# Patient Record
Sex: FEMALE | Race: WHITE | NOT HISPANIC OR LATINO | Employment: OTHER | URBAN - METROPOLITAN AREA
[De-identification: names, ages, dates, MRNs, and addresses within clinical notes are randomized per-mention and may not be internally consistent; named-entity substitution may affect disease eponyms.]

---

## 2017-01-17 ENCOUNTER — MYC MEDICAL ADVICE (OUTPATIENT)
Dept: FAMILY MEDICINE | Facility: CLINIC | Age: 67
End: 2017-01-17

## 2017-01-17 NOTE — Clinical Note
[facility]  [address] [place] [id number] [phone]      January 25, 2017      Shelia Sanchez  [facility] [address]              Dear Shelia,    In order to ensure we are providing the best quality care, we have reviewed your chart and see that you are due for:    1 FIT (stool card)  2 Mammogram- (scheduling line [phone])    Please call the clinic at your earliest convenience to schedule an appointment. Please complete the FIT card and send in. If you need another card, please schedule a lab only appt to pick one up.    Thank you for trusting us with your health care.    Sincerely,    Dr. Tahmina Hernández/wilbur

## 2017-01-17 NOTE — TELEPHONE ENCOUNTER
Panel Management Review          Composite cancer screening  Chart review shows that this patient is due/due soon for the following Mammogram and Fecal Colorectal (FIT)  Summary:    Patient is due/failing the following:   FIT and MAMMOGRAM    Action needed:   Patient needs to schedule mammogram  Complete FIT and send in OR schedule lab only appt to  another card if she needs it    Type of outreach:    Sent Zova message.    Questions for provider review:    None                                                                                                                                      Fauzia Jasmine MA       Chart routed to Care Team .

## 2017-03-24 ENCOUNTER — OFFICE VISIT (OUTPATIENT)
Dept: PALLIATIVE MEDICINE | Facility: CLINIC | Age: 67
End: 2017-03-24
Payer: COMMERCIAL

## 2017-03-24 VITALS
SYSTOLIC BLOOD PRESSURE: 143 MMHG | DIASTOLIC BLOOD PRESSURE: 82 MMHG | BODY MASS INDEX: 41.82 KG/M2 | HEART RATE: 88 BPM | WEIGHT: 267 LBS

## 2017-03-24 DIAGNOSIS — M54.2 CERVICAL PAIN: ICD-10-CM

## 2017-03-24 DIAGNOSIS — M47.812 CERVICAL FACET JOINT SYNDROME: ICD-10-CM

## 2017-03-24 DIAGNOSIS — M47.812 CERVICAL SPONDYLOSIS WITHOUT MYELOPATHY: ICD-10-CM

## 2017-03-24 DIAGNOSIS — M19.011 ARTHROSIS OF RIGHT ACROMIOCLAVICULAR JOINT: ICD-10-CM

## 2017-03-24 DIAGNOSIS — G44.86 CERVICOGENIC HEADACHE: ICD-10-CM

## 2017-03-24 DIAGNOSIS — M79.18 MYOFASCIAL PAIN: Primary | ICD-10-CM

## 2017-03-24 PROCEDURE — 99215 OFFICE O/P EST HI 40 MIN: CPT | Performed by: ANESTHESIOLOGY

## 2017-03-24 RX ORDER — GABAPENTIN 100 MG/1
100 CAPSULE ORAL 3 TIMES DAILY
Qty: 90 CAPSULE | Refills: 1 | Status: SHIPPED | OUTPATIENT
Start: 2017-03-24 | End: 2017-05-25

## 2017-03-24 ASSESSMENT — PAIN SCALES - GENERAL: PAINLEVEL: EXTREME PAIN (8)

## 2017-03-24 NOTE — NURSING NOTE
"Chief Complaint   Patient presents with     Pain     Neck pain        Initial /82  Pulse 88  Wt 121.1 kg (267 lb)  BMI 41.82 kg/m2 Estimated body mass index is 41.82 kg/(m^2) as calculated from the following:    Height as of 11/16/16: 1.702 m (5' 7\").    Weight as of this encounter: 121.1 kg (267 lb).  Medication Reconciliation: complete     Monse Villanueva MA    "

## 2017-03-24 NOTE — MR AVS SNAPSHOT
After Visit Summary   3/24/2017    Shelia Sanchez    MRN: [id number]           Patient Information     Date Of Birth          1950        Visit Information        Provider Department      3/24/2017 3:00 PM Rome Fowler MD [facility]        Today's Diagnoses     Myofascial pain    -  1    Cervical facet joint syndrome          Care Instructions    Assessment:  1.  Chronic neck pain  2.  Cervical spondylosis (arithritis)  3.  Cervical facet joint arthropathy  4.  Cervicogenic headache  5.  Myofascial pain  6.  Chronic right shoulder pain  7.  Right acromioclavicular joint arthropathy      Plan:  Diagnosis reviewed, treatment option addressed, and risk/benefits discussed.  Self-care instructions given.  I am recommending a multidisciplinary treatment plan to help this patient better manage her pain.      1. Physical Therapy: deferred at this time - continue home exercise  2. Clinical Health Psychologist to address issues of relaxation, behavioral change, coping style, and other factors important to improvement: deferred at this time - coping well  3. Diagnostic Studies: reviewed MRI with patient  4. Medication Management:   1. Continue Advil prn  2. trial of Gabapentin 100 mg at bedtime, with gradual titration to three times per day  5. Further procedures recommended:   1. Cervical medial branch block bilaterally at C3, C4, C5, and C6  2. Consider right acromioclavicular joint injection  6. Acupuncture: deferred  7. Urine toxicology screen today: deferred today - not requesting pain medications   8. Recommendations/follow-up for PCP:  Continue current treatment  9. Release of information: n/a  10. Follow up: for procedure and in 6-8 weeks      ----------------------------------------------------------------  Nurse Triage line:  [phone]   Call this number with any questions or concerns. You may leave a detailed message anytime. Calls are typically returned Monday  through Friday between 8 AM and 4:30 PM. We usually get back to you within 2 business days depending on the issue/request.       Medication refills:    For non-narcotic medications, call your pharmacy directly to request a refill. The pharmacy will contact the [facility] for authorization. Please allow 3-4 days for these refills to be processed.     For narcotic refills, call the nurse triage line or send a Vistaart message. Please contact us 7-10 days before your refill is due. The message MUST include the name of the specific medication(s) requested and how you would like to receive the prescription(s). The options are as follows:    Pain Clinic staff can mail the prescription to your pharmacy. Please tell us the name of the pharmacy.    You may pick the prescription up at the Pain Clinic (tell us the location) or during a clinic visit with your pain provider    Pain Clinic staff can deliver the prescription to the [facility] pharmacy in the clinic building. Please tell us the location.      Scheduling number: [phone].  Call this number to schedule or change appointments.    We believe regular attendance is key to your success in our program.    Any time you are unable to keep your appointment we ask that you call us at least 24 hours in advance to let us know. This will allow us to offer the appointment time to another patient.             Follow-ups after your visit        Additional Services     PAIN INJECTION EVAL/TREAT/FOLLOW UP                 Who to contact     If you have questions or need follow up information about today's clinic visit or your schedule please contact [facility] directly at [phone].  Normal or non-critical lab and imaging results will be communicated to you by MyChart, letter or phone within 4 business days after the clinic has received the results. If you do not hear from us within 7 days, please contact the clinic through MyChart or phone. If you have a  critical or abnormal lab result, we will notify you by phone as soon as possible.  Submit refill requests through QR Wild or call your pharmacy and they will forward the refill request to us. Please allow 3 business days for your refill to be completed.          Additional Information About Your Visit        Joobilihart Information     QR Wild gives you secure access to your electronic health record. If you see a primary care provider, you can also send messages to your care team and make appointments. If you have questions, please call your primary care clinic.  If you do not have a primary care provider, please call [phone] and they will assist you.        Care EveryWhere ID     This is your Care EveryWhere ID. This could be used by other organizations to access your [place] medical records  [id number]        Your Vitals Were     Pulse BMI (Body Mass Index)                88 41.82 kg/m2           Blood Pressure from Last 3 Encounters:   03/24/17 143/82   11/16/16 128/76   07/11/16 134/70    Weight from Last 3 Encounters:   03/24/17 121.1 kg (267 lb)   11/16/16 116.5 kg (256 lb 12.8 oz)   06/03/16 109.8 kg (242 lb)              We Performed the Following     PAIN INJECTION EVAL/TREAT/FOLLOW UP          Today's Medication Changes          These changes are accurate as of: 3/24/17  4:08 PM.  If you have any questions, ask your nurse or doctor.               Start taking these medicines.        Dose/Directions    gabapentin 100 MG capsule   Commonly known as:  NEURONTIN   Used for:  Myofascial pain        Dose:  100 mg   Take 1 capsule (100 mg) by mouth 3 times daily Take one at bedtime for 3 days, then one twice a day for 3 days, then one 3 times per day   Quantity:  90 capsule   Refills:  1            Where to get your medicines      These medications were sent to [facility] [address] [facility] [address] [facility] [address]  [address], [facility] [address]      Phone:  [phone]     gabapentin 100 MG capsule                Primary Care Provider Office Phone # Fax #    Tahmina Hernández [phone]       [address] [address]        Thank you!     Thank you for choosing [facility]  for your care. Our goal is always to provide you with excellent care. Hearing back from our patients is one way we can continue to improve our services. Please take a few minutes to complete the written survey that you may receive in the mail after your visit with us. Thank you!             Your Updated Medication List - Protect others around you: Learn how to safely use, store and throw away your medicines at www.disposemymeds.org.          This list is accurate as of: 3/24/17  4:08 PM.  Always use your most recent med list.                   Brand Name Dispense Instructions for use    amLODIPine 5 MG tablet    NORVASC    90 tablet    Take 1 tablet (5 mg) by mouth daily       aspirin 81 MG tablet      Take 81 mg by mouth daily       gabapentin 100 MG capsule    NEURONTIN    90 capsule    Take 1 capsule (100 mg) by mouth 3 times daily Take one at bedtime for 3 days, then one twice a day for 3 days, then one 3 times per day       hydrochlorothiazide 25 MG tablet    HYDRODIURIL    90 tablet    Take 1 tablet (25 mg) by mouth daily       NEXIUM PO          SLEEP-AID MAXIMUM STRENGTH 50 MG Caps   Generic drug:  DiphenhydrAMINE HCl (Sleep)      Take 1 capsule by mouth At Bedtime       venlafaxine 37.5 MG 24 hr capsule    EFFEXOR-XR    90 capsule    Take 1 capsule (37.5 mg) by mouth daily

## 2017-03-24 NOTE — PATIENT INSTRUCTIONS
Assessment:  1.  Chronic neck pain  2.  Cervical spondylosis (arithritis)  3.  Cervical facet joint arthropathy  4.  Cervicogenic headache  5.  Myofascial pain  6.  Chronic right shoulder pain  7.  Right acromioclavicular joint arthropathy      Plan:  Diagnosis reviewed, treatment option addressed, and risk/benefits discussed.  Self-care instructions given.  I am recommending a multidisciplinary treatment plan to help this patient better manage her pain.      1. Physical Therapy: deferred at this time - continue home exercise  2. Clinical Health Psychologist to address issues of relaxation, behavioral change, coping style, and other factors important to improvement: deferred at this time - coping well  3. Diagnostic Studies: reviewed MRI with patient  4. Medication Management:   1. Continue Advil prn  2. trial of Gabapentin 100 mg at bedtime, with gradual titration to three times per day  5. Further procedures recommended:   1. Cervical medial branch block bilaterally at C3, C4, C5, and C6  2. Consider right acromioclavicular joint injection  6. Acupuncture: deferred  7. Urine toxicology screen today: deferred today - not requesting pain medications   8. Recommendations/follow-up for PCP:  Continue current treatment  9. Release of information: n/a  10. Follow up: for procedure and in 6-8 weeks      ----------------------------------------------------------------  Nurse Triage line:  [phone]   Call this number with any questions or concerns. You may leave a detailed message anytime. Calls are typically returned Monday through Friday between 8 AM and 4:30 PM. We usually get back to you within 2 business days depending on the issue/request.       Medication refills:    For non-narcotic medications, call your pharmacy directly to request a refill. The pharmacy will contact the [facility] for authorization. Please allow 3-4 days for these refills to be processed.     For narcotic refills, call the nurse  triage line or send a HipLogiq message. Please contact us 7-10 days before your refill is due. The message MUST include the name of the specific medication(s) requested and how you would like to receive the prescription(s). The options are as follows:    Pain Clinic staff can mail the prescription to your pharmacy. Please tell us the name of the pharmacy.    You may pick the prescription up at the Pain Clinic (tell us the location) or during a clinic visit with your pain provider    Pain Clinic staff can deliver the prescription to the [facility] pharmacy in the clinic building. Please tell us the location.      Scheduling number: [phone].  Call this number to schedule or change appointments.    We believe regular attendance is key to your success in our program.    Any time you are unable to keep your appointment we ask that you call us at least 24 hours in advance to let us know. This will allow us to offer the appointment time to another patient.

## 2017-03-24 NOTE — PROGRESS NOTES
"  [facility] Consultation    Date of visit: 3/24/2017    Reason for consultation:    Shelia Sanchez is a 66 year old female who is seen today for transfer of care from Dr Lincoln Fernando for management of chronic pain.      Primary Care Provider is Tahmina Hernández.  Pain medications are being prescribed by:  None at this time.    Please see the [facility] health questionnaire which the patient completed and reviewed with me in detail.    Chief Complaint:    Chief Complaint   Patient presents with     Pain     Neck pain      She was last seen by Dr Fernando on 6/3/16 and had right shoulder joint performed on 6/6/16 and left hip bursa injection performed on 7/11/16.    The treatment plan on 6/3/16 included:  Plan:  Diagnosis reviewed, treatment option addressed, and risk/benefits discussed. Self-care instructions given. I am recommending a multidisciplinary treatment plan to help this patient better manage her pain.      1. Physical Therapy: Indicated, to improve posture for head and neck and learn better exercise \"do's and don'ts\" and also to address IT band syndrome and correct stretching. Progress to an IHEP  2. Clinical Health Psychologist to address issues of relaxation, behavioral change, coping style, and other factors important to improvement. Deferred, pt coping well.   3. Diagnostic Studies: Consider CMBB for neck pain if not enough response after shoulder GH joint injection  4. Medication Management: Pt wishes to avoid medication changes. Effexor dose escalation discussed as an option for a med she already takes/tolerates  5. Further procedures recommended: Fluoro guided GT bursa steroid injection due to obesity and inconsistent results from blind approaches  6. Acupuncture: consider  7. Opiate contract and urine toxicology screen today: Narcotics not indicated, and not requested   8. Recommendations/follow-up for PCP:  Defer pain complaints to our clinic for " consistent management and guidance   9. Follow up: 2-3 Months     See Patient Instructions for further details of our treatment plan- which were reviewed by myself with the patient.      Patient Instructions   There are many things that we could do for the various pains:     1. Right shoulder joint steroid injection using an Ultrasound machine to get into the joint space (used in combination with the stretches and posture changes you can learn about). We will schedule you for 3:15pm this coming Monday- they will call today or Monday to formally schedule it.   2. Physical therapy to learn how to change your posture; especially the neck and upper body/shoulders. Also can work on the left hip and other issues as they come up. Call the number below to schedule PT.  3. The left hip injection can be done in about 1 month from now (1 month after the shoulder steroid injection) and we will use X-ray guidance to get it right into the best location.   4. There are many other potential treatments for headaches and neck pains if they become more problematic in the future  5. Your Effexor medication could help with pain if needed, at higher doses: upwards of 300mg/day- titration up slowly by ~37.5mg increments every 1-2 months. (similarly slowly down and off). There are still many other medications that we could consider.      Pain history:  Shelia Sanchez is a 66 year old female who first started having problems with pain in the neck, shoulder, and hip a couple of years ago.  She states that her pain started with headaches in the back of her head and that her neck pain and headaches have been getting worse.  She complains of a stiffness in the neck as well.  Her neck is what hurts her the most at this time.  She stopped doing upper extremity exercises at the gym and her shoulder pain has pretty much resolved.    She complains of constant, sharp pain in the neck worse on the left and is associated with headache.  She has  decreased range of motion to the left due to pain as well.  She denies upper extremity pain.  She denies upper extremity numbness or tingling.  She denies weakness, does have occasional unsteadiness on first rising.    Pain rating: intensity ranges from 3/10 to 8/10, and Averages 7/10 on a 0-10 scale.  Aggravating factors include: driving, turning to left, shopping, cleaning, worse on first rising, looking up  Relieving factors include: advil, heat,   Any bowel or bladder incontinence: denies changes    Current treatments include:  Advil 200 mg three tabs prn  Effexor 37.5 mg daily    Previous medication treatments included:  As above    Other treatments have included:  Shelia Sanchez has been seen at a pain clinic in the past.  Dr Fernando  PT: last treatment in 10/16 - not helpful, at times made worse  Chiropractic: denies  Acupuncture: denies  TENs Unit: denies  Injections: right shoulder injection and left hip bursa injection - helped    Past Medical History:  Past Medical History:   Diagnosis Date     Arthritis 1/28/14    Bursitis in left hip     Asteatotic eczema      Gallstone pancreatitis      HTN      Obesity      Past Surgical History:  Past Surgical History:   Procedure Laterality Date     C VAG HYST,RMV TUBE/OVARY  2004     CHOLECYSTECTOMY, LAPOROSCOPIC      Cholecystectomy, Laparoscopic     HC ERCP W STENT PLACEMENT BILE/PANCREATIC DUCT       Medications:  Current Outpatient Prescriptions   Medication Sig Dispense Refill     Esomeprazole Magnesium (NEXIUM PO)        venlafaxine (EFFEXOR-XR) 37.5 MG 24 hr capsule Take 1 capsule (37.5 mg) by mouth daily 90 capsule 3     hydrochlorothiazide (HYDRODIURIL) 25 MG tablet Take 1 tablet (25 mg) by mouth daily 90 tablet 3     amLODIPine (NORVASC) 5 MG tablet Take 1 tablet (5 mg) by mouth daily 90 tablet 3     aspirin 81 MG tablet Take 81 mg by mouth daily       DiphenhydrAMINE HCl, Sleep, (SLEEP-AID MAXIMUM STRENGTH) 50 MG CAPS Take 1 capsule by mouth At  Bedtime       Allergies:   No Known Allergies  Social History:  Home situation: single, lives alone, no children  Occupation/Schooling: retired , B.S.  Tobacco use: never  Alcohol use: occasional  Drug use: never  History of chemical dependency treatment: denies    Family history:  Family History   Problem Relation Age of Onset     Arthritis Mother      Congenital Anomalies Mother      Eye Disorder Mother      Glaucoma Mother      Congenital Anomalies Father      Alzheimer Disease Father      Arthritis Father      HEART DISEASE Maternal Grandmother      HEART DISEASE Maternal Grandfather      HEART DISEASE Paternal Grandfather      Depression Sister      Hypertension Sister      Macular Degeneration Maternal Uncle      DIABETES Father      Hypertension Mother      Hypertension Father      Hypertension No family hx of      Hypertension Paternal Grandmother      CEREBROVASCULAR DISEASE Father      Prostate Cancer Father      Depression Mother      Obesity Father      Obesity Mother      Family history of headaches: n/a    Review of Systems:  Skin: rash  Eyes: negative  Ears/Nose/Throat: vertigo  Respiratory: No shortness of breath, dyspnea on exertion, cough, or hemoptysis  Cardiovascular: lower extremity edema  Gastrointestinal: negative  Genitourinary: incontinence  Musculoskeletal: neck pain and joint pain  Neurologic: headaches  Psychiatric: negative  Hematologic/Lymphatic/Immunologic: easy bruising  Endocrine: negative    Physical Exam:  Vitals:    03/24/17 1500   BP: 143/82   Pulse: 88   Weight: 121.1 kg (267 lb)     Exam:  Constitutional: healthy, alert, active, no distress, cooperative, smiling and over weight  Head: normocephalic. Atraumatic.   Eyes: no redness or jaundice noted   ENT: oropharnx normal.  MMM.  Neck supple.    Cardiovascular: no edema or JVD appreciated, palpable pulses  Respiratory: non-labored, equal expansion, no audible wheezing, speaking in full sentences  Gastrointestinal:  soft, non-tender, normoactive bowel sounds   : deferred  Skin: no suspicious lesions or rashes  Psychiatric: mentation appears normal and affect normal/bright    Musculoskeletal exam:  Gait/Station/Posture: grossly intact and non-antalgic, toe and heel walk intact  Cervical spine: significantly decreased range of motion on lateral rotation left greater than right and with extension.  tender to palpation at the upper to mid cervical articular pillars bilaterally, left greater than right, positive facet loading left greater than right    Thoracic spine:  Non-tender    Lumbar spine: flexes well without pain, exam limited    Myofascial tenderness:  Cervical paraspinal muscles  Straight leg exam: not tested  Nura's maneuver: not tested    Right shoulder with mildly decreased range of motion on internal rotation, tender at the acromioclavicular joint, Apley scratch testing equivocal right, negative left    Neurologic exam:  CN:  Cranial nerves 2-12 are grossly intact  Motor:  5/5 UE and LE strength throughout  Reflexes:  Trace throughout  Other reflexes:  Plantar response downgoing bilaterally, no clonus, Godfrey's equivocal bilaterally  Sensory:  (upper and lower extremities):   Light touch: grossly intact throughout   Vibration: not tested   Allodynia: absent    Dysethesia: absent    Hyperalgesia: absent     Diagnostic tests:  MRI of the cervical spine was completed on 3/2/16 showing:  FINDINGS: The spinal cord appears normal. The paraspinal soft tissues  appear normal. The bone marrow has normal signal intensity.      C2-C3: Mild annular disc bulge. Central canal and neural foramina are  normal.     C3-C4: Degeneration of the disc. Mild annular disc bulge. Mild  degenerative change in the facet joints.     C4-C5: Degeneration of the disc. Mild annular disc bulge with an  associated posterior osteophyte extending greater to the right of  midline. Central canal is normal. There is mild right foraminal  stenosis due  to an uncinate spur.     C5-C6: Degeneration of the disc. Diffuse annular disc bulge with  associated posterior osteophyte. Central canal lower limits of normal.  Neural foramina are adequate.     C6-C7: Degeneration of the disc. Small central disc protrusion.  Central canal and neural foramina are normal.     C7-T1: Normal disc, facet joints, spinal canal and neural foramina.         IMPRESSION:   1. Multilevel degenerative change.  2. No focal right-sided disc herniations are seen.  3. There is mild right C4-C5 foraminal stenosis due to an uncinate  spur.    Other testing (labs, diagnostics):  MR RIGHT SHOULDER WITHOUT CONTRAST 5/21/2016 11:32 AM     HISTORY: Progressive rotator cuff shoulder pain, right. Pain in right  shoulder. Other chronic pain. Impingement syndrome of right shoulder.     TECHNIQUE: Coronal oblique T1, T2, and fat suppressed T2, sagittal  oblique T2, and transverse proton density and T2 weighted images.     FINDINGS:   Osseous acromial outlet: There is a Type I subacromial configuration.  No apparent subacromial spur. There are mild degenerative changes at  the acromioclavicular joint which only minimally indent the  supraspinatus outlet.     Rotator cuff: No supraspinatus, infraspinatus, or subscapularis  tendinosis or tear. No isolated rotator cuff muscular atrophy.     Labral Structures: The glenoid labrum appears within normal limits. No  apparent SLAP lesion. No paralabral cysts.     Biceps Tendon: No long head biceps tendon tear, subluxation, or  tendinosis.     Osseous structures: Glenohumeral joint degenerative changes are noted  with inferomedial humeral head osteophytic spurring. There are also  small foci of chondral fissuring or small articular cartilage defects  along the glenoid fossa and medial articular surface of the humeral  head. Foci of subchondral marrow edema and cyst formation are also  noted in the superior aspect of the humeral head.     Joint space: There is a small  to moderate glenohumeral joint effusion.  Debris is noted within the subscapularis and axillary recesses of the  glenohumeral joint.     Additional findings: No abnormal bursal signal. No deltoid muscle  edema.          IMPRESSION:   1. Glenohumeral joint degenerative arthrosis with humeral head and  glenoid chondromalacia and mild inferomedial humeral head osteophytic  spurring.  2. Glenohumeral joint effusion with debris noted in the subscapularis  and axillary recesses of the glenohumeral joint.  3. No rotator cuff tendinosis or tear.      Screening tools:  DIRE Score for ongoing opioid management is calculated as follows:    Diagnosis = 1    Intractability = 2    Risk: Psych = 3  Chem Hlth = 3  Reliability = 3  Social = 3    Efficacy = 2    Total DIRE Score = 17 (14 or higher predicts good candidate for ongoing opioid management; 13 or lower predicts poor candidate for opioid management)     Assessment:  1.  Chronic neck pain  2.  Cervical spondylosis (arithritis)  3.  Cervical facet joint arthropathy  4.  Cervicogenic headache  5.  Myofascial pain  6.  Chronic right shoulder pain  7.  Right acromioclavicular joint arthropathy    Shelia Sanchez is a 66 year old female who presents with the complaints of worsening chronic neck pain with decreased range of motion due to pain and stiffness.  Her examination today is consistent with cervical facet arthropathy, left greater than right.  she also had right A/C joint tenderness on exam today. Our treatment plan is as outlined below.    Plan:  Diagnosis reviewed, treatment option addressed, and risk/benefits discussed.  Self-care instructions given.  I am recommending a multidisciplinary treatment plan to help this patient better manage her pain.      1. Physical Therapy: deferred at this time - continue home exercise  2. Clinical Health Psychologist to address issues of relaxation, behavioral change, coping style, and other factors important to improvement:  deferred at this time - coping well  3. Diagnostic Studies: reviewed MRI with patient  4. Medication Management:   1. Continue Advil prn  2. trial of Gabapentin 100 mg at bedtime, with gradual titration to three times per day  5. Further procedures recommended:   1. Cervical medial branch block bilaterally at C3, C4, C5, and C6  2. Consider right acromioclavicular joint injection  6. Acupuncture: deferred  7. Urine toxicology screen today: deferred today - not requesting pain medications   8. Recommendations/follow-up for PCP:  Continue current treatment  9. Release of information: n/a  10. Follow up: for procedure and in 6-8 weeks    Total time spent was 60 minutes, and more than 50% of face to face time was spent in counseling and/or coordination of care regarding principles of multidisciplinary care, medication management, and interventional procedures to decrease pain and improve function.

## 2017-03-27 ENCOUNTER — TELEPHONE (OUTPATIENT)
Dept: PALLIATIVE MEDICINE | Facility: CLINIC | Age: 67
End: 2017-03-27

## 2017-03-27 NOTE — TELEPHONE ENCOUNTER
She should take her bp meds.    Called pt and left message relaying the above.    Beatris Cortes RN-BSN  [facility]

## 2017-03-27 NOTE — TELEPHONE ENCOUNTER
Pre-screening questions for Radiology Injections:    Injection to be done at which interventional clinic site? [facility] [place]    Procedure ordered by Dr. Rebecca Vicente    Procedure ordered? Cervical Medial Branch Block    What insurance would patient like us to bill for this procedure? [employer]      [employer]- Any injection DO NOT SCHEDULE and route to Romy Thao.      [employer] insurance - If scheduling an SI joint injection DO NOT SCHEDULE and route to Romy Thao.     [employer]- MBB's must be scheduled at LEAST two weeks apart      [employer] - Any injection besides hip/shoulder/knee joint DO NOT SCHEDULE and route to Romy Thao. She will obtain PA and call pt back to schedule procedure or notify pt of denial.     Is an  needed? No     Patient has a drive home? (mandatory) Yes     Is patient taking any blood thinners (plavix, coumadin, jantoven, warfarin, heparin, pradaxa or dabigatran )? No   (If so, do not schedule, contact RN and/or MD)     Is patient taking any aspirin products? Yes - Pt takes 81mg daily; instructed to hold 6 day(s) prior to procedure.    (If more than 325mg/day do not schedule; Contact RN/MD. For all non-cervical interventional procedures if patient is taking MORE than 325mg/day, limit aspirin to 81-325mg/day x 1 week. No hold required day of procedure.  For CERVICAL procedures, hold all aspirin products for 6 days.)      Does the patient have a bleeding or clotting disorder? No   (If yes, okay to schedule, but contact RN/MD).  **For any patients with platelet count <100, must be forwarded to provider**    Is patient diabetic? NO If YES, have them bring their glucometer.    Does patient have an active infection or treated for one within the past week? No    Is patient currently taking any antibiotics?  No  For patients on chronic, preventative, or prophylactic antibiotics, procedures can be scheduled.   For patients on antibiotics for active or  recent infection:  Jose Mendoza [place], [place]-antibiotic course must have been completed for 4 days  Christy German-antibiotic course must have been completed for 7 days    Is patient currently taking any steroid medications? (i.e. Prednisone, Medrol)  No   For patients on steroid medications:  Jose Mendoza [place], [place]-steroid course must have been completed for 4 days  Christy German-steroid course must have been completed for 7 days    Review with patient:  If you are started on any steroids or antibiotics between now and your appointment, you must contact us because it may affect our ability to perform your procedure Informed    Is patient actively being treated for cancer or immunocompromised, including the spleen having been removed? No  **For Dr. Schulte patients without spleens should have the chart sent to her**  (If YES, do NOT schedule and route to RN)    Are you able to get on and off an exam table with minimal or no assistance? Yes  (If NO, do NOT schedule and route to RN)  Are you able to roll over and lay on your stomach with minimal or no assistance? Yes  (If NO, do NOT schedule and route to RN)         Any allergies to contrast dye, iodine, shellfish, or numbing and steroid medications? No  (If so, inform nursing and note in scheduling comments.)    Allergies: Review of patient's allergies indicates no known allergies.      Any chance of pregnancy? No    Has the patient had a flu shot or any other vaccinations within 7 days before or after the procedure.    No       Does patient have an MRI/CT? March 2016  (SI joint, hip injections, lumbar sympathetic blocks, and stellate ganglion blocks do not require an MRI)    If so, was it done at [place]? Yes      If not, where was it done?      Was the MRI done w/in the last 3 years? Yes     If MRI was not done at [place], [facility] or [facility] do NOT schedule. Route to nursing.  (If pt has disc the injection can be  scheduled but pt has to bring disc to appt. If they show up w/out disc the injection cannot be done)      **Must be scheduled with elapsed time interval of at least 2 weeks and not more than 6 months between the First MBB and the Second MBB**       Medial Branch Block Pre-Procedure Instructions    It is okay to take long acting pain medications (if you are on them) the day of the procedure but try not to take any short acting medications unless absolutely necessary. Informed        Long acting meds would include: Gabapentin (Neurontin), MS Contin, Oxycontin        Short acting meds would include:  Percocet, Oxycodone, Vicodin, Ibuprofen     The day of the procedure, you should try to do things that provoke your pain, since the injection is being done to see if it will relieve your pain . Informed    If your pain level is a 4 out of 10 or less on the day of the procedure, please call [phone] to reschedule.  Informed      Reminders (please tell patient if applicable):        Instructed pt to arrive 30 minutes early for IV start if this is for a cervical procedure, ALL sympathetic (stellate ganglion, hypogastric, or lumbar sympathetic block) and all sedation procedures (RFA, spinal cord stimulation trials). Informed        -IVs are not routinely placed for Garcia and [place] cervical cases       If NPO for sedation, it is okay to take medications with sips of water (except if they are to hold blood thinners). N/A   *DO take blood pressure medication if it is prescribed*      If this is for a cervical MBB aspirin needs to be held for 6 days.  Informed      Do not schedule procedures requiring IV placement in the first appointment after lunch         For patients 85 or older we recommend having an adult stay w/ them for the remainder of the day.              Does the patient have any questions? Amlodipine Besylate- 5 mg once daily. Patient is on a blood pressure medication and she wants to know if that will be okay to  continue on.

## 2017-04-13 ENCOUNTER — RADIANT APPOINTMENT (OUTPATIENT)
Dept: RADIOLOGY | Facility: CLINIC | Age: 67
End: 2017-04-13
Attending: ANESTHESIOLOGY
Payer: COMMERCIAL

## 2017-04-13 ENCOUNTER — RADIOLOGY INJECTION OFFICE VISIT (OUTPATIENT)
Dept: PALLIATIVE MEDICINE | Facility: CLINIC | Age: 67
End: 2017-04-13
Payer: COMMERCIAL

## 2017-04-13 VITALS — DIASTOLIC BLOOD PRESSURE: 70 MMHG | SYSTOLIC BLOOD PRESSURE: 140 MMHG | HEART RATE: 83 BPM

## 2017-04-13 DIAGNOSIS — M47.812 CERVICAL FACET JOINT SYNDROME: Primary | ICD-10-CM

## 2017-04-13 DIAGNOSIS — M47.812 CERVICAL SPONDYLOSIS WITHOUT MYELOPATHY: ICD-10-CM

## 2017-04-13 PROCEDURE — 64492 INJ PARAVERT F JNT C/T 3 LEV: CPT | Mod: LT | Performed by: ANESTHESIOLOGY

## 2017-04-13 PROCEDURE — 64490 INJ PARAVERT F JNT C/T 1 LEV: CPT | Mod: LT | Performed by: ANESTHESIOLOGY

## 2017-04-13 PROCEDURE — 64491 INJ PARAVERT F JNT C/T 2 LEV: CPT | Mod: LT | Performed by: ANESTHESIOLOGY

## 2017-04-13 ASSESSMENT — PAIN SCALES - GENERAL: PAINLEVEL: EXTREME PAIN (8)

## 2017-04-13 NOTE — NURSING NOTE
"Chief Complaint   Patient presents with     Pain       Initial /84  Pulse 88 Estimated body mass index is 41.82 kg/(m^2) as calculated from the following:    Height as of 11/16/16: 1.702 m (5' 7\").    Weight as of 3/24/17: 121.1 kg (267 lb).  Medication Reconciliation: complete       Injection intake:    If this procedure is requiring IV sedation has patient been NPO for 6  Hours? NA    Is patient on coumadin, plavix or other prescribed blood thinner?   No    If patient is on coumadin was it held for 5 days?   NA    If patient is on plavix was it held for 7 days?    NA     Does patient take aspirin?  No    If this is for a cervical procedure and patient is on aspirin has it been held for 6 days?   NA    Any allergies to contrast dye, iodine, steroid and/or numbing medications?  NO    Is patient currently taking antibiotics or have an active infection?  NO    Does patient have a ? Yes       Is patient pregnant or breastfeeding?  NO    Are the vital signs normal?  Yes    Mary Rodriguez CMA ([facility])        "

## 2017-04-13 NOTE — MR AVS SNAPSHOT
After Visit Summary   4/13/2017    Shelia Sanchez    MRN: [id number]           Patient Information     Date Of Birth          1950        Visit Information        Provider Department      4/13/2017 3:15 PM Rome Fowler MD [facility]        Care Instructions    [facility]   Medial Branch Block Discharge Instructions      Your procedure was performed by:  Dr. Rome Vicente      Medications used include:  Lidocaine    Omnipaque      You will need to complete the Pain Scale Log form and return it to us as soon as possible.  Once we have received the form, we will review it and call you to determine the next steps.     The form can be faxed to [phone] or mailed to:   [facility]   [address]   [place] [address]      You may resume your regular medications    You may resume your regular activities    Be cautious with walking as numbness and/or weakness in the lower extremities may occur for up to 6-8 hours due to the effects of the anesthetic.    Avoid driving for 6 hours. The local anesthetic could slow your reflexes.     You may shower, however no swimming or tub baths or hot tubs for 24 hours following your procedure.    Your pain will return after the numbing medications have worn off.  You may use your current pain medications as needed.    You may use anti-inflammatory medications (such as ibuprofen, aleve, or advil) or Tylenol for pain control if necessary.  Some people find it helpful to alternate ibuprofen and Tylenol every 3 hours for a couple of days.    You may use ice packs 10-15 minutes three to four times a day at the injection site for comfort.     Do not use heat to painful areas for 6 to 8 hours. This will give the local anesthetic time to wear off and prevent you from accidentally burning your skin.     If you experience any of the following, call the pain center nursing line during work  hours at [phone] or the after hours provider line at [phone]:  -Fever over 100 degree F  -Swelling, bleeding, redness, drainage, warmth at the injection site  -Progressive weakness or numbness in your arms  -If cervical, call if you have any unusual headache that is not relieved by Tylenol  -Unusual new onset of pain that is not improving          Follow-ups after your visit        Your next 10 appointments already scheduled     May 16, 2017  2:30 PM CDT   Return Visit with Rome Vicente MD   [facility] [place] ([facility] [place])    [address]  [place] [address] [phone]              Who to contact     If you have questions or need follow up information about today's clinic visit or your schedule please contact [facility] [place] directly at [phone].  Normal or non-critical lab and imaging results will be communicated to you by MyChart, letter or phone within 4 business days after the clinic has received the results. If you do not hear from us within 7 days, please contact the clinic through [employer] or phone. If you have a critical or abnormal lab result, we will notify you by phone as soon as possible.  Submit refill requests through nprogress or call your pharmacy and they will forward the refill request to us. Please allow 3 business days for your refill to be completed.          Additional Information About Your Visit        MyChart Information     nprogress gives you secure access to your electronic health record. If you see a primary care provider, you can also send messages to your care team and make appointments. If you have questions, please call your primary care clinic.  If you do not have a primary care provider, please call [phone] and they will assist you.        Care EveryWhere ID     This is your Care EveryWhere ID. This could be used by other organizations to access your [place] medical records  [id number]        Your  Vitals Were     Pulse                   83            Blood Pressure from Last 3 Encounters:   04/13/17 140/70   03/24/17 143/82   11/16/16 128/76    Weight from Last 3 Encounters:   03/24/17 121.1 kg (267 lb)   11/16/16 116.5 kg (256 lb 12.8 oz)   06/03/16 109.8 kg (242 lb)              Today, you had the following     No orders found for display       Primary Care Provider Office Phone # Fax #    Tahmina Hernández [phone]       [facility] [address] [address]        Thank you!     Thank you for choosing [facility]  for your care. Our goal is always to provide you with excellent care. Hearing back from our patients is one way we can continue to improve our services. Please take a few minutes to complete the written survey that you may receive in the mail after your visit with us. Thank you!             Your Updated Medication List - Protect others around you: Learn how to safely use, store and throw away your medicines at www.disposemymeds.org.          This list is accurate as of: 4/13/17  4:30 PM.  Always use your most recent med list.                   Brand Name Dispense Instructions for use    amLODIPine 5 MG tablet    NORVASC    90 tablet    Take 1 tablet (5 mg) by mouth daily       aspirin 81 MG tablet      Take 81 mg by mouth daily       gabapentin 100 MG capsule    NEURONTIN    90 capsule    Take 1 capsule (100 mg) by mouth 3 times daily Take one at bedtime for 3 days, then one twice a day for 3 days, then one 3 times per day       hydrochlorothiazide 25 MG tablet    HYDRODIURIL    90 tablet    Take 1 tablet (25 mg) by mouth daily       NEXIUM PO          SLEEP-AID MAXIMUM STRENGTH 50 MG Caps   Generic drug:  DiphenhydrAMINE HCl (Sleep)      Take 1 capsule by mouth At Bedtime       venlafaxine 37.5 MG 24 hr capsule    EFFEXOR-XR    90 capsule    Take 1 capsule (37.5 mg) by mouth daily

## 2017-04-13 NOTE — NURSING NOTE
24 gauge Peripheral IV inserted into left anticubital - attempts: 1    Briana Candelaria RN, [facility]  Pain Clinic Care Coordinator

## 2017-04-13 NOTE — PATIENT INSTRUCTIONS
[facility]   Medial Branch Block Discharge Instructions      Your procedure was performed by:  Dr. Rome Vicente      Medications used include:  Lidocaine    Omnipaque      You will need to complete the Pain Scale Log form and return it to us as soon as possible.  Once we have received the form, we will review it and call you to determine the next steps.     The form can be faxed to [phone] or mailed to:   [facility]   [address]   [place] [address]      You may resume your regular medications    You may resume your regular activities    Be cautious with walking as numbness and/or weakness in the lower extremities may occur for up to 6-8 hours due to the effects of the anesthetic.    Avoid driving for 6 hours. The local anesthetic could slow your reflexes.     You may shower, however no swimming or tub baths or hot tubs for 24 hours following your procedure.    Your pain will return after the numbing medications have worn off.  You may use your current pain medications as needed.    You may use anti-inflammatory medications (such as ibuprofen, aleve, or advil) or Tylenol for pain control if necessary.  Some people find it helpful to alternate ibuprofen and Tylenol every 3 hours for a couple of days.    You may use ice packs 10-15 minutes three to four times a day at the injection site for comfort.     Do not use heat to painful areas for 6 to 8 hours. This will give the local anesthetic time to wear off and prevent you from accidentally burning your skin.     If you experience any of the following, call the pain center nursing line during work hours at [phone] or the after hours provider line at [phone]:  -Fever over 100 degree F  -Swelling, bleeding, redness, drainage, warmth at the injection site  -Progressive weakness or numbness in your arms  -If cervical, call if you have any unusual headache that is not relieved by Tylenol  -Unusual  new onset of pain that is not improving

## 2017-04-14 NOTE — PROGRESS NOTES
Pre procedure Diagnosis: cervical facet arthropathy, Cervical spondylosis   Post procedure Diagnosis: Same  Procedure performed: diagnostic cervical medial branch block #1 on the left at C3, C4, C5, and C6, fluoroscopically guided, contrast controlled  Anesthesia: none  Complications: none  Operators: Rome Vicente MD    Indications:   Shelia Sanchez is a 66 year old female who presents today for cervical facet procedures.  The patient has a history of chronic left greater than right nek pain that is worse with extension and lateral rotation and associated with decreased range of motion to the left.  Exam shows tenderness along the left greater than right cervical articular pillars and increased pain with extension and lateral rotation left greater than right and they have tried conservative treatment including physical therapy, activity modifications, medications, and prior injections.    Options/alternatives, benefits and risks were discussed with the patient including but not limited to bleeding, infection, tissue trauma, exposure to radiation, reaction to medications, spinal cord injury, weakness, numbness and paralysis.  Questions were answered to her satisfaction and she wishes to proceed. Voluntary informed consent was obtained and signed.     MRI Cervical spine dated 3/2/16 shows;  FINDINGS: The spinal cord appears normal. The paraspinal soft tissues appear normal. The bone marrow has normal signal intensity.      C2-C3: Mild annular disc bulge. Central canal and neural foramina are normal.     C3-C4: Degeneration of the disc. Mild annular disc bulge. Mild  degenerative change in the facet joints.     C4-C5: Degeneration of the disc. Mild annular disc bulge with an  associated posterior osteophyte extending greater to the right of  midline. Central canal is normal. There is mild right foraminal  stenosis due to an uncinate spur.     C5-C6: Degeneration of the disc. Diffuse annular disc bulge  with  associated posterior osteophyte. Central canal lower limits of normal. Neural foramina are adequate.     C6-C7: Degeneration of the disc. Small central disc protrusion.  Central canal and neural foramina are normal.     C7-T1: Normal disc, facet joints, spinal canal and neural foramina.         IMPRESSION:   1. Multilevel degenerative change.  2. No focal right-sided disc herniations are seen.  3. There is mild right C4-C5 foraminal stenosis due to an uncinate spur.    Vitals were reviewed: Yes  /70 (BP Location: Right arm, Patient Position: Chair, Cuff Size: Adult Large)  Pulse 83  Allergies were reviewed:  Yes   Medications were reviewed:  Yes   Pre-procedure pain score: 7/10 with facet loading    Procedure:  After obtaining signed, informed consent, the patient was taken to the procedure room and placed in the prone position on the [facility] frame.  A Pause for the Cause was completed prior to procedure start.  The patient was prepped and draped in the usual sterile fashion.    The areas of interest were identified with fluoroscopy.  The skin and subcutaneous tissue were anesthetized by injecting Lidocaine 1% 1 ml at each injection site utilizing a 27 gauge 1.25 inch needle.  Then,  27 gauge 3.5 inch spinal needles with slightly curved tips were advanced tangential to the medial branch nerves, abutting the articular pillars at C3, C4, C5, and C6 on the left utilizing AP and Lateral fluoroscopic guidance.  Aspiration for blood and CSF was negative at all the levels.    Then, needle positions were verified by injecting Omnipaque 300 at each level which showed good needle placement and adequate spread without intravascular or intrathecal uptake at each level.  Omnipaque used:  1 ml.   Omnipaque wasted:  9 ml.  Then, after repeated negative aspiration, each level was injected with 0.5 ml of 2% Lidocaine and the needles were restyletted and withdrawn.    The injection sites were cleaned and sterile  dressings were applied where indicated.    The patient tolerated the procedure well without complications and was taken to the recovery area for continued observation.  Fluoroscopic images were saved to PACS.    The patient was re-evaluated following the procedure and they noted decreased pain and improved range of motion.    Post-procedure pain:  3/10    The patient was given a pain diary and instructed to document their pain throughout the day.  The patient was asked to return the pain diary the next day in person or by fax at which time it will be reviewed and the decision made whether or not to proceed with Medial Branch Block #2.    Follow-up:  To be determined    Rome Vicente MD  [facility]

## 2017-04-21 ENCOUNTER — TELEPHONE (OUTPATIENT)
Dept: PALLIATIVE MEDICINE | Facility: CLINIC | Age: 67
End: 2017-04-21

## 2017-04-21 NOTE — TELEPHONE ENCOUNTER
Information noted.  Will schedule MBB # 2 once pain diary is received if appropriate.    Rome Vicente MD  [facility]

## 2017-04-21 NOTE — TELEPHONE ENCOUNTER
Patient had a diagnostic cervical medial branch block #1 on the left at C3, C4, C5, and C6, fluoroscopically guided, contrast controlled medial branch block # 1 on 4/13/17.   Called patient for an update. Pt reported the following details:  Worked well, gave good pain relief for a few days.   If needed, remind pt that this procedure is not supposed to give them long term pain relief, only a few hours.     Has patient sent back the post injection form?YES    If this is medial branch block #1 does patient want to schedule medial branch block #2? YES

## 2017-04-25 ENCOUNTER — MYC MEDICAL ADVICE (OUTPATIENT)
Dept: FAMILY MEDICINE | Facility: CLINIC | Age: 67
End: 2017-04-25

## 2017-04-25 NOTE — LETTER
[facility]  [address] [place] [address] [phone]      May 2, 2017      Shelia Sanchez  [facility] [address]              Dear Shelia,    In order to ensure we are providing the best quality care, we have reviewed your chart and see that you are due for:     1 FIT (stool card)   2 Mammogram- (scheduling line [phone])     Please call the clinic at your earliest convenience to schedule an appointment. Please complete the FIT card and send in. If you need another card, please schedule a lab only appt to pick one up.     Thank you for trusting us with your health care.     Sincerely,     Dr. Tahmina Hernández/wilbur

## 2017-04-25 NOTE — TELEPHONE ENCOUNTER
Panel Management Review       Composite cancer screening  Chart review shows that this patient is due/due soon for the following Mammogram and Fecal Colorectal (FIT)  Summary:    Patient is due/failing the following:   FIT and MAMMOGRAM    Action needed:   Patient needs office visit for Mammogram and FIT.    Type of outreach:    Sent Dragonplay message.    Questions for provider review:    None                                                                                                                                      Fauzia Jasmine MA       Chart routed to Care Team .

## 2017-04-26 ENCOUNTER — TELEPHONE (OUTPATIENT)
Dept: PALLIATIVE MEDICINE | Facility: CLINIC | Age: 67
End: 2017-04-26

## 2017-04-26 NOTE — TELEPHONE ENCOUNTER
Left voicemail for patient to schedule CMBB2.    Elizabeth CHAMBERS    [facility]

## 2017-04-26 NOTE — TELEPHONE ENCOUNTER
Forms rcvd.     Briana Candelaria RN, [facility]  Pain Clinic Care Coordinator

## 2017-04-26 NOTE — TELEPHONE ENCOUNTER
Pt had a cervical  medial branch block # 1 on 4/13/17.  The post medial branch block form was received.      According to pain diary pt would like to proceed with medial branch block #2.    Max relief from block is:  71.4%  Insurance:  [facility]   Does pt have adequate relief insurance-wise to proceed to medial branch block #2?  Yes: [facility] covers if >50%     Routed to scheduling coordinators to call pt to schedule cervical  medial branch block #2 with Dr. Rebecca Vicente .    Beatris Cortes, RN-BSN  [facility]

## 2017-05-04 NOTE — TELEPHONE ENCOUNTER
Pre-screening questions for Radiology Injections:    Injection to be done at which interventional clinic site? [facility] [place]    Procedure ordered by Dr. Rebecca Vicente    Procedure ordered? Cervical Medial Branch Block    What insurance would patient like us to bill for this procedure? [employer]      [employer]'s comp- Any injection DO NOT SCHEDULE and route to Romy Thao.      [employer] insurance - If scheduling an SI joint injection DO NOT SCHEDULE and route to Romy Thao.     [employer]- MBB's must be scheduled at LEAST two weeks apart      [employer] - Any injection besides hip/shoulder/knee joint DO NOT SCHEDULE and route to Romy Thao. She will obtain PA and call pt back to schedule procedure or notify pt of denial.     [employer]- PA required for all MBB's    Is an  needed? No     Patient has a drive home? (mandatory) Yes     Is patient taking any blood thinners (plavix, coumadin, jantoven, warfarin, heparin, pradaxa or dabigatran )? No   (If so, do not schedule, contact RN and/or MD)     Is patient taking any aspirin products? Yes - Pt takes 81mg daily; instructed to hold 0 day(s) prior to procedure.    (If more than 325mg/day do not schedule; Contact RN/MD. For all non-cervical interventional procedures if patient is taking MORE than 325mg/day, limit aspirin to 81-325mg/day x 1 week. No hold required day of procedure.  For CERVICAL procedures, hold all aspirin products for 6 days.)      Does the patient have a bleeding or clotting disorder? No   (If yes, okay to schedule, but contact RN/MD).  **For any patients with platelet count <100, must be forwarded to provider**    Is patient diabetic? NO If YES, have them bring their glucometer.    Does patient have an active infection or treated for one within the past week? No    Is patient currently taking any antibiotics?  No  For patients on chronic, preventative, or prophylactic antibiotics, procedures can be scheduled.    For patients on antibiotics for active or recent infection:  Jose Mendoza [place], [place]-antibiotic course must have been completed for 4 days  Christy German-antibiotic course must have been completed for 7 days    Is patient currently taking any steroid medications? (i.e. Prednisone, Medrol)  No   For patients on steroid medications:  Jose Mendoza [place], [place]-steroid course must have been completed for 4 days  Christy German-steroid course must have been completed for 7 days    Review with patient:  If you are started on any steroids or antibiotics between now and your appointment, you must contact us because it may affect our ability to perform your procedure INFORMED    Is patient actively being treated for cancer or immunocompromised, including the spleen having been removed? No  **For Dr. Schulte patients without spleens should have the chart sent to her**  (If YES, do NOT schedule and route to RN)    Are you able to get on and off an exam table with minimal or no assistance? Yes  (If NO, do NOT schedule and route to RN)  Are you able to roll over and lay on your stomach with minimal or no assistance? Yes  (If NO, do NOT schedule and route to RN)         Any allergies to contrast dye, iodine, shellfish, or numbing and steroid medications? No  (If so, inform nursing and note in scheduling comments.)    Allergies: Review of patient's allergies indicates no known allergies.      Any chance of pregnancy? No    Has the patient had a flu shot or any other vaccinations within 7 days before or after the procedure.    No       Does patient have an MRI/CT? YES - MRI   (SI joint, hip injections, lumbar sympathetic blocks, and stellate ganglion blocks do not require an MRI)    If so, was it done at [place]? Yes      If not, where was it done? N/A     Was the MRI done w/in the last 3 years? Yes     If MRI was not done at [place], [facility] or [facility] do NOT schedule. Route to  nursing.  (If pt has disc the injection can be scheduled but pt has to bring disc to appt. If they show up w/out disc the injection cannot be done)      **Must be scheduled with elapsed time interval of at least 2 weeks and not more than 6 months between the First MBB and the Second MBB**       Medial Branch Block Pre-Procedure Instructions    It is okay to take long acting pain medications (if you are on them) the day of the procedure but try not to take any short acting medications unless absolutely necessary. INFORMED        Long acting meds would include: Gabapentin (Neurontin), MS Contin, Oxycontin        Short acting meds would include:  Percocet, Oxycodone, Vicodin, Ibuprofen     The day of the procedure, you should try to do things that provoke your pain, since the injection is being done to see if it will relieve your pain . INFORMED    If your pain level is a 4 out of 10 or less on the day of the procedure, please call [phone] to reschedule.  INFORMED      Reminders (please tell patient if applicable):        Instructed pt to arrive 30 minutes early for IV start if this is for a cervical procedure, ALL sympathetic (stellate ganglion, hypogastric, or lumbar sympathetic block) and all sedation procedures (RFA, spinal cord stimulation trials). INFORMED        -IVs are not routinely placed for Garcia and [place] cervical cases       If NPO for sedation, it is okay to take medications with sips of water (except if they are to hold blood thinners). REVIEWED   *DO take blood pressure medication if it is prescribed*      If this is for a cervical MBB aspirin needs to be held for 6 days.  REVIEWED      Do not schedule procedures requiring IV placement in the first appointment after lunch REVIEWED        For patients 85 or older we recommend having an adult stay w/ them for the remainder of the day. REVIEWED              Does the patient have any questions? NO    Mily Kohler    [facility]  Pain Management

## 2017-05-04 NOTE — TELEPHONE ENCOUNTER
Please call pt again last call was 8 days ago.     Briana Candelaria RN, [facility]  Pain Clinic Care Coordinator

## 2017-05-11 ENCOUNTER — TELEPHONE (OUTPATIENT)
Dept: PALLIATIVE MEDICINE | Facility: CLINIC | Age: 67
End: 2017-05-11

## 2017-05-11 DIAGNOSIS — M79.18 MYOFASCIAL PAIN: ICD-10-CM

## 2017-05-11 RX ORDER — GABAPENTIN 100 MG/1
100 CAPSULE ORAL 3 TIMES DAILY
Qty: 90 CAPSULE | Refills: 1 | Status: CANCELLED | OUTPATIENT
Start: 2017-05-11

## 2017-05-11 NOTE — TELEPHONE ENCOUNTER
Fax received from: [facility]   Drug: gabapentin (NEURONTIN) 100 MG capsule   Qty: 90  Last filled 04/16/17  Last seen: 4/13/17  Next appointment: 5/16/17       Monse Villanueva MA

## 2017-05-16 ENCOUNTER — OFFICE VISIT (OUTPATIENT)
Dept: PALLIATIVE MEDICINE | Facility: CLINIC | Age: 67
End: 2017-05-16
Payer: COMMERCIAL

## 2017-05-16 VITALS — HEART RATE: 89 BPM | DIASTOLIC BLOOD PRESSURE: 84 MMHG | SYSTOLIC BLOOD PRESSURE: 147 MMHG

## 2017-05-16 DIAGNOSIS — M47.812 CERVICAL FACET JOINT SYNDROME: ICD-10-CM

## 2017-05-16 DIAGNOSIS — M54.2 CERVICAL PAIN: Primary | ICD-10-CM

## 2017-05-16 DIAGNOSIS — G44.86 CERVICOGENIC HEADACHE: ICD-10-CM

## 2017-05-16 DIAGNOSIS — R51.9 LEFT-SIDED HEADACHE: ICD-10-CM

## 2017-05-16 PROCEDURE — 99214 OFFICE O/P EST MOD 30 MIN: CPT | Performed by: ANESTHESIOLOGY

## 2017-05-16 ASSESSMENT — PAIN SCALES - GENERAL: PAINLEVEL: SEVERE PAIN (6)

## 2017-05-16 NOTE — MR AVS SNAPSHOT
After Visit Summary   5/16/2017    Shelia Sanchez    MRN: [id number]           Patient Information     Date Of Birth          1950        Visit Information        Provider Department      5/16/2017 2:30 PM Rome Fowler MD [facility]        Care Instructions    Assessment:  1. Chronic neck pain  2. Cervical spondylosis (arithritis)  3. Cervical facet joint arthropathy  4. Cervicogenic headache  5. Myofascial pain  6. Chronic right shoulder pain  7. Right acromioclavicular joint arthropathy      Plan:  1. Physical Therapy:  Deferred - continue home exercises  2. Clinical Health Psychologist to address issues of relaxation, behavioral change, coping style, and other factors important to improvement.  Deferred - coping well  3. Diagnostic Studies:  Not indicated  4. Medication Management:    1. Continue Gabapentin 100 mg TID  2. Continue Advil in safe doses  3. Continue Effexor  5. Further procedures recommended:   1. Diagnostic Medial Branch Block #2 left C3, C4 C5, C6 as scheduled  6. Recommendations to PCP: continue current treatment  7. Follow up: for injection and to be determined after injection (radiofrequency ablation) - otherwise in 8 to 10 weeks          ----------------------------------------------------------------  Nurse Triage line:  [phone]   Call this number with any questions or concerns. You may leave a detailed message anytime. Calls are typically returned Monday through Friday between 8 AM and 4:30 PM. We usually get back to you within 2 business days depending on the issue/request.       Medication refills:    For non-narcotic medications, call your pharmacy directly to request a refill. The pharmacy will contact the [facility] for authorization. Please allow 3-4 days for these refills to be processed.     For narcotic refills, call the nurse triage line or send a [employer] message. Please contact us 7-10 days before your refill is due.  The message MUST include the name of the specific medication(s) requested and how you would like to receive the prescription(s). The options are as follows:    Pain Clinic staff can mail the prescription to your pharmacy. Please tell us the name of the pharmacy.    You may pick the prescription up at the Pain Clinic (tell us the location) or during a clinic visit with your pain provider    Pain Clinic staff can deliver the prescription to the [facility] pharmacy in the clinic building. Please tell us the location.      Scheduling number: [phone].  Call this number to schedule or change appointments.    We believe regular attendance is key to your success in our program.    Any time you are unable to keep your appointment we ask that you call us at least 24 hours in advance to let us know. This will allow us to offer the appointment time to another patient.             Follow-ups after your visit        Your next 10 appointments already scheduled     Jun 02, 2017 10:15 AM CDT   Radiology Injections with Rome Vicente MD   [facility] [place] ([facility])    [address]  [place] [address] [phone]              Who to contact     If you have questions or need follow up information about today's clinic visit or your schedule please contact [facility] directly at [phone].  Normal or non-critical lab and imaging results will be communicated to you by MyChart, letter or phone within 4 business days after the clinic has received the results. If you do not hear from us within 7 days, please contact the clinic through MyChart or phone. If you have a critical or abnormal lab result, we will notify you by phone as soon as possible.  Submit refill requests through [employer] or call your pharmacy and they will forward the refill request to us. Please allow 3 business days for your refill to be completed.          Additional Information About Your Visit         [employer] Information     [employer] gives you secure access to your electronic health record. If you see a primary care provider, you can also send messages to your care team and make appointments. If you have questions, please call your primary care clinic.  If you do not have a primary care provider, please call [phone] and they will assist you.        Care EveryWhere ID     This is your Care EveryWhere ID. This could be used by other organizations to access your [place] medical records  [id number]        Your Vitals Were     Pulse                   89            Blood Pressure from Last 3 Encounters:   05/16/17 147/84   04/13/17 140/70   03/24/17 143/82    Weight from Last 3 Encounters:   03/24/17 121.1 kg (267 lb)   11/16/16 116.5 kg (256 lb 12.8 oz)   06/03/16 109.8 kg (242 lb)              Today, you had the following     No orders found for display       Primary Care Provider Office Phone # Fax #    Tahmina Hernández [phone]       [address] [address]        Thank you!     Thank you for choosing [facility]  for your care. Our goal is always to provide you with excellent care. Hearing back from our patients is one way we can continue to improve our services. Please take a few minutes to complete the written survey that you may receive in the mail after your visit with us. Thank you!             Your Updated Medication List - Protect others around you: Learn how to safely use, store and throw away your medicines at www.disposemymeds.org.          This list is accurate as of: 5/16/17  3:18 PM.  Always use your most recent med list.                   Brand Name Dispense Instructions for use    amLODIPine 5 MG tablet    NORVASC    90 tablet    Take 1 tablet (5 mg) by mouth daily       aspirin 81 MG tablet      Take 81 mg by mouth daily       gabapentin 100 MG capsule    NEURONTIN    90 capsule    Take 1 capsule (100  mg) by mouth 3 times daily Take one at bedtime for 3 days, then one twice a day for 3 days, then one 3 times per day       hydrochlorothiazide 25 MG tablet    HYDRODIURIL    90 tablet    Take 1 tablet (25 mg) by mouth daily       NEXIUM PO          SLEEP-AID MAXIMUM STRENGTH 50 MG Caps   Generic drug:  DiphenhydrAMINE HCl (Sleep)      Take 1 capsule by mouth At Bedtime       venlafaxine 37.5 MG 24 hr capsule    EFFEXOR-XR    90 capsule    Take 1 capsule (37.5 mg) by mouth daily

## 2017-05-16 NOTE — PATIENT INSTRUCTIONS
Assessment:  1. Chronic neck pain  2. Cervical spondylosis (arithritis)  3. Cervical facet joint arthropathy  4. Cervicogenic headache  5. Myofascial pain  6. Chronic right shoulder pain  7. Right acromioclavicular joint arthropathy      Plan:  1. Physical Therapy:  Deferred - continue home exercises  2. Clinical Health Psychologist to address issues of relaxation, behavioral change, coping style, and other factors important to improvement.  Deferred - coping well  3. Diagnostic Studies:  Not indicated  4. Medication Management:    1. Continue Gabapentin 100 mg TID  2. Continue Advil in safe doses  3. Continue Effexor  5. Further procedures recommended:   1. Diagnostic Medial Branch Block #2 left C3, C4 C5, C6 as scheduled  6. Recommendations to PCP: continue current treatment  7. Follow up: for injection and to be determined after injection (radiofrequency ablation) - otherwise in 8 to 10 weeks          ----------------------------------------------------------------  Nurse Triage line:  [phone]   Call this number with any questions or concerns. You may leave a detailed message anytime. Calls are typically returned Monday through Friday between 8 AM and 4:30 PM. We usually get back to you within 2 business days depending on the issue/request.       Medication refills:    For non-narcotic medications, call your pharmacy directly to request a refill. The pharmacy will contact the [facility] for authorization. Please allow 3-4 days for these refills to be processed.     For narcotic refills, call the nurse triage line or send a [employer] message. Please contact us 7-10 days before your refill is due. The message MUST include the name of the specific medication(s) requested and how you would like to receive the prescription(s). The options are as follows:    Pain Clinic staff can mail the prescription to your pharmacy. Please tell us the name of the pharmacy.    You may pick the prescription up at the  Pain Clinic (tell us the location) or during a clinic visit with your pain provider    Pain Clinic staff can deliver the prescription to the [place] pharmacy in the clinic building. Please tell us the location.      Scheduling number: [phone].  Call this number to schedule or change appointments.    We believe regular attendance is key to your success in our program.    Any time you are unable to keep your appointment we ask that you call us at least 24 hours in advance to let us know. This will allow us to offer the appointment time to another patient.

## 2017-05-16 NOTE — PROGRESS NOTES
[facility]    Date of visit: 5/16/2017    Chief complaint:   Chief Complaint   Patient presents with     Pain       Interval history:  Shelia Sanchez was last seen by me on 4/13/17 for a cervical MBB which dropped her pain from a 7/10 to 1/10 and took away her headache for the day and for office visit on 3/24/17.      Recommendations/plan at the last visit included:  Plan:  Diagnosis reviewed, treatment option addressed, and risk/benefits discussed. Self-care instructions given. I am recommending a multidisciplinary treatment plan to help this patient better manage her pain.      1. Physical Therapy: deferred at this time - continue home exercise  2. Clinical Health Psychologist to address issues of relaxation, behavioral change, coping style, and other factors important to improvement: deferred at this time - coping well  3. Diagnostic Studies: reviewed MRI with patient  4. Medication Management:   1. Continue Advil prn  2. trial of Gabapentin 100 mg at bedtime, with gradual titration to three times per day  5. Further procedures recommended:   1. Cervical medial branch block bilaterally at C3, C4, C5, and C6  2. Consider right acromioclavicular joint injection  6. Acupuncture: deferred  7. Urine toxicology screen today: deferred today - not requesting pain medications   8. Recommendations/follow-up for PCP:  Continue current treatment  9. Release of information: n/a  10. Follow up: for procedure and in 6-8 weeks    Since her last visit, Shelia Sanchez reports:  Her pain is back to her previous level.  She has continued neck pain with headaches on the front of her head on the left.  She has not had right sided headache for a long time.  The headaches seem to be worse than the neck pain.  She will typically have headaches on 4 days out of the week and the headaches are usually frontal in nature.  She denies nausea, vomiting, or dizziness with her headaches but will occasionally feel a  little unsteady.  She denies visual changes, sensitivity to light, or an aura.    The neck pain is also more to the left and seems to be worse at the base of the neck associated with stiffness.  Her neck pain is increased with turning her head and with looking up and down.  She denies pain down the arms and denies numbness or tingling in the arms or hands and denies weakness.    She is scheduled for her next MBB on 6/2/17    Pain scores:  Pain intensity on average is 4 on a scale of 0-10. Ranges from 2/10 to 6/10 and is described as aching, sharp, and continuous.    Current pain treatments:   Advil 200 mg three tabs prn  Effexor 37.5 mg daily  Gabapentin 100 mg 2-3 times per day - some mild swelling    Past pain treatments:  advil  effexor    Side Effects: edema    Medications:  Current Outpatient Prescriptions   Medication Sig Dispense Refill     Esomeprazole Magnesium (NEXIUM PO)        gabapentin (NEURONTIN) 100 MG capsule Take 1 capsule (100 mg) by mouth 3 times daily Take one at bedtime for 3 days, then one twice a day for 3 days, then one 3 times per day 90 capsule 1     venlafaxine (EFFEXOR-XR) 37.5 MG 24 hr capsule Take 1 capsule (37.5 mg) by mouth daily 90 capsule 3     hydrochlorothiazide (HYDRODIURIL) 25 MG tablet Take 1 tablet (25 mg) by mouth daily 90 tablet 3     amLODIPine (NORVASC) 5 MG tablet Take 1 tablet (5 mg) by mouth daily 90 tablet 3     aspirin 81 MG tablet Take 81 mg by mouth daily       DiphenhydrAMINE HCl, Sleep, (SLEEP-AID MAXIMUM STRENGTH) 50 MG CAPS Take 1 capsule by mouth At Bedtime         Medical History: any changes in medical history since they were last seen? No    Review of Systems:  The 14 system ROS was reviewed from the intake questionnaire, and is positive for: fatigue, weight gain, headache, itching, edema, high blood pressure, arthritis, neck pain, mood swings  Any bowel or bladder problems: denies changes  Mood: good    Physical Exam:  /84  Pulse 89  Constitutional:  alert and no distress.  Pt is obese  Head: Normocephalic. No masses, lesions, tenderness or abnormalities  ENT: EOMI, mucosal surfaces moist.  Neck with full ROM, posture fair  Cardiovascular: No edema or JVD appreciated.  Respiratory: Good diaphragmatic excursion. No wheezes appreciated.  Speaking in complete sentences without shortness of breath.  No accessory muscle use.   Musculoskeletal: extremities normal- no gross deformities noted, normal muscle tone and able to move about the exam room without difficulty.    Skin: no suspicious lesions or rashes appreciated on exposed areas  Neurologic: Gait normal. Moving all extremities spontaneously, no apparent weakness.    Psychiatric: mentation appears normal, affect full and good eye contact.      Cervical Spine:  Decreased ROM on left lateral rotation and extension, tender lower spinous processes and paraspinal muscles, tender along the cervical articular pillars left greater than right, positive facet loading left     Lumbar Spine:  Moves well, exam limited    Assessment:  1. Chronic neck pain  2. Cervical spondylosis (arithritis)  3. Cervical facet joint arthropathy  4. Cervicogenic headache  5. Myofascial pain  6. Chronic right shoulder pain  7. Right acromioclavicular joint arthropathy    Shelia Sanchez is a 66 year old female who is seen at the pain clinic for chronic neck pain associated with headache.  She got significant relief from her first cervical MBB of her neck pain and her headache. She otherwise has been doing well with current treatments.  Our treatment plan is as outlined below.    Plan:  1. Physical Therapy:  Deferred - continue home exercises  2. Clinical Health Psychologist to address issues of relaxation, behavioral change, coping style, and other factors important to improvement.  Deferred - coping well  3. Diagnostic Studies:  Not indicated  4. Medication Management:    1. Continue Gabapentin 100 mg TID  2. Continue Advil in safe  doses  3. Continue Effexor  5. Further procedures recommended:   1. Diagnostic Medial Branch Block #2 left C3, C4 C5, C6 as scheduled  6. Recommendations to PCP: continue current treatment  7. Follow up: for injection and to be determined after injection (radiofrequency ablation) - otherwise in 8 to 10 weeks    Total time spent was 30 minutes, and more than 50% of face to face time was spent in counseling and/or coordination of care regarding exercise, medication management, and interventional procedures to decrease pain and improve function.

## 2017-05-16 NOTE — NURSING NOTE
"Chief Complaint   Patient presents with     Pain       Initial /84  Pulse 89 Estimated body mass index is 41.82 kg/(m^2) as calculated from the following:    Height as of 11/16/16: 1.702 m (5' 7\").    Weight as of 3/24/17: 121.1 kg (267 lb).  Medication Reconciliation: jean-paul Rodriguez CMA ([facility])      "

## 2017-05-25 DIAGNOSIS — M79.18 MYOFASCIAL PAIN: ICD-10-CM

## 2017-05-25 NOTE — TELEPHONE ENCOUNTER
Rvwd and prepped order for provider- see notes.     Briana Candelaria RN, [facility]  Pain Clinic Care Coordinator

## 2017-05-25 NOTE — TELEPHONE ENCOUNTER
This was never sent to provider.     Will complete in alternate encounter.     Briana Candelaria RN, [facility]  Pain Clinic Care Coordinator

## 2017-05-25 NOTE — TELEPHONE ENCOUNTER
Fax received from: [facility] pharmacy.  Drug: gabapentin (NEURONTIN) 100 MG capsule  Qty: 90  Last filled 04/16/17  Last seen:4/13/17  Next appointment: 6/2/17      Monse Villanueva MA

## 2017-05-26 RX ORDER — GABAPENTIN 100 MG/1
100 CAPSULE ORAL 3 TIMES DAILY
Qty: 90 CAPSULE | Refills: 1 | Status: SHIPPED | OUTPATIENT
Start: 2017-05-26 | End: 2017-07-18

## 2017-05-26 NOTE — TELEPHONE ENCOUNTER
Signed Prescriptions:                        Disp   Refills    gabapentin (NEURONTIN) 100 MG capsule      90 cap*1        Sig: Take 1 capsule (100 mg) by mouth 3 times daily Take           one at bedtime for 3 days, then one twice a day           for 3 days, then one 3 times per day  Authorizing Provider: ROME CAZARES    Reviewed, signed, e-prescribed.    Rome Vicente MD  [facility]

## 2017-06-02 ENCOUNTER — RADIANT APPOINTMENT (OUTPATIENT)
Dept: RADIOLOGY | Facility: CLINIC | Age: 67
End: 2017-06-02
Attending: ANESTHESIOLOGY

## 2017-06-02 ENCOUNTER — RADIOLOGY INJECTION OFFICE VISIT (OUTPATIENT)
Dept: PALLIATIVE MEDICINE | Facility: CLINIC | Age: 67
End: 2017-06-02
Payer: COMMERCIAL

## 2017-06-02 VITALS — HEART RATE: 77 BPM | OXYGEN SATURATION: 100 % | SYSTOLIC BLOOD PRESSURE: 146 MMHG | DIASTOLIC BLOOD PRESSURE: 80 MMHG

## 2017-06-02 DIAGNOSIS — M47.812 CERVICAL FACET JOINT SYNDROME: Primary | ICD-10-CM

## 2017-06-02 DIAGNOSIS — M47.812 CERVICAL SPONDYLOSIS WITHOUT MYELOPATHY: ICD-10-CM

## 2017-06-02 DIAGNOSIS — M47.812 CERVICAL FACET JOINT SYNDROME: ICD-10-CM

## 2017-06-02 PROCEDURE — 64490 INJ PARAVERT F JNT C/T 1 LEV: CPT | Mod: LT | Performed by: ANESTHESIOLOGY

## 2017-06-02 PROCEDURE — 64491 INJ PARAVERT F JNT C/T 2 LEV: CPT | Mod: LT | Performed by: ANESTHESIOLOGY

## 2017-06-02 PROCEDURE — 64492 INJ PARAVERT F JNT C/T 3 LEV: CPT | Mod: LT | Performed by: ANESTHESIOLOGY

## 2017-06-02 ASSESSMENT — PAIN SCALES - GENERAL: PAINLEVEL: EXTREME PAIN (8)

## 2017-06-02 NOTE — PATIENT INSTRUCTIONS
[facility]   Medial Branch Block Discharge Instructions      Your procedure was performed by:  Dr. Rome Vicente     Medications used include:  Lidocaine  Bupivicaine  Omnipaque      You will need to complete the Pain Scale Log form and return it to us as soon as possible.  Once we have received the form, we will review it and call you to determine the next steps.     The form can be faxed to [phone] or mailed to:   [facility]   [address]   [place] [address]      You may resume your regular medications    You may resume your regular activities    Be cautious with walking as numbness and/or weakness in the lower extremities may occur for up to 6-8 hours due to the effects of the anesthetic.    Avoid driving for 6 hours. The local anesthetic could slow your reflexes.     You may shower, however no swimming or tub baths or hot tubs for 24 hours following your procedure.    Your pain will return after the numbing medications have worn off.  You may use your current pain medications as needed.    You may use anti-inflammatory medications (such as ibuprofen, aleve, or advil) or Tylenol for pain control if necessary.  Some people find it helpful to alternate ibuprofen and Tylenol every 3 hours for a couple of days.    You may use ice packs 10-15 minutes three to four times a day at the injection site for comfort.     Do not use heat to painful areas for 6 to 8 hours. This will give the local anesthetic time to wear off and prevent you from accidentally burning your skin.     If you experience any of the following, call the pain center nursing line during work hours at [phone] or the after hours provider line at [phone]:  -Fever over 100 degree F  -Swelling, bleeding, redness, drainage, warmth at the injection site  -Progressive weakness or numbness in your legs or arms  -If lumbar, call if you have a loss of bowel or bladder function  -If  cervical, call if you have any unusual headache that is not relieved by Tylenol  -Unusual new onset of pain that is not improving

## 2017-06-02 NOTE — NURSING NOTE
"Chief Complaint   Patient presents with     Pain       Initial /83  Pulse 83 Estimated body mass index is 41.82 kg/(m^2) as calculated from the following:    Height as of 11/16/16: 1.702 m (5' 7\").    Weight as of 3/24/17: 121.1 kg (267 lb).  Medication Reconciliation: complete     Injection intake:    If this procedure is requiring IV sedation has patient been NPO for 6  Hours? NA    Is patient on coumadin, plavix or other prescribed blood thinner?   No    If patient is on coumadin was it held for 5 days?   NA    If patient is on plavix was it held for 7 days?    NA     Does patient take aspirin?  Yes -   ASA- 81mg    If this is for a cervical procedure and patient is on aspirin has it been held for 6 days?   NA    Any allergies to contrast dye, iodine, steroid and/or numbing medications?  NO    Is patient currently taking antibiotics or have an active infection?  NO    Does patient have a ? Yes       Is patient pregnant or breastfeeding?  NO    Are the vital signs normal?  Yes    Mary Rodriguez CMA ([facility])        "

## 2017-06-02 NOTE — NURSING NOTE
Discharge Information    IV Discontiued Time:  1100 catheter intact    Amount of Fluid Infused:  Saline locked    Discharge Criteria = When patient returns to baseline or as per MD order    Consciousness:  Pt is fully awake    Circulation:  BP +/- 20% of pre-procedure level    Respiration:  Patient is able to breathe deeply    O2 Sat:  Patient is able to maintain O2 Sat >92% on room air    Activity:  Moves 4 extremities on command    Ambulation:  Patient is able to stand and walk or stand and pivot into wheelchair    Dressing:  Clean/dry or No Dressing    Notes:   Discharge instructions and AVS given to patient    Patient meets criteria for discharge?  YES    Admitted to PCU?  No    Responsible adult present to accompany patient home?  Yes    Signature/Title:    alycia pitts RN Care Coordinator  [facility]

## 2017-06-02 NOTE — MR AVS SNAPSHOT
After Visit Summary   6/2/2017    Shelia Sanchez    MRN: [id number]           Patient Information     Date Of Birth          1950        Visit Information        Provider Department      6/2/2017 10:15 AM Rome Fowler MD [facility] [place]        Care Instructions    [facility]   Medial Branch Block Discharge Instructions      Your procedure was performed by:  Dr. Rome Vicente     Medications used include:  Lidocaine  Bupivicaine  Omnipaque      You will need to complete the Pain Scale Log form and return it to us as soon as possible.  Once we have received the form, we will review it and call you to determine the next steps.     The form can be faxed to [phone] or mailed to:   [facility]   [id number] [place] [address] [address]   [place] [address]      You may resume your regular medications    You may resume your regular activities    Be cautious with walking as numbness and/or weakness in the lower extremities may occur for up to 6-8 hours due to the effects of the anesthetic.    Avoid driving for 6 hours. The local anesthetic could slow your reflexes.     You may shower, however no swimming or tub baths or hot tubs for 24 hours following your procedure.    Your pain will return after the numbing medications have worn off.  You may use your current pain medications as needed.    You may use anti-inflammatory medications (such as ibuprofen, aleve, or advil) or Tylenol for pain control if necessary.  Some people find it helpful to alternate ibuprofen and Tylenol every 3 hours for a couple of days.    You may use ice packs 10-15 minutes three to four times a day at the injection site for comfort.     Do not use heat to painful areas for 6 to 8 hours. This will give the local anesthetic time to wear off and prevent you from accidentally burning your skin.     If you experience any of the following, call the pain center nursing line during  work hours at [phone] or the after hours provider line at [phone]:  -Fever over 100 degree F  -Swelling, bleeding, redness, drainage, warmth at the injection site  -Progressive weakness or numbness in your legs or arms  -If lumbar, call if you have a loss of bowel or bladder function  -If cervical, call if you have any unusual headache that is not relieved by Tylenol  -Unusual new onset of pain that is not improving            Follow-ups after your visit        Your next 10 appointments already scheduled     Jul 27, 2017 10:30 AM CDT   Return Visit with Rome Vicente MD   [facility] [place] ([facility] [place])    [address]  [place] [address] [phone]              Who to contact     If you have questions or need follow up information about today's clinic visit or your schedule please contact [facility] directly at [phone].  Normal or non-critical lab and imaging results will be communicated to you by Loved[employer]lahart, letter or phone within 4 business days after the clinic has received the results. If you do not hear from us within 7 days, please contact the clinic through [employer] or phone. If you have a critical or abnormal lab result, we will notify you by phone as soon as possible.  Submit refill requests through [employer] or call your pharmacy and they will forward the refill request to us. Please allow 3 business days for your refill to be completed.          Additional Information About Your Visit        [email or web address] Information     [employer] gives you secure access to your electronic health record. If you see a primary care provider, you can also send messages to your care team and make appointments. If you have questions, please call your primary care clinic.  If you do not have a primary care provider, please call [phone] and they will assist you.        Care EveryWhere ID     This is your Care EveryWhere ID. This could be used by other  organizations to access your [place] medical records  [id number]        Your Vitals Were     Pulse Pulse Oximetry                77 100%           Blood Pressure from Last 3 Encounters:   06/02/17 146/80   05/16/17 147/84   04/13/17 140/70    Weight from Last 3 Encounters:   03/24/17 121.1 kg (267 lb)   11/16/16 116.5 kg (256 lb 12.8 oz)   06/03/16 109.8 kg (242 lb)              Today, you had the following     No orders found for display       Primary Care Provider Office Phone # Fax #    Tahmina Hernández [phone]       [facility] [address] [place] [address]        Thank you!     Thank you for choosing [facility]  for your care. Our goal is always to provide you with excellent care. Hearing back from our patients is one way we can continue to improve our services. Please take a few minutes to complete the written survey that you may receive in the mail after your visit with us. Thank you!             Your Updated Medication List - Protect others around you: Learn how to safely use, store and throw away your medicines at www.disposemymeds.org.          This list is accurate as of: 6/2/17 10:53 AM.  Always use your most recent med list.                   Brand Name Dispense Instructions for use    amLODIPine 5 MG tablet    NORVASC    90 tablet    Take 1 tablet (5 mg) by mouth daily       aspirin 81 MG tablet      Take 81 mg by mouth daily       gabapentin 100 MG capsule    NEURONTIN    90 capsule    Take 1 capsule (100 mg) by mouth 3 times daily Take one at bedtime for 3 days, then one twice a day for 3 days, then one 3 times per day       hydrochlorothiazide 25 MG tablet    HYDRODIURIL    90 tablet    Take 1 tablet (25 mg) by mouth daily       NEXIUM PO          SLEEP-AID MAXIMUM STRENGTH 50 MG Caps   Generic drug:  DiphenhydrAMINE HCl (Sleep)      Take 1 capsule by mouth At Bedtime       venlafaxine 37.5 MG 24 hr capsule    EFFEXOR-XR    90  capsule    Take 1 capsule (37.5 mg) by mouth daily

## 2017-06-02 NOTE — PROGRESS NOTES
Pre procedure Diagnosis: facet arthropathy, Cervical spondylosis   Post procedure Diagnosis: Same  Procedure performed: cervical medial branch block #2 on the left at C3, C4, C5 and C6, fluoroscopically guided, contrast controlled  Anesthesia: none  Complications: none  Operators: Rome Vicente MD    Indications:   Shelia Sanchez is a 66 year old female who is known to me that presents today for her second diagnostic cervical medial branch block.  The patient has a history of chronic left sided neck pain without significant radiation.  Exam shows increased pain with extension and lateral rotation of the cervical spine to the left and they have tried conservative treatment including physical therapy, activity modifications, medications, and interventional procedures.    Options/alternatives, benefits and risks were discussed with the patient including but not limited to bleeding, infection, tissue trauma, exposure to radiation, reaction to medications, spinal cord injury, weakness, numbness and paralysis.  Questions were answered to her satisfaction and she wishes to proceed. Voluntary informed consent was obtained and signed.     Vitals were reviewed: Yes  /80  Pulse 77  SpO2 100%  Allergies were reviewed:  Yes   Medications were reviewed:  Yes   Pre-procedure pain score: 6/10    Procedure:  After obtaining signed, informed consent, the patient was taken to the procedure room and placed in the prone position on the [facility] frame.  A Pause for the Cause was completed prior to procedure start.  The patient was prepped and draped in the usual sterile fashion.    The areas of interest were identified with fluoroscopy.  The skin and subcutaneous tissue were anesthetized by injecting Lidocaine 1% 1 ml at each injection site utilizing a 27 gauge 1.25 inch needle.  Then,  27 gauge 3.5 inch spinal needles with slightly curved tips were advanced tangential to the medial branch nerves, abutting the articular  pillars at C3, C4, C5 and C6 on the left utilizing AP and Lateral fluoroscopic guidance.  Aspiration for blood and CSF was negative at all the levels.    Then, needle positions were verified by injecting Omnipaque 300 at each level which showed good needle placement adequate spread without intravascular or intrathecal uptake.  Omnipaque used:  1 ml.  Omnipaque wasted:  9 ml.  Then, after repeated negative aspiration, each level was injected with 0.5 ml of 2% Lidocaine and the needles were restyletted and withdrawn.    The injection sites were cleaned and sterile dressings were applied where indicated.    The patient tolerated the procedure well without complications and was taken to the recovery area for continued observation.  Fluoroscopic images were saved to PACS.    The patient was re-evaluated following the procedure and they noted decreased pain and improved range of motion.    Post-procedure pain:  3/10 equalling a 50% reduction at 10 minutes post procedure    The patient was given a pain diary and instructed to document their pain throughout the day.  The patient was asked to return the pain diary the next day in person or by fax at which time it will be reviewed and the decision made whether or not to proceed with RFA at these same levels.    Follow-up:  To be determined    Rome Vicente MD  [facility]

## 2017-06-09 ENCOUNTER — TELEPHONE (OUTPATIENT)
Dept: PALLIATIVE MEDICINE | Facility: CLINIC | Age: 67
End: 2017-06-09

## 2017-06-09 NOTE — TELEPHONE ENCOUNTER
Patient had a  cervical medial branch block #2 on the left at C3, C4, C5 and C6, on 6/2/17.   Left message that we were calling for an update about how she was doing after the injection.  LM that if she has any problems or questions to call the nurse line at [phone].

## 2017-06-12 ENCOUNTER — TELEPHONE (OUTPATIENT)
Dept: PALLIATIVE MEDICINE | Facility: CLINIC | Age: 67
End: 2017-06-12

## 2017-06-12 NOTE — TELEPHONE ENCOUNTER
Routed to Romy to check insurance coverage for L-sided cervical radiofrequency ablation.  Then,  27 gauge 3.5 inch spinal needles with slightly curved tips were advanced tangential to the medial branch nerves, abutting the articular pillars at C3, C4, C5 and C6 on the left utilizing AP and Lateral fluoroscopic guidance.  . Dx Facet arthropathy 721.9. For medicare patients use 716.98     Max % of pain relief from blocks:    Cervical  medial branch block #1:  71.4%    Cervical  medial branch block #2:  100%    Insurance:  [facility]    Does pt have adequate relief insurance-wise to proceed to medial branch block #2?  Yes: For Romy to fully determine     Physical therapy:  Last done in?:  2016. How many sessions?:  Several in 2015 & 2016.  Where was it done?  [facility]      The post medial branch block form was received.    Called pt and informed him/her that insurance would be checked and will be called once we get a response.  Done via [employer]    The pain diary was faxed to Romy for insurance review.    Beatris Cortes RN-BSN  [facility]

## 2017-06-13 NOTE — TELEPHONE ENCOUNTER
Pt insured by [facility] - PA not available.    Insurance Company Time Between Work-ups Wait Time Post Work-up Pre-Req's for RFA PA Required?   [facility] Medicare Replacement No Restrictions 1 hour Lidocaine;        2 hours Bupivicaine Dx of arthropathy, spondylosis, or sprain; 6 months failed conservative treatment (medication, injections, etc.) This includes 4 weeks of PT or an unfavorable evaluation; 2 successful workups resulting in >50% pain relief No

## 2017-06-14 NOTE — TELEPHONE ENCOUNTER
Pre-screening Questions for RFA Procedure      Procedure ordered? CRFA    What insurance are we billing for this procedure?  [employer]    Has patient had this injection before? No  Any chance of pregnancy? NO   If YES, do NOT schedule and route to RN pool    Is  Needed?: No  Will patient have a ?  Yes   If pt is given sedation meds, no driving for 24 hours.  Is pt taking a cab or transportation service? NO        If so will need to be accompanied by an adult too (friend/family member) in order for IV sedation to be given.      Per [facility] Policy:  Outpatients are to have responsible adult or family member to accompany them at discharge and drive them home. A service providing medically trained drivers or attendants would be acceptable. Public transportation would not be acceptable unless the patient is accompanied by a responsible adult or family member.    Is patient taking any blood thinners (plavix, coumadin, jantoven, warfarin, heparin, pradaxa or dabigatran )? No   If YES, do NOT schedule, and route to RN pool    Is patient taking any aspirin products? Yes - Pt takes 81mg daily; instructed to hold 6 day(s) prior to procedure.      If more than 325mg/day do NOT schedule; route to RN pool     For CERVICAL procedures, hold all aspirin products for 6 days.     Does the patient have a bleeding or clotting disorder? No     If YES, it it OKAY to schedule AND route to RN nurse pool    **For any patients with platelet count <100, must be forwarded to provider**    Does patient have an active infection or treated for one within the past week? No   If YES, do NOT schedule and route to RN     Is patient currently taking any antibiotics?  No    For patients on chronic, preventative, or prophylactic antibiotics, procedures may be scheduled.     For patients on antibiotics for active or recent infection:    Jose Mendoza [place], [place]-antibiotic course must have been completed for 4 days    Drs.  Maxi-antibiotic course must have been completed for 7 days    Is patient currently taking any steroid medications? (i.e. Prednisone, Medrol)  No     For patients on steroid medications:    Jose Mendoza [place], [place]-steroid course must have been completed for 4 days    Christy German-steroid course must have been completed for 7 days    Reviewed with patient:  If you are started on any steroids or antibiotics between now and your appointment, you must contact us because it may affect our ability to perform your procedure.  Yes    Is patient actively being treated for cancer or immunocompromised, including the spleen having been removed? No    If YES, do NOT schedule and route to RN pool     **For Dr. Schulte patients without spleens should have the chart sent to her**    Any history of complications with sedation medications?  NO   If YES, OK to schedule AND route to RN pool     Any history of sleep apnea?  NO   If YES, OK to schedule AND route to RN pool     Any cardiac history?  NO   If YES, OK to schedule AND route to RN pool     Does patient have an allergy to contrast dye, iodine or shellfish?  No   If YES, OK to schedule. Route to RN pool AND add allergy information to appointment notes    Are you able to get on and off an exam table with minimal or no assistance? Yes   If NO, do NOT schedule and route to RN pool    Are you able to roll over and lay on your stomach with minimal or no assistance? Yes   If NO, do NOT schedule and route to RN pool    Informed patient that s/he needs to be NPO for 6 hours before procedure?  YES   Informed patient that it is OK to take normal medications with sips of water, especially blood pressure medications, before the procedure and must hold blood thinners as instructed.  Yes  Informed patient to arrive 30 minutes before procedure time to have an IV inserted.  reviewed   Do NOT schedule at 0745, 0815 or 1245.  reviewed   All radiofrequency  ablations are in a 90 minute time slot except genicular radiofrequency ablation those are 60 minute time slot.  reviewed     When you schedule an RFA for [place], e-mail David Galvin the date, time, and who they are seeing. Also Cc Breann Yadav, Mariela Espinosa, Erin Dailey, and the provider that they are scheduled with.     In [place] there are only 6 probes for each area (lumbar and cervical) with a 72 hour autoclave. Make sure that there are at least 3 days between LRFA s and 3 days between CRFA s.

## 2017-06-30 ENCOUNTER — OFFICE VISIT (OUTPATIENT)
Dept: OPTOMETRY | Facility: CLINIC | Age: 67
End: 2017-06-30
Payer: COMMERCIAL

## 2017-06-30 DIAGNOSIS — H52.4 ASTIGMATISM OF LEFT EYE WITH PRESBYOPIA: ICD-10-CM

## 2017-06-30 DIAGNOSIS — H25.13 AGE-RELATED NUCLEAR CATARACT OF BOTH EYES: ICD-10-CM

## 2017-06-30 DIAGNOSIS — H52.01 HYPEROPIA OF RIGHT EYE WITH ASTIGMATISM AND PRESBYOPIA: ICD-10-CM

## 2017-06-30 DIAGNOSIS — H52.202 ASTIGMATISM OF LEFT EYE WITH PRESBYOPIA: ICD-10-CM

## 2017-06-30 DIAGNOSIS — Z01.00 EXAMINATION OF EYES AND VISION: Primary | ICD-10-CM

## 2017-06-30 DIAGNOSIS — H52.201 HYPEROPIA OF RIGHT EYE WITH ASTIGMATISM AND PRESBYOPIA: ICD-10-CM

## 2017-06-30 DIAGNOSIS — H52.4 HYPEROPIA OF RIGHT EYE WITH ASTIGMATISM AND PRESBYOPIA: ICD-10-CM

## 2017-06-30 PROCEDURE — 92015 DETERMINE REFRACTIVE STATE: CPT | Performed by: OPTOMETRIST

## 2017-06-30 PROCEDURE — 92014 COMPRE OPH EXAM EST PT 1/>: CPT | Performed by: OPTOMETRIST

## 2017-06-30 ASSESSMENT — REFRACTION_WEARINGRX
OD_ADD: +2.25
OS_AXIS: 020
OD_AXIS: 165
OD_SPHERE: -0.50
OS_CYLINDER: +0.75
OS_ADD: +2.25
OD_CYLINDER: +1.00
OS_SPHERE: -1.00

## 2017-06-30 ASSESSMENT — VISUAL ACUITY
OD_CC: 20/30 -1
OS_SC: 20/60
OS_CC: 20/40
OS_SC: 20/25
CORRECTION_TYPE: GLASSES
OD_SC: 20/30
METHOD: SNELLEN - LINEAR
OS_CC: 20/25
OD_SC: 20/60
OD_CC: 20/25

## 2017-06-30 ASSESSMENT — CONF VISUAL FIELD
OD_NORMAL: 1
OS_NORMAL: 1

## 2017-06-30 ASSESSMENT — SLIT LAMP EXAM - LIDS
COMMENTS: NORMAL
COMMENTS: NORMAL

## 2017-06-30 ASSESSMENT — REFRACTION_MANIFEST
OD_SPHERE: -0.25
OD_AXIS: 165
OD_ADD: +2.25
OS_ADD: +2.25
OS_AXIS: 025
OD_CYLINDER: +1.00
OS_SPHERE: -0.75
OS_CYLINDER: +0.75

## 2017-06-30 ASSESSMENT — EXTERNAL EXAM - RIGHT EYE: OD_EXAM: NORMAL

## 2017-06-30 ASSESSMENT — TONOMETRY
IOP_METHOD: APPLANATION
OD_IOP_MMHG: 14
OS_IOP_MMHG: 15

## 2017-06-30 ASSESSMENT — CUP TO DISC RATIO
OS_RATIO: 0.1
OD_RATIO: 0.15

## 2017-06-30 ASSESSMENT — EXTERNAL EXAM - LEFT EYE: OS_EXAM: NORMAL

## 2017-06-30 NOTE — MR AVS SNAPSHOT
After Visit Summary   6/30/2017    Shelia Sanchez    MRN: [id number]           Patient Information     Date Of Birth          1950        Visit Information        Provider Department      6/30/2017 11:00 AM Carito Ruiz OD [facility]        Today's Diagnoses     Examination of eyes and vision    -  1    Hyperopia of right eye with astigmatism and presbyopia        Astigmatism of left eye with presbyopia        Age-related nuclear cataract of both eyes          Care Instructions        A final glasses prescription was given.  Allow time for adaptation.  The glasses may cause dizziness and affect depth perception for awhile.  Monitor cataracts by having yearly exams.  Wear sunglasses when outside.  Return to clinic 1 year for Comprehensive Vision Exam      Carito Ruiz O.D  [facility] [place]  [address] [place] [address]. [place]  [address] [phone]                  Follow-ups after your visit        Follow-up notes from your care team     Return in about 1 year (around 6/30/2018) for Eye Exam.      Your next 10 appointments already scheduled     Jul 25, 2017  9:45 AM CDT   Radiology Injections with Rome Vicente MD   [facility] ([facility])    [address] [address] [phone]            Jul 27, 2017 10:30 AM CDT   Return Visit with Rome Vicente MD   [facility] [place] ([facility])    [address] [id number] [phone]              Who to contact     If you have questions or need follow up information about today's clinic visit or your schedule please contact [facility] directly at [phone].  Normal or non-critical lab and imaging results will be communicated to you by MyChart, letter or phone within 4 business days after the clinic has received the results. If you do not hear from us within 7 days, please  contact the clinic through [employer] or phone. If you have a critical or abnormal lab result, we will notify you by phone as soon as possible.  Submit refill requests through [employer] or call your pharmacy and they will forward the refill request to us. Please allow 3 business days for your refill to be completed.          Additional Information About Your Visit        [employer] Information     [employer] gives you secure access to your electronic health record. If you see a primary care provider, you can also send messages to your care team and make appointments. If you have questions, please call your primary care clinic.  If you do not have a primary care provider, please call [phone] and they will assist you.        Care EveryWhere ID     This is your Care EveryWhere ID. This could be used by other organizations to access your [facility] [place] medical records  [id number]         Blood Pressure from Last 3 Encounters:   06/02/17 146/80   05/16/17 147/84   04/13/17 140/70    Weight from Last 3 Encounters:   03/24/17 121.1 kg (267 lb)   11/16/16 116.5 kg (256 lb 12.8 oz)   06/03/16 109.8 kg (242 lb)              We Performed the Following     EYE EXAM (SIMPLE-NONBILLABLE)     REFRACTION        Primary Care Provider Office Phone # Fax #    Tahmina Hernández [phone]       [address] [address]        Equal Access to Services     VICKY [facility] : Hadii aad ku hadasho Soomaali, waaxda luqadaha, qaybta kaalmada adeegyada, barry landaverde . So [facility] [phone].    ATENCIÓN: Si habla español, tiene a barajas disposición servicios gratuitos de asistencia lingüística. Llame al [phone].    We comply with applicable federal civil rights laws and [place] laws. We do not discriminate on the basis of race, color, national origin, age, disability sex, sexual orientation or gender identity.            Thank you!     Thank you for choosing  [facility]  for your care. Our goal is always to provide you with excellent care. Hearing back from our patients is one way we can continue to improve our services. Please take a few minutes to complete the written survey that you may receive in the mail after your visit with us. Thank you!             Your Updated Medication List - Protect others around you: Learn how to safely use, store and throw away your medicines at www.disposemymeds.org.          This list is accurate as of: 6/30/17 11:51 AM.  Always use your most recent med list.                   Brand Name Dispense Instructions for use Diagnosis    amLODIPine 5 MG tablet    NORVASC    90 tablet    Take 1 tablet (5 mg) by mouth daily    Essential hypertension with goal blood pressure less than 140/90       aspirin 81 MG tablet      Take 81 mg by mouth daily        gabapentin 100 MG capsule    NEURONTIN    90 capsule    Take 1 capsule (100 mg) by mouth 3 times daily Take one at bedtime for 3 days, then one twice a day for 3 days, then one 3 times per day    Myofascial pain       hydrochlorothiazide 25 MG tablet    HYDRODIURIL    90 tablet    Take 1 tablet (25 mg) by mouth daily    Essential hypertension with goal blood pressure less than 140/90       NEXIUM PO           SLEEP-AID MAXIMUM STRENGTH 50 MG Caps   Generic drug:  DiphenhydrAMINE HCl (Sleep)      Take 1 capsule by mouth At Bedtime        venlafaxine 37.5 MG 24 hr capsule    EFFEXOR-XR    90 capsule    Take 1 capsule (37.5 mg) by mouth daily    Menopausal syndrome (hot flashes)

## 2017-06-30 NOTE — PROGRESS NOTES
Chief Complaint   Patient presents with     COMPREHENSIVE EYE EXAM      Accompanied by self  Last Eye Exam: 2 years ago  Dilated Previously: Yes    What are you currently using to see?  glasses       Distance Vision Acuity: Satisfied with vision    Near Vision Acuity: Satisfied with vision while reading  with glasses    Eye Comfort: good  Do you use eye drops? : No  Occupation or Hobbies: retired    Melissa Sunshine, Optometric Tech          Medical, surgical and family histories reviewed and updated 6/30/2017.       OBJECTIVE: See Ophthalmology exam    ASSESSMENT:    ICD-10-CM    1. Examination of eyes and vision Z01.00 EYE EXAM (SIMPLE-NONBILLABLE)     REFRACTION   2. Hyperopia of right eye with astigmatism and presbyopia H52.01 EYE EXAM (SIMPLE-NONBILLABLE)    H52.201 REFRACTION    H52.4    3. Astigmatism of left eye with presbyopia H52.202 EYE EXAM (SIMPLE-NONBILLABLE)    H52.4 REFRACTION   4. Age-related nuclear cataract of both eyes H25.13 EYE EXAM (SIMPLE-NONBILLABLE)      PLAN:   A final glasses prescription was given.  Allow time for adaptation.  The glasses may cause dizziness and affect depth perception for awhile.  Monitor cataracts by having yearly exams.  Wear sunglasses when outside.  Return to clinic 1 year for Comprehensive Vision Exam      Carito Ruiz O.D  [address] [facility] [place]  [address]    [phone]

## 2017-06-30 NOTE — PATIENT INSTRUCTIONS
A final glasses prescription was given.  Allow time for adaptation.  The glasses may cause dizziness and affect depth perception for awhile.  Monitor cataracts by having yearly exams.  Wear sunglasses when outside.  Return to clinic 1 year for Comprehensive Vision Exam      Carito Ruiz O.D  [address]  [place]  [address]    [phone]

## 2017-07-18 DIAGNOSIS — M79.18 MYOFASCIAL PAIN: ICD-10-CM

## 2017-07-18 RX ORDER — GABAPENTIN 100 MG/1
100 CAPSULE ORAL 3 TIMES DAILY
Qty: 90 CAPSULE | Refills: 1 | Status: SHIPPED | OUTPATIENT
Start: 2017-07-18 | End: 2017-11-14

## 2017-07-18 NOTE — TELEPHONE ENCOUNTER
Fax received from: [facility] pharmacy  Drug: gabapentin (NEURONTIN) 100 MG capsule   Qty: 90  Last filled 06/19/207  Last seen: 6/02/17  Next appointment: 7/25/17       Monse Villanueva MA

## 2017-07-18 NOTE — TELEPHONE ENCOUNTER
Signed Prescriptions:                        Disp   Refills    gabapentin (NEURONTIN) 100 MG capsule      90 cap*1        Sig: Take 1 capsule (100 mg) by mouth 3 times daily  Authorizing Provider: ROME CAZARES    Reviewed, signed, e-prescribed.    Rome Vicente MD  [facility]

## 2017-07-25 ENCOUNTER — RADIOLOGY INJECTION OFFICE VISIT (OUTPATIENT)
Dept: PALLIATIVE MEDICINE | Facility: CLINIC | Age: 67
End: 2017-07-25
Payer: COMMERCIAL

## 2017-07-25 ENCOUNTER — RADIANT APPOINTMENT (OUTPATIENT)
Dept: RADIOLOGY | Facility: CLINIC | Age: 67
End: 2017-07-25
Attending: ANESTHESIOLOGY

## 2017-07-25 VITALS — OXYGEN SATURATION: 99 % | SYSTOLIC BLOOD PRESSURE: 145 MMHG | DIASTOLIC BLOOD PRESSURE: 82 MMHG | HEART RATE: 75 BPM

## 2017-07-25 DIAGNOSIS — M47.812 CERVICAL FACET JOINT SYNDROME: ICD-10-CM

## 2017-07-25 DIAGNOSIS — M47.812 CERVICAL SPONDYLOSIS WITHOUT MYELOPATHY: ICD-10-CM

## 2017-07-25 DIAGNOSIS — M47.812 SPONDYLOSIS OF CERVICAL REGION WITHOUT MYELOPATHY OR RADICULOPATHY: ICD-10-CM

## 2017-07-25 DIAGNOSIS — G89.18 PAIN FOLLOWING SURGERY OR PROCEDURE: Primary | ICD-10-CM

## 2017-07-25 PROCEDURE — 64490 INJ PARAVERT F JNT C/T 1 LEV: CPT | Mod: LT | Performed by: ANESTHESIOLOGY

## 2017-07-25 PROCEDURE — 64491 INJ PARAVERT F JNT C/T 2 LEV: CPT | Mod: LT | Performed by: ANESTHESIOLOGY

## 2017-07-25 PROCEDURE — 64492 INJ PARAVERT F JNT C/T 3 LEV: CPT | Mod: LT | Performed by: ANESTHESIOLOGY

## 2017-07-25 RX ORDER — OXYCODONE AND ACETAMINOPHEN 5; 325 MG/1; MG/1
1-2 TABLET ORAL EVERY 6 HOURS PRN
Qty: 40 TABLET | Refills: 0 | Status: SHIPPED | OUTPATIENT
Start: 2017-07-25 | End: 2018-04-18

## 2017-07-25 ASSESSMENT — PAIN SCALES - GENERAL
PAINLEVEL: MODERATE PAIN (4)
PAINLEVEL: NO PAIN (0)

## 2017-07-25 NOTE — NURSING NOTE
20 gauge Peripheral IV inserted into left anticubital - attempts: 1    BRITTANY QuinteroN, RN  Care Coordinator  [facility]

## 2017-07-25 NOTE — NURSING NOTE
MD Time IN: 10:15 AM   Sedation start time:  10:40 AM:  0.5 MG versed  Second and final dose 11:00 AM:  0.5 MG versed; 25 MCG Fentanyl  MD Time OUT:  11:15 AM    Total medications administered: fentanyl 25 mcg IV; versed 1 mg IV  Intravenous fluids were administered, normal saline 100 cc's.  Sedation Level Achieved:  Minimal sedation

## 2017-07-25 NOTE — MR AVS SNAPSHOT
After Visit Summary   7/25/2017    Shelia Sanchez    MRN: [id number]           Patient Information     Date Of Birth          1950        Visit Information        Provider Department      7/25/2017 9:45 AM Rome Fowler MD [facility]        Today's Diagnoses     Pain following surgery or procedure    -  1      Care Instructions    [facility]   Radiofrequency Ablation (RFA) Discharge Instructions     Anticipate procedural pain for up to 2 weeks.   You will likely receive a prescription for pain medication and you should take this as directed.   It may take 4 to 6 weeks to receive relief from the RFA  If you received sedation before, during or after your procedure, for the next 24 hours you shall NOT:    -Drive    -Operate machinery    -Drink alcohol    -Sign any legal documents   You may resume your normal diet   You may resume your regular medications after the procedure   Be cautious with walking. Numbness and/or weakness in the lower extremities may occur for up to 6-8 hours due to effect of local anesthetic  Avoid strenuous activity for the first 24 hours   You may resume your regular activities after 24 hours   You may shower, however no swimming, tub baths or hot tubs for 24 hours following your procedure   You may use ice packs 10-15 minutes three to four times a day at the injection site for comfort   Do not use heat to painful areas for 6 to 8 hours. This will give the local anesthetic time to wear off and prevent you from accidentally burning your skin.   You may use anti-inflammatory medications (such as Ibuprofen or Aleve or Advil) or Tylenol for pain control if necessary   If you experience any of the following, call the pain center nursing line during work hours at [phone] or call our after hours provider line at at [phone]:   -Fever over 100 degree F    -Swelling, bleeding, redness, drainage, warmth at the injection site     -Progressive weakness or numbness on your legs or arms    -Loss of bowel or bladder function    -Unusual headache that is not relieved by Tylenol    -Unusual new onset of pain that is not improving      Anticipate pain for up to 2 weeks after this procedure.  Pain medication has been prescribed to you for this.  It may take up to 4-6 weeks to receive relief from the radiofrequency ablation .  Follow up with provider in 6 weeks.             Follow-ups after your visit        Your next 10 appointments already scheduled     Jul 27, 2017 10:30 AM CDT   Return Visit with Rome Vicente MD   [facility] [place] ([facility] [place])    [address]  [place] [address] [phone]              Who to contact     If you have questions or need follow up information about today's clinic visit or your schedule please contact [facility] [place] directly at [phone].  Normal or non-critical lab and imaging results will be communicated to you by SpaceFacehart, letter or phone within 4 business days after the clinic has received the results. If you do not hear from us within 7 days, please contact the clinic through SpaceFacehart or phone. If you have a critical or abnormal lab result, we will notify you by phone as soon as possible.  Submit refill requests through Skytree or call your pharmacy and they will forward the refill request to us. Please allow 3 business days for your refill to be completed.          Additional Information About Your Visit        SpaceFacehart Information     Skytree gives you secure access to your electronic health record. If you see a primary care provider, you can also send messages to your care team and make appointments. If you have questions, please call your primary care clinic.  If you do not have a primary care provider, please call [phone] and they will assist you.        Care EveryWhere ID     This is your Care EveryWhere ID. This could be used by  other organizations to access your [facility] medical records  [id number]        Your Vitals Were     Pulse Pulse Oximetry                90 95%           Blood Pressure from Last 3 Encounters:   07/25/17 142/86   06/02/17 146/80   05/16/17 147/84    Weight from Last 3 Encounters:   03/24/17 121.1 kg (267 lb)   11/16/16 116.5 kg (256 lb 12.8 oz)   06/03/16 109.8 kg (242 lb)              Today, you had the following     No orders found for display         Today's Medication Changes          These changes are accurate as of: 7/25/17 11:28 AM.  If you have any questions, ask your nurse or doctor.               Start taking these medicines.        Dose/Directions    oxyCODONE-acetaminophen 5-325 MG per tablet   Commonly known as:  PERCOCET   Used for:  Pain following surgery or procedure   Started by:  Rome Fowler MD        Dose:  1-2 tablet   Take 1-2 tablets by mouth every 6 hours as needed for pain maximum 6 tablet(s) per day   Quantity:  40 tablet   Refills:  0            Where to get your medicines      Some of these will need a paper prescription and others can be bought over the counter.  Ask your nurse if you have questions.     Bring a paper prescription for each of these medications     oxyCODONE-acetaminophen 5-325 MG per tablet                Primary Care Provider Office Phone # Fax #    Tahmina Hernández [phone]       [address] [address]        Equal Access to Services     VICKY ROBERTS AH: Hadii bruce dubono Sozeinab, waaxda luqadaha, qaybta kaalmada jaja, barry alvarenga. So [facility] [phone].    ATENCIÓN: Si habla español, tiene a barajas disposición servicios gratuitos de asistencia lingüística. Llame al [phone].    We comply with applicable federal civil rights laws and [place] laws. We do not discriminate on the basis of race, color, national origin, age, disability sex, sexual  orientation or gender identity.            Thank you!     Thank you for choosing [facility]  for your care. Our goal is always to provide you with excellent care. Hearing back from our patients is one way we can continue to improve our services. Please take a few minutes to complete the written survey that you may receive in the mail after your visit with us. Thank you!             Your Updated Medication List - Protect others around you: Learn how to safely use, store and throw away your medicines at www.disposemymeds.org.          This list is accurate as of: 7/25/17 11:28 AM.  Always use your most recent med list.                   Brand Name Dispense Instructions for use Diagnosis    amLODIPine 5 MG tablet    NORVASC    90 tablet    Take 1 tablet (5 mg) by mouth daily    Essential hypertension with goal blood pressure less than 140/90       aspirin 81 MG tablet      Take 81 mg by mouth daily        gabapentin 100 MG capsule    NEURONTIN    90 capsule    Take 1 capsule (100 mg) by mouth 3 times daily    Myofascial pain       hydrochlorothiazide 25 MG tablet    HYDRODIURIL    90 tablet    Take 1 tablet (25 mg) by mouth daily    Essential hypertension with goal blood pressure less than 140/90       NEXIUM PO           oxyCODONE-acetaminophen 5-325 MG per tablet    PERCOCET    40 tablet    Take 1-2 tablets by mouth every 6 hours as needed for pain maximum 6 tablet(s) per day    Pain following surgery or procedure       SLEEP-AID MAXIMUM STRENGTH 50 MG Caps   Generic drug:  DiphenhydrAMINE HCl (Sleep)      Take 1 capsule by mouth At Bedtime        venlafaxine 37.5 MG 24 hr capsule    EFFEXOR-XR    90 capsule    Take 1 capsule (37.5 mg) by mouth daily    Menopausal syndrome (hot flashes)       ZANTAC PO

## 2017-07-25 NOTE — NURSING NOTE
"Chief Complaint   Patient presents with     Pain       Initial BP (!) 166/91  Pulse 84 Estimated body mass index is 41.82 kg/(m^2) as calculated from the following:    Height as of 11/16/16: 1.702 m (5' 7\").    Weight as of 3/24/17: 121.1 kg (267 lb).  Medication Reconciliation: complete     Injection intake:    If this procedure is requiring IV sedation has patient been NPO for 6  Hours? YES    Is patient on coumadin, plavix or other prescribed blood thinner?   No    If patient is on coumadin was it held for 5 days?   NA    If patient is on plavix was it held for 7 days?    NA     Does patient take aspirin?  Yes -   ASA-81mg    If this is for a cervical procedure and patient is on aspirin has it been held for 6 days?   NA    Any allergies to contrast dye, iodine, steroid and/or numbing medications?  NO    Is patient currently taking antibiotics or have an active infection?  NO    Does patient have a ? Yes       Is patient pregnant or breastfeeding?  NO    Are the vital signs normal?  Yes    Mary Rodriguez CMA (AAMA)        "

## 2017-07-25 NOTE — PROGRESS NOTES
Pre procedure Diagnosis: facet arthropathy, Cervical spondylosis   Post procedure Diagnosis: Same  Procedure performed: cervical medial branch radiofrequency ablation on the left C3, C4, C5, C6, fluoroscopically guided  Anesthesia: Minimal sedation with Versed 1 mg IV and Fentanyl 25 mcg IV  Complications: none  Operators: Rome Vicente MD     Indications:   Shelia Sanchez is a 66 year old female who is known to me that presents today for cervical medial branch radiofrequency ablation as the third part of a three stage procedure.  The patient has a history of chronic left sided neck pain without radiation.  Exam shows increased pain with extension and lateral rotation of the cervical spine and they have tried conservative treatment including physical therapy,medications, and previous interventional procedures. She had two positive diagnostic cervical medial branch blocks on 4/13/17 and 6/2/17 that offered significant reduction in her pain with improved cervical range of motion.     Options/alternatives, benefits and risks were discussed with the patient including but not limited to bleeding, infection, tissue trauma, exposure to radiation, reaction to medications, spinal cord injury, weakness, numbness and paralysis.  Questions were answered to her satisfaction and she wishes to proceed. Voluntary informed consent was obtained and signed.      Vitals were reviewed: Yes  BP (!) 166/91  Pulse 84  Allergies were reviewed:  Yes   Medications were reviewed:  Yes   Pre-procedure pain score: 4/10     Procedure:  After obtaining signed, informed consent, the patient was taken to the procedure room and placed in the prone position on the [facility] frame.  A Pause for the Cause was completed prior to procedure start.  The patient was prepped and draped in the usual sterile fashion.   The areas of interest were identified with fluoroscopy.  The skin and subcutaneous tissues were anesthetized by injecting Lidocaine 1%  1 ml at each injection site utilizing a 30 gauge 1 inch needle.  Then,  20 gauge 100 mm curved tip RF needles with 10 mm active tips were advanced tangential to the medial branch nerves, abutting the articular pillars of C3, C4, C5, and C6 on the left utilizing AP and Lateral fluoroscopy.  Aspiration was negative at all the levels.    Each level was then tested for motor and sensory stimulation, and positioned so that stimulation was negative for stimuli outside the immediate area of the desired lesion.  Sensory stimulation was completed at 50 Hz, with max stimulation up to 0.4V.  Motor stimulation was completed at 2Hz, up to 1.5V and was negative except for multifidus muscle movement at all the levels.       Each level was then injected with 1.5 ml of Bupivicaine 0.5 %, and after allowing the local anesthetic to set up a 90 second, 80 degree Centigrade lesion was generated.  The electrodes were withdrawn slightly and the needles were rotated 180 degrees to widen the lesion area.  The electrodes were replaced and a second 80 degree 90 second lesion was generated.     Needles were then flushed with a combination of Lidocaine 1% and Bupivicaine 0.5% as they were removed and  hemostasis was achieved.     The patient tolerated the procedure well without complications and was taken to the recovery area for continued observation.  Fluoroscopic images were saved to PACS.     Sedation start time:  1040  MD time out:  1115     Post-procedure pain:  0/10    The patient was given a prescription for Percocet 5/325 1-2 tabs Q6H prn post procedure pain, # 40.     Follow-up includes:  - post procedure nurse call in one week  - follow up with PCP and in the pain clinic as scheduled     Rome Vicente MD  [facility]    [facility] Procedure Note        Sedation:      Performed by: Rome Vicente  Authorized by: Rome Vicente    Pre-Procedure Assessment done at 1005.    Expected  Level:  Minimal Sedation    Indication:  Sedation is required to allow for cervical medial branch radiofrequency ablation    Consent obtained from patient after discussing the risks, benefits and alternatives.    PO Intake:  Appropriately NPO for procedure    ASA Class:  Class 3 - SEVERE SYSTEMIC DISEASE, DEFINITE FUNCTIONAL LIMITATIONS.    Mallampati:  Grade 3:  Soft palate visible, posterior pharyngeal wall not visible    Lungs: Lungs Clear with good breath sounds bilaterally.     Heart: Normal heart sounds and rate    History and physical reviewed and no updates needed. I have reviewed the lab findings, diagnostic data, medications, and the plan for sedation. I have determined this patient to be an appropriate candidate for the planned sedation and procedure and have reassessed the patient IMMEDIATELY PRIOR to sedation and procedure.      Sedation Post Procedure Summary:    Prior to the start of the procedure and with procedural staff participation, I verbally confirmed the patient s identity using two indicators, relevant allergies, that the procedure was appropriate and matched the consent or emergent situation, and that the correct equipment/implants were available. Immediately prior to starting the procedure I conducted the Time Out with the procedural staff and re-confirmed the patient s name, procedure, and site/side. (The Joint Commission universal protocol was followed.)  Yes      Sedatives: Fentanyl and Midazolam (Versed)    Vital signs, airway and pulse oximetry were monitored and remained stable throughout the procedure and sedation was maintained until the procedure was complete.  The patient was monitored by staff until sedation discharge criteria were met.    Patient tolerance: Patient tolerated the procedure well with no immediate complications.    Time of sedation in minutes:  35 minutes from beginning to end of physician one to one monitoring.    Rome Vicente MD  [facility]

## 2017-07-25 NOTE — NURSING NOTE
Discharge Information    IV Discontiued Time:  11:45    Amount of Fluid Infused:  100cc    Discharge Criteria = When patient returns to baseline or as per MD order    Consciousness:  Pt is fully awake    Circulation:  BP +/- 20% of pre-procedure level    Respiration:  Patient is able to breathe deeply    O2 Sat:  Patient is able to maintain O2 Sat >92% on room air    Activity:  Moves 4 extremities on command    Ambulation:  Patient is able to stand and walk or stand and pivot into wheelchair    Dressing:  Clean/dry or No Dressing    Notes:   Discharge instructions and AVS given to patient    Patient meets criteria for discharge?  YES    Admitted to PCU?  No    Responsible adult present to accompany patient home?  Yes    Signature/Title:    Moni Williamson RN Care Coordinator  [facility] [place]

## 2017-07-25 NOTE — PATIENT INSTRUCTIONS
[facility]   Radiofrequency Ablation (RFA) Discharge Instructions     Anticipate procedural pain for up to 2 weeks.   You will likely receive a prescription for pain medication and you should take this as directed.   It may take 4 to 6 weeks to receive relief from the RFA  If you received sedation before, during or after your procedure, for the next 24 hours you shall NOT:    -Drive    -Operate machinery    -Drink alcohol    -Sign any legal documents   You may resume your normal diet   You may resume your regular medications after the procedure   Be cautious with walking. Numbness and/or weakness in the lower extremities may occur for up to 6-8 hours due to effect of local anesthetic  Avoid strenuous activity for the first 24 hours   You may resume your regular activities after 24 hours   You may shower, however no swimming, tub baths or hot tubs for 24 hours following your procedure   You may use ice packs 10-15 minutes three to four times a day at the injection site for comfort   Do not use heat to painful areas for 6 to 8 hours. This will give the local anesthetic time to wear off and prevent you from accidentally burning your skin.   You may use anti-inflammatory medications (such as Ibuprofen or Aleve or Advil) or Tylenol for pain control if necessary   If you experience any of the following, call the pain center nursing line during work hours at [phone] or call our after hours provider line at at [phone]:   -Fever over 100 degree F    -Swelling, bleeding, redness, drainage, warmth at the injection site    -Progressive weakness or numbness on your legs or arms    -Loss of bowel or bladder function    -Unusual headache that is not relieved by Tylenol    -Unusual new onset of pain that is not improving      Anticipate pain for up to 2 weeks after this procedure.  Pain medication has been prescribed to you for this.  It may take up to 4-6 weeks to receive relief from the  radiofrequency ablation .  Follow up with provider in 6 weeks.

## 2017-08-02 ENCOUNTER — TELEPHONE (OUTPATIENT)
Dept: RADIOLOGY | Facility: CLINIC | Age: 67
End: 2017-08-02

## 2017-08-02 NOTE — TELEPHONE ENCOUNTER
Patient had a radiofrequency ablation (RFA) on 7/25/17.  Called patient for an update.      Left message that we were calling for an update about how s/he was doing after the procedure and that if s/he has any questions or concerns to call the nurse line at [phone].

## 2017-08-29 ENCOUNTER — MYC MEDICAL ADVICE (OUTPATIENT)
Dept: FAMILY MEDICINE | Facility: CLINIC | Age: 67
End: 2017-08-29

## 2017-08-29 NOTE — LETTER
[facility]  [address] [place] [address] [phone]                                                                                                September 6, 2017    Shelia S Laura  [address] [address]        Dear Ms. Sanchez,    In order to ensure we are providing the best quality care, we have reviewed your chart and see that you are due for:     1 FIT (stool card)   2 Mammogram- (scheduling line [phone])     Please call the clinic at your earliest convenience to schedule an appointment. Please complete the FIT card and send in. If you need another card, please schedule a lab only appt to pick one up.     Thank you for trusting us with your health care.     Sincerely,     Dr. Tahmina Hernández/wilbur

## 2017-08-29 NOTE — TELEPHONE ENCOUNTER
Panel Management Review           Composite cancer screening  Chart review shows that this patient is due/due soon for the following Mammogram and Fecal Colorectal (FIT)  Summary:    Patient is due/failing the following:   FIT and MAMMOGRAM    Action needed:   Patient needs office visit for Mammogram and complete FIT.    Type of outreach:    Sent [employer] message.    Questions for provider review:    None                                                                                                                                      Fauzia Jasmine MA       Chart routed to Care Team .

## 2017-10-17 DIAGNOSIS — I10 ESSENTIAL HYPERTENSION WITH GOAL BLOOD PRESSURE LESS THAN 140/90: ICD-10-CM

## 2017-10-17 DIAGNOSIS — N95.1 MENOPAUSAL SYNDROME (HOT FLASHES): ICD-10-CM

## 2017-10-17 DIAGNOSIS — Z13.6 CARDIOVASCULAR SCREENING; LDL GOAL LESS THAN 130: Primary | ICD-10-CM

## 2017-10-17 NOTE — TELEPHONE ENCOUNTER
venlafaxine (EFFEXOR-XR) 37.5 MG 24 hr capsule   37.5 mg, DAILY 3 ordered  Edit     Summary: Take 1 capsule (37.5 mg) by mouth daily, Disp-90 capsule, R-3, E-Prescribe   Dose, Route, Frequency: 37.5 mg, Oral, DAILY  Start: 11/16/2016  Ord/Sold: 11/16/2016 (O)  Report  Taking:   Long-term:   Pharmacy: [facility] [address] [place] [address] AVE  AT [facility]  Med Dose History       Patient Sig: Take 1 capsule (37.5 mg) by mouth daily       Ordered on: 11/16/2016       Authorized by: PJ [place]       Dispense: 90 capsule          Last Office Visit with [facility] or [facility] prescribing provider: 11-        BP Readings from Last 3 Encounters:   07/25/17 145/82   06/02/17 146/80   05/16/17 147/84     Pulse: (for Fetzima)  Creatinine   Date Value Ref Range Status   11/16/2016 0.65 0.52 - 1.04 mg/dL Final   ]    Last PHQ-9 score on record=   PHQ-9 SCORE 6/6/2016   Total Score 6

## 2017-10-18 NOTE — TELEPHONE ENCOUNTER
Schedule yearly visit and then provide all her refills until the appointment.    Tahmina Hernández MD

## 2017-10-18 NOTE — TELEPHONE ENCOUNTER
Routing refill request to provider for review/approval because:  [facility] is not on [facility] refill protocol for med      Catrachita Amezquita RN  [facility]

## 2017-10-20 RX ORDER — AMLODIPINE BESYLATE 5 MG/1
5 TABLET ORAL DAILY
Qty: 90 TABLET | Refills: 0 | Status: SHIPPED | OUTPATIENT
Start: 2017-10-20 | End: 2018-01-10

## 2017-10-20 RX ORDER — VENLAFAXINE HYDROCHLORIDE 37.5 MG/1
CAPSULE, EXTENDED RELEASE ORAL
Qty: 90 CAPSULE | Refills: 0 | Status: SHIPPED | OUTPATIENT
Start: 2017-10-20 | End: 2018-01-10 | Stop reason: DRUGHIGH

## 2017-10-20 RX ORDER — HYDROCHLOROTHIAZIDE 25 MG/1
25 TABLET ORAL DAILY
Qty: 90 TABLET | Refills: 0 | Status: SHIPPED | OUTPATIENT
Start: 2017-10-20 | End: 2018-01-10

## 2017-10-20 NOTE — TELEPHONE ENCOUNTER
Scheduled patient for 1/10, refills provided & labs ordered per patient request.  Lilliana Reeves RN

## 2017-11-13 DIAGNOSIS — M79.18 MYOFASCIAL PAIN: ICD-10-CM

## 2017-11-13 NOTE — TELEPHONE ENCOUNTER
Fax received from: [facility] pharmacy  Drug: gabapentin (NEURONTIN) 100 MG capsule   Qty: 90  Last filled 10/16/17  Last seen: 7/25/17  Next appointment: WILIAM      Routing to MA pool to facilitate refill.       Elizabeth CHAMBERS    [facility] [place]

## 2017-11-14 RX ORDER — GABAPENTIN 100 MG/1
100 CAPSULE ORAL 3 TIMES DAILY
Qty: 90 CAPSULE | Refills: 1 | Status: SHIPPED | OUTPATIENT
Start: 2017-11-14 | End: 2018-01-04

## 2017-11-14 NOTE — TELEPHONE ENCOUNTER
Signed Prescriptions:                        Disp   Refills    gabapentin (NEURONTIN) 100 MG capsule      90 cap*1        Sig: Take 1 capsule (100 mg) by mouth 3 times daily  Authorizing Provider: ROME CAZARES    Reviewed, signed, e-prescribed.    Rome Vicente MD  [facility]

## 2017-12-16 DIAGNOSIS — I10 ESSENTIAL HYPERTENSION WITH GOAL BLOOD PRESSURE LESS THAN 140/90: ICD-10-CM

## 2017-12-18 NOTE — TELEPHONE ENCOUNTER
Requested Prescriptions   Pending Prescriptions Disp Refills     hydrochlorothiazide (HYDRODIURIL) 25 MG tablet [Pharmacy Med Name: HYDROCHLOROTHIAZIDE 25MG TABLETS]  Last Written Prescription Date:  10/20/2017  Last Fill Quantity: 90,  # refills: 0   Last Office Visit with [facility], [facility] or [facility] prescribing provider:  11/16/2016  Future Office Visit:    Next 5 appointments (look out 90 days)     Jose 10, 2018 10:00 AM CST   PHYSICAL with Tahmina Hernández MD   [facility] ([address] [id number] [phone]                  90 tablet 0     Sig: TAKE 1 TABLET(25 MG) BY MOUTH DAILY    Diuretics (Including Combos) Protocol Failed    12/16/2017  3:58 AM       Failed - Normal serum creatinine on file in past 12 months    Recent Labs   Lab Test  11/16/16   1050   CR  0.65             Failed - Normal serum potassium on file in past 12 months    Recent Labs   Lab Test  11/16/16   1050   POTASSIUM  3.9                   Failed - Normal serum sodium on file in past 12 months    Recent Labs   Lab Test  11/16/16   1050   NA  140             Passed - Blood pressure under 140/90    BP Readings from Last 3 Encounters:   07/25/17 145/82   06/02/17 146/80   05/16/17 147/84                Passed - Recent or future visit with authorizing provider's specialty    Patient had office visit in the last year or has a visit in the next 30 days with authorizing provider.  See chart review.              Passed - Patient is age 18 or older       Passed - No active pregancy on record       Passed - No positive pregnancy test in past 12 months

## 2017-12-22 RX ORDER — HYDROCHLOROTHIAZIDE 25 MG/1
TABLET ORAL
Qty: 90 TABLET | Refills: 0 | Status: SHIPPED | OUTPATIENT
Start: 2017-12-22 | End: 2018-01-10

## 2018-01-04 ENCOUNTER — TELEPHONE (OUTPATIENT)
Dept: FAMILY MEDICINE | Facility: CLINIC | Age: 68
End: 2018-01-04

## 2018-01-04 DIAGNOSIS — M79.18 MYOFASCIAL PAIN: ICD-10-CM

## 2018-01-04 RX ORDER — GABAPENTIN 100 MG/1
100 CAPSULE ORAL 3 TIMES DAILY
Qty: 90 CAPSULE | Refills: 3 | Status: SHIPPED | OUTPATIENT
Start: 2018-01-04 | End: 2018-04-13

## 2018-01-04 NOTE — TELEPHONE ENCOUNTER
Signed Prescriptions:                        Disp   Refills    gabapentin (NEURONTIN) 100 MG capsule      90 cap*3        Sig: Take 1 capsule (100 mg) by mouth 3 times daily  Authorizing Provider: ROME CAZARES    Reviewed, signed, E-prescribed.    Rome Vicente MD  [facility]

## 2018-01-04 NOTE — TELEPHONE ENCOUNTER
Received fax request from [facility] pharmacy requesting refill(s) for gabapentin (NEURONTIN) 100 MG capsule    Last refilled on 12/08/17    Pt last seen on 05/16/17 for office visit. Has had injections since.  Next appt scheduled for : none    Will facilitate refill.

## 2018-01-10 ENCOUNTER — OFFICE VISIT (OUTPATIENT)
Dept: FAMILY MEDICINE | Facility: CLINIC | Age: 68
End: 2018-01-10
Payer: COMMERCIAL

## 2018-01-10 VITALS
OXYGEN SATURATION: 99 % | DIASTOLIC BLOOD PRESSURE: 82 MMHG | WEIGHT: 274.4 LBS | HEART RATE: 85 BPM | HEIGHT: 67 IN | SYSTOLIC BLOOD PRESSURE: 136 MMHG | RESPIRATION RATE: 21 BRPM | BODY MASS INDEX: 43.07 KG/M2 | TEMPERATURE: 97.9 F

## 2018-01-10 DIAGNOSIS — N95.1 MENOPAUSAL SYNDROME (HOT FLASHES): ICD-10-CM

## 2018-01-10 DIAGNOSIS — Z00.01 ENCOUNTER FOR PREVENTATIVE ADULT HEALTH CARE EXAM WITH ABNORMAL FINDINGS: Primary | ICD-10-CM

## 2018-01-10 DIAGNOSIS — Z12.11 COLON CANCER SCREENING: ICD-10-CM

## 2018-01-10 DIAGNOSIS — Z12.31 VISIT FOR SCREENING MAMMOGRAM: ICD-10-CM

## 2018-01-10 DIAGNOSIS — F32.0 MILD MAJOR DEPRESSION (H): ICD-10-CM

## 2018-01-10 DIAGNOSIS — E66.01 MORBID OBESITY (H): ICD-10-CM

## 2018-01-10 DIAGNOSIS — I10 ESSENTIAL HYPERTENSION WITH GOAL BLOOD PRESSURE LESS THAN 140/90: ICD-10-CM

## 2018-01-10 DIAGNOSIS — Z78.0 ASYMPTOMATIC MENOPAUSAL STATE: ICD-10-CM

## 2018-01-10 DIAGNOSIS — Z13.6 CARDIOVASCULAR SCREENING; LDL GOAL LESS THAN 130: ICD-10-CM

## 2018-01-10 DIAGNOSIS — B07.9 VIRAL WARTS, UNSPECIFIED TYPE: ICD-10-CM

## 2018-01-10 PROCEDURE — 80061 LIPID PANEL: CPT | Performed by: FAMILY MEDICINE

## 2018-01-10 PROCEDURE — 36415 COLL VENOUS BLD VENIPUNCTURE: CPT | Performed by: FAMILY MEDICINE

## 2018-01-10 PROCEDURE — 99213 OFFICE O/P EST LOW 20 MIN: CPT | Mod: 25 | Performed by: FAMILY MEDICINE

## 2018-01-10 PROCEDURE — 80053 COMPREHEN METABOLIC PANEL: CPT | Performed by: FAMILY MEDICINE

## 2018-01-10 PROCEDURE — 99397 PER PM REEVAL EST PAT 65+ YR: CPT | Performed by: FAMILY MEDICINE

## 2018-01-10 RX ORDER — VENLAFAXINE HYDROCHLORIDE 37.5 MG/1
CAPSULE, EXTENDED RELEASE ORAL
Qty: 90 CAPSULE | Refills: 3 | Status: CANCELLED | OUTPATIENT
Start: 2018-01-10

## 2018-01-10 RX ORDER — AMLODIPINE BESYLATE 5 MG/1
5 TABLET ORAL DAILY
Qty: 90 TABLET | Refills: 3 | Status: SHIPPED | OUTPATIENT
Start: 2018-01-10 | End: 2018-12-17

## 2018-01-10 RX ORDER — VENLAFAXINE HYDROCHLORIDE 75 MG/1
75 CAPSULE, EXTENDED RELEASE ORAL DAILY
Qty: 90 CAPSULE | Refills: 3 | Status: SHIPPED | OUTPATIENT
Start: 2018-01-10 | End: 2019-01-11

## 2018-01-10 RX ORDER — HYDROCHLOROTHIAZIDE 25 MG/1
25 TABLET ORAL DAILY
Qty: 90 TABLET | Refills: 3 | Status: SHIPPED | OUTPATIENT
Start: 2018-01-10 | End: 2019-01-11

## 2018-01-10 ASSESSMENT — ACTIVITIES OF DAILY LIVING (ADL)
CURRENT_FUNCTION: NO ASSISTANCE NEEDED
I_NEED_ASSISTANCE_FOR_THE_FOLLOWING_DAILY_ACTIVITIES:: NO ASSISTANCE IS NEEDED

## 2018-01-10 ASSESSMENT — PATIENT HEALTH QUESTIONNAIRE - PHQ9: SUM OF ALL RESPONSES TO PHQ QUESTIONS 1-9: 9

## 2018-01-10 ASSESSMENT — PAIN SCALES - GENERAL: PAINLEVEL: NO PAIN (0)

## 2018-01-10 NOTE — MR AVS SNAPSHOT
After Visit Summary   1/10/2018    Shelia Sanchez    MRN: [id number]           Patient Information     Date Of Birth          1950        Visit Information        Provider Department      1/10/2018 10:00 AM Tahmina Hernández MD [facility]        Today's Diagnoses     Encounter for preventative adult health care exam with abnormal findings    -  1    Essential hypertension with goal blood pressure less than 140/90        Menopausal syndrome (hot flashes)        BMI > 40        Mild major depression (H)        Asymptomatic menopausal state        Visit for screening mammogram        Colon cancer screening        CARDIOVASCULAR SCREENING; LDL GOAL LESS THAN 130          Care Instructions    Begin doubling up on Venlafaxine, so this will equal 75 milligrams daily. I will refill this medication with the increased dose.   You can use OTC salicylic acid at night for the warts and then cover the area with a band aid or duct tape. Take this off in the morning and repeat nightly.    Preventive Health Recommendations    Female Ages 65 +    Yearly exam:     See your health care provider every year in order to  o Review health changes.   o Discuss preventive care.    o Review your medicines if your doctor has prescribed any.      You no longer need a yearly Pap test unless you've had an abnormal Pap test in the past 10 years. If you have vaginal symptoms, such as bleeding or discharge, be sure to talk with your provider about a Pap test.      Every 1 to 2 years, have a mammogram.  If you are over 69, talk with your health care provider about whether or not you want to continue having screening mammograms.      Every 10 years, have a colonoscopy. Or, have a yearly FIT test (stool test). These exams will check for colon cancer.       Have a cholesterol test every 5 years, or more often if your doctor advises it.       Have a diabetes test (fasting glucose) every three years. If you are  at risk for diabetes, you should have this test more often.       At age 65, have a bone density scan (DEXA) to check for osteoporosis (brittle bone disease).    Shots:    Get a flu shot each year.    Get a tetanus shot every 10 years.    Talk to your doctor about your pneumonia vaccines. There are now two you should receive - Pneumovax (PPSV 23) and Prevnar (PCV 13).    Talk to your doctor about the shingles vaccine.    Talk to your doctor about the hepatitis B vaccine.    Nutrition:     Eat at least 5 servings of fruits and vegetables each day.      Eat whole-grain bread, whole-wheat pasta and brown rice instead of white grains and rice.      Talk to your provider about Calcium and Vitamin D.     Lifestyle    Exercise at least 150 minutes a week (30 minutes a day, 5 days a week). This will help you control your weight and prevent disease.      Limit alcohol to one drink per day.      No smoking.       Wear sunscreen to prevent skin cancer.       See your dentist twice a year for an exam and cleaning.      See your eye doctor every 1 to 2 years to screen for conditions such as glaucoma, macular degeneration and cataracts.          Follow-ups after your visit        Additional Services     MENTAL HEALTH REFERRAL  - Adult; Outpatient Treatment; Individual/Couples/Family/Group Therapy/Health Psychology; [facility] [place]: [facility] [phone]; We will contact you to schedule the appointment or please call with any questions       All scheduling is subject to the client's specific insurance plan & benefits, provider/location availability, and provider clinical specialities.  Please arrive 15 minutes early for your first appointment and bring your completed paperwork.    Please be aware that coverage of these services is subject to the terms and limitations of your health insurance plan.  Call member services at your health plan with any benefit or coverage questions.                            Future tests that  "were ordered for you today     Open Future Orders        Priority Expected Expires Ordered    *MA Screening Digital Bilateral Routine  1/10/2019 1/10/2018    DX Hip/Pelvis/Spine Routine  1/10/2019 1/10/2018            Who to contact     If you have questions or need follow up information about today's clinic visit or your schedule please contact [facility] [place] directly at [phone].  Normal or non-critical lab and imaging results will be communicated to you by [employer], letter or phone within 4 business days after the clinic has received the results. If you do not hear from us within 7 days, please contact the clinic through [employer] or phone. If you have a critical or abnormal lab result, we will notify you by phone as soon as possible.  Submit refill requests through [employer] or call your pharmacy and they will forward the refill request to us. Please allow 3 business days for your refill to be completed.          Additional Information About Your Visit        [employer] Information     [employer] gives you secure access to your electronic health record. If you see a primary care provider, you can also send messages to your care team and make appointments. If you have questions, please call your primary care clinic.  If you do not have a primary care provider, please call [phone] and they will assist you.        Care EveryWhere ID     This is your Care EveryWhere ID. This could be used by other organizations to access your [facility] medical records  [id number]        Your Vitals Were     Pulse Temperature Respirations Height Pulse Oximetry BMI (Body Mass Index)    85 97.9  F (36.6  C) (Oral) 21 5' 7\" (1.702 m) 99% 42.98 kg/m2       Blood Pressure from Last 3 Encounters:   01/10/18 136/82   07/25/17 145/82   06/02/17 146/80    Weight from Last 3 Encounters:   01/10/18 274 lb 6.4 oz (124.5 kg)   03/24/17 267 lb (121.1 kg)   11/16/16 256 lb 12.8 oz (116.5 kg)              We Performed the Following     " Comprehensive metabolic panel (BMP + Alb, Alk Phos, ALT, AST, Total. Bili, TP)     Lipid panel reflex to direct LDL Fasting     MENTAL HEALTH REFERRAL  - Adult; Outpatient Treatment; Individual/Couples/Family/Group Therapy/Health Psychology; [facility] [place] [facility] [phone]; We will contact you to schedule the appointment or please call with any questions          Today's Medication Changes          These changes are accurate as of: 1/10/18 11:02 AM.  If you have any questions, ask your nurse or doctor.               These medicines have changed or have updated prescriptions.        Dose/Directions    hydrochlorothiazide 25 MG tablet   Commonly known as:  HYDRODIURIL   This may have changed:  See the new instructions.   Used for:  Essential hypertension with goal blood pressure less than 140/90   Changed by:  Tahmina Hernández MD        Dose:  25 mg   Take 1 tablet (25 mg) by mouth daily   Quantity:  90 tablet   Refills:  3       venlafaxine 75 MG 24 hr capsule   Commonly known as:  EFFEXOR-XR   This may have changed:  See the new instructions.   Used for:  Menopausal syndrome (hot flashes)   Changed by:  Tahmina Hernández MD        Dose:  75 mg   Take 1 capsule (75 mg) by mouth daily   Quantity:  90 capsule   Refills:  3            Where to get your medicines      These medications were sent to [facility] [address] [place] [address] AT [facility] [address]  [address] [place] [id number]     Phone:  [phone]     amLODIPine 5 MG tablet    hydrochlorothiazide 25 MG tablet    venlafaxine 75 MG 24 hr capsule                Primary Care Provider Office Phone # Fax #    Tahmina Hernández [phone] [address] [facility] [address]        Equal Access to Services     VICKY ROBERTS AH: Kunal Ellis, svetlana lumamta, qaybta kaalkimberly wilkinson, barry alvarenga. So [facility]  [phone].    ATENCIÓN: Si mara amezcua, tiene a barajas disposición servicios gratuitos de asistencia lingüística. Reji beck [phone].    We comply with applicable federal civil rights laws and [place] laws. We do not discriminate on the basis of race, color, national origin, age, disability, sex, sexual orientation, or gender identity.            Thank you!     Thank you for choosing [facility]  for your care. Our goal is always to provide you with excellent care. Hearing back from our patients is one way we can continue to improve our services. Please take a few minutes to complete the written survey that you may receive in the mail after your visit with us. Thank you!             Your Updated Medication List - Protect others around you: Learn how to safely use, store and throw away your medicines at www.disposemymeds.org.          This list is accurate as of: 1/10/18 11:02 AM.  Always use your most recent med list.                   Brand Name Dispense Instructions for use Diagnosis    amLODIPine 5 MG tablet    NORVASC    90 tablet    Take 1 tablet (5 mg) by mouth daily    Essential hypertension with goal blood pressure less than 140/90       aspirin 81 MG tablet      Take 81 mg by mouth daily        gabapentin 100 MG capsule    NEURONTIN    90 capsule    Take 1 capsule (100 mg) by mouth 3 times daily    Myofascial pain       hydrochlorothiazide 25 MG tablet    HYDRODIURIL    90 tablet    Take 1 tablet (25 mg) by mouth daily    Essential hypertension with goal blood pressure less than 140/90       * NEXIUM PO           * NEXIUM 24HR PO      Take 1 tablet by mouth daily        oxyCODONE-acetaminophen 5-325 MG per tablet    PERCOCET    40 tablet    Take 1-2 tablets by mouth every 6 hours as needed for pain maximum 6 tablet(s) per day    Pain following surgery or procedure       SLEEP-AID MAXIMUM STRENGTH 50 MG Caps   Generic drug:  DiphenhydrAMINE HCl (Sleep)      Take 1 capsule by mouth At  Bedtime        venlafaxine 75 MG 24 hr capsule    EFFEXOR-XR    90 capsule    Take 1 capsule (75 mg) by mouth daily    Menopausal syndrome (hot flashes)       ZANTAC PO           * Notice:  This list has 2 medication(s) that are the same as other medications prescribed for you. Read the directions carefully, and ask your doctor or other care provider to review them with you.

## 2018-01-10 NOTE — PROGRESS NOTES
SUBJECTIVE:   Shelia Sanchez is a 67 year old female who presents for Preventive Visit.    Are you in the first 12 months of your Medicare coverage?  No    Physical   Annual:     Getting at least 3 servings of Calcium per day::  Yes    Bi-annual eye exam::  Yes    Dental care twice a year::  Yes    Sleep apnea or symptoms of sleep apnea::  Daytime drowsiness and Excessive snoring    Frequency of exercise::  None    Taking medications regularly::  Yes    Medication side effects::  Lightheadedness    Additional concerns today::  YES    Ability to successfully perform activities of daily living: no assistance needed  Home Safety:  No safety concerns identified  Hearing Impairment: no hearing concerns      Fall risk:  Fallen 2 or more times in the past year?: No  Any fall with injury in the past year?: Yes  Timed Up and Go Test (>13.5 is fall risk; contact physician) : 12    COGNITIVE SCREEN  1) Repeat 3 items (Banana, Sunrise, Chair)    2) Clock draw: NORMAL  3) 3 item recall: Recalls 3 objects  Results: 3 items recalled: COGNITIVE IMPAIRMENT LESS LIKELY    Mini-CogTM Copyright S Garrett. Licensed by the author for use in [facility]; reprinted with permission ([email or web address]). All rights reserved.        Neck: Tough to deal with this. Affecting her driving because it is difficult to move her neck worse to the R than the L. Pain moved into her R shoulder as well. Had ablation done in May. Gave her more flexibility and less pain. Trying to build up the nerve to do this again. This was done through Dr. Rebecca Vicente.     Gabapentin: Not a fan of this medication. Has dry mouth, used biotene spray and uses tablets during the day. Drinking water has not helped. She has benefits with pain so doesn't want to stop the medication.     Hot flashes: States her symptoms have improved since being on venlafaxine. Has never been on a higher dose.     Warts: Has 6 warts on her R third finger. Has tried to use bandages. No  warts anywhere else.     : Incontinence continues    Mood: Patient has been down lately. Sometimes she will play solitaire and eat for a while day. States this is why she has gained weight. Motivation is very low. No sense of hopelessness. Cousin has cancer, sisters depression is worse, difficulties at home.   No suicidal ideation  Reviewed phq    Exercise: Was told to stop exercising until she figured out what was going on with her neck. Went back to gym on January 1st. Difficult to get motivated to get to the gym. Still cannot do weight lifting.     Eyes: Has cataracts that are growing slowly, surgery in the distant future.  Dentist: Has been going regularly.      Reviewed and updated as needed this visit by clinical staff  Tobacco  Allergies  Meds         Reviewed and updated as needed this visit by Provider        Social History   Substance Use Topics     Smoking status: Never Smoker     Smokeless tobacco: Never Used     Alcohol use 0.0 oz/week      Comment: 4 DRINKS PER WEEK       Alcohol Use 1/10/2018   If you drink alcohol, do you typically have greater than 3 drinks per day OR greater than 7 drinks per week?   No   No flowsheet data found.        Today's PHQ-2 Score:   PHQ-2 ( 1999 [employer]) 1/10/2018   Q1: Little interest or pleasure in doing things 1   Q2: Feeling down, depressed or hopeless 1   PHQ-2 Score 2   Q1: Little interest or pleasure in doing things Several days   Q2: Feeling down, depressed or hopeless Several days   PHQ-2 Score 2       Do you feel safe in your environment - Yes    Do you have a Health Care Directive?: No: Advance care planning was reviewed with patient; patient declined at this time.      Current providers sharing in care for this patient include: Patient Care Team:  Tahmina Hernández MD as PCP - General  Caitlin Reeves RN as   Jessica Hsu, RN as Nurse Coordinator (Neurology)  Marlyn Matta, RN as Nurse Coordinator (Neurology)    The following health  maintenance items are reviewed in Epic and correct as of today:  Health Maintenance   Topic Date Due     FIT Q1 YR  [birth year]20/1960     DEPRESSION ACTION PLAN Q1 YR  08/20/1966     DEXA SCAN SCREENING (SYSTEM ASSIGNED)  08/20/2013     FALL RISK ASSESSMENT  02/12/2017     MAMMO SCREEN Q2 YR (SYSTEM ASSIGNED)  02/12/2017     INFLUENZA VACCINE (SYSTEM ASSIGNED)  09/01/2017     EYE EXAM Q1 YEAR  06/30/2018     BMP Q6 MOS  07/10/2018     PHQ-9 Q6 MONTHS  07/10/2018     ALT Q1 YR  01/10/2019     LIPID MONITORING Q1 YEAR  01/10/2019     TETANUS IMMUNIZATION (SYSTEM ASSIGNED)  09/08/2019     ADVANCE DIRECTIVE PLANNING Q5 YRS  03/06/2020     PNEUMOCOCCAL  Completed     HEPATITIS C SCREENING  Completed     Patient Active Problem List   Diagnosis     Asteatotic eczema     BMI > 40     Cholelithiasis with obstruction     CARDIOVASCULAR SCREENING; LDL GOAL LESS THAN 130     Hypertension goal BP (blood pressure) < 140/90     Advanced directives, counseling/discussion     Right knee pain     Left shoulder pain     Cataracts, both eyes     Urinary incontinence     Atypical nevus     Cervical pain     Left-sided headache     Menopausal syndrome (hot flashes)     Heartburn     Mild major depression (H)     Past Surgical History:   Procedure Laterality Date     C VAG HYST,RMV TUBE/OVARY  2004     CHOLECYSTECTOMY, LAPOROSCOPIC      Cholecystectomy, Laparoscopic     HC ERCP W STENT PLACEMENT BILE/PANCREATIC DUCT         Social History   Substance Use Topics     Smoking status: Never Smoker     Smokeless tobacco: Never Used     Alcohol use 0.0 oz/week      Comment: 4 DRINKS PER WEEK     Family History   Problem Relation Age of Onset     Arthritis Mother      Congenital Anomalies Mother      Eye Disorder Mother      Glaucoma Mother      Hypertension Mother      Depression Mother      Obesity Mother      Congenital Anomalies Father      Alzheimer Disease Father      Arthritis Father      DIABETES Father      Hypertension Father       CEREBROVASCULAR DISEASE Father      Prostate Cancer Father      Obesity Father      HEART DISEASE Maternal Grandmother      HEART DISEASE Maternal Grandfather      Hypertension Paternal Grandmother      HEART DISEASE Paternal Grandfather      Depression Sister      Hypertension Sister      Macular Degeneration Maternal Uncle          Current Outpatient Prescriptions   Medication Sig Dispense Refill     Esomeprazole Magnesium (NEXIUM 24HR PO) Take 1 tablet by mouth daily       hydrochlorothiazide (HYDRODIURIL) 25 MG tablet Take 1 tablet (25 mg) by mouth daily 90 tablet 3     amLODIPine (NORVASC) 5 MG tablet Take 1 tablet (5 mg) by mouth daily 90 tablet 3     venlafaxine (EFFEXOR-XR) 75 MG 24 hr capsule Take 1 capsule (75 mg) by mouth daily 90 capsule 3     gabapentin (NEURONTIN) 100 MG capsule Take 1 capsule (100 mg) by mouth 3 times daily 90 capsule 3     RaNITidine HCl (ZANTAC PO)        Esomeprazole Magnesium (NEXIUM PO)        aspirin 81 MG tablet Take 81 mg by mouth daily       DiphenhydrAMINE HCl, Sleep, (SLEEP-AID MAXIMUM STRENGTH) 50 MG CAPS Take 1 capsule by mouth At Bedtime       oxyCODONE-acetaminophen (PERCOCET) 5-325 MG per tablet Take 1-2 tablets by mouth every 6 hours as needed for pain maximum 6 tablet(s) per day (Patient not taking: Reported on 1/10/2018) 40 tablet 0     No Known Allergies      Mammogram Screening: Patient over age 50, mutual decision to screen reflected in health maintenance.  Review of Systems  Constitutional, HEENT, cardiovascular, pulmonary, GI, , musculoskeletal, neuro, skin, endocrine and psych systems are negative, except as otherwise noted.    This document serves as a record of the services and decisions personally performed and made by Tahmina Hernández MD. It was created on her behalf by Etelvina Garsia, a trained medical scribe. The creation of this document is based the provider's statements to the medical scribe.    January 10, 2018 11:03 AM    OBJECTIVE:   /82  "(BP Location: Right arm, Patient Position: Chair, Cuff Size: Adult Large)  Pulse 85  Temp 97.9  F (36.6  C) (Oral)  Resp 21  Ht 5' 7\" (1.702 m)  Wt 274 lb 6.4 oz (124.5 kg)  SpO2 99%  BMI 42.98 kg/m2 Estimated body mass index is 42.98 kg/(m^2) as calculated from the following:    Height as of this encounter: 5' 7\" (1.702 m).    Weight as of this encounter: 274 lb 6.4 oz (124.5 kg).  Physical Exam  GENERAL: morbid obesity, alert and no distress  EYES: Eyes grossly normal to inspection, PERRL and conjunctivae and sclerae normal  HENT: ear canals and TM's normal, nose and mouth without ulcers or lesions  NECK: no adenopathy, no asymmetry, masses, or scars and thyroid normal to palpation  RESP: lungs clear to auscultation - no rales, rhonchi or wheezes  BREAST: normal without masses, tenderness or nipple discharge and no palpable axillary masses or adenopathy  CV: regular rate and rhythm, normal S1 S2, no S3 or S4, no murmur, click or rub, no peripheral edema and peripheral pulses strong  ABDOMEN: soft, nontender, no hepatosplenomegaly, no masses and bowel sounds normal  MS: no gross musculoskeletal defects noted, no edema  SKIN: R third finger warts  NEURO: Normal strength and tone, mentation intact and speech normal  PSYCH: mentation appears normal, affect normal/bright    PHQ9 score 9    ASSESSMENT / PLAN:   1. Encounter for preventative adult health care exam with abnormal findings      2. Mild major depression (H)  New diagnosis today, although patient has felt for many years that she likely has had low grade depression. Increase Venlafaxine to 75 mg daily.    Follow up with new provider in 4-6 weeks for re-check.  - MENTAL HEALTH REFERRAL  - Adult; Outpatient Treatment; Individual/Couples/Family/Group Therapy/Health Psychology; [facility] [place] [phone]; We will contact you to schedule the appointment or please call with any questions    3. Essential hypertension with goal blood " pressure less than 140/90  Chronic, stable, well controlled, continue current medication, refill done if needed    - hydrochlorothiazide (HYDRODIURIL) 25 MG tablet; Take 1 tablet (25 mg) by mouth daily  Dispense: 90 tablet; Refill: 3  - amLODIPine (NORVASC) 5 MG tablet; Take 1 tablet (5 mg) by mouth daily  Dispense: 90 tablet; Refill: 3  - Comprehensive metabolic panel (BMP + Alb, Alk Phos, ALT, AST, Total. Bili, TP)    4. Menopausal syndrome (hot flashes)  Venlafaxine has been helpful, given mood symptoms will increase dose.   - venlafaxine (EFFEXOR-XR) 75 MG 24 hr capsule; Take 1 capsule (75 mg) by mouth daily  Dispense: 90 capsule; Refill: 3    5. BMI > 40  She is aware of the need for weight loss. We need to improve mental health and motivation. Continue to discuss at future appointments.   - Comprehensive metabolic panel (BMP + Alb, Alk Phos, ALT, AST, Total. Bili, TP)  - JUST IN CASE    6. Asymptomatic menopausal state    - DX Hip/Pelvis/Spine; Future    7. Visit for screening mammogram    - *MA Screening Digital Bilateral; Future    8. Colon cancer screening  Return Fit card.     9. CARDIOVASCULAR SCREENING; LDL GOAL LESS THAN 130    - Lipid panel reflex to direct LDL Fasting    10. Viral warts, unspecified type  OTC treatment discussed.      End of Life Planning:  Patient currently has an advanced directive: No.  I have verified the patient's ablity to prepare an advanced directive/make health care decisions.  Literature was provided to assist patient in preparing an advanced directive.    COUNSELING:  Reviewed preventive health counseling, as reflected in patient instructions       Regular exercise       Healthy diet/nutrition       Vision screening       Hearing screening       Dental care       Osteoporosis Prevention/Bone Health       Colon cancer screening       (Gricel)menopause management       The 10-year ASCVD risk score (Josep DAYNA Jr, et al., 2013) is: 12.5%    Values used to calculate the score:       "Age: 67 years      Sex: Female      Is Non- : No      Diabetic: No      Tobacco smoker: No      Systolic Blood Pressure: 136 mmHg      Is BP treated: Yes      HDL Cholesterol: 62 mg/dL      Total Cholesterol: 208 mg/dL      Estimated body mass index is 42.98 kg/(m^2) as calculated from the following:    Height as of this encounter: 5' 7\" (1.702 m).    Weight as of this encounter: 274 lb 6.4 oz (124.5 kg).  Weight management plan: Discussed healthy diet and exercise guidelines and patient will follow up in 12 months in clinic to re-evaluate.   reports that she has never smoked. She has never used smokeless tobacco.      Appropriate preventive services were discussed with this patient, including applicable screening as appropriate for cardiovascular disease, diabetes, osteopenia/osteoporosis, and glaucoma.  As appropriate for age/gender, discussed screening for colorectal cancer, prostate cancer, breast cancer, and cervical cancer. Checklist reviewing preventive services available has been given to the patient.    Reviewed patients plan of care and provided an AVS. The Basic Care Plan (routine screening as documented in Health Maintenance) for Shelia meets the Care Plan requirement. This Care Plan has been established and reviewed with the Patient.    Counseling Resources:  ATP IV Guidelines  Pooled Cohorts Equation Calculator  Breast Cancer Risk Calculator  FRAX Risk Assessment  ICSI Preventive Guidelines  Dietary Guidelines for Americans, 2010  USDA's MyPlate  ASA Prophylaxis  Lung CA Screening    Tahmina Hernández MD  [facility] for HPI/ROS submitted by the patient on 1/10/2018   PHQ-2 Score: 2    The information in this document, created by the medical scribe for me, accurately reflects the services I personally performed and the decisions made by me. I have reviewed and approved this document for accuracy prior to leaving the patient care area.    "

## 2018-01-10 NOTE — NURSING NOTE
"Chief Complaint   Patient presents with     Physical     not fasting        Initial /82 (BP Location: Right arm, Patient Position: Chair, Cuff Size: Adult Large)  Pulse 85  Temp 97.9  F (36.6  C) (Oral)  Resp 21  Ht 5' 7\" (1.702 m)  Wt 274 lb 6.4 oz (124.5 kg)  SpO2 99%  BMI 42.98 kg/m2 Estimated body mass index is 42.98 kg/(m^2) as calculated from the following:    Height as of this encounter: 5' 7\" (1.702 m).    Weight as of this encounter: 274 lb 6.4 oz (124.5 kg).  Medication Reconciliation: complete   Yakelin sEcobar CMA      "

## 2018-01-10 NOTE — PATIENT INSTRUCTIONS
Begin doubling up on Venlafaxine, so this will equal 75 milligrams daily. I will refill this medication with the increased dose.   You can use OTC salicylic acid at night for the warts and then cover the area with a band aid or duct tape. Take this off in the morning and repeat nightly.    Preventive Health Recommendations    Female Ages 65 +    Yearly exam:     See your health care provider every year in order to  o Review health changes.   o Discuss preventive care.    o Review your medicines if your doctor has prescribed any.      You no longer need a yearly Pap test unless you've had an abnormal Pap test in the past 10 years. If you have vaginal symptoms, such as bleeding or discharge, be sure to talk with your provider about a Pap test.      Every 1 to 2 years, have a mammogram.  If you are over 69, talk with your health care provider about whether or not you want to continue having screening mammograms.      Every 10 years, have a colonoscopy. Or, have a yearly FIT test (stool test). These exams will check for colon cancer.       Have a cholesterol test every 5 years, or more often if your doctor advises it.       Have a diabetes test (fasting glucose) every three years. If you are at risk for diabetes, you should have this test more often.       At age 65, have a bone density scan (DEXA) to check for osteoporosis (brittle bone disease).    Shots:    Get a flu shot each year.    Get a tetanus shot every 10 years.    Talk to your doctor about your pneumonia vaccines. There are now two you should receive - Pneumovax (PPSV 23) and Prevnar (PCV 13).    Talk to your doctor about the shingles vaccine.    Talk to your doctor about the hepatitis B vaccine.    Nutrition:     Eat at least 5 servings of fruits and vegetables each day.      Eat whole-grain bread, whole-wheat pasta and brown rice instead of white grains and rice.      Talk to your provider about Calcium and Vitamin D.     Lifestyle    Exercise at least 150  minutes a week (30 minutes a day, 5 days a week). This will help you control your weight and prevent disease.      Limit alcohol to one drink per day.      No smoking.       Wear sunscreen to prevent skin cancer.       See your dentist twice a year for an exam and cleaning.      See your eye doctor every 1 to 2 years to screen for conditions such as glaucoma, macular degeneration and cataracts.    [facility]   Discharged by : Fauzia ZAMBRANO MA    If you have any questions regarding your visit please contact your care team:     Team Gold                Clinic Hours Telephone Number     Dr. Kailyn Ruvalcaba 7am-7pm  Monday - Thursday   7am-5pm  Fridays  [phone]   (Appointment scheduling available 24/7)     RN Line  [phone] option 2     [facility] 11am-9pm Monday-Friday Saturday-Sunday- 9am-5pm     [place] -   5pm-9pm Monday-Friday Saturday-Sunday- 9am-5pm    [phone] - [place]    [phone] - [place]       For a Price Quote for your services, please call our Consumer Price Line at [phone].     What options do I have for visits at the clinic other than the traditional office visit?     To expand how we care for you, many of our providers are utilizing electronic visits (e-visits) and telephone visits, when medically appropriate, for interactions with their patients rather than a visit in the clinic. We also offer nurse visits for many medical concerns. Just like any other service, we will bill your insurance company for this type of visit based on time spent on the phone with your provider. Not all insurance companies cover these visits. Please check with your medical insurance if this type of visit is covered. You will be responsible for any charges that are not paid by your insurance.   E-visits via [employer]idee: generally incur a $35.00 fee.      Telephone visits:   Time spent on the phone: *charged based on time that is spent on the phone in increments of 10 minutes. Estimated cost:   5-10 mins $30.00   11-20 mins. $59.00   21-30 mins. $85.00     Use [employer] (secure email communication and access to your chart) to send your primary care provider a message or make an appointment. Ask someone on your Team how to sign up for [employer].     As always, Thank you for trusting us with your health care needs!      [facility]:    Scheduling Appointments  [place] [place]  Call: [phone]    [place], [facility]  Call: [phone]    [facility]  Call: [phone]    For Gastroenterology referrals   [facility]   Clinics and Surgery Center, 4th Floor   [address] [place] [address]   Appointments: [phone]    WHERE TO GO FOR CARE?  Clinic    Make an appointment if you:       Are sick (cold, cough, flu, sore throat, earache or in pain).       Have a small injury (sprain, small cut, burn or broken bone).       Need a physical exam, Pap smear, vaccine or prescription refill.       Have questions about your health or medicines.    To reach us:      Call [phone] ([phone]). Open 24 hours every day. (For counseling services, call [phone].)    Log into [employer] at [email or web address]. (Visit [email or web address] to create an account.) Hospital emergency room    An emergency is a serious or life- threatening problem that must be treated right away.    Call [phone] or get to the hospital if you have:      Very bad or sudden:            - Chest pain or pressure         - Bleeding         - Head or belly pain         - Dizziness or trouble seeing, walking or                          Speaking      Problems breathing      Blood in your vomit or you are coughing up blood      A major injury (knocked out, loss of a finger or limb, rape, broken bone  protruding from skin)    A mental health crisis. (Or call the Mental Health Crisis line at 1-353.340.6411 or Suicide Prevention Hotline at 1-261.871.7478.)    Open 24 hours every day. You don't need an appointment.     Urgent care    Visit urgent care for sickness or small injuries when the clinic is closed. You don't need an appointment. To check hours or find an urgent care near you, visit [email or web address]. Online care    Get online care from [facility] for more than 70 common problems, like colds, allergies and infections. Open 24 hours every day at:   [email or web address]   Need help deciding?    For advice about where to be seen, you may call your clinic and ask to speak with a nurse. We're here for you 24 hours every day.         If you are deaf or hard of hearing, please let us know. We provide many free services including sign language interpreters, oral interpreters, TTYs, telephone amplifiers, note takers and written materials.

## 2018-01-11 LAB
ALBUMIN SERPL-MCNC: 3.7 G/DL (ref 3.4–5)
ALP SERPL-CCNC: 89 U/L (ref 40–150)
ALT SERPL W P-5'-P-CCNC: 22 U/L (ref 0–50)
ANION GAP SERPL CALCULATED.3IONS-SCNC: 7 MMOL/L (ref 3–14)
AST SERPL W P-5'-P-CCNC: 19 U/L (ref 0–45)
BILIRUB SERPL-MCNC: 0.3 MG/DL (ref 0.2–1.3)
BUN SERPL-MCNC: 18 MG/DL (ref 7–30)
CALCIUM SERPL-MCNC: 8.7 MG/DL (ref 8.5–10.1)
CHLORIDE SERPL-SCNC: 104 MMOL/L (ref 94–109)
CHOLEST SERPL-MCNC: 217 MG/DL
CO2 SERPL-SCNC: 29 MMOL/L (ref 20–32)
CREAT SERPL-MCNC: 0.64 MG/DL (ref 0.52–1.04)
GFR SERPL CREATININE-BSD FRML MDRD: >90 ML/MIN/1.7M2
GLUCOSE SERPL-MCNC: 79 MG/DL (ref 70–99)
HDLC SERPL-MCNC: 67 MG/DL
LDLC SERPL CALC-MCNC: 126 MG/DL
NONHDLC SERPL-MCNC: 150 MG/DL
POTASSIUM SERPL-SCNC: 3.6 MMOL/L (ref 3.4–5.3)
PROT SERPL-MCNC: 6.5 G/DL (ref 6.8–8.8)
SODIUM SERPL-SCNC: 140 MMOL/L (ref 133–144)
TRIGL SERPL-MCNC: 122 MG/DL

## 2018-01-12 NOTE — PROGRESS NOTES
Jane Bass,       Your lab results are stable for kidney function and blood sugar and potassium.  Your cholesterol is essentially the same as last check.  Your cardiac risk score, see below, does fall in the range that cholesterol lowering medication should be considered.  Let us know if you want to pursue that now, otherwise I recommend discussing this with your new provider.  Tahmina Hernández MD      The below information is a cardiac risk score and helps determine which patients would benefit from cholesterol lowering medication.  We consider statin therapy when the 10 year risk score is 7.5 % or higher.     The 10-year ASCVD risk score (Josep DC Jr, et al., 2013) is: 12.5%    Values used to calculate the score:      Age: 67 years      Sex: Female      Is Non- : No      Diabetic: No      Tobacco smoker: No      Systolic Blood Pressure: 136 mmHg      Is BP treated: Yes      HDL Cholesterol: 67 mg/dL      Total Cholesterol: 217 mg/dL      Tahmina Hernández MD

## 2018-01-16 DIAGNOSIS — N95.1 MENOPAUSAL SYNDROME (HOT FLASHES): ICD-10-CM

## 2018-01-16 NOTE — TELEPHONE ENCOUNTER
"Requested Prescriptions   Pending Prescriptions Disp Refills     venlafaxine (EFFEXOR-XR) 37.5 MG 24 hr capsule [Pharmacy Med Name: VENLAFAXINE ER 37.5MG CAPSULES]  Last Written Prescription Date:  1/10/2018  Last Fill Quantity: 90 tabs,  # refills: 3   Last Office Visit with [facility], P or [facility] prescribing provider:  1/10/2018   Future Office Visit:    Next 5 appointments (look out 90 days)     Feb 12, 2018 11:20 AM GERARD KELLY with Mariana Benitez DO   [facility] ([facility]    [address] [place] [id number] [phone]                    90 capsule 0     Sig: TAKE 1 CAPSULE(37.5 MG) BY MOUTH DAILY    Serotonin-Norepinephrine Reuptake Inhibitors  Failed    1/16/2018  3:59 AM       Failed - PHQ-9 score of less than 5 in past 6 months    The PHQ-9 criteria is meant to fail. It requires a PHQ-9 score review     .    Passed - Blood pressure under 140/90    BP Readings from Last 3 Encounters:   01/10/18 136/82   07/25/17 145/82   06/02/17 146/80                Passed - Recent or future visit with authorizing provider's specialty    Patient had office visit in the last year or has a visit in the next 30 days with authorizing provider.  See \"Patient Info\" tab in inbasket, or \"Choose Columns\" in Meds & Orders section of the refill encounter.              Passed - Patient is age 18 or older       Passed - No active pregnancy on record       Passed - Normal serum creatinine on file in past 12 months    Recent Labs   Lab Test  01/10/18   1108   CR  0.64            Passed - No positive pregnancy test in past 12 months       Passed - Recent (6 mo) or future visit with authorizing provider's specialty    Patient had office visit in the last 6 months or has a visit in the next 30 days with authorizing provider.  See \"Patient Info\" tab in inbasket, or \"Choose Columns\" in Meds & Orders section of the refill encounter.             "

## 2018-01-19 RX ORDER — VENLAFAXINE HYDROCHLORIDE 37.5 MG/1
CAPSULE, EXTENDED RELEASE ORAL
Qty: 90 CAPSULE | Refills: 0 | OUTPATIENT
Start: 2018-01-19

## 2018-01-24 ENCOUNTER — RADIANT APPOINTMENT (OUTPATIENT)
Dept: BONE DENSITY | Facility: CLINIC | Age: 68
End: 2018-01-24
Attending: FAMILY MEDICINE
Payer: COMMERCIAL

## 2018-01-24 ENCOUNTER — RADIANT APPOINTMENT (OUTPATIENT)
Dept: MAMMOGRAPHY | Facility: CLINIC | Age: 68
End: 2018-01-24
Attending: FAMILY MEDICINE
Payer: COMMERCIAL

## 2018-01-24 DIAGNOSIS — Z12.31 VISIT FOR SCREENING MAMMOGRAM: ICD-10-CM

## 2018-01-24 DIAGNOSIS — Z78.0 ASYMPTOMATIC MENOPAUSAL STATE: ICD-10-CM

## 2018-01-24 PROCEDURE — 77080 DXA BONE DENSITY AXIAL: CPT | Performed by: INTERNAL MEDICINE

## 2018-01-24 PROCEDURE — 77067 SCR MAMMO BI INCL CAD: CPT | Mod: TC

## 2018-04-13 DIAGNOSIS — M79.18 MYOFASCIAL PAIN: ICD-10-CM

## 2018-04-13 RX ORDER — GABAPENTIN 100 MG/1
100 CAPSULE ORAL 3 TIMES DAILY
Qty: 90 CAPSULE | Refills: 3 | Status: SHIPPED | OUTPATIENT
Start: 2018-04-13 | End: 2018-07-27

## 2018-04-13 NOTE — TELEPHONE ENCOUNTER
Signed Prescriptions:                        Disp   Refills    gabapentin (NEURONTIN) 100 MG capsule      90 cap*3        Sig: Take 1 capsule (100 mg) by mouth 3 times daily  Authorizing Provider: ROME CAZARES    Reviewed, signed, e-prescribed.    Rome Vicente MD  [facility]

## 2018-04-13 NOTE — TELEPHONE ENCOUNTER
Received fax request from [facility]  pharmacy requesting refill(s) for gabapentin (NEURONTIN) 100 MG capsule    Last refilled on 3/20/18    Pt last seen on 7/25/17  Next appt scheduled for 5/22/18    Will facilitate refill.

## 2018-04-18 ENCOUNTER — OFFICE VISIT (OUTPATIENT)
Dept: FAMILY MEDICINE | Facility: CLINIC | Age: 68
End: 2018-04-18
Payer: COMMERCIAL

## 2018-04-18 VITALS
TEMPERATURE: 98.3 F | HEART RATE: 95 BPM | DIASTOLIC BLOOD PRESSURE: 82 MMHG | WEIGHT: 278 LBS | SYSTOLIC BLOOD PRESSURE: 136 MMHG | HEIGHT: 67 IN | BODY MASS INDEX: 43.63 KG/M2

## 2018-04-18 DIAGNOSIS — F32.0 MILD MAJOR DEPRESSION (H): ICD-10-CM

## 2018-04-18 DIAGNOSIS — M54.2 CERVICALGIA: ICD-10-CM

## 2018-04-18 DIAGNOSIS — N95.1 MENOPAUSAL SYNDROME (HOT FLASHES): ICD-10-CM

## 2018-04-18 DIAGNOSIS — R51.9 LEFT-SIDED HEADACHE: ICD-10-CM

## 2018-04-18 DIAGNOSIS — E66.01 MORBID OBESITY (H): ICD-10-CM

## 2018-04-18 DIAGNOSIS — R06.83 SNORING: ICD-10-CM

## 2018-04-18 DIAGNOSIS — I10 HYPERTENSION GOAL BP (BLOOD PRESSURE) < 140/90: ICD-10-CM

## 2018-04-18 DIAGNOSIS — R42 DIZZINESS: Primary | ICD-10-CM

## 2018-04-18 DIAGNOSIS — G47.00 INSOMNIA, UNSPECIFIED TYPE: ICD-10-CM

## 2018-04-18 PROCEDURE — 99215 OFFICE O/P EST HI 40 MIN: CPT | Performed by: FAMILY MEDICINE

## 2018-04-18 RX ORDER — VENLAFAXINE HYDROCHLORIDE 37.5 MG/1
37.5 CAPSULE, EXTENDED RELEASE ORAL DAILY
Qty: 30 CAPSULE | Refills: 1 | Status: SHIPPED | OUTPATIENT
Start: 2018-04-18 | End: 2018-06-09

## 2018-04-18 NOTE — PROGRESS NOTES
"  SUBJECTIVE:   Shelia Sanchez is a 67 year old female who presents to clinic today for the following health issues:      New Patient/Transfer of Care from Dr. Hernández  Patient would like to talk about dizziness and lightheadedness. She reports this is a new problem. It got really bad in the second week in February. Lightheadedness is constant but it has periods of being worse and better. The dizziness is a feeling of balance issues. She feels like she could fall over to her left at times.   She has similar sx like before, headache with dizziness, neck pain  No chest pain, no palpitations, , no nausea  Dizziness not with moment, MRI of head was neg, no light or sensitivity      She is also struggling with sleep over the last few months. Takes about 1 hour to fall asleep at night and she is frequently awaking at night. She was taking benadryl to help with sleep but it does not help every night. She is now taking Advil PM at night along with regular Advil for her pain control.  She is unable to see Pain Management until 5/22.    Cervical nerve burned off\" last year and was ok but since then her pain came back and thinks her neck pain  January -neck pain came back in January  Gabapentin was prescribed for her pain and she has been on it and thinks that her insomnia is coming from that, she feels like she has     Depression worsening and weight gain, not sleeping well due to pain    She is snoring a lot, insomnia -stop breathing in the middle of the night that she has been told about        Hypertension Follow-up      Outpatient blood pressures are not being checked.    Low Salt Diet: not monitoring salt, Does not usually add salt to foods        Depression Followup    Status since last visit: Worsened due to pain and other concerns    See PHQ-9 for current symptoms.  Other associated symptoms: None    Complicating factors:   Significant life event:  No   Current substance abuse:  None  Anxiety or Panic symptoms:  " No    PHQ-9 6/6/2016 1/10/2018   Total Score 6 9   Q9: Suicide Ideation Not at all Not at all     b  PHQ-9  English  PHQ-9   Any Language  Suicide Assessment Five-step Evaluation and Treatment (SAFE-T)    Problem list and histories reviewed & adjusted, as indicated.  Additional history: as documented    Patient Active Problem List   Diagnosis     Asteatotic eczema     BMI > 40     Cholelithiasis with obstruction     CARDIOVASCULAR SCREENING; LDL GOAL LESS THAN 130     Hypertension goal BP (blood pressure) < 140/90     Advanced directives, counseling/discussion     Right knee pain     Left shoulder pain     Cataracts, both eyes     Urinary incontinence     Atypical nevus     Cervical pain     Left-sided headache     Menopausal syndrome (hot flashes)     Heartburn     Mild major depression (H)     Past Surgical History:   Procedure Laterality Date     C VAG HYST,RMV TUBE/OVARY  2004     CHOLECYSTECTOMY, LAPOROSCOPIC      Cholecystectomy, Laparoscopic     HC ERCP W STENT PLACEMENT BILE/PANCREATIC DUCT         Social History   Substance Use Topics     Smoking status: Never Smoker     Smokeless tobacco: Never Used     Alcohol use 0.0 oz/week      Comment: 4 DRINKS PER WEEK     Family History   Problem Relation Age of Onset     Arthritis Mother      Congenital Anomalies Mother      Eye Disorder Mother      Glaucoma Mother      Hypertension Mother      Depression Mother      Obesity Mother      Congenital Anomalies Father      Alzheimer Disease Father      Arthritis Father      DIABETES Father      Hypertension Father      CEREBROVASCULAR DISEASE Father      Prostate Cancer Father      Obesity Father      HEART DISEASE Maternal Grandmother      HEART DISEASE Maternal Grandfather      Hypertension Paternal Grandmother      HEART DISEASE Paternal Grandfather      Depression Sister      Hypertension Sister      Macular Degeneration Maternal Uncle            Reviewed and updated as needed this visit by clinical staff  Tobacco  " Allergies  Meds  Med Hx  Surg Hx  Fam Hx  Soc Hx      Reviewed and updated as needed this visit by Provider         ROS:  Constitutional, HEENT, cardiovascular, pulmonary, GI, , musculoskeletal, neuro, skin, endocrine and psych systems are negative, except as otherwise noted.    OBJECTIVE:     /82 (BP Location: Right arm, Patient Position: Standing, Cuff Size: Adult Large)  Pulse 95  Temp 98.3  F (36.8  C) (Oral)  Ht 5' 7\" (1.702 m)  Wt 278 lb (126.1 kg)  BMI 43.54 kg/m2  Body mass index is 43.54 kg/(m^2).  GENERAL: healthy, alert and no distress  NECK: no adenopathy, no asymmetry, masses, or scars and thyroid normal to palpation  RESP: lungs clear to auscultation - no rales, rhonchi or wheezes  CV: regular rate and rhythm, normal S1 S2, no S3 or S4, no murmur, click or rub, no peripheral edema and peripheral pulses strong  ABDOMEN: soft, nontender, no hepatosplenomegaly, no masses and bowel sounds normal  MS: no gross musculoskeletal defects noted, no edema    Diagnostic Test Results:  No results found for this or any previous visit (from the past 24 hour(s)).  No results found for this or any previous visit (from the past 24 hour(s)).    ASSESSMENT/PLAN:       ICD-10-CM    1. Dizziness R42 NEUROLOGY ADULT REFERRAL     TSH with free T4 reflex     **Basic metabolic panel FUTURE anytime     **Hemoglobin FUTURE anytime     CANCELED: TSH with free T4 reflex     CANCELED: Basic metabolic panel     CANCELED: Hemoglobin   2. Left-sided headache R51 NEUROLOGY ADULT REFERRAL     TSH with free T4 reflex     **Basic metabolic panel FUTURE anytime     **Hemoglobin FUTURE anytime     CANCELED: TSH with free T4 reflex     CANCELED: Basic metabolic panel     CANCELED: Hemoglobin   3. Cervicalgia M54.2 NEUROLOGY ADULT REFERRAL     TSH with free T4 reflex     **Basic metabolic panel FUTURE anytime     **Hemoglobin FUTURE anytime     CANCELED: TSH with free T4 reflex     CANCELED: Basic metabolic panel     " CANCELED: Hemoglobin   4. Mild major depression (H) F32.0 venlafaxine (EFFEXOR-XR) 37.5 MG 24 hr capsule   5. Hypertension goal BP (blood pressure) < 140/90 I10    6. Insomnia, unspecified type G47.00 SLEEP EVALUATION & MANAGEMENT REFERRAL - [facility]  [phone] (Age 15 and up)   7. BMI > 40 E66.01    8. Snoring R06.83 SLEEP EVALUATION & MANAGEMENT REFERRAL - [facility]  [phone] (Age 15 and up)   9. Menopausal syndrome (hot flashes) N95.1      New patient to this provider with complex medical problems  Reviewed old records, labs and notes with patient in the room  Menopause/depression-increase efexor, recheck in 3 weeks here  Chronic pain-follow up with pain clinic as planned  Check labs(fasting-can come for fasting lab prior to visit)  Chronic headache/neck pain-now dizziness, ? Atypical migraine, check labs -Follow up with neurology as planned  Snoring-advised sleep study  Obesity-advised weight management    Spent  57min greater than 50% of counseling and coordination of care for the conditions documented above.      See us in 3-4 weeks here    See Patient Instructions    Mariana Benitez DO  [facility]

## 2018-04-18 NOTE — PATIENT INSTRUCTIONS
Follow up with pain clinic as planned  Increase effexor as planned  Check labs(fasting-can come for fasting lab prior to visit)  Follow up with neurology as planned  See us in 3-4 weeks here  Mariana Benitez D.O.    [facility]   Discharged by : Yakelin Escobar CMA  Paper scripts provided to patient : no     If you have any questions regarding your visit please contact your care team:     Team Gold                Clinic Hours Telephone Number     Dr. Kailyn Le, NP 7am-7pm  Monday - Thursday   7am-5pm  Fridays  [phone]   (Appointment scheduling available 24/7)     RN Line  [phone] option 2     Urgent [facility] - [place] [facility] - 11am-9pm Monday-Friday Saturday-Sunday- 9am-5pm     [place] -   5pm-9pm Monday-Friday Saturday-Sunday- 9am-5pm    [phone] - [place]    [phone] - [place]       For a Price Quote for your services, please call our Consumer Price Line at [phone].     What options do I have for visits at the clinic other than the traditional office visit?     To expand how we care for you, many of our providers are utilizing electronic visits (e-visits) and telephone visits, when medically appropriate, for interactions with their patients rather than a visit in the clinic. We also offer nurse visits for many medical concerns. Just like any other service, we will bill your insurance company for this type of visit based on time spent on the phone with your provider. Not all insurance companies cover these visits. Please check with your medical insurance if this type of visit is covered. You will be responsible for any charges that are not paid by your insurance.   E-visits via [employer]: generally incur a $35.00 fee.     Telephone visits:   Time spent on the phone: *charged based on time that is spent on the phone in increments of 10 minutes. Estimated  cost:   5-10 mins $30.00   11-20 mins. $59.00   21-30 mins. $85.00     Use [employer] (secure email communication and access to your chart) to send your primary care provider a message or make an appointment. Ask someone on your Team how to sign up for [employer].     As always, Thank you for trusting us with your health care needs!      [facility]:    Scheduling Appointments  [facility]  Call: [phone]    [facility]  Call: [phone]    [facility]  Call: [phone]    For Gastroenterology referrals   [facility]   Clinics and Surgery Center, 4th Floor   [address] [place] [address]   Appointments: [phone]    WHERE TO GO FOR CARE?  Clinic    Make an appointment if you:       Are sick (cold, cough, flu, sore throat, earache or in pain).       Have a small injury (sprain, small cut, burn or broken bone).       Need a physical exam, Pap smear, vaccine or prescription refill.       Have questions about your health or medicines.    To reach us:      Call [phone] ([phone]). Open 24 hours every day. (For counseling services, call [phone].)    Log into [employer] at [email or web address]. (Visit [email or web address] to create an account.) Hospital emergency room    An emergency is a serious or life- threatening problem that must be treated right away.    Call 194 or get to the hospital if you have:      Very bad or sudden:            - Chest pain or pressure         - Bleeding         - Head or belly pain         - Dizziness or trouble seeing, walking or                          Speaking      Problems breathing      Blood in your vomit or you are coughing up blood      A major injury (knocked out, loss of a finger or limb, rape, broken bone protruding from skin)    A mental health crisis. (Or call the Mental Health Crisis line at 1-648.315.6280 or Suicide Prevention Hotline at  4-707-976-4776.)    Open 24 hours every day. You don't need an appointment.     Urgent care    Visit urgent care for sickness or small injuries when the clinic is closed. You don't need an appointment. To check hours or find an urgent care near you, visit [email or web address]. Online care    Get online care from [facility] for more than 70 common problems, like colds, allergies and infections. Open 24 hours every day at:   [email or web address]   Need help deciding?    For advice about where to be seen, you may call your clinic and ask to speak with a nurse. We're here for you 24 hours every day.         If you are deaf or hard of hearing, please let us know. We provide many free services including sign language interpreters, oral interpreters, TTYs, telephone amplifiers, note takers and written materials.

## 2018-04-18 NOTE — MR AVS SNAPSHOT
After Visit Summary   4/18/2018    Shelia Sanchez    MRN: [id number]           Patient Information     Date Of Birth          1950        Visit Information        Provider Department      4/18/2018 1:20 PM Mariana Benitez DO [facility]        Today's Diagnoses     Dizziness    -  1    Left-sided headache        Cervicalgia        Mild major depression (H)        Hypertension goal BP (blood pressure) < 140/90        Insomnia, unspecified type        BMI > 40        Snoring        Menopausal syndrome (hot flashes)          Care Instructions    Follow up with pain clinic as planned  Increase effexor as planned  Check labs(fasting-can come for fasting lab prior to visit)  Follow up with neurology as planned  See us in 3-4 weeks here  Mariana Benitez D.O.    [facility]   Discharged by : Yakelin Escobar CMA  Paper scripts provided to patient : no     If you have any questions regarding your visit please contact your care team:     Team Gold                Clinic Hours Telephone Number     Dr. Kailyn Le, NP 7am-7pm  Monday - Thursday   7am-5pm  Fridays  [phone]   (Appointment scheduling available 24/7)     RN Line  [phone] option 2     Urgent [facility] [place] [facility] - 11am-9pm Monday-Friday Saturday-Sunday- 9am-5pm     [place] -   5pm-9pm Monday-Friday Saturday-Sunday- 9am-5pm    [phone] - [place]    [phone] - [place]       For a Price Quote for your services, please call our Consumer Price Line at [phone].     What options do I have for visits at the clinic other than the traditional office visit?     To expand how we care for you, many of our providers are utilizing electronic visits (e-visits) and telephone visits, when medically appropriate, for interactions with their patients rather than a visit in the  clinic. We also offer nurse visits for many medical concerns. Just like any other service, we will bill your insurance company for this type of visit based on time spent on the phone with your provider. Not all insurance companies cover these visits. Please check with your medical insurance if this type of visit is covered. You will be responsible for any charges that are not paid by your insurance.   E-visits via SurgiQuesthart: generally incur a $35.00 fee.     Telephone visits:   Time spent on the phone: *charged based on time that is spent on the phone in increments of 10 minutes. Estimated cost:   5-10 mins $30.00   11-20 mins. $59.00   21-30 mins. $85.00     Use Imaggat (secure email communication and access to your chart) to send your primary care provider a message or make an appointment. Ask someone on your Team how to sign up for [email or web address].     As always, Thank you for trusting us with your health care needs!      [facility]:    Scheduling Appointments  [facility]  Call: [phone]    [facility]  Call: [phone]    [facility]  Call: [phone]    For Gastroenterology referrals   [facility] G[facility]enterology   Clinics and Surgery Center, 4th Floor   [address] [place] [address]   Appointments: [phone]    WHERE TO GO FOR CARE?  Clinic    Make an appointment if you:       Are sick (cold, cough, flu, sore throat, earache or in pain).       Have a small injury (sprain, small cut, burn or broken bone).       Need a physical exam, Pap smear, vaccine or prescription refill.       Have questions about your health or medicines.    To reach us:      Call [phone] ([phone]). Open 24 hours every day. (For counseling services, call [phone].)    Log into [email or web address] at [email or web address]. (Visit [email or web address] to create an account.) Hospital emergency room    An emergency is a serious or  life- threatening problem that must be treated right away.    Call 911 or get to the hospital if you have:      Very bad or sudden:            - Chest pain or pressure         - Bleeding         - Head or belly pain         - Dizziness or trouble seeing, walking or                          Speaking      Problems breathing      Blood in your vomit or you are coughing up blood      A major injury (knocked out, loss of a finger or limb, rape, broken bone protruding from skin)    A mental health crisis. (Or call the Mental Health Crisis line at 1-312.336.2265 or Suicide Prevention Hotline at 1-258.741.3911.)    Open 24 hours every day. You don't need an appointment.     Urgent care    Visit urgent care for sickness or small injuries when the clinic is closed. You don't need an appointment. To check hours or find an urgent care near you, visit [email or web address]. Online care    Get online care from [facility] for more than 70 common problems, like colds, allergies and infections. Open 24 hours every day at:   [email or web address]   Need help deciding?    For advice about where to be seen, you may call your clinic and ask to speak with a nurse. We're here for you 24 hours every day.         If you are deaf or hard of hearing, please let us know. We provide many free services including sign language interpreters, oral interpreters, TTYs, telephone amplifiers, note takers and written materials.                   Follow-ups after your visit        Additional Services     NEUROLOGY ADULT REFERRAL       Your provider has referred you for the following:   Consult at [facility]: [facility] [phone]   [email or web address]  [facility]: [facility] [phone]   [email or web address]    Please be aware that coverage of these services is subject to the terms and limitations of your health insurance plan.  Call member services at  your health plan with any benefit or coverage questions.      Please bring the following with you to your appointment:    (1) Any X-Rays, CTs or MRIs which have been performed.  Contact the facility where they were done to arrange for  prior to your scheduled appointment.    (2) List of current medications  (3) This referral request   (4) Any documents/labs given to you for this referral            SLEEP EVALUATION & MANAGEMENT REFERRAL - [facility]  [phone] (Age 15 and up)       Please be aware that coverage of these services is subject to the terms and limitations of your health insurance plan.  Call member services at your health plan with any benefit or coverage questions.      Please bring the following to your appointment:    >>   List of current medications   >>   This referral request   >>   Any documents/labs given to you for this referral                      Your next 10 appointments already scheduled     May 22, 2018  1:00 PM CDT   Return Visit with Rome Vicente MD   [facility] ([facility])    [address] [facility] [address] [phone]              Future tests that were ordered for you today     Open Future Orders        Priority Expected Expires Ordered    SLEEP EVALUATION & MANAGEMENT REFERRAL - [facility]  [phone] (Age 15 and up) Routine  4/18/2019 4/18/2018            Who to contact     If you have questions or need follow up information about today's clinic visit or your schedule please contact [facility] directly at [phone].  Normal or non-critical lab and imaging results will be communicated to you by MyChart, letter or phone within 4 business days after the clinic has received the results. If you do not hear from us within 7 days, please contact the clinic through MyChart or phone. If you have a critical or abnormal lab  "result, we will notify you by phone as soon as possible.  Submit refill requests through [employer] or call your pharmacy and they will forward the refill request to us. Please allow 3 business days for your refill to be completed.          Additional Information About Your Visit        MoonfryeharChubbies Shorts Information     [employer] gives you secure access to your electronic health record. If you see a primary care provider, you can also send messages to your care team and make appointments. If you have questions, please call your primary care clinic.  If you do not have a primary care provider, please call [phone] and they will assist you.        Care EveryWhere ID     This is your Care EveryWhere ID. This could be used by other organizations to access your [place] medical records  [id number]        Your Vitals Were     Pulse Temperature Height BMI (Body Mass Index)          76 98.3  F (36.8  C) (Oral) 5' 7\" (1.702 m) 43.54 kg/m2         Blood Pressure from Last 3 Encounters:   04/18/18 138/78   01/10/18 136/82   07/25/17 145/82    Weight from Last 3 Encounters:   04/18/18 278 lb (126.1 kg)   01/10/18 274 lb 6.4 oz (124.5 kg)   03/24/17 267 lb (121.1 kg)              We Performed the Following     Basic metabolic panel     Hemoglobin     NEUROLOGY ADULT REFERRAL     TSH with free T4 reflex          Today's Medication Changes          These changes are accurate as of 4/18/18  2:12 PM.  If you have any questions, ask your nurse or doctor.               These medicines have changed or have updated prescriptions.        Dose/Directions    NEXIUM 24HR PO   This may have changed:  Another medication with the same name was removed. Continue taking this medication, and follow the directions you see here.   Changed by:  Mariana Benitez DO        Dose:  1 tablet   Take 1 tablet by mouth daily   Refills:  0       * venlafaxine 75 MG 24 hr capsule   Commonly known as:  EFFEXOR-XR   This may have changed:  Another medication " with the same name was added. Make sure you understand how and when to take each.   Used for:  Menopausal syndrome (hot flashes)   Changed by:  Mariana Benitez DO        Dose:  75 mg   Take 1 capsule (75 mg) by mouth daily   Quantity:  90 capsule   Refills:  3       * venlafaxine 37.5 MG 24 hr capsule   Commonly known as:  EFFEXOR-XR   This may have changed:  You were already taking a medication with the same name, and this prescription was added. Make sure you understand how and when to take each.   Used for:  Mild major depression (H)   Changed by:  Mariana Benitez DO        Dose:  37.5 mg   Take 1 capsule (37.5 mg) by mouth daily   Quantity:  30 capsule   Refills:  1       * Notice:  This list has 2 medication(s) that are the same as other medications prescribed for you. Read the directions carefully, and ask your doctor or other care provider to review them with you.      Stop taking these medicines if you haven't already. Please contact your care team if you have questions.     oxyCODONE-acetaminophen 5-325 MG per tablet   Commonly known as:  PERCOCET   Stopped by:  Mariana Benitez DO           ZANTAC PO   Stopped by:  Mariana Benitez DO                Where to get your medicines      These medications were sent to [facility] [address] [place] [address] E  [address] [place] [address]     Phone:  [phone]     venlafaxine 37.5 MG 24 hr capsule                Primary Care Provider Office Phone # Fax #    Mariana Benitez [phone]       [facility] [address]        Equal Access to Services     VICKY ROBERTS AH: Kunal Ellis, svetlana brown, bryce kaalmada adecoreen, barry alvarenga. So [facility] [phone].    ATENCIÓN: Si habla español, tiene a barajas disposición servicios gratuitos de asistencia lingüística.  Reji beck [phone].    We comply with applicable federal civil rights laws and [place] laws. We do not discriminate on the basis of race, color, national origin, age, disability, sex, sexual orientation, or gender identity.            Thank you!     Thank you for choosing [facility]  for your care. Our goal is always to provide you with excellent care. Hearing back from our patients is one way we can continue to improve our services. Please take a few minutes to complete the written survey that you may receive in the mail after your visit with us. Thank you!             Your Updated Medication List - Protect others around you: Learn how to safely use, store and throw away your medicines at www.disposemymeds.org.          This list is accurate as of 4/18/18  2:12 PM.  Always use your most recent med list.                   Brand Name Dispense Instructions for use Diagnosis    ADVIL PM PO      Taking 2 at bedtime        ADVIL PO      Taking as needed for pain        amLODIPine 5 MG tablet    NORVASC    90 tablet    Take 1 tablet (5 mg) by mouth daily    Essential hypertension with goal blood pressure less than 140/90       aspirin 81 MG tablet      Take 81 mg by mouth daily        gabapentin 100 MG capsule    NEURONTIN    90 capsule    Take 1 capsule (100 mg) by mouth 3 times daily    Myofascial pain       hydrochlorothiazide 25 MG tablet    HYDRODIURIL    90 tablet    Take 1 tablet (25 mg) by mouth daily    Essential hypertension with goal blood pressure less than 140/90       NEXIUM 24HR PO      Take 1 tablet by mouth daily        SLEEP-AID MAXIMUM STRENGTH 50 MG Caps   Generic drug:  DiphenhydrAMINE HCl (Sleep)      Take 1 capsule by mouth At Bedtime        * venlafaxine 75 MG 24 hr capsule    EFFEXOR-XR    90 capsule    Take 1 capsule (75 mg) by mouth daily    Menopausal syndrome (hot flashes)       * venlafaxine 37.5 MG 24 hr capsule    EFFEXOR-XR    30 capsule    Take 1 capsule (37.5 mg)  by mouth daily    Mild major depression (H)       * Notice:  This list has 2 medication(s) that are the same as other medications prescribed for you. Read the directions carefully, and ask your doctor or other care provider to review them with you.

## 2018-04-23 ENCOUNTER — TELEPHONE (OUTPATIENT)
Dept: FAMILY MEDICINE | Facility: CLINIC | Age: 68
End: 2018-04-23

## 2018-04-23 NOTE — TELEPHONE ENCOUNTER
Patient established care last viist with multiple complaints and was referred to neurology but I see no appoint made with neurology but has one with me early May  Please let her know I would prefer to see her after her specialist visit  Thanks  Mariana Benitez D.O.

## 2018-04-24 NOTE — TELEPHONE ENCOUNTER
Called patient and left a detailed VM message and relayed Dr Benitez message below.    Shadia Reyna

## 2018-05-04 ENCOUNTER — OFFICE VISIT (OUTPATIENT)
Dept: NEUROLOGY | Facility: CLINIC | Age: 68
End: 2018-05-04
Payer: COMMERCIAL

## 2018-05-04 VITALS
TEMPERATURE: 97.5 F | WEIGHT: 275.8 LBS | BODY MASS INDEX: 43.2 KG/M2 | OXYGEN SATURATION: 97 % | SYSTOLIC BLOOD PRESSURE: 138 MMHG | DIASTOLIC BLOOD PRESSURE: 84 MMHG | RESPIRATION RATE: 18 BRPM | HEART RATE: 86 BPM

## 2018-05-04 DIAGNOSIS — G44.40 MEDICATION OVERUSE HEADACHE: ICD-10-CM

## 2018-05-04 DIAGNOSIS — M54.2 CERVICALGIA: ICD-10-CM

## 2018-05-04 DIAGNOSIS — G44.86 CERVICOGENIC HEADACHE: Primary | ICD-10-CM

## 2018-05-04 DIAGNOSIS — R42 DIZZINESS: ICD-10-CM

## 2018-05-04 DIAGNOSIS — G44.209 TENSION HEADACHE: ICD-10-CM

## 2018-05-04 PROCEDURE — 99205 OFFICE O/P NEW HI 60 MIN: CPT | Performed by: PSYCHIATRY & NEUROLOGY

## 2018-05-04 NOTE — MR AVS SNAPSHOT
After Visit Summary   5/4/2018    Shelia Sanchez    MRN: [id number]           Patient Information     Date Of Birth          1950        Visit Information        Provider Department      5/4/2018 1:30 PM Marbin Alvarez MD [facility]        Today's Diagnoses     Cervicogenic headache    -  1    Tension headache        Medication overuse headache        Dizziness          Care Instructions    AFTER VISIT SUMMARY (AVS):    At today's visit we discussed various diagnostic possibilities for your symptoms and the reasons for work-up, which includes:  Orders Placed This Encounter   Procedures     PHYSICAL THERAPY REFERRAL     Please follow-up with your pain clinic doctor as planned.    No new medications were ordered.    We discussed that your dose of Effexor and possibly gabapentin might be increased.  We also discussed that you need to reduce the use of Advil/ibuprofen to prevent rebound headaches.  Could also try naproxen.  Both medication should be taken with food and less than 15 days per month.    Reviewed non-pharmacological headache prevention measures include proper sleep hygiene, regular meals, adequate hydration, regular exercise, and stress reduction techniques.    Your blood pressure was elevated during today's visit.  Please discuss it with your primary care provider.    Next follow-up appointment is on as needed basis.    Please do not hesitate to call me with any questions or concerns.    Thanks.            Follow-ups after your visit        Additional Services     PHYSICAL THERAPY REFERRAL       *This therapy referral will be filtered to a centralized scheduling office at [facility] and the patient will receive a call to schedule an appointment at a [place] location most convenient for them. *     [facility] provides Physical Therapy evaluation and treatment and many specialty services across the  "[facility] system.  If requesting a specialty program, please choose from the list below.    If you have not heard from the scheduling office within 2 business days, please call [phone] for all locations, with the exception of [place], please call [phone] and [place], please call [phone]  Treatment: Evaluation & Treatment  Special Instructions/Modalities: The patient had right [place]-Hallpike maneuver positive today, please evaluate and treat for peripheral vestibular dysfunction.  Special Programs: Balance/Vestibular    Please be aware that coverage of these services is subject to the terms and limitations of your health insurance plan.  Call member services at your health plan with any benefit or coverage questions.      **Note to Provider:  If you are referring outside of [facility] for the therapy appointment, please list the name of the location in the \"special instructions\" above, print the referral and give to the patient to schedule the appointment.                  Follow-up notes from your care team     Return if symptoms worsen or fail to improve.      Your next 10 appointments already scheduled     May 18, 2018 11:00 AM CDT   New Sleep Patient with BEBETO Abraham   [facility] ([facility])    [address] [address] [id number] [phone]            May 22, 2018  1:00 PM CDT   Return Visit with Rome Vicente MD   [facility] ([facility])    [address] [place] [id number] [phone]            May 30, 2018  1:00 PM CDT   Office Visit with Mariana Benitez DO   [facility] ([facility])    [address] [facility] [address] [phone]           Bring a current list of meds and any records pertaining to this visit. For Physicals, please bring immunization records and any forms needing to be filled " out. Please arrive 10 minutes early to complete paperwork.              Who to contact     If you have questions or need follow up information about today's clinic visit or your schedule please contact [facility] directly at [phone].  Normal or non-critical lab and imaging results will be communicated to you by MyChart, letter or phone within 4 business days after the clinic has received the results. If you do not hear from us within 7 days, please contact the clinic through Receptoshart or phone. If you have a critical or abnormal lab result, we will notify you by phone as soon as possible.  Submit refill requests through [employer] or call your pharmacy and they will forward the refill request to us. Please allow 3 business days for your refill to be completed.          Additional Information About Your Visit        ReceptosharOptMed Information     [employer] gives you secure access to your electronic health record. If you see a primary care provider, you can also send messages to your care team and make appointments. If you have questions, please call your primary care clinic.  If you do not have a primary care provider, please call [phone] and they will assist you.        Care EveryWhere ID     This is your Care EveryWhere ID. This could be used by other organizations to access your [place] medical records  [id number]        Your Vitals Were     Pulse Temperature Respirations Pulse Oximetry BMI (Body Mass Index)       86 97.5  F (36.4  C) (Oral) 18 97% 43.2 kg/m2        Blood Pressure from Last 3 Encounters:   05/04/18 144/84   04/18/18 136/82   01/10/18 136/82    Weight from Last 3 Encounters:   05/04/18 275 lb 12.8 oz (125.1 kg)   04/18/18 278 lb (126.1 kg)   01/10/18 274 lb 6.4 oz (124.5 kg)              We Performed the Following     PHYSICAL THERAPY REFERRAL        Primary Care Provider Office Phone # Fax #    Mariana Benitez [phone]       [facility]  [facility] [address]        Equal Access to Services     [facility]: Hadii aad ku hadphanikorin Josefazeinab, waaxda luqadaha, qaybta kaalmada jaja, barry alvarenga. So [facility] [phone].    ATENCIÓN: Si habla español, tiene a barajas disposición servicios gratuitos de asistencia lingüística. Reji al [phone].    We comply with applicable federal civil rights laws and [place] laws. We do not discriminate on the basis of race, color, national origin, age, disability, sex, sexual orientation, or gender identity.            Thank you!     Thank you for choosing [facility]  for your care. Our goal is always to provide you with excellent care. Hearing back from our patients is one way we can continue to improve our services. Please take a few minutes to complete the written survey that you may receive in the mail after your visit with us. Thank you!             Your Updated Medication List - Protect others around you: Learn how to safely use, store and throw away your medicines at www.disposemymeds.org.          This list is accurate as of 5/4/18  2:42 PM.  Always use your most recent med list.                   Brand Name Dispense Instructions for use Diagnosis    ADVIL PM PO      Taking 2 at bedtime        ADVIL PO      Taking as needed for pain        amLODIPine 5 MG tablet    NORVASC    90 tablet    Take 1 tablet (5 mg) by mouth daily    Essential hypertension with goal blood pressure less than 140/90       aspirin 81 MG tablet      Take 81 mg by mouth daily        gabapentin 100 MG capsule    NEURONTIN    90 capsule    Take 1 capsule (100 mg) by mouth 3 times daily    Myofascial pain       hydrochlorothiazide 25 MG tablet    HYDRODIURIL    90 tablet    Take 1 tablet (25 mg) by mouth daily    Essential hypertension with goal blood pressure less than 140/90       NEXIUM 24HR PO      Take 1 tablet by mouth daily        SLEEP-AID MAXIMUM STRENGTH 50 MG Caps   Generic drug:   DiphenhydrAMINE HCl (Sleep)      Take 1 capsule by mouth At Bedtime        * venlafaxine 75 MG 24 hr capsule    EFFEXOR-XR    90 capsule    Take 1 capsule (75 mg) by mouth daily    Menopausal syndrome (hot flashes)       * venlafaxine 37.5 MG 24 hr capsule    EFFEXOR-XR    30 capsule    Take 1 capsule (37.5 mg) by mouth daily    Mild major depression (H)       * Notice:  This list has 2 medication(s) that are the same as other medications prescribed for you. Read the directions carefully, and ask your doctor or other care provider to review them with you.

## 2018-05-04 NOTE — LETTER
5/4/2018         RE: Shelia Sanchez  [address]   [place] [address]        Dear Colleague,    Thank you for referring your patient, Shelia Sanchez, to the [facility]. Please see a copy of my visit note below.    INITIAL NEUROLOGY CONSULTATION    DATE OF VISIT: 5/4/2018  CLINIC LOCATION: [facility]  MRN: [id number]  PATIENT NAME: Shelia Sanchez  YOB: 1950    PRIMARY CARE PROVIDER: Mariana Benitez DO.     REASON FOR VISIT:   Chief Complaint   Patient presents with     Consult     dizziness     HISTORY OF PRESENT ILLNESS:                                                    Ms. Shelia Sanchez is 67 year old right handed female patient with past medical history of depression, hypertension, morbid obesity, and cervicalgia, who was seen in consultation today requested by Mariana Benitez DO, for headaches and lightheadedness/dizziness.    Per patient's report, she was in her usual state of health until 2013, when she developed headaches, dizziness, and left cervical pain.  These symptoms fluctuated throughout the years, and became more noticeable again in January 2018.    Currently, the patient reports intermittent pulsating pressure 5/10 headache that is located on the left side of her head, extending from frontal to occipital regions.  It occurs approximately 3 times per week and could last up to 2 days if left untreated.  The other accompanying symptoms include lightheadedness and cervicalgia.  At the same time, the patient denies any associated increased sensitivity to light or sound, nausea, emesis, intolerance of physical activity, autonomic, or focal neurological symptoms.    Headache, neck pain, and lightheadedness are worse when turning head to the left, getting up from bent forward position, lifting heavy objects, carrying shoulder bag on the left side, reading, and groceries shopping. The patient underwent  "cervical medial branch radiofrequency ablation that was effective for approximately 6 months.  In late December/early January 2018, her left cervical pain has returned along with other symptoms.  She takes 600 mg of Advil 2 times per day.  She is also on gabapentin 100 mg 3 times daily.  She takes Effexor for her depression and menopausal symptoms.    The patient reports that her sleep is not adequate.  Her hydration is not optimal.  She rates her stress level as high (related to care for her sister, who is very sick).    Her dizziness that she describes as \"lightheadedness\" also occurs without headache multiple times per day lasting minutes.  It is triggered when she raises from bent forward position or lying in bed on her left side as well as with certain head movements.  She denies any additional neurological symptoms.    Recent laboratory evaluation (January 2018) includes unremarkable CMP and elevated LDL of 126.    Brain MRI with and without contrast from 11/03/2015 (performed for vertigo and throbbing headache) demonstrated no acute intracranial pathology.  Mild chronic small vessel ischemic disease and parenchymal volume loss were noted.  No intracranial arterial aneurysm or stenosis was seen on head MRA.  Dominant left vertebral artery with the right vertebral artery terminating in PICA was noted on neck MRA.  No significant carotid artery stenosis was seen.    On 03/02/2016 the patient also had cervical spine MRI that demonstrated multilevel degenerative changes with mild right C4-5 foraminal stenosis, as the most significant finding.    The patient was seen twice (2015 and 2016) in the [facility] by Nathalia Zimmer NP for cervicogenic headache.    The patient denies a history of recent head injury. Prior neurological history: negative for migraine, stroke, brain neoplasms, seizure disorders, multiple sclerosis, meningitis, encephalitis, and major head injuries.    Review of " systems:  the patient endorses multiple chronic complaints on 14-point comprehensive review of systems of the Patient Health History Form, that were reviewed and will be scanned into her electronic medical record at the end of this encounter. Her primary care and other medical providers are aware and addressing all of them.    PAST MEDICAL/SURGICAL HISTORY:                                                    I personally reviewed patient's past medical and surgical history with the patient at today's visit.  Past Medical History:   Diagnosis Date     Arthritis 1/28/14    Bursitis in left hip     Asteatotic eczema      Gallstone pancreatitis      HTN      Obesity      Past Surgical History:   Procedure Laterality Date     C VAG HYST,RMV TUBE/OVARY  2004     CHOLECYSTECTOMY, LAPOROSCOPIC      Cholecystectomy, Laparoscopic     HC ERCP W STENT PLACEMENT BILE/PANCREATIC DUCT       MEDICATIONS:                                                    I personally reviewed patient's medications and allergies with the patient at today's visit.  Current Outpatient Prescriptions on File Prior to Visit:  amLODIPine (NORVASC) 5 MG tablet Take 1 tablet (5 mg) by mouth daily   aspirin 81 MG tablet Take 81 mg by mouth daily   DiphenhydrAMINE HCl, Sleep, (SLEEP-AID MAXIMUM STRENGTH) 50 MG CAPS Take 1 capsule by mouth At Bedtime   Esomeprazole Magnesium (NEXIUM 24HR PO) Take 1 tablet by mouth daily   gabapentin (NEURONTIN) 100 MG capsule Take 1 capsule (100 mg) by mouth 3 times daily   hydrochlorothiazide (HYDRODIURIL) 25 MG tablet Take 1 tablet (25 mg) by mouth daily   Ibuprofen (ADVIL PO) Taking as needed for pain   Ibuprofen-Diphenhydramine Cit (ADVIL PM PO) Taking 2 at bedtime   venlafaxine (EFFEXOR-XR) 37.5 MG 24 hr capsule Take 1 capsule (37.5 mg) by mouth daily   venlafaxine (EFFEXOR-XR) 75 MG 24 hr capsule Take 1 capsule (75 mg) by mouth daily     ALLERGIES:                                                    No Known  Allergies  FAMILY/SOCIAL HISTORY:                                                    Family and social history was reviewed with the patient at today's visit.  Family history is positive for stroke, migraine, headache, and dementia/Alzheimer's disease.   Problem (# of Occurrences) Relation (Name,Age of Onset)    Alzheimer Disease (1) Father    Arthritis (2) Mother, Father    CEREBROVASCULAR DISEASE (1) Father    Congenital Anomalies (2) Mother, Father    DIABETES (1) Father    Depression (2) Mother, Sister ([place])    Eye Disorder (1) Mother    Glaucoma (1) Mother    HEART DISEASE (3) Maternal Grandmother, Maternal Grandfather, Paternal Grandfather    Hypertension (4) Mother, Father, Paternal Grandmother, Sister ([place])    Macular Degeneration (1) Maternal Uncle    Obesity (2) Mother, Father    Prostate Cancer (1) Father        Single, lives alone.  Never smoker.  3-4 alcoholic drinks per week.  Denies use of recreational drugs.  Retired manager.  REVIEW OF SYSTEMS:                                                    Patient has completed a [facility] Patient Health History, including a 14-system review, which was personally reviewed, and pertinent positives are listed in HPI. She denies any additional problems on the further questioning.  EXAM:                                                    VITAL SIGNS:   /84 (BP Location: Right arm, Patient Position: Standing, Cuff Size: Adult Large)  Pulse 86  Temp 97.5  F (36.4  C) (Oral)  Resp 18  Wt 125.1 kg (275 lb 12.8 oz)  SpO2 97%  BMI 43.2 kg/m2   Orthostatic blood pressure:   05/04/18 1254 05/04/18 1310   BP: 147/83 144/84   BP Location: Right arm Right arm   Patient Position: Supine Standing   Cuff Size: Adult Large Adult Large   Pulse: 86    Resp: 18    Temp: 97.5  F (36.4  C)    TempSrc: Oral    SpO2: 97%    Weight: 125.1 kg (275 lb 12.8 oz)    Repeat blood pressure:  /84  Pulse 86  Temp 97.5  F (36.4  C) (Oral)  Resp 18   Wt 125.1 kg (275 lb 12.8 oz)  SpO2 97%  BMI 43.2 kg/m2    Mini-Cog Assessment:  Mini Cog Assessment  Clock Draw Score: 2 Normal  3 Item Recall: 3 objects recalled  Mini Cog Total Score: 5  Mini-Cog Assessment Score: Mini Cog Total Score: 5/5.    General: pt is in NAD, cooperative.  Skin: normal turgor, moist mucous membranes, no lesions/rashes noticed.  HEENT: ATNC, EOMI, PERRL, white sclera, normal conjunctiva, no nystagmus or ptosis. No carotid bruits bilaterally.  Respiratory: lung sounds clear to auscultation bilaterally, no crackles, wheezes, rhonchi. Symmetric lung excursion, no accessory respiratory muscle use.  Cardiovascular: normal S1/S2, no murmurs/rubs/gallops.   Abdomen: Not distended.  : deferred.    Neurological:  Mental: alert, follows commands, mini-cog is 5/5 with 3/3 on memory recall, no aphasia or dysarthria. Fund of knowledge is appropriate for age.  Cranial Nerves:  CN II: visual acuity - able to accurately count fingers with each eye. Visual fields intact, fundi: discs sharp, no papilledema and normal vessels bilaterally.  CN III, IV, VI: EOM intact, pupils equal and reactive.  No spontaneous nystagmus, no skew deviation.  CN V: facial sensation nl  CN VII: face symmetric, no facial droop  CN VIII: hearing normal.  Head impulse tests are negative bilaterally.  Dayton-Hallpike maneuver is negative on the left, on the right produces rotatory nystagmus with reported worsening of dizziness.  CN IX: palate elevation symmetric, uvula at midline  CN XI SCM normal, shoulder shrug nl  CN XII: tongue midline  Motor: Strength: 5/5 in all major groups of all extremities. Normal tone. No abnormal movements. No pronator drift b/l.  Reflexes: Triceps, biceps, brachioradialis, patellar, and achilles reflexes normal and symmetric. No clonus noted. Toes are down-going b/l.   Sensory: temperature, light touch, pinprick, and vibration intact. Romberg: negative.  Coordination: FNF and heel-shin tests intact b/l.  No dysdiadochokinesia with rapid alternating movements.  Gait:  Normal, except mild difficulty with tandem walk.    DATA:   LABS/IMAGING/OTHER STUDIES: I reviewed pertinent medical records, including personal review of brain and cervical spine MRI and head and neck MRA images, previous neurology notes, and Care Everywhere, as detailed in the history of present illness.  ASSESSMENT and PLAN:      ASSESSMENT: Shelia Sanchez is a 67 year old female patient with past medical history of depression, hypertension, morbid obesity, and cervicalgia, who presents with chronic headaches, dizziness/lightheadedness, and cervical pain.    We had a prolonged discussion with the patient regarding her symptoms.  Her neurological exam today demonstrates positive right Shakir-Hallpike maneuver without additional significant focal findings.  This could be seen with peripheral vestibular pathology, such as BPPV.  I placed a referral to physical therapy for vestibular treatment.  Her relatively recent brain MRI/MRA, performed after the onset of her symptoms, was unrevealing to central causes of vestibular dysfunction.    Her headaches are likely multifactorial, including tension, cervicogenic, and medication overuse components.  We reviewed in detail each of these diagnoses along with available treatment options and the plan, as summarized below.    DIAGNOSES:    ICD-10-CM    1. Cervicogenic headache R51    2. Tension headache G44.209    3. Medication overuse headache G44.40    4. Dizziness R42 PHYSICAL THERAPY REFERRAL   5. Cervicalgia M54.2      PLAN: At today's visit we thoroughly discussed various diagnostic possibilities for patient's symptoms and the reasons for work-up, which includes:  Orders Placed This Encounter   Procedures     PHYSICAL THERAPY REFERRAL     I recommended the patient to follow-up with her pain clinic doctor as planned to address cervical pain.  It might also help her headaches.    No new medications were  ordered.    We discussed that her dose of Effexor and possibly gabapentin might be increased.  We also discussed that she needs to reduce the use of Advil/ibuprofen to prevent rebound headaches.  Could also try naproxen.  I counseled the patient that both medications should be taken with food and less than 15 days per month.    Reviewed non-pharmacological headache prevention measures include proper sleep hygiene, regular meals, adequate hydration, regular exercise, and stress reduction techniques.    Patient's blood pressure was elevated during today's visit.  I advised the patient to discuss it with her primary care provider.    Next follow-up appointment is on as needed basis.    I advised the patient to call me with any questions or concerns.    Total Time: 81 minutes with > 50% spent counseling the patient on stated above assessment and recommendations, including nature of the diagnosis, needed w/u, and proposed plan.  Additional time was used to answer patient's questions regarding her symptoms and the plan.    Marbin Alvarez MD  / [facility]  (Chart documentation was completed in part with Dragon voice-recognition software. Even though reviewed, some grammatical, spelling, and word errors may remain.)              Again, thank you for allowing me to participate in the care of your patient.        Sincerely,        Marbin Alvarez MD

## 2018-05-04 NOTE — PATIENT INSTRUCTIONS
AFTER VISIT SUMMARY (AVS):    At today's visit we discussed various diagnostic possibilities for your symptoms and the reasons for work-up, which includes:  Orders Placed This Encounter   Procedures     PHYSICAL THERAPY REFERRAL     Please follow-up with your pain clinic doctor as planned.    No new medications were ordered.    We discussed that your dose of Effexor and possibly gabapentin might be increased.  We also discussed that you need to reduce the use of Advil/ibuprofen to prevent rebound headaches.  Could also try naproxen.  Both medication should be taken with food and less than 15 days per month.    Reviewed non-pharmacological headache prevention measures include proper sleep hygiene, regular meals, adequate hydration, regular exercise, and stress reduction techniques.    Your blood pressure was elevated during today's visit.  Please discuss it with your primary care provider.    Next follow-up appointment is on as needed basis.    Please do not hesitate to call me with any questions or concerns.    Thanks.

## 2018-05-04 NOTE — NURSING NOTE
Vitals:    05/04/18 1254 05/04/18 1310   BP: 147/83 144/84   BP Location: Right arm Right arm   Patient Position: Supine Standing   Cuff Size: Adult Large Adult Large   Pulse: 86    Resp: 18    Temp: 97.5  F (36.4  C)    TempSrc: Oral    SpO2: 97%    Weight: 275 lb 12.8 oz (125.1 kg)      Patient reports symptoms of dizziness and lightheadedness.    Ana Jasmine MA

## 2018-05-04 NOTE — PROGRESS NOTES
INITIAL NEUROLOGY CONSULTATION    DATE OF VISIT: 5/4/2018  CLINIC LOCATION: [facility]  MRN: [id number]  PATIENT NAME: Shelia Sanchez  YOB: 1950    PRIMARY CARE PROVIDER: Mariana Benitez DO.     REASON FOR VISIT:   Chief Complaint   Patient presents with     Consult     dizziness     HISTORY OF PRESENT ILLNESS:                                                    Ms. Shelia Sanchez is 67 year old right handed female patient with past medical history of depression, hypertension, morbid obesity, and cervicalgia, who was seen in consultation today requested by Mariana Benitez DO, for headaches and lightheadedness/dizziness.    Per patient's report, she was in her usual state of health until 2013, when she developed headaches, dizziness, and left cervical pain.  These symptoms fluctuated throughout the years, and became more noticeable again in January 2018.    Currently, the patient reports intermittent pulsating pressure 5/10 headache that is located on the left side of her head, extending from frontal to occipital regions.  It occurs approximately 3 times per week and could last up to 2 days if left untreated.  The other accompanying symptoms include lightheadedness and cervicalgia.  At the same time, the patient denies any associated increased sensitivity to light or sound, nausea, emesis, intolerance of physical activity, autonomic, or focal neurological symptoms.    Headache, neck pain, and lightheadedness are worse when turning head to the left, getting up from bent forward position, lifting heavy objects, carrying shoulder bag on the left side, reading, and groceries shopping. The patient underwent cervical medial branch radiofrequency ablation that was effective for approximately 6 months.  In late December/early January 2018, her left cervical pain has returned along with other symptoms.  She takes 600 mg of Advil 2 times per day.  She is also on  "gabapentin 100 mg 3 times daily.  She takes Effexor for her depression and menopausal symptoms.    The patient reports that her sleep is not adequate.  Her hydration is not optimal.  She rates her stress level as high (related to care for her sister, who is very sick).    Her dizziness that she describes as \"lightheadedness\" also occurs without headache multiple times per day lasting minutes.  It is triggered when she raises from bent forward position or lying in bed on her left side as well as with certain head movements.  She denies any additional neurological symptoms.    Recent laboratory evaluation (January 2018) includes unremarkable CMP and elevated LDL of 126.    Brain MRI with and without contrast from 11/03/2015 (performed for vertigo and throbbing headache) demonstrated no acute intracranial pathology.  Mild chronic small vessel ischemic disease and parenchymal volume loss were noted.  No intracranial arterial aneurysm or stenosis was seen on head MRA.  Dominant left vertebral artery with the right vertebral artery terminating in PICA was noted on neck MRA.  No significant carotid artery stenosis was seen.    On 03/02/2016 the patient also had cervical spine MRI that demonstrated multilevel degenerative changes with mild right C4-5 foraminal stenosis, as the most significant finding.    The patient was seen twice (2015 and 2016) in the [facility] by Nathalia Zimmer NP for cervicogenic headache.    The patient denies a history of recent head injury. Prior neurological history: negative for migraine, stroke, brain neoplasms, seizure disorders, multiple sclerosis, meningitis, encephalitis, and major head injuries.    Review of systems:  the patient endorses multiple chronic complaints on 14-point comprehensive review of systems of the Patient Health History Form, that were reviewed and will be scanned into her electronic medical record at the end of this encounter. Her primary " care and other medical providers are aware and addressing all of them.    PAST MEDICAL/SURGICAL HISTORY:                                                    I personally reviewed patient's past medical and surgical history with the patient at today's visit.  Past Medical History:   Diagnosis Date     Arthritis 1/28/14    Bursitis in left hip     Asteatotic eczema      Gallstone pancreatitis      HTN      Obesity      Past Surgical History:   Procedure Laterality Date     C VAG HYST,RMV TUBE/OVARY  2004     CHOLECYSTECTOMY, LAPOROSCOPIC      Cholecystectomy, Laparoscopic     HC ERCP W STENT PLACEMENT BILE/PANCREATIC DUCT       MEDICATIONS:                                                    I personally reviewed patient's medications and allergies with the patient at today's visit.  Current Outpatient Prescriptions on File Prior to Visit:  amLODIPine (NORVASC) 5 MG tablet Take 1 tablet (5 mg) by mouth daily   aspirin 81 MG tablet Take 81 mg by mouth daily   DiphenhydrAMINE HCl, Sleep, (SLEEP-AID MAXIMUM STRENGTH) 50 MG CAPS Take 1 capsule by mouth At Bedtime   Esomeprazole Magnesium (NEXIUM 24HR PO) Take 1 tablet by mouth daily   gabapentin (NEURONTIN) 100 MG capsule Take 1 capsule (100 mg) by mouth 3 times daily   hydrochlorothiazide (HYDRODIURIL) 25 MG tablet Take 1 tablet (25 mg) by mouth daily   Ibuprofen (ADVIL PO) Taking as needed for pain   Ibuprofen-Diphenhydramine Cit (ADVIL PM PO) Taking 2 at bedtime   venlafaxine (EFFEXOR-XR) 37.5 MG 24 hr capsule Take 1 capsule (37.5 mg) by mouth daily   venlafaxine (EFFEXOR-XR) 75 MG 24 hr capsule Take 1 capsule (75 mg) by mouth daily     ALLERGIES:                                                    No Known Allergies  FAMILY/SOCIAL HISTORY:                                                    Family and social history was reviewed with the patient at today's visit.  Family history is positive for stroke, migraine, headache, and dementia/Alzheimer's disease.   Problem (# of  Occurrences) Relation (Name,Age of Onset)    Alzheimer Disease (1) Father    Arthritis (2) Mother, Father    CEREBROVASCULAR DISEASE (1) Father    Congenital Anomalies (2) Mother, Father    DIABETES (1) Father    Depression (2) Mother, Sister ([place])    Eye Disorder (1) Mother    Glaucoma (1) Mother    HEART DISEASE (3) Maternal Grandmother, Maternal Grandfather, Paternal Grandfather    Hypertension (4) Mother, Father, Paternal Grandmother, Sister ([place])    Macular Degeneration (1) Maternal Uncle    Obesity (2) Mother, Father    Prostate Cancer (1) Father        Single, lives alone.  Never smoker.  3-4 alcoholic drinks per week.  Denies use of recreational drugs.  Retired manager.  REVIEW OF SYSTEMS:                                                    Patient has completed a [facility] Patient Health History, including a 14-system review, which was personally reviewed, and pertinent positives are listed in HPI. She denies any additional problems on the further questioning.  EXAM:                                                    VITAL SIGNS:   /84 (BP Location: Right arm, Patient Position: Standing, Cuff Size: Adult Large)  Pulse 86  Temp 97.5  F (36.4  C) (Oral)  Resp 18  Wt 125.1 kg (275 lb 12.8 oz)  SpO2 97%  BMI 43.2 kg/m2   Orthostatic blood pressure:   05/04/18 1254 05/04/18 1310   BP: 147/83 144/84   BP Location: Right arm Right arm   Patient Position: Supine Standing   Cuff Size: Adult Large Adult Large   Pulse: 86    Resp: 18    Temp: 97.5  F (36.4  C)    TempSrc: Oral    SpO2: 97%    Weight: 125.1 kg (275 lb 12.8 oz)    Repeat blood pressure:  /84  Pulse 86  Temp 97.5  F (36.4  C) (Oral)  Resp 18  Wt 125.1 kg (275 lb 12.8 oz)  SpO2 97%  BMI 43.2 kg/m2    Mini-Cog Assessment:  Mini Cog Assessment  Clock Draw Score: 2 Normal  3 Item Recall: 3 objects recalled  Mini Cog Total Score: 5  Mini-Cog Assessment Score: Mini Cog Total Score: 5/5.    General: pt is in NAD,  cooperative.  Skin: normal turgor, moist mucous membranes, no lesions/rashes noticed.  HEENT: ATNC, EOMI, PERRL, white sclera, normal conjunctiva, no nystagmus or ptosis. No carotid bruits bilaterally.  Respiratory: lung sounds clear to auscultation bilaterally, no crackles, wheezes, rhonchi. Symmetric lung excursion, no accessory respiratory muscle use.  Cardiovascular: normal S1/S2, no murmurs/rubs/gallops.   Abdomen: Not distended.  : deferred.    Neurological:  Mental: alert, follows commands, mini-cog is 5/5 with 3/3 on memory recall, no aphasia or dysarthria. Fund of knowledge is appropriate for age.  Cranial Nerves:  CN II: visual acuity - able to accurately count fingers with each eye. Visual fields intact, fundi: discs sharp, no papilledema and normal vessels bilaterally.  CN III, IV, VI: EOM intact, pupils equal and reactive.  No spontaneous nystagmus, no skew deviation.  CN V: facial sensation nl  CN VII: face symmetric, no facial droop  CN VIII: hearing normal.  Head impulse tests are negative bilaterally.  [place]-Hallpike maneuver is negative on the left, on the right produces rotatory nystagmus with reported worsening of dizziness.  CN IX: palate elevation symmetric, uvula at midline  CN XI SCM normal, shoulder shrug nl  CN XII: tongue midline  Motor: Strength: 5/5 in all major groups of all extremities. Normal tone. No abnormal movements. No pronator drift b/l.  Reflexes: Triceps, biceps, brachioradialis, patellar, and achilles reflexes normal and symmetric. No clonus noted. Toes are down-going b/l.   Sensory: temperature, light touch, pinprick, and vibration intact. Romberg: negative.  Coordination: FNF and heel-shin tests intact b/l. No dysdiadochokinesia with rapid alternating movements.  Gait:  Normal, except mild difficulty with tandem walk.    DATA:   LABS/IMAGING/OTHER STUDIES: I reviewed pertinent medical records, including personal review of brain and cervical spine MRI and head and neck MRA  images, previous neurology notes, and Care Everywhere, as detailed in the history of present illness.  ASSESSMENT and PLAN:      ASSESSMENT: Shelia Sanchez is a 67 year old female patient with past medical history of depression, hypertension, morbid obesity, and cervicalgia, who presents with chronic headaches, dizziness/lightheadedness, and cervical pain.    We had a prolonged discussion with the patient regarding her symptoms.  Her neurological exam today demonstrates positive right [place]-Hallpike maneuver without additional significant focal findings.  This could be seen with peripheral vestibular pathology, such as BPPV.  I placed a referral to physical therapy for vestibular treatment.  Her relatively recent brain MRI/MRA, performed after the onset of her symptoms, was unrevealing to central causes of vestibular dysfunction.    Her headaches are likely multifactorial, including tension, cervicogenic, and medication overuse components.  We reviewed in detail each of these diagnoses along with available treatment options and the plan, as summarized below.    DIAGNOSES:    ICD-10-CM    1. Cervicogenic headache R51    2. Tension headache G44.209    3. Medication overuse headache G44.40    4. Dizziness R42 PHYSICAL THERAPY REFERRAL   5. Cervicalgia M54.2      PLAN: At today's visit we thoroughly discussed various diagnostic possibilities for patient's symptoms and the reasons for work-up, which includes:  Orders Placed This Encounter   Procedures     PHYSICAL THERAPY REFERRAL     I recommended the patient to follow-up with her pain clinic doctor as planned to address cervical pain.  It might also help her headaches.    No new medications were ordered.    We discussed that her dose of Effexor and possibly gabapentin might be increased.  We also discussed that she needs to reduce the use of Advil/ibuprofen to prevent rebound headaches.  Could also try naproxen.  I counseled the patient that both medications should  be taken with food and less than 15 days per month.    Reviewed non-pharmacological headache prevention measures include proper sleep hygiene, regular meals, adequate hydration, regular exercise, and stress reduction techniques.    Patient's blood pressure was elevated during today's visit.  I advised the patient to discuss it with her primary care provider.    Next follow-up appointment is on as needed basis.    I advised the patient to call me with any questions or concerns.    Total Time: 81 minutes with > 50% spent counseling the patient on stated above assessment and recommendations, including nature of the diagnosis, needed w/u, and proposed plan.  Additional time was used to answer patient's questions regarding her symptoms and the plan.    Marbin Alvarez MD  / Neurology  [place]  (Chart documentation was completed in part with Dragon voice-recognition software. Even though reviewed, some grammatical, spelling, and word errors may remain.)

## 2018-05-18 ENCOUNTER — OFFICE VISIT (OUTPATIENT)
Dept: SLEEP MEDICINE | Facility: CLINIC | Age: 68
End: 2018-05-18
Attending: FAMILY MEDICINE
Payer: COMMERCIAL

## 2018-05-18 VITALS
SYSTOLIC BLOOD PRESSURE: 139 MMHG | OXYGEN SATURATION: 97 % | HEIGHT: 67 IN | DIASTOLIC BLOOD PRESSURE: 82 MMHG | BODY MASS INDEX: 43.16 KG/M2 | HEART RATE: 90 BPM | WEIGHT: 275 LBS

## 2018-05-18 DIAGNOSIS — G47.30 SLEEP APNEA, UNSPECIFIED TYPE: ICD-10-CM

## 2018-05-18 DIAGNOSIS — R06.89 DYSPNEA AND RESPIRATORY ABNORMALITY: Primary | ICD-10-CM

## 2018-05-18 DIAGNOSIS — I10 ESSENTIAL HYPERTENSION: ICD-10-CM

## 2018-05-18 DIAGNOSIS — R53.81 MALAISE AND FATIGUE: ICD-10-CM

## 2018-05-18 DIAGNOSIS — R06.00 DYSPNEA AND RESPIRATORY ABNORMALITY: Primary | ICD-10-CM

## 2018-05-18 DIAGNOSIS — Z72.820 LACK OF ADEQUATE SLEEP: ICD-10-CM

## 2018-05-18 DIAGNOSIS — G47.00 INSOMNIA, UNSPECIFIED TYPE: ICD-10-CM

## 2018-05-18 DIAGNOSIS — R53.83 MALAISE AND FATIGUE: ICD-10-CM

## 2018-05-18 DIAGNOSIS — R06.83 SNORING: ICD-10-CM

## 2018-05-18 PROBLEM — F32.0 MILD MAJOR DEPRESSION (H): Chronic | Status: ACTIVE | Noted: 2018-01-10

## 2018-05-18 PROCEDURE — 99204 OFFICE O/P NEW MOD 45 MIN: CPT | Performed by: PHYSICIAN ASSISTANT

## 2018-05-18 NOTE — MR AVS SNAPSHOT
After Visit Summary   5/18/2018    Shelia Sanchez    MRN: [id number]           Patient Information     Date Of Birth          1950        Visit Information        Provider Department      5/18/2018 11:00 AM Sunita Sanches PA [facility]        Today's Diagnoses     Dyspnea and respiratory abnormality    -  1    Insomnia, unspecified type        Snoring        Class 3 obesity due to excess calories with body mass index (BMI) of 40.0 to 44.9 in adult, unspecified whether serious comorbidity present (H)        Lack of adequate sleep        Essential hypertension        Sleep apnea, unspecified type        Malaise and fatigue          Care Instructions      Your BMI is Body mass index is 43.07 kg/(m^2).  Weight management is a personal decision.  If you are interested in exploring weight loss strategies, the following discussion covers the approaches that may be successful. Body mass index (BMI) is one way to tell whether you are at a healthy weight, overweight, or obese. It measures your weight in relation to your height.  A BMI of 18.5 to 24.9 is in the healthy range. A person with a BMI of 25 to 29.9 is considered overweight, and someone with a BMI of 30 or greater is considered obese. More than two-thirds of American adults are considered overweight or obese.  Being overweight or obese increases the risk for further weight gain. Excess weight may lead to heart disease and diabetes.  Creating and following plans for healthy eating and physical activity may help you improve your health.  Weight control is part of healthy lifestyle and includes exercise, emotional health, and healthy eating habits. Careful eating habits lifelong are the mainstay of weight control. Though there are significant health benefits from weight loss, long-term weight loss with diet alone may be very difficult to achieve- studies show long-term success with dietary management in less than 10% of people.  Attaining a healthy weight may be especially difficult to achieve in those with severe obesity. In some cases, medications, devices and surgical management might be considered.  What can you do?  If you are overweight or obese and are interested in methods for weight loss, you should discuss this with your provider.     Consider reducing daily calorie intake by 500 calories.     Keep a food journal.     Avoiding skipping meals, consider cutting portions instead.    Diet combined with exercise helps maintain muscle while optimizing fat loss. Strength training is particularly important for building and maintaining muscle mass. Exercise helps reduce stress, increase energy, and improves fitness. Increasing exercise without diet control, however, may not burn enough calories to loose weight.       Start walking three days a week 10-20 minutes at a time    Work towards walking thirty minutes five days a week     Eventually, increase the speed of your walking for 1-2 minutes at time    In addition, we recommend that you review healthy lifestyles and methods for weight loss available through the National Institutes of Health patient information sites:  http://win.niddk.nih.gov/publications/index.htm    And look into health and wellness programs that may be available through your health insurance provider, employer, local community center, or [employer].    Weight management plan: Patient was referred to their PCP to discuss a diet and exercise plan.              Follow-ups after your visit        Your next 10 appointments already scheduled     May 22, 2018  1:00 PM CDT   Return Visit with Rome Vicente MD   [facility] [place] ([facility] [place])    [address] [place] [address]  [place] [address]   [phone]            May 30, 2018  1:00 PM CDT   [facility]    [address]  "[place] [id number] [phone]           Bring a current list of meds and any records pertaining to this visit. For Physicals, please bring immunization records and any forms needing to be filled out. Please arrive 10 minutes early to complete paperwork.              Future tests that were ordered for you today     Open Future Orders        Priority Expected Expires Ordered    Comprehensive Sleep Study Routine  11/14/2018 5/18/2018            Who to contact     If you have questions or need follow up information about today's clinic visit or your schedule please contact [facility] directly at [phone].  Normal or non-critical lab and imaging results will be communicated to you by Grovohart, letter or phone within 4 business days after the clinic has received the results. If you do not hear from us within 7 days, please contact the clinic through [employer] or phone. If you have a critical or abnormal lab result, we will notify you by phone as soon as possible.  Submit refill requests through Weavly or call your pharmacy and they will forward the refill request to us. Please allow 3 business days for your refill to be completed.          Additional Information About Your Visit        MyChart Information     [employer]ly gives you secure access to your electronic health record. If you see a primary care provider, you can also send messages to your care team and make appointments. If you have questions, please call your primary care clinic.  If you do not have a primary care provider, please call [phone] and they will assist you.        Care EveryWhere ID     This is your Care EveryWhere ID. This could be used by other organizations to access your [place] medical records  [id number]        Your Vitals Were     Pulse Height Pulse Oximetry BMI (Body Mass Index)          90 1.702 m (5' 7\") 97% 43.07 kg/m2         Blood Pressure from Last 3 Encounters:   05/18/18 139/82   05/04/18 138/84   04/18/18 136/82 "    Weight from Last 3 Encounters:   05/18/18 124.7 kg (275 lb)   05/04/18 125.1 kg (275 lb 12.8 oz)   04/18/18 126.1 kg (278 lb)              We Performed the Following     SLEEP EVALUATION & MANAGEMENT REFERRAL - [facility]  [phone] (Age 15 and up)          Today's Medication Changes          These changes are accurate as of 5/18/18 12:40 PM.  If you have any questions, ask your nurse or doctor.               Stop taking these medicines if you haven't already. Please contact your care team if you have questions.     SLEEP-AID MAXIMUM STRENGTH 50 MG Caps   Generic drug:  DiphenhydrAMINE HCl (Sleep)   Stopped by:  Sunita Sanches PA                    Primary Care Provider Office Phone # Fax #    Mariana Benitez [phone]       [address] [place] [id number]        Equal Access to Services     VICKY ROBERTS : Hadii aad ku hadasho Soomaali, waaxda luqadaha, qaybta kaalmada adeegyada, barry alvarenga. So [facility] [phone].    ATENCIÓN: Si habla español, tiene a barajas disposición servicios gratuitos de asistencia lingüística. Llame al [phone].    We comply with applicable federal civil rights laws and [place] laws. We do not discriminate on the basis of race, color, national origin, age, disability, sex, sexual orientation, or gender identity.            Thank you!     Thank you for choosing [facility]  for your care. Our goal is always to provide you with excellent care. Hearing back from our patients is one way we can continue to improve our services. Please take a few minutes to complete the written survey that you may receive in the mail after your visit with us. Thank you!             Your Updated Medication List - Protect others around you: Learn how to safely use, store and throw away your medicines at www.disposemymeds.org.          This list is accurate as of 5/18/18 12:40 PM.  Always use  your most recent med list.                   Brand Name Dispense Instructions for use Diagnosis    ADVIL PM PO      Taking 2 at bedtime        ADVIL PO      Taking as needed for pain        amLODIPine 5 MG tablet    NORVASC    90 tablet    Take 1 tablet (5 mg) by mouth daily    Essential hypertension with goal blood pressure less than 140/90       aspirin 81 MG tablet      Take 81 mg by mouth daily        gabapentin 100 MG capsule    NEURONTIN    90 capsule    Take 1 capsule (100 mg) by mouth 3 times daily    Myofascial pain       hydrochlorothiazide 25 MG tablet    HYDRODIURIL    90 tablet    Take 1 tablet (25 mg) by mouth daily    Essential hypertension with goal blood pressure less than 140/90       NEXIUM 24HR PO      Take 1 tablet by mouth daily        * venlafaxine 75 MG 24 hr capsule    EFFEXOR-XR    90 capsule    Take 1 capsule (75 mg) by mouth daily    Menopausal syndrome (hot flashes)       * venlafaxine 37.5 MG 24 hr capsule    EFFEXOR-XR    30 capsule    Take 1 capsule (37.5 mg) by mouth daily    Mild major depression (H)       * Notice:  This list has 2 medication(s) that are the same as other medications prescribed for you. Read the directions carefully, and ask your doctor or other care provider to review them with you.

## 2018-05-18 NOTE — PATIENT INSTRUCTIONS

## 2018-05-18 NOTE — NURSING NOTE
"Chief Complaint   Patient presents with     Sleep Problem     consult       Initial /83  Pulse 90  Ht 1.702 m (5' 7\")  Wt 124.7 kg (275 lb)  SpO2 97%  BMI 43.07 kg/m2 Estimated body mass index is 43.07 kg/(m^2) as calculated from the following:    Height as of this encounter: 1.702 m (5' 7\").    Weight as of this encounter: 124.7 kg (275 lb).    Medication Reconciliation: complete    Neck circumference: 16 inches / 41 centimeters.        "

## 2018-05-18 NOTE — PROGRESS NOTES
"  Sleep Consultation:    Date on this visit: 5/18/2018    Sheila Sanchez is sent by Mariana Benitez for a sleep consultation regarding insomnia and possible sleep disordered breathing.    Primary Physician: Mariana Benitez     CC: \"Trouple sleeping\"      Shelia goes to sleep  At 10:00 PM during the week. She wakes up between 7 and 9 AM without an alarm. She falls asleep in 60 minutes.  Shelia has difficulty falling asleep. She is not always tired when she goes to bed. Her mind is overactive at night.  She wakes up 3-4 times a night for 30 minutes to hours before falling back to sleep.  Shelia wakes up to go to the bathroom, uncertain reasons and pain.  On weekends, Shelia goes to sleep at 10:00 PM.  She wakes up at between 7 and 9 AM without an alarm. She falls asleep in 60 minutes.  Patient gets 5-6 hours of sleep per night. She does not feel refreshed.     Patient does use electronics in bed and watch TV in bed and does not read in bed.     Shelia does not do shift work.  She is retried.     Shelia does snore every night and snoring is loud. Patient does not have a regular bed partner. There is report of snoring and gasping.  She does have witnessed apneas. Patient sleeps on her back and side. She has frequent morning dry mouth and morning headaches(3-4/week), denies no morning confusion and restless legs. She has [employer].  Shelia denies any bruxism, sleep walking, sleep talking, dream enactment, sleep paralysis, cataplexy and hypnogogic/hypnopompic hallucinations.    Shelia denies difficulty breathing through her nose, claustrophobia and reflux at night.      Shelia has gained 0-5 pounds in the last year.  Patient describes themself as a morning person.  She would prefer to go to sleep at 10:00 PM and wake up at 7:30 AM.  Patient's [place] Sleepiness score 9/24 inconsistent with excessive  daytime sleepiness.  She has fatigue.     Shelia naps 4 times per week " for 60 minutes, feels unrefreshed after naps. She takes some inadvertant naps.  She denies falling asleep while driving.   Patient was counseled on the importance of driving while alert, to pull over if drowsy, or nap before getting into the vehicle if sleepy.  She uses 1-2 cups/day of coffee. Last caffeine intake is usually before noon.    Allergies:    No Known Allergies    Medications:    Current Outpatient Prescriptions   Medication Sig Dispense Refill     amLODIPine (NORVASC) 5 MG tablet Take 1 tablet (5 mg) by mouth daily 90 tablet 3     aspirin 81 MG tablet Take 81 mg by mouth daily       Esomeprazole Magnesium (NEXIUM 24HR PO) Take 1 tablet by mouth daily       gabapentin (NEURONTIN) 100 MG capsule Take 1 capsule (100 mg) by mouth 3 times daily 90 capsule 3     hydrochlorothiazide (HYDRODIURIL) 25 MG tablet Take 1 tablet (25 mg) by mouth daily 90 tablet 3     Ibuprofen (ADVIL PO) Taking as needed for pain       Ibuprofen-Diphenhydramine Cit (ADVIL PM PO) Taking 2 at bedtime       venlafaxine (EFFEXOR-XR) 37.5 MG 24 hr capsule Take 1 capsule (37.5 mg) by mouth daily 30 capsule 1     venlafaxine (EFFEXOR-XR) 75 MG 24 hr capsule Take 1 capsule (75 mg) by mouth daily 90 capsule 3       Problem List:  Patient Active Problem List    Diagnosis Date Noted     Mild major depression (H) 01/10/2018     Priority: Medium     Heartburn 11/16/2016     Priority: Medium     Menopausal syndrome (hot flashes) 03/30/2016     Priority: Medium     Cervical pain 11/19/2015     Priority: Medium     Left-sided headache 11/19/2015     Priority: Medium     Atypical nevus 02/13/2015     Priority: Medium     MILD, risk factor for melanoma. Needs yearly skin exam.   The entire mole was not removed, so if it grows, needs to be completely excised.   Do you wish to do the replacement in the background? yes         Urinary incontinence 02/02/2015     Priority: Medium     Cataracts, both eyes 05/07/2014     Priority: Medium     Left shoulder  pain 12/27/2012     Priority: Medium     Advanced directives, counseling/discussion 02/09/2012     Priority: Medium     Advance Care Planning:   ACP Review and Resources Provided:  Reviewed chart for advance care plan.  Shelia Sanchez has no plan or code status on file. Discussed available resources and provided with information. Confirmed code status reflects current choices pending further ACP discussions.  Confirmed/documented designated decision maker(s). See permanent comments section of demographics in clinical tab.   Added by Brisa Hernandez on 3/6/2015  Discussed advance care planning with patient; information given to patient to review. 2/9/2012          Hypertension goal BP (blood pressure) < 140/90 07/02/2011     Priority: Medium     CARDIOVASCULAR SCREENING; LDL GOAL LESS THAN 130 06/11/2010     Priority: Medium     BMI > 40      Priority: Medium        Past Medical/Surgical History:  Past Medical History:   Diagnosis Date     Arthritis 1/28/14    Bursitis in left hip     Asteatotic eczema      Cholelithiasis with obstruction 5/10/2010     Gallstone pancreatitis      HTN      Obesity      Right knee pain 12/27/2012     Past Surgical History:   Procedure Laterality Date     C VAG HYST,RMV TUBE/OVARY  2004     CHOLECYSTECTOMY, LAPOROSCOPIC      Cholecystectomy, Laparoscopic     HC ERCP W STENT PLACEMENT BILE/PANCREATIC DUCT         Social History:  Social History     Social History     Marital status: Single     Spouse name: N/A     Number of children: N/A     Years of education: N/A     Occupational History     Not on file.     Social History Main Topics     Smoking status: Never Smoker     Smokeless tobacco: Never Used     Alcohol use 0.0 oz/week      Comment: 4 DRINKS PER WEEK     Drug use: No     Sexual activity: Not Currently     Partners: Male     Birth control/ protection: Abstinence     Other Topics Concern      Service No     Blood Transfusions Yes     1974     Caffeine Concern No      Occupational Exposure No     Hobby Hazards No     Sleep Concern No     Stress Concern No     Weight Concern Yes     OVERWEIGHT     Special Diet No     Back Care No     Exercise No     Seat Belt Yes     Self-Exams Yes     Parent/Sibling W/ Cabg, Mi Or Angioplasty Before 65f 55m? No     Social History Narrative       Family History:  Family History   Problem Relation Age of Onset     Arthritis Mother      Congenital Anomalies Mother      Eye Disorder Mother      Glaucoma Mother      Hypertension Mother      Depression Mother      Obesity Mother      Congenital Anomalies Father      Alzheimer Disease Father      Arthritis Father      DIABETES Father      Hypertension Father      CEREBROVASCULAR DISEASE Father      Prostate Cancer Father      Obesity Father      HEART DISEASE Maternal Grandmother      HEART DISEASE Maternal Grandfather      Hypertension Paternal Grandmother      HEART DISEASE Paternal Grandfather      Depression Sister      Hypertension Sister      Macular Degeneration Maternal Uncle        Review of Systems:  CONSTITUTIONAL: NEGATIVE for weight gain/loss, fever, chills, sweats or night sweats, drug allergies.  EYES: NEGATIVE for changes in vision, blind spots, double vision.  ENT: NEGATIVE for ear pain, sore throat, sinus pain, post-nasal drip, runny nose, bloody nose  CARDIAC: NEGATIVE for fast heartbeats or fluttering in chest, chest pain or pressure, breathlessness when lying flat, swollen legs or swollen feet.  NEUROLOGIC: NEGATIVE headaches, weakness or numbness in the arms or legs.  DERMATOLOGIC: NEGATIVE for rashes, new moles or change in mole(s)  PULMONARY: NEGATIVE SOB at rest, SOB with activity, dry cough, productive cough, coughing up blood, wheezing or whistling when breathing.    GASTROINTESTINAL: NEGATIVE for nausea or vomitting, loose or watery stools, fat or grease in stools, constipation, abdominal pain, bowel movements black in color or blood noted.  GENITOURINARY: NEGATIVE for  "pain during urination, blood in urine, urinating more frequently than usual, irregular menstrual periods.  MUSCULOSKELETAL: NEGATIVE for muscle pain, bone or joint pain, swollen joints.  ENDOCRINE: NEGATIVE for increased thirst or urination, diabetes.  LYMPHATIC: NEGATIVE for swollen lymph nodes, lumps or bumps in the breasts or nipple discharge.    Physical Examination:  Vitals: /82  Pulse 90  Ht 1.702 m (5' 7\")  Wt 124.7 kg (275 lb)  SpO2 97%  BMI 43.07 kg/m2  BMI= Body mass index is 43.07 kg/(m^2).         [place] Total Score 5/18/2018   Total score - McBain 9       GENERAL APPEARANCE: alert and no distress  EYES: Eyes grossly normal to inspection, PERRL and conjunctivae and sclerae normal  HENT: ear canals and TM's normal, nose and mouth without ulcers or lesions, oropharynx crowded and tongue base enlarged  NECK: no adenopathy and no asymmetry, masses, or scars  RESP: lungs clear to auscultation - no rales, rhonchi or wheezes  CV: regular rates and rhythm and normal S1 S2, no S3 or S4  MS: trace lower extremity edema bilaterally  NEURO: Normal strength and tone, mentation intact and speech normal  PSYCH: mentation appears normal  Mallampati Class: IV.  Tonsillar Stage:     Last Basic Metabolic Panel:  Lab Results   Component Value Date     01/10/2018      Lab Results   Component Value Date    POTASSIUM 3.6 01/10/2018     Lab Results   Component Value Date    CHLORIDE 104 01/10/2018     Lab Results   Component Value Date    MYLES 8.7 01/10/2018     Lab Results   Component Value Date    CO2 29 01/10/2018     Lab Results   Component Value Date    BUN 18 01/10/2018     Lab Results   Component Value Date    CR 0.64 01/10/2018     Lab Results   Component Value Date    GLC 79 01/10/2018     TSH   Date Value Ref Range Status   11/03/2015 1.62 0.40 - 4.00 mU/L Final   ]    ASSESSMENT/PLAN:   1. High probability of obstructive sleep apnea with modest possibility of coexisting hypoventilation based on BMI " "of 43, loud snoring, witnessed apnea, non-refreshing sleep, fatigue/excessive daytime sleepiness(ESS 9), crowded oropharynx, morning headaches, neck circumference of 16\" and co-morbid HTN. STOP-BANG score is 7/8. Recommend Polysomnogram with transcutaneous C02 monitoring to evaluate for obstructive sleep apnea and hypoventilation.  Given the patient's risk of hypoventilation and insomnia, laboratory study would be preferable. Patient was counseled in alternative therapies for management of sleep apnea, including weight loss strategies and surgical management.    2. Insomnia, sleep onset and sleep maintenance, likely due to a variety of factors including depression, pain and inadequate sleep hygiene. Although untreated obstructive sleep apnea may be implicated in sleep maintenance difficulties. We initially recommended to go to bed when very tired, no sleeping in, limiting naps and no TV and electronics in bed. Could consider formal CBT-I in the future.     Literature provided regarding sleep apnea, sleep hygiene and insomnia.      She will follow up with me in approximately two weeks after her sleep study has been completed to review the results and discuss plan of care.       Polysomnography reviewed.  Obstructive sleep apnea reviewed.  Complications of untreated sleep apnea were reviewed.    Sunita Sanches PA-C    CC: Mariana Benitez        "

## 2018-05-22 ENCOUNTER — OFFICE VISIT (OUTPATIENT)
Dept: PALLIATIVE MEDICINE | Facility: CLINIC | Age: 68
End: 2018-05-22
Payer: COMMERCIAL

## 2018-05-22 VITALS
WEIGHT: 275 LBS | HEART RATE: 99 BPM | SYSTOLIC BLOOD PRESSURE: 139 MMHG | DIASTOLIC BLOOD PRESSURE: 84 MMHG | BODY MASS INDEX: 43.07 KG/M2

## 2018-05-22 DIAGNOSIS — M47.812 CERVICAL FACET JOINT SYNDROME: ICD-10-CM

## 2018-05-22 DIAGNOSIS — M79.18 MYOFASCIAL PAIN: ICD-10-CM

## 2018-05-22 DIAGNOSIS — G44.86 CERVICOGENIC HEADACHE: ICD-10-CM

## 2018-05-22 DIAGNOSIS — M54.12 CERVICAL RADICULOPATHY: Primary | ICD-10-CM

## 2018-05-22 DIAGNOSIS — M47.812 CERVICAL SPONDYLOSIS WITHOUT MYELOPATHY: ICD-10-CM

## 2018-05-22 DIAGNOSIS — R42 VERTIGO: ICD-10-CM

## 2018-05-22 PROCEDURE — 99214 OFFICE O/P EST MOD 30 MIN: CPT | Performed by: ANESTHESIOLOGY

## 2018-05-22 ASSESSMENT — PAIN SCALES - GENERAL: PAINLEVEL: SEVERE PAIN (6)

## 2018-05-22 NOTE — PATIENT INSTRUCTIONS
Assessment:   1. Chronic neck pain  2. Cervical spondylosis (arithritis)  3. Cervical facet joint arthropathy  4. Cervicogenic headache  5. Myofascial pain  6. Chronic right shoulder pain  7. Right acromioclavicular joint arthropathy  8. vertigo      Plan:  1. Physical Therapy:  Continue home exercise program - may need updated therapy prior to RFA treatments  2. Clinical Health Psychologist to address issues of relaxation, behavioral change, coping style, and other factors important to improvement.  Deferred - coping well  3. Diagnostic Studies:  Updated cervical spine MRI ordered today  4. Medication Management:    1. Continue Gabapentin 100 mg TID - trial of two at bedtime and continue usual daytime dosing  2. Continue Effexor as prescribed  3. Continue Advil in safe doses  4. Consider Tylenol 500 mg two tabs three times a day  5.   Further procedures recommended:    1.  Consider repeating cervical medial branch RFA C3, C4, C5, and C6 on the left   2.  May benefit from cervical epidural steroid injection  6.   Recommendations to PCP: ENT referral for evaluation of vertigo would be helpful  7.   Neurosurgical evaluation may be warranted depending on results of MRI  8.   Follow up: 3 months - patient will decide on procedures once MRI completed      ----------------------------------------------------------------  Nurse Triage line:  [phone]   Call this number with any questions or concerns. You may leave a detailed message anytime. Calls are typically returned Monday through Friday between 8 AM and 4:30 PM. We usually get back to you within 2 business days depending on the issue/request.       Medication refills:    For non-narcotic medications, call your pharmacy directly to request a refill. The pharmacy will contact the [facility] for authorization. Please allow 3-4 days for these refills to be processed.     For narcotic refills, call the nurse triage line or send a [employer] message. Please  contact us 7-10 days before your refill is due. The message MUST include the name of the specific medication(s) requested and how you would like to receive the prescription(s). The options are as follows:    Pain Clinic staff can mail the prescription to your pharmacy. Please tell us the name of the pharmacy.    You may pick the prescription up at the Pain Clinic (tell us the location) or during a clinic visit with your pain provider    Pain Clinic staff can deliver the prescription to the [facility] pharmacy in the clinic building. Please tell us the location.      Scheduling number: [phone].  Call this number to schedule or change appointments.    We believe regular attendance is key to your success in our program.    Any time you are unable to keep your appointment we ask that you call us at least 24 hours in advance to let us know. This will allow us to offer the appointment time to another patient.

## 2018-05-22 NOTE — PROGRESS NOTES
[facility]    Date of visit: 5/22/2018    Chief complaint:   Chief Complaint   Patient presents with     Pain       Interval history:  Shelia Sanchez was last seen by me on 7/25/17 for Cervical MB RFA on the left.  Her last office visit was on 5/16/17.      Recommendations/plan at the last visit included:  Plan:  1. Physical Therapy:  Deferred - continue home exercises  2. Clinical Health Psychologist to address issues of relaxation, behavioral change, coping style, and other factors important to improvement.  Deferred - coping well  3. Diagnostic Studies:  Not indicated  4. Medication Management:    1. Continue Gabapentin 100 mg TID  2. Continue Advil in safe doses  3. Continue Effexor  5. Further procedures recommended:   1. Diagnostic Medial Branch Block #2 left C3, C4 C5, C6 as scheduled  6. Recommendations to PCP: continue current treatment  7. Follow up: for injection and to be determined after injection (radiofrequency ablation) - otherwise in 8 to 10 weeks    Since her last visit, Shelia Sanchez reports:  She has had increased pain in the left side of her neck since her last visit.  She was feeling very much better after the RFA treatment.  She feels that her pain started coming back at the end of January or the beginning of February and has continued to worsen since that time.      Her pain remains on the left side of the neck with pain into the back of the left shoulder.  She also has headaches in the back of the head that refer to behind her left eye.  She denies significant problems with her right side of her neck.    She complains of feeling dizzy all the time with worsening of her dizziness as the pain increases.  She feels that the dizziness was better after the RFA treatment but has gotten worse as her pain has worsened.  Her dizziness is worse with initial rising as well as with lying down at night and moving her head while lying down in bed.  She has not been evaluated  by ENT to date.    Pain scores:  Pain intensity on average is 5 on a scale of 0-10.  Ranges from 0/10 to 8/10.  Her pain is constant and throbbing in nature and worsened with bending, lifting, looking up, down, and side to side.    [place] Board of Pharmacy Data Base Reviewed:    YES; no current opioids    Current pain treatments:   Advil 200 mg three tabs prn  Effexor 37.5 mg daily  Gabapentin 100 mg 2-3 times per day - some mild swelling    Past pain treatments:  advil  effexor    Injections:    -7/25/17 cervical MB RFA left C3, C4, C5, C6  -6/2/17 cervical MBB #2  -4/13/17 cervical MBB #1  -7/11/16 left hip bursa injection ([facility])  -6/6/16 right shoulder glenohumeral joint injection ([place])      Side Effects: no side effect    Medications:  Current Outpatient Prescriptions   Medication Sig Dispense Refill     amLODIPine (NORVASC) 5 MG tablet Take 1 tablet (5 mg) by mouth daily 90 tablet 3     aspirin 81 MG tablet Take 81 mg by mouth daily       Esomeprazole Magnesium (NEXIUM 24HR PO) Take 1 tablet by mouth daily       gabapentin (NEURONTIN) 100 MG capsule Take 1 capsule (100 mg) by mouth 3 times daily 90 capsule 3     hydrochlorothiazide (HYDRODIURIL) 25 MG tablet Take 1 tablet (25 mg) by mouth daily 90 tablet 3     Ibuprofen (ADVIL PO) Taking as needed for pain       Ibuprofen-Diphenhydramine Cit (ADVIL PM PO) Taking 2 at bedtime       venlafaxine (EFFEXOR-XR) 37.5 MG 24 hr capsule Take 1 capsule (37.5 mg) by mouth daily 30 capsule 1     venlafaxine (EFFEXOR-XR) 75 MG 24 hr capsule Take 1 capsule (75 mg) by mouth daily 90 capsule 3       Medical History: any changes in medical history since they were last seen? No    Review of Systems:  The 14 system ROS was reviewed from the intake questionnaire, and is positive for: fatigue, weight gain, headache, dizziness, edema, HTN, joint pain, arthritis, neck pain, urinary incontinence, depression, mood swings  Any bowel or bladder problems: urinary  incontinence  Mood: good    Physical Exam:  /84  Pulse 99  Wt 124.7 kg (275 lb)  BMI 43.07 kg/m2  Constitutional: alert and no distress.  Pt is obese  Head: Normocephalic. No masses, lesions, tenderness or abnormalities  ENT: EOMI, mucosal surfaces moist.  Neck with full ROM, posture fair  Cardiovascular: No edema or JVD appreciated.  Respiratory: Good diaphragmatic excursion. No wheezes appreciated.  Speaking in complete sentences without shortness of breath.  No accessory muscle use.   Musculoskeletal: extremities normal- no gross deformities noted, normal muscle tone and able to move about the exam room without difficulty.    Skin: no suspicious lesions or rashes appreciated on exposed areas  Neurologic: Gait normal and non-antalgic. Moving all extremities spontaneously, no apparent weakness.    Psychiatric: mentation appears normal, affect full and good eye contact.      Cervical Spine:  Decreased cervical range of motion on lateral rotation to the left and extension, extension and lateral rotation to the left is positive for facet loading pain, tender cervical paraspinal muscles  Lumbar Spine:  Moves well, exam limited    MR CERVICAL SPINE WITHOUT CONTRAST   3/2/2016 2:43 PM   FINDINGS:  The spinal cord appears normal. The paraspinal soft tissues  appear normal.  The bone marrow has normal signal intensity.      C2-C3:  Mild annular disc bulge. Central canal and neural foramina are  normal.     C3-C4:  Degeneration of the disc. Mild annular disc bulge. Mild  degenerative change in the facet joints.     C4-C5:  Degeneration of the disc. Mild annular disc bulge with an  associated posterior osteophyte extending greater to the right of  midline. Central canal is normal. There is mild right foraminal  stenosis due to an uncinate spur.     C5-C6:  Degeneration of the disc. Diffuse annular disc bulge with  associated posterior osteophyte. Central canal lower limits of normal.  Neural foramina are  adequate.     C6-C7:  Degeneration of the disc. Small central disc protrusion.  Central canal and neural foramina are normal.     C7-T1: Normal disc, facet joints, spinal canal and neural foramina.       IMPRESSION:    1. Multilevel degenerative change.  2. No focal right-sided disc herniations are seen.  3. There is mild right C4-C5 foraminal stenosis due to an uncinate  spur.    Assessment:   1. Chronic neck pain  2. Cervical spondylosis (arithritis)  3. Cervical facet joint arthropathy  4. Cervicogenic headache  5. Myofascial pain  6. Chronic right shoulder pain  7. Right acromioclavicular joint arthropathy  8. vertigo    Shelia Sanchez is a 67 year old female who is seen at the pain clinic for chronic left sided neck pain.  She responded very well to the cervical MB RFA and felt better until about 7 months after the procedure when her pain started coming back.  She knows the RFA works well but she is still hesitant to repeat it due to post-procedure discomfort.  She has had worsening of dizziness/vertigo that has not been properly worked up and she would benefit from ENT evaluation.  She was seen by neurology in the past but they did not have any answers per the patient.  Our treatment plan is as outlined below.    Plan:  1. Physical Therapy:  Continue home exercise program - may need updated therapy prior to RFA treatments  2. Clinical Health Psychologist to address issues of relaxation, behavioral change, coping style, and other factors important to improvement.  Deferred - coping well  3. Diagnostic Studies:  Updated cervical spine MRI ordered today  4. Medication Management:    1. Continue Gabapentin 100 mg TID - trial of two at bedtime and continue usual daytime dosing  2. Continue Effexor as prescribed  3. Continue Advil in safe doses  4. Consider Tylenol 500 mg two tabs three times a day  5.   Further procedures recommended:    1.  Consider repeating cervical medial branch RFA C3, C4, C5, and C6 on  the left   2.  May benefit from cervical epidural steroid injection  6.   Recommendations to PCP: ENT referral for evaluation of vertigo would be helpful  7.   Neurosurgical evaluation may be warranted depending on results of MRI  8.   Follow up: 3 months - patient will decide on procedures once MRI completed    Total time spent was 30 minutes, and more than 50% of face to face time was spent in counseling and/or coordination of care regarding diagnosis, physical activity, medication management, and interventional procedures.    Rome Vicente MD  [facility]

## 2018-05-22 NOTE — MR AVS SNAPSHOT
After Visit Summary   5/22/2018    Shelia Sanchez    MRN: [id number]           Patient Information     Date Of Birth          1950        Visit Information        Provider Department      5/22/2018 1:00 PM Rome Fowler MD [facility]        Today's Diagnoses     Cervical radiculopathy    -  1      Care Instructions    Assessment:   1. Chronic neck pain  2. Cervical spondylosis (arithritis)  3. Cervical facet joint arthropathy  4. Cervicogenic headache  5. Myofascial pain  6. Chronic right shoulder pain  7. Right acromioclavicular joint arthropathy  8. vertigo      Plan:  1. Physical Therapy:  Continue home exercise program - may need updated therapy prior to RFA treatments  2. Clinical Health Psychologist to address issues of relaxation, behavioral change, coping style, and other factors important to improvement.  Deferred - coping well  3. Diagnostic Studies:  Updated cervical spine MRI ordered today  4. Medication Management:    1. Continue Gabapentin 100 mg TID - trial of two at bedtime and continue usual daytime dosing  2. Continue Effexor as prescribed  3. Continue Advil in safe doses  4. Consider Tylenol 500 mg two tabs three times a day  5.   Further procedures recommended:    1.  Consider repeating cervical medial branch RFA C3, C4, C5, and C6 on the left   2.  May benefit from cervical epidural steroid injection  6.   Recommendations to PCP: ENT referral for evaluation of vertigo would be helpful  7.   Neurosurgical evaluation may be warranted depending on results of MRI  8.   Follow up: 3 months - patient will decide on procedures once MRI completed      ----------------------------------------------------------------  Nurse Triage line:  [phone]   Call this number with any questions or concerns. You may leave a detailed message anytime. Calls are typically returned Monday through Friday between 8 AM and 4:30 PM. We usually get back to you within 2 business  days depending on the issue/request.       Medication refills:    For non-narcotic medications, call your pharmacy directly to request a refill. The pharmacy will contact the [facility] for authorization. Please allow 3-4 days for these refills to be processed.     For narcotic refills, call the nurse triage line or send a AOMit message. Please contact us 7-10 days before your refill is due. The message MUST include the name of the specific medication(s) requested and how you would like to receive the prescription(s). The options are as follows:    Pain Clinic staff can mail the prescription to your pharmacy. Please tell us the name of the pharmacy.    You may pick the prescription up at the Pain Clinic (tell us the location) or during a clinic visit with your pain provider    Pain Clinic staff can deliver the prescription to the [facility] in the clinic building. Please tell us the location.      Scheduling number: [phone].  Call this number to schedule or change appointments.    We believe regular attendance is key to your success in our program.    Any time you are unable to keep your appointment we ask that you call us at least 24 hours in advance to let us know. This will allow us to offer the appointment time to another patient.               Follow-ups after your visit        Your next 10 appointments already scheduled     May 23, 2018  8:15 AM CDT   LAB with [facility]   [facility] ([address] [id number] [phone]           Please do not eat 10-12 hours before your appointment if you are coming in fasting for labs on lipids, cholesterol, or glucose (sugar). This does not apply to pregnant women. Water, hot tea and black coffee (with nothing added) are okay. Do not drink other fluids, diet soda or chew gum.            May 30, 2018  1:00 PM CDT   Office Visit with DO Alan Parekh  [facility] ([facility])    [address] [address] [phone]           Bring a current list of meds and any records pertaining to this visit. For Physicals, please bring immunization records and any forms needing to be filled out. Please arrive 10 minutes early to complete paperwork.              Future tests that were ordered for you today     Open Future Orders        Priority Expected Expires Ordered    MR Cervical Spine w/o Contrast Routine  5/22/2019 5/22/2018            Who to contact     If you have questions or need follow up information about today's clinic visit or your schedule please contact [facility] directly at [phone].  Normal or non-critical lab and imaging results will be communicated to you by Exposed Vocalshart, letter or phone within 4 business days after the clinic has received the results. If you do not hear from us within 7 days, please contact the clinic through [employer] or phone. If you have a critical or abnormal lab result, we will notify you by phone as soon as possible.  Submit refill requests through Bizdom or call your pharmacy and they will forward the refill request to us. Please allow 3 business days for your refill to be completed.          Additional Information About Your Visit        Exposed VocalsharSyMynd Information     Bizdom gives you secure access to your electronic health record. If you see a primary care provider, you can also send messages to your care team and make appointments. If you have questions, please call your primary care clinic.  If you do not have a primary care provider, please call [phone] and they will assist you.        Care EveryWhere ID     This is your Care EveryWhere ID. This could be used by other organizations to access your [place] medical records  [id number]        Your Vitals Were     Pulse BMI (Body Mass Index)                99 43.07 kg/m2           Blood Pressure from Last 3  Encounters:   05/22/18 139/84   05/18/18 139/82   05/04/18 138/84    Weight from Last 3 Encounters:   05/22/18 124.7 kg (275 lb)   05/18/18 124.7 kg (275 lb)   05/04/18 125.1 kg (275 lb 12.8 oz)               Primary Care Provider Office Phone # Fax Brittnee Benitez [phone]       [address] [place] [address]        Equal Access to Services     VICKY ROBERTS : Hadii bruce garcia hadasho Soomaali, waaxda luqadaha, qaybta kaalmada adeegyada, barry landaverde . So [facility] [phone].    ATENCIÓN: Si habla español, tiene a barajas disposición servicios gratuitos de asistencia lingüística. Llame al [phone].    We comply with applicable federal civil rights laws and [place] laws. We do not discriminate on the basis of race, color, national origin, age, disability, sex, sexual orientation, or gender identity.            Thank you!     Thank you for choosing [facility]  for your care. Our goal is always to provide you with excellent care. Hearing back from our patients is one way we can continue to improve our services. Please take a few minutes to complete the written survey that you may receive in the mail after your visit with us. Thank you!             Your Updated Medication List - Protect others around you: Learn how to safely use, store and throw away your medicines at www.disposemymeds.org.          This list is accurate as of 5/22/18  1:36 PM.  Always use your most recent med list.                   Brand Name Dispense Instructions for use Diagnosis    ADVIL PM PO      Taking 2 at bedtime        ADVIL PO      Taking as needed for pain        amLODIPine 5 MG tablet    NORVASC    90 tablet    Take 1 tablet (5 mg) by mouth daily    Essential hypertension with goal blood pressure less than 140/90       aspirin 81 MG tablet      Take 81 mg by mouth daily        gabapentin 100 MG capsule    NEURONTIN    90 capsule    Take 1 capsule (100 mg) by  mouth 3 times daily    Myofascial pain       hydrochlorothiazide 25 MG tablet    HYDRODIURIL    90 tablet    Take 1 tablet (25 mg) by mouth daily    Essential hypertension with goal blood pressure less than 140/90       NEXIUM 24HR PO      Take 1 tablet by mouth daily        * venlafaxine 75 MG 24 hr capsule    EFFEXOR-XR    90 capsule    Take 1 capsule (75 mg) by mouth daily    Menopausal syndrome (hot flashes)       * venlafaxine 37.5 MG 24 hr capsule    EFFEXOR-XR    30 capsule    Take 1 capsule (37.5 mg) by mouth daily    Mild major depression (H)       * Notice:  This list has 2 medication(s) that are the same as other medications prescribed for you. Read the directions carefully, and ask your doctor or other care provider to review them with you.

## 2018-05-23 DIAGNOSIS — R42 DIZZINESS: ICD-10-CM

## 2018-05-23 DIAGNOSIS — M54.2 CERVICALGIA: ICD-10-CM

## 2018-05-23 DIAGNOSIS — R51.9 LEFT-SIDED HEADACHE: ICD-10-CM

## 2018-05-23 LAB
ANION GAP SERPL CALCULATED.3IONS-SCNC: 12 MMOL/L (ref 3–14)
BUN SERPL-MCNC: 19 MG/DL (ref 7–30)
CALCIUM SERPL-MCNC: 9.1 MG/DL (ref 8.5–10.1)
CHLORIDE SERPL-SCNC: 108 MMOL/L (ref 94–109)
CO2 SERPL-SCNC: 24 MMOL/L (ref 20–32)
CREAT SERPL-MCNC: 0.69 MG/DL (ref 0.52–1.04)
GFR SERPL CREATININE-BSD FRML MDRD: 84 ML/MIN/1.7M2
GLUCOSE SERPL-MCNC: 115 MG/DL (ref 70–99)
HGB BLD-MCNC: 12.8 G/DL (ref 11.7–15.7)
POTASSIUM SERPL-SCNC: 3.7 MMOL/L (ref 3.4–5.3)
SODIUM SERPL-SCNC: 144 MMOL/L (ref 133–144)
TSH SERPL DL<=0.005 MIU/L-ACNC: 1.06 MU/L (ref 0.4–4)

## 2018-05-23 PROCEDURE — 85018 HEMOGLOBIN: CPT | Performed by: FAMILY MEDICINE

## 2018-05-23 PROCEDURE — 84443 ASSAY THYROID STIM HORMONE: CPT | Performed by: FAMILY MEDICINE

## 2018-05-23 PROCEDURE — 80048 BASIC METABOLIC PNL TOTAL CA: CPT | Performed by: FAMILY MEDICINE

## 2018-05-23 PROCEDURE — 36415 COLL VENOUS BLD VENIPUNCTURE: CPT | Performed by: FAMILY MEDICINE

## 2018-05-30 ENCOUNTER — OFFICE VISIT (OUTPATIENT)
Dept: FAMILY MEDICINE | Facility: CLINIC | Age: 68
End: 2018-05-30
Payer: COMMERCIAL

## 2018-05-30 ENCOUNTER — RADIANT APPOINTMENT (OUTPATIENT)
Dept: MRI IMAGING | Facility: CLINIC | Age: 68
End: 2018-05-30
Attending: ANESTHESIOLOGY
Payer: COMMERCIAL

## 2018-05-30 VITALS
HEART RATE: 76 BPM | SYSTOLIC BLOOD PRESSURE: 132 MMHG | TEMPERATURE: 98 F | WEIGHT: 273 LBS | HEIGHT: 67 IN | DIASTOLIC BLOOD PRESSURE: 78 MMHG | BODY MASS INDEX: 42.85 KG/M2

## 2018-05-30 DIAGNOSIS — M54.12 CERVICAL RADICULOPATHY: ICD-10-CM

## 2018-05-30 DIAGNOSIS — H91.91 HEARING PROBLEM OF RIGHT EAR: ICD-10-CM

## 2018-05-30 DIAGNOSIS — N95.1 MENOPAUSAL SYNDROME (HOT FLASHES): ICD-10-CM

## 2018-05-30 DIAGNOSIS — H61.23 BILATERAL IMPACTED CERUMEN: ICD-10-CM

## 2018-05-30 DIAGNOSIS — R42 DIZZINESS: Primary | ICD-10-CM

## 2018-05-30 DIAGNOSIS — M54.2 CERVICAL PAIN: Chronic | ICD-10-CM

## 2018-05-30 DIAGNOSIS — I10 HYPERTENSION GOAL BP (BLOOD PRESSURE) < 140/90: Chronic | ICD-10-CM

## 2018-05-30 DIAGNOSIS — F32.0 MILD MAJOR DEPRESSION (H): Chronic | ICD-10-CM

## 2018-05-30 PROCEDURE — 72141 MRI NECK SPINE W/O DYE: CPT | Mod: TC

## 2018-05-30 PROCEDURE — 99214 OFFICE O/P EST MOD 30 MIN: CPT | Mod: 25 | Performed by: FAMILY MEDICINE

## 2018-05-30 PROCEDURE — 69209 REMOVE IMPACTED EAR WAX UNI: CPT | Mod: 50 | Performed by: FAMILY MEDICINE

## 2018-05-30 NOTE — PROGRESS NOTES
SUBJECTIVE:   Shelia Sanchez is a 67 year old female who presents to clinic today for the following health issues:      Patient last seen 4/18/18 to establish care.   She had labs drawn 5/23/18 and is here to discuss results  Patient reports that the dizziness is the same, no changes.       Notes from neurology 5/4/18  ASSESSMENT: Shelia Sanchez is a 67 year old female patient with past medical history of depression, hypertension, morbid obesity, and cervicalgia, who presents with chronic headaches, dizziness/lightheadedness, and cervical pain.     We had a prolonged discussion with the patient regarding her symptoms.  Her neurological exam today demonstrates positive right Kimmell-Hallpike maneuver without additional significant focal findings.  This could be seen with peripheral vestibular pathology, such as BPPV.  I placed a referral to physical therapy for vestibular treatment.  Her relatively recent brain MRI/MRA, performed after the onset of her symptoms, was unrevealing to central causes of vestibular dysfunction.     Her headaches are likely multifactorial, including tension, cervicogenic, and medication overuse components.  We reviewed in detail each of these diagnoses along with available treatment options and the plan, as summarized below.     DIAGNOSES:      ICD-10-CM     1. Cervicogenic headache R51     2. Tension headache G44.209     3. Medication overuse headache G44.40     4. Dizziness R42 PHYSICAL THERAPY REFERRAL   5. Cervicalgia M54.2        PLAN: At today's visit we thoroughly discussed various diagnostic possibilities for patient's symptoms and the reasons for work-up, which includes:      Orders Placed This Encounter   Procedures     PHYSICAL THERAPY REFERRAL      I recommended the patient to follow-up with her pain clinic doctor as planned to address cervical pain.  It might also help her headaches.     No new medications were ordered.     We discussed that her dose of Effexor and  possibly gabapentin might be increased.  We also discussed that she needs to reduce the use of Advil/ibuprofen to prevent rebound headaches.  Could also try naproxen.  I counseled the patient that both medications should be taken with food and less than 15 days per month.     Reviewed non-pharmacological headache prevention measures include proper sleep hygiene, regular meals, adequate hydration, regular exercise, and stress reduction techniques.     Patient's blood pressure was elevated during today's visit.  I advised the patient to discuss it with her primary care provider.     Next follow-up appointment is on as needed basis.     I advised the patient to call me with any questions or concerns.     Total Time: 81 minutes with > 50% spent counseling the patient on stated above assessment and recommendations, including nature of the diagnosis, needed w/u, and proposed plan.  Additional time was used to answer patient's questions regarding her symptoms and the plan.     Marbin Alvarez MD  / Neurology  [place]    Notes from pain clinic  [facility]     Date of visit: 5/22/2018     Chief complaint:       Chief Complaint   Patient presents with     Pain         Interval history:  Shelia Sanchez was last seen by me on 7/25/17 for Cervical MB RFA on the left.  Her last office visit was on 5/16/17.       Recommendations/plan at the last visit included:  Plan:  1. Physical Therapy:  Deferred - continue home exercises  2. Clinical Health Psychologist to address issues of relaxation, behavioral change, coping style, and other factors important to improvement.  Deferred - coping well  3. Diagnostic Studies:  Not indicated  4. Medication Management:    1. Continue Gabapentin 100 mg TID  2. Continue Advil in safe doses  3. Continue Effexor  5. Further procedures recommended:   1. Diagnostic Medial Branch Block #2 left C3, C4 C5, C6 as scheduled  6. Recommendations to PCP: continue current  treatment  7. Follow up: for injection and to be determined after injection (radiofrequency ablation) - otherwise in 8 to 10 weeks     Since her last visit, Shelia Sanchez reports:  She has had increased pain in the left side of her neck since her last visit.  She was feeling very much better after the RFA treatment.  She feels that her pain started coming back at the end of January or the beginning of February and has continued to worsen since that time.       Her pain remains on the left side of the neck with pain into the back of the left shoulder.  She also has headaches in the back of the head that refer to behind her left eye.  She denies significant problems with her right side of her neck.     She complains of feeling dizzy all the time with worsening of her dizziness as the pain increases.  She feels that the dizziness was better after the RFA treatment but has gotten worse as her pain has worsened.  Her dizziness is worse with initial rising as well as with lying down at night and moving her head while lying down in bed.  She has not been evaluated by ENT to date.     Pain scores:  Pain intensity on average is 5 on a scale of 0-10.  Ranges from 0/10 to 8/10.  Her pain is constant and throbbing in nature and worsened with bending, lifting, looking up, down, and side to side.     [place] Board of Pharmacy Data Base Reviewed:    YES; no current opioids     Current pain treatments:   Advil 200 mg three tabs prn  Effexor 37.5 mg daily  Gabapentin 100 mg 2-3 times per day - some mild swelling     Past pain treatments:  advil  effexor     Injections:    -7/25/17 cervical MB RFA left C3, C4, C5, C6  -6/2/17 cervical MBB #2  -4/13/17 cervical MBB #1  -7/11/16 left hip bursa injection ([facility])  -6/6/16 right shoulder glenohumeral joint injection ([facility])       (Chart documentation was completed in part with Dragon voice-recognition software. Even though reviewed, some grammatical, spelling, and word errors  "may remain.)     Ear   Right ear is noted to be completely \"plugged\" due to cerumen build up and reported to not be new with the dizziness symptoms. She noted that she also has a family history of cerumen build up. Denies tinnitus.     Depression   Depression symptoms at the last visit was noted to be not controlled. She was taking 75mg of Venlafaxine and given an additional dosage of 37.5mg at that visit. She reported today that stress level and depression is now controlled after dosage increase.      Neck pain and Headaches    Patient also takes Gabapentin for neck pain and headaches, and dosage increase was prescribed by Pain Medicine. She takes a total Gabapentin of 100mg in the morning and 100mg in the afternoon, as well as 200mg at bedtime. She reported that gabapentin is able to reduce pain for better sleep.     Venlafaxine HCl and Gabapentin was noted to be helping symptoms at night.    She noted that she has seen two Neurologists since these symptoms. She reported that the first Neurologist told her that she was \"fine\".     She has not followed up with Physical Therapy yet as advises. MRI on neck was completed today and ordered by pain clinic. Patient has not yet completed any steriod injections thus far. Pain Clinic wants to wait for additional interventions of possible epidural steroid injection or surgery until MRI results return.     Patient reported that she completed an MRI on neck in the past in 2016.       Sleep Consultation:     Date on this visit: 5/18/2018     Shelia Sanchez is sent by Mariana Benitez for a sleep consultation regarding insomnia and possible sleep disordered breathing.     Primary Physician: Mariana Benitez      CC: \"Trouple sleeping\"        Shelia goes to sleep  At 10:00 PM during the week. She wakes up between 7 and 9 AM without an alarm. She falls asleep in 60 minutes.  Shelia has difficulty falling asleep. She is not always tired when she goes to bed. " Her mind is overactive at night.  She wakes up 3-4 times a night for 30 minutes to hours before falling back to sleep.  Shelia wakes up to go to the bathroom, uncertain reasons and pain.  On weekends, Shelia goes to sleep at 10:00 PM.  She wakes up at between 7 and 9 AM without an alarm. She falls asleep in 60 minutes.  Patient gets 5-6 hours of sleep per night. She does not feel refreshed.      Patient does use electronics in bed and watch TV in bed and does not read in bed.      Shelia does not do shift work.  She is retried.      Shelia does snore every night and snoring is loud. Patient does not have a regular bed partner. There is report of snoring and gasping.  She does have witnessed apneas. Patient sleeps on her back and side. She has frequent morning dry mouth and morning headaches(3-4/week), denies no morning confusion and restless legs. She has [employer].  Shelia denies any bruxism, sleep walking, sleep talking, dream enactment, sleep paralysis, cataplexy and hypnogogic/hypnopompic hallucinations.     Shelia denies difficulty breathing through her nose, claustrophobia and reflux at night.       Shelia has gained 0-5 pounds in the last year.  Patient describes themself as a morning person.  She would prefer to go to sleep at 10:00 PM and wake up at 7:30 AM.  Patient's [place] Sleepiness score 9/24 inconsistent with excessive  daytime sleepiness.  She has fatigue.      Shelia naps 4 times per week for 60 minutes, feels unrefreshed after naps. She takes some inadvertant naps.  She denies falling asleep while driving.   Patient was counseled on the importance of driving while alert, to pull over if drowsy, or nap before getting into the vehicle if sleepy.  She uses 1-2 cups/day of coffee. Last caffeine intake is usually before noon.    ASSESSMENT/PLAN:   1. High probability of obstructive sleep apnea with modest possibility of coexisting hypoventilation based on BMI of 43, loud  "snoring, witnessed apnea, non-refreshing sleep, fatigue/excessive daytime sleepiness(ESS 9), crowded oropharynx, morning headaches, neck circumference of 16\" and co-morbid HTN. STOP-BANG score is 7/8. Recommend Polysomnogram with transcutaneous C02 monitoring to evaluate for obstructive sleep apnea and hypoventilation.  Given the patient's risk of hypoventilation and insomnia, laboratory study would be preferable. Patient was counseled in alternative therapies for management of sleep apnea, including weight loss strategies and surgical management.     2. Insomnia, sleep onset and sleep maintenance, likely due to a variety of factors including depression, pain and inadequate sleep hygiene. Although untreated obstructive sleep apnea may be implicated in sleep maintenance difficulties. We initially recommended to go to bed when very tired, no sleeping in, limiting naps and no TV and electronics in bed. Could consider formal CBT-I in the future.      Literature provided regarding sleep apnea, sleep hygiene and insomnia.       She will follow up with me in approximately two weeks after her sleep study has been completed to review the results and discuss plan of care.        Polysomnography reviewed.  Obstructive sleep apnea reviewed.  Complications of untreated sleep apnea were reviewed.     Sunita Sanches PA-C        OF NOTE She plans to complete the sleep study after vacation.     Obesity   She noted that she joined weight watchers and is monitoring her nutritional intake.   Wt Readings from Last 5 Encounters:   05/30/18 273 lb (123.8 kg)   05/22/18 275 lb (124.7 kg)   05/18/18 275 lb (124.7 kg)   05/04/18 275 lb 12.8 oz (125.1 kg)   04/18/18 278 lb (126.1 kg)       Hypertension  She is taking hydrochlorothiazide and Norvasc and is controlling blood pressures.    BP Readings from Last 3 Encounters:   05/30/18 132/78   05/22/18 139/84   05/18/18 139/82         Blood Glucose  Lab Results   Component Value Date    A1C 5.5 " 12/02/2015    A1C 5.8@ 07/14/2008         Problem list and histories reviewed & adjusted, as indicated.  Additional history: as documented    Patient Active Problem List   Diagnosis     BMI > 40     CARDIOVASCULAR SCREENING; LDL GOAL LESS THAN 130     Hypertension goal BP (blood pressure) < 140/90     Advanced directives, counseling/discussion     Left shoulder pain     Cataracts, both eyes     Urinary incontinence     Atypical nevus     Cervical pain     Left-sided headache     Menopausal syndrome (hot flashes)     Heartburn     Mild major depression (H)     Past Surgical History:   Procedure Laterality Date     C VAG HYST,RMV TUBE/OVARY  2004     CHOLECYSTECTOMY, LAPOROSCOPIC      Cholecystectomy, Laparoscopic     HC ERCP W STENT PLACEMENT BILE/PANCREATIC DUCT         Social History   Substance Use Topics     Smoking status: Never Smoker     Smokeless tobacco: Never Used     Alcohol use 0.0 oz/week      Comment: 4 DRINKS PER WEEK     Family History   Problem Relation Age of Onset     Arthritis Mother      Congenital Anomalies Mother      Eye Disorder Mother      Glaucoma Mother      Hypertension Mother      Depression Mother      Obesity Mother      Congenital Anomalies Father      Alzheimer Disease Father      Arthritis Father      DIABETES Father      Hypertension Father      CEREBROVASCULAR DISEASE Father      Prostate Cancer Father      Obesity Father      HEART DISEASE Maternal Grandmother      HEART DISEASE Maternal Grandfather      Hypertension Paternal Grandmother      HEART DISEASE Paternal Grandfather      Depression Sister      Hypertension Sister      Macular Degeneration Maternal Uncle          Current Outpatient Prescriptions   Medication Sig Dispense Refill     amLODIPine (NORVASC) 5 MG tablet Take 1 tablet (5 mg) by mouth daily 90 tablet 3     aspirin 81 MG tablet Take 81 mg by mouth daily       Esomeprazole Magnesium (NEXIUM 24HR PO) Take 1 tablet by mouth daily       gabapentin (NEURONTIN) 100 MG  capsule Take 1 capsule (100 mg) by mouth 3 times daily 90 capsule 3     hydrochlorothiazide (HYDRODIURIL) 25 MG tablet Take 1 tablet (25 mg) by mouth daily 90 tablet 3     Ibuprofen-Diphenhydramine Cit (ADVIL PM PO) Taking 2 at bedtime       venlafaxine (EFFEXOR-XR) 37.5 MG 24 hr capsule Take 1 capsule (37.5 mg) by mouth daily 30 capsule 1     venlafaxine (EFFEXOR-XR) 75 MG 24 hr capsule Take 1 capsule (75 mg) by mouth daily 90 capsule 3     Ibuprofen (ADVIL PO) Taking as needed for pain       No Known Allergies  Recent Labs   Lab Test  05/23/18   0811  01/10/18   1108   02/05/16   0816  12/02/15   0954   11/03/15   1426  01/23/15   0902   05/06/10   1136 05/03/10   A1C   --    --    --    --   5.5   --    --    --    --    --    --    LDL   --   126*   --   125*   --    --    --   119   < >   --    --    HDL   --   67   --   62   --    --    --   75   < >   --    --    TRIG   --   122   --   107   --    --    --   63   < >   --    --    ALT   --   22   --    --    --    --    --    --    --   300*  467 @*   CR  0.69  0.64   < >   --    --    < >  0.66  0.73   < >   --    --    GFRESTIMATED  84  >90   < >   --    --    < >  89  79   < >   --    --    GFRESTBLACK  >90  >90   < >   --    --    < >  >90   GFR Calc    >90   GFR Calc     < >   --    --    POTASSIUM  3.7  3.6   < >   --    --    < >  3.7  3.7   < >   --    --    TSH  1.06   --    --    --    --    --   1.62   --    --    --    --     < > = values in this interval not displayed.      BP Readings from Last 3 Encounters:   05/30/18 132/78   05/22/18 139/84   05/18/18 139/82    Wt Readings from Last 3 Encounters:   05/30/18 273 lb (123.8 kg)   05/22/18 275 lb (124.7 kg)   05/18/18 275 lb (124.7 kg)                  Labs reviewed in EPIC    Reviewed and updated as needed this visit by clinical staff  Tobacco  Allergies  Meds  Med Hx  Surg Hx  Fam Hx  Soc Hx      Reviewed and updated as needed this visit by Provider      "    ROS:  Constitutional, HEENT, cardiovascular, pulmonary, GI, , musculoskeletal, neuro, skin, endocrine and psych systems are negative, except as otherwise noted.    This document serves as a record of the services and decisions personally performed and made by Mariana Benitez D.O. It was created on her behalf by Eric Ayala, a trained medical scribe. The creation of this document is based on the provider's statements to the medical scribe.  Eric Ayala May 30, 2018 1:09 PM      OBJECTIVE:     /78  Pulse 76  Temp 98  F (36.7  C) (Oral)  Ht 5' 7\" (1.702 m)  Wt 273 lb (123.8 kg)  BMI 42.76 kg/m2  Body mass index is 42.76 kg/(m^2).  GENERAL: alert, no distress and obese  EYES: Eyes grossly normal to inspection, PERRL and conjunctivae and sclerae normal  HENT: normal cephalic/atraumatic, both ears: occluded with cerumen worse on right than left unable to remove with curette, bilateral TM visible after flushing. nose and mouth without ulcers or lesions, oropharynx clear and oral mucous membranes moist  NECK: no adenopathy, no asymmetry, masses, or scars and thyroid normal to palpation  RESP: lungs clear to auscultation - no rales, rhonchi or wheezes  CV: regular rate and rhythm, normal S1 S2, no S3 or S4, no murmur, click or rub, no peripheral edema and peripheral pulses strong  ABDOMEN: soft, nontender, no hepatosplenomegaly, no masses and bowel sounds normal  MS: no gross musculoskeletal defects noted, no edema  SKIN: no suspicious lesions or rashes  NEURO: Normal strength and tone, mentation intact and speech normal  PSYCH: mentation appears normal, affect normal/bright    Diagnostic Test Results:  none     ASSESSMENT/PLAN:     Tobacco Cessation:   reports that she has never smoked. She has never used smokeless tobacco.      BMI:   Estimated body mass index is 42.76 kg/(m^2) as calculated from the following:    Height as of this encounter: 5' 7\" (1.702 m).    Weight as of this encounter: 273 lb " (123.8 kg).   Weight management plan: Discussed healthy diet and exercise guidelines and patient will follow up in 6 months in clinic to re-evaluate. patient reported that she is attending weight watchers.      1. Dizziness  Patient has been evaluated with Neurology X2 without significant findings it was felt at last Neurology  visit that benign positional vertigo may be causing symptoms. She declines PT at this time that was advised by Pain Management. Instead she plans to follow up with ENT for right hearing issues.  Ears were flushed due to unable to remove cerumen with curette.   - REMOVE IMPACTED CERUMEN  - OTOLARYNGOLOGY REFERRAL    2. Hypertension goal BP (blood pressure) < 140/90  Currently controlled with hydrochlorothiazide and Norvasc. After sleep study and using CPAP machine she will notify me of blood pressures. If blood pressures are better controlled and lowered, we will plan to eliminate Norvasc.     3. Cervical pain  Patient will continue taking gabapentin and follow up with Physical Therapy, Pain Medicine, and Specialists as planned.     4. Menopausal syndrome (hot flashes)  Stable on Venlafaxine.     5. Mild major depression (H)  Stable and controlled with increased dosage in Venlafaxine. Follow up in 6 months    6. Bilateral impacted cerumen  Ears were flushed due to unable to remove cerumen with curette.   - REMOVE IMPACTED CERUMEN    7. Hearing problem of right ear  - OTOLARYNGOLOGY REFERRAL    Patient instructions discussed with patient    The information in this document, created by the medical scribe for me, accurately reflects the services I personally performed and the decisions made by me. I have reviewed and approved this document for accuracy prior to leaving the patient care area.  May 30, 2018 1:43 PM      Mariana Benitez DO  [facility]

## 2018-05-30 NOTE — MR AVS SNAPSHOT
After Visit Summary   5/30/2018    Shelia Sanchez    MRN: [id number]           Patient Information     Date Of Birth          1950        Visit Information        Provider Department      5/30/2018 1:00 PM Mariana Benitez DO [facility]        Today's Diagnoses     Dizziness    -  1    Hypertension goal BP (blood pressure) < 140/90        Cervical pain        Menopausal syndrome (hot flashes)        Mild major depression (H)        Bilateral impacted cerumen        Hearing problem of right ear          Care Instructions    Follow up in January for annual exam and fasting labs. Follow up in three months if weight loss is difficult to achieve with weight watchers.     Continue taking all medications as directed.     Monitor blood pressures at home and continue taking blood pressure medications. If blood pressures are noted to be in the 114/80 with CPAP machine notify me and we may wean down from Norvasc.     Follow up with Physical Therapy as planned.     ENT referral made.   Follow up with sleep study as planned.     [facility]   Discharged by : Yakelin Escobar CMA  Paper scripts provided to patient : no    If you have any questions regarding your visit please contact your care team:     Team Gold                Clinic Hours Telephone Number     Dr. Kailyn Le, NP 7am-7pm  Monday - Thursday   7am-5pm  Fridays  [phone]   (Appointment scheduling available 24/7)     RN Line  [phone] option 2     Urgent Care - [place] - 11am-9pm Monday-Friday Saturday-Sunday- 9am-5pm     [place] -   5pm-9pm Monday-Friday Saturday-Sunday- 9am-5pm    [phone] - [place]    [phone] - [place]       For a Price Quote for your services, please call our Consumer Price Line at [phone].     What options do I have for visits at the clinic other  than the traditional office visit?     To expand how we care for you, many of our providers are utilizing electronic visits (e-visits) and telephone visits, when medically appropriate, for interactions with their patients rather than a visit in the clinic. We also offer nurse visits for many medical concerns. Just like any other service, we will bill your insurance company for this type of visit based on time spent on the phone with your provider. Not all insurance companies cover these visits. Please check with your medical insurance if this type of visit is covered. You will be responsible for any charges that are not paid by your insurance.   E-visits via tripJane: generally incur a $35.00 fee.     Telephone visits:  Time spent on the phone: *charged based on time that is spent on the phone in increments of 10 minutes. Estimated cost:   5-10 mins $30.00   11-20 mins. $59.00   21-30 mins. $85.00       Use tripJane (secure email communication and access to your chart) to send your primary care provider a message or make an appointment. Ask someone on your Team how to sign up for tripJane.     As always, Thank you for trusting us with your health care needs!      [facility]:    Scheduling Appointments  [facility]  Call: [phone]    [facility]  Call: [phone]    [facility]  Call: [phone]    For Gastroenterology referrals   [facility]   Clinics and Surgery Center, 4th Floor   [address] [place] [address]   Appointments: [phone]    WHERE TO GO FOR CARE?  Clinic    Make an appointment if you:       Are sick (cold, cough, flu, sore throat, earache or in pain).       Have a small injury (sprain, small cut, burn or broken bone).       Need a physical exam, Pap smear, vaccine or prescription refill.       Have questions about your health or medicines.    To reach us:      Call  [phone] ([phone]). Open 24 hours every day. (For counseling services, call [phone].)    Log into [employer] at "[employer]. (Visit [employer] to create an account.) Hospital emergency room    An emergency is a serious or life- threatening problem that must be treated right away.    Call 911 or get to the hospital if you have:      Very bad or sudden:            - Chest pain or pressure         - Bleeding         - Head or belly pain         - Dizziness or trouble seeing, walking or                          Speaking      Problems breathing      Blood in your vomit or you are coughing up blood      A major injury (knocked out, loss of a finger or limb, rape, broken bone protruding from skin)    A mental health crisis. (Or call the Mental Health Crisis line at 1-415.200.8056 or Suicide Prevention Hotline at 1-370.640.1584.)    Open 24 hours every day. You don't need an appointment.     Urgent care    Visit urgent care for sickness or small injuries when the clinic is closed. You don't need an appointment. To check hours or find an urgent care near you, visit [email or web address]. Online care    Get online care from [facility] for more than 70 common problems, like colds, allergies and infections. Open 24 hours every day at:   [email or web address]   Need help deciding?    For advice about where to be seen, you may call your clinic and ask to speak with a nurse. We're here for you 24 hours every day.         If you are deaf or hard of hearing, please let us know. We provide many free services including sign language interpreters, oral interpreters, TTYs, telephone amplifiers, note takers and written materials.                   Follow-ups after your visit        Additional Services     OTOLARYNGOLOGY REFERRAL       Your provider has referred you to: FM: [facility] [phone]   [email or web address]    Please be aware that coverage of these services is  subject to the terms and limitations of your health insurance plan.  Call member services at your health plan with any benefit or coverage questions.      Please bring the following with you to your appointment:    (1) Any X-Rays, CTs or MRIs which have been performed.  Contact the facility where they were done to arrange for  prior to your scheduled appointment.   (2) List of current medications  (3) This referral request   (4) Any documents/labs given to you for this referral                  Your next 10 appointments already scheduled     Sep 10, 2018  1:30 PM CDT   Return Visit with Rome Vicente MD   [facility] [place] ([facility] [place])    [address] [address] [phone]              Who to contact     If you have questions or need follow up information about today's clinic visit or your schedule please contact [facility] directly at [phone].  Normal or non-critical lab and imaging results will be communicated to you by MyChart, letter or phone within 4 business days after the clinic has received the results. If you do not hear from us within 7 days, please contact the clinic through Dinetouchhart or phone. If you have a critical or abnormal lab result, we will notify you by phone as soon as possible.  Submit refill requests through [employer] or call your pharmacy and they will forward the refill request to us. Please allow 3 business days for your refill to be completed.          Additional Information About Your Visit        D[employer] Information     [employer] gives you secure access to your electronic health record. If you see a primary care provider, you can also send messages to your care team and make appointments. If you have questions, please call your primary care clinic.  If you do not have a primary care provider, please call [phone] and they will assist you.        Care EveryWhere ID     This is your Care  "EveryWhere ID. This could be used by other organizations to access your [facility] medical records  [id number]        Your Vitals Were     Pulse Temperature Height BMI (Body Mass Index)          76 98  F (36.7  C) (Oral) 5' 7\" (1.702 m) 42.76 kg/m2         Blood Pressure from Last 3 Encounters:   05/30/18 132/78   05/22/18 139/84   05/18/18 139/82    Weight from Last 3 Encounters:   05/30/18 273 lb (123.8 kg)   05/22/18 275 lb (124.7 kg)   05/18/18 275 lb (124.7 kg)              We Performed the Following     OTOLARYNGOLOGY REFERRAL     REMOVE IMPACTED CERVIGNESH        Primary Care Provider Office Phone # Fax #    Mariana Benitez [phone] [address] [address]        Equal Access to Services     VICKY ROBERTS : Hadii aad ku hadasho Soomaali, waaxda luqadaha, qaybta kaalmada adeegyada, waxay tomasain haymon kleber landaverde . So [facility] [phone].    ATENCIÓN: Si habla español, tiene a barajas disposición servicios gratuitos de asistencia lingüística. Llame al [phone].    We comply with applicable federal civil rights laws and [place] laws. We do not discriminate on the basis of race, color, national origin, age, disability, sex, sexual orientation, or gender identity.            Thank you!     Thank you for choosing [facility]  for your care. Our goal is always to provide you with excellent care. Hearing back from our patients is one way we can continue to improve our services. Please take a few minutes to complete the written survey that you may receive in the mail after your visit with us. Thank you!             Your Updated Medication List - Protect others around you: Learn how to safely use, store and throw away your medicines at www.disposemymeds.org.          This list is accurate as of 5/30/18  1:45 PM.  Always use your most recent med list.                   Brand Name Dispense Instructions for use Diagnosis    ADVIL PM PO      Taking " 2 at bedtime        ADVIL PO      Taking as needed for pain        amLODIPine 5 MG tablet    NORVASC    90 tablet    Take 1 tablet (5 mg) by mouth daily    Essential hypertension with goal blood pressure less than 140/90       aspirin 81 MG tablet      Take 81 mg by mouth daily        gabapentin 100 MG capsule    NEURONTIN    90 capsule    Take 1 capsule (100 mg) by mouth 3 times daily    Myofascial pain       hydrochlorothiazide 25 MG tablet    HYDRODIURIL    90 tablet    Take 1 tablet (25 mg) by mouth daily    Essential hypertension with goal blood pressure less than 140/90       NEXIUM 24HR PO      Take 1 tablet by mouth daily        * venlafaxine 75 MG 24 hr capsule    EFFEXOR-XR    90 capsule    Take 1 capsule (75 mg) by mouth daily    Menopausal syndrome (hot flashes)       * venlafaxine 37.5 MG 24 hr capsule    EFFEXOR-XR    30 capsule    Take 1 capsule (37.5 mg) by mouth daily    Mild major depression (H)       * Notice:  This list has 2 medication(s) that are the same as other medications prescribed for you. Read the directions carefully, and ask your doctor or other care provider to review them with you.

## 2018-05-30 NOTE — NURSING NOTE
Patient identified using two patient identifiers.  Ear exam showing wax occlusion completed by provider.  Solution: warm water was placed in the bilateral ear(s) via irrigation tool: elephant ear.  Marlyn Beavers CMA ([facility])

## 2018-05-30 NOTE — PATIENT INSTRUCTIONS
Follow up in January for annual exam and fasting labs. Follow up in three months if weight loss is difficult to achieve with weight watchers.     Continue taking all medications as directed.     Monitor blood pressures at home and continue taking blood pressure medications. If blood pressures are noted to be in the 114/80 with CPAP machine notify me and we may wean down from Norvasc.     Follow up with Physical Therapy as planned.     ENT referral made.   Follow up with sleep study as planned.     [facility]   Discharged by : Yakelin Escobar CMA  Paper scripts provided to patient : no    If you have any questions regarding your visit please contact your care team:     Team Gold                Clinic Hours Telephone Number     Dr. Kailyn Le, NP 7am-7pm  Monday - Thursday   7am-5pm  Fridays  [phone]   (Appointment scheduling available 24/7)     RN Line  [phone] option 2     Urgent Care - [place] - 11am-9pm Monday-Friday Saturday-Sunday- 9am-5pm     [place] -   5pm-9pm Monday-Friday Saturday-Sunday- 9am-5pm    [phone] - [place]    [phone] - [place]       For a Price Quote for your services, please call our Consumer Price Line at [phone].     What options do I have for visits at the clinic other than the traditional office visit?     To expand how we care for you, many of our providers are utilizing electronic visits (e-visits) and telephone visits, when medically appropriate, for interactions with their patients rather than a visit in the clinic. We also offer nurse visits for many medical concerns. Just like any other service, we will bill your insurance company for this type of visit based on time spent on the phone with your provider. Not all insurance companies cover these visits. Please check with your medical insurance if this type of visit is covered. You  will be responsible for any charges that are not paid by your insurance.   E-visits via tamycaharIntellitactics: generally incur a $35.00 fee.     Telephone visits:  Time spent on the phone: *charged based on time that is spent on the phone in increments of 10 minutes. Estimated cost:   5-10 mins $30.00   11-20 mins. $59.00   21-30 mins. $85.00       Use tamycahart (secure email communication and access to your chart) to send your primary care provider a message or make an appointment. Ask someone on your Team how to sign up for Hyperpott.     As always, Thank you for trusting us with your health care needs!      [facility]:    Scheduling Appointments  [place] [place]  Call: [phone]    [facility]  Call: [phone]    [facility]  Call: [phone]    For Gastroenterology referrals   [facility]   Clinics and Surgery Center, 4th Floor   [address] [place] [address]   Appointments: [phone]    WHERE TO GO FOR CARE?  Clinic    Make an appointment if you:       Are sick (cold, cough, flu, sore throat, earache or in pain).       Have a small injury (sprain, small cut, burn or broken bone).       Need a physical exam, Pap smear, vaccine or prescription refill.       Have questions about your health or medicines.    To reach us:      Call [phone] ([phone]). Open 24 hours every day. (For counseling services, call [phone].)    Log into [employer] at [email or web address]. (Visit [email or web address] to create an account.) Hospital emergency room    An emergency is a serious or life- threatening problem that must be treated right away.    Call 206 or get to the hospital if you have:      Very bad or sudden:            - Chest pain or pressure         - Bleeding         - Head or belly pain         - Dizziness or trouble seeing, walking or                          Speaking      Problems  breathing      Blood in your vomit or you are coughing up blood      A major injury (knocked out, loss of a finger or limb, rape, broken bone protruding from skin)    A mental health crisis. (Or call the Mental Health Crisis line at 1-618.121.1422 or Suicide Prevention Hotline at 1-658.445.7748.)    Open 24 hours every day. You don't need an appointment.     Urgent care    Visit urgent care for sickness or small injuries when the clinic is closed. You don't need an appointment. To check hours or find an urgent care near you, visit [email or web address]. Online care    Get online care from [employer] for more than 70 common problems, like colds, allergies and infections. Open 24 hours every day at:   [email or web address]   Need help deciding?    For advice about where to be seen, you may call your clinic and ask to speak with a nurse. We're here for you 24 hours every day.         If you are deaf or hard of hearing, please let us know. We provide many free services including sign language interpreters, oral interpreters, TTYs, telephone amplifiers, note takers and written materials.

## 2018-05-31 ENCOUNTER — MYC MEDICAL ADVICE (OUTPATIENT)
Dept: PALLIATIVE MEDICINE | Facility: CLINIC | Age: 68
End: 2018-05-31

## 2018-05-31 DIAGNOSIS — M54.12 CERVICAL RADICULOPATHY: Primary | ICD-10-CM

## 2018-05-31 NOTE — TELEPHONE ENCOUNTER
Rojas from patient: Routing to Dr Rebecca Vicente to review patients request for plan.     Jane Bass, When Dr. Rebecca Vicente reviews the MRI he will determine what the best plan of care is. We will have more information for you at that point.     COLLIN Quintero, RN  Care Coordinator  [facility]      ----- Message -----     From: Shelia Sanchez     Sent: 5/31/2018 11:25 AM CDT       To: Rome Vicente MD  Subject: Do I need an appointment?    Dr. Rebecca Vicente  I have read the results and your recommendations after the MRI Wednesday, May 30,2018 at [facility].  I am fine with seeing neurosurgery as you suggested.  Will you be sending me a referral or how do I act on this.  I also think another radiofrequency ablation is needed but would like to see the neurosurgery first.  I do not have an appointment with you until Monday, September 10, 2018 at 1:30 P.M. Just wondering how I should proceed before that appointment.  Should I see neurosurgery or schedule for the procedure.  Thank you again for your efforts on my behalf.    Shelia Sanchez

## 2018-06-01 NOTE — TELEPHONE ENCOUNTER
Orders Placed This Encounter   Procedures     NEUROSURGERY REFERRAL     Order placed for neurosurgery evaluation.  Will discuss procedures after evaluation.    Rome Vicente MD  [facility]

## 2018-06-01 NOTE — TELEPHONE ENCOUNTER
Carly sent to pt:  From: Beatris Cortes RN        Created: 6/1/2018 2:48 PM       Jalen Bass.  Dr. Rebecca Vicente has placed the referral.   You may be called to schedule or you can call: [facility] [phone]    Beatris Cortes, RN-BSN  [facility]

## 2018-06-09 ENCOUNTER — TELEPHONE (OUTPATIENT)
Dept: FAMILY MEDICINE | Facility: CLINIC | Age: 68
End: 2018-06-09

## 2018-06-09 DIAGNOSIS — F32.0 MILD MAJOR DEPRESSION (H): ICD-10-CM

## 2018-06-11 RX ORDER — VENLAFAXINE HYDROCHLORIDE 37.5 MG/1
CAPSULE, EXTENDED RELEASE ORAL
Qty: 30 CAPSULE | Refills: 0 | Status: SHIPPED | OUTPATIENT
Start: 2018-06-11 | End: 2018-07-05

## 2018-06-11 NOTE — TELEPHONE ENCOUNTER
"Requested Prescriptions   Pending Prescriptions Disp Refills     venlafaxine (EFFEXOR-XR) 37.5 MG 24 hr capsule [Pharmacy Med Name: VENLAFAXINE ER 37.5MG CAPSULES] 30 capsule 0     Sig: TAKE 1 CAPSULE(37.5 MG) BY MOUTH DAILY    Serotonin-Norepinephrine Reuptake Inhibitors  Failed    6/9/2018  9:03 AM       Failed - PHQ-9 score of less than 5 in past 6 months    Please review last PHQ-9 score.          Passed - Blood pressure under 140/90 in past 12 months    BP Readings from Last 3 Encounters:   05/30/18 132/78   05/22/18 139/84   05/18/18 139/82                Passed - Patient is age 18 or older       Passed - No active pregnancy on record       Passed - Normal serum creatinine on file in past 12 months    Recent Labs   Lab Test  05/23/18   0811   CR  0.69            Passed - No positive pregnancy test in past 12 months       Passed - Recent (6 mo) or future (30 days) visit within the authorizing provider's specialty    Patient had office visit in the last 6 months or has a visit in the next 30 days with authorizing provider or within the authorizing provider's specialty.  See \"Patient Info\" tab in inbasket, or \"Choose Columns\" in Meds & Orders section of the refill encounter.            Last Written Prescription Date:  4/18/18/  Last Fill Quantity: 30,  # refills: 1   Last office visit: 5/30/2018 with prescribing provider:  MITALI   Future Office Visit:      "

## 2018-06-13 NOTE — TELEPHONE ENCOUNTER
Chart reviewed, and patient has read [employer] request for PHQ-9, but has not yet filled it out.  Will leave encounter open a couple more days in case of reply, then may close, as patient aware of request.    Keke Winter RN

## 2018-06-14 ENCOUNTER — OFFICE VISIT (OUTPATIENT)
Dept: NEUROSURGERY | Facility: CLINIC | Age: 68
End: 2018-06-14
Attending: ANESTHESIOLOGY
Payer: COMMERCIAL

## 2018-06-14 VITALS
DIASTOLIC BLOOD PRESSURE: 81 MMHG | HEART RATE: 93 BPM | SYSTOLIC BLOOD PRESSURE: 143 MMHG | OXYGEN SATURATION: 95 % | TEMPERATURE: 96.9 F

## 2018-06-14 DIAGNOSIS — M50.30 DEGENERATIVE DISC DISEASE, CERVICAL: Primary | ICD-10-CM

## 2018-06-14 PROCEDURE — 99203 OFFICE O/P NEW LOW 30 MIN: CPT | Performed by: NURSE PRACTITIONER

## 2018-06-14 ASSESSMENT — PAIN SCALES - GENERAL: PAINLEVEL: SEVERE PAIN (6)

## 2018-06-14 NOTE — PROGRESS NOTES
"Dr. Danie Spears  [facility]  Neurosurgery Clinic Visit      CC: Neck pain    Primary care Provider: Mariana Benitez      Reason For Visit:   I was asked by Dr. Rebecca Vicente to consult on the patient for cervical radiculopathy.      HPI: Shelia Sanchez is a 67 year old female with a long-standing history of left-sided neck pain, starting approximately in 2014.  She describes frequent headaches accompanying her neck pain, \"They usually occur in the morning when I first get up and radiate along the left side of my neck into the left side of the head/face.\"  She has had dizziness in the past, but recently had ear irrigation and she notes the dizziness has significantly improved.  She describes the pain as generally constant, however, she is occasionally pain free.  The pain is along the left lateral neck and radiates to the upper back.  She denies pain extending into the arm or hand.  She denies RUE pain.  She denies weakness to the BUE or issues with balance/gait.  Her pain increases with turning her neck, and with looking down to read.  She has relieve with taking Ibuprofen.  She underwent cervical RFA last summer and had significant relief for 6-7 months, however, recently the pain has started to gradually return.  She states she has been seen by neurology at the [place] for evaluation of headaches and dizziness as well with no change in treatment plan.    Pain right now:  6    Past Medical History:   Diagnosis Date     Arthritis 1/28/14    Bursitis in left hip     Asteatotic eczema      Cholelithiasis with obstruction 5/10/2010     Gallstone pancreatitis      HTN      Obesity      Right knee pain 12/27/2012       Past Medical History reviewed with patient during visit.    Past Surgical History:   Procedure Laterality Date     C VAG HYST,RMV TUBE/OVARY  2004     CHOLECYSTECTOMY, LAPOROSCOPIC      Cholecystectomy, Laparoscopic     HC ERCP W STENT PLACEMENT BILE/PANCREATIC DUCT   "     Past Surgical History reviewed with patient during visit.    Current Outpatient Prescriptions   Medication     amLODIPine (NORVASC) 5 MG tablet     aspirin 81 MG tablet     Esomeprazole Magnesium (NEXIUM 24HR PO)     gabapentin (NEURONTIN) 100 MG capsule     hydrochlorothiazide (HYDRODIURIL) 25 MG tablet     Ibuprofen (ADVIL PO)     venlafaxine (EFFEXOR-XR) 37.5 MG 24 hr capsule     venlafaxine (EFFEXOR-XR) 75 MG 24 hr capsule     Ibuprofen-Diphenhydramine Cit (ADVIL PM PO)     No current facility-administered medications for this visit.        No Known Allergies    Social History     Social History     Marital status: Single     Spouse name: N/A     Number of children: N/A     Years of education: N/A     Social History Main Topics     Smoking status: Never Smoker     Smokeless tobacco: Never Used     Alcohol use 0.0 oz/week      Comment: 4 DRINKS PER WEEK     Drug use: No     Sexual activity: Not Currently     Partners: Male     Birth control/ protection: Abstinence     Other Topics Concern      Service No     Blood Transfusions Yes     1974     Caffeine Concern No     Occupational Exposure No     Hobby Hazards No     Sleep Concern No     Stress Concern No     Weight Concern Yes     OVERWEIGHT     Special Diet No     Back Care No     Exercise No     Seat Belt Yes     Self-Exams Yes     Parent/Sibling W/ Cabg, Mi Or Angioplasty Before 65f 55m? No     Social History Narrative       Family History   Problem Relation Age of Onset     Arthritis Mother      Congenital Anomalies Mother      Eye Disorder Mother      Glaucoma Mother      Hypertension Mother      Depression Mother      Obesity Mother      Congenital Anomalies Father      Alzheimer Disease Father      Arthritis Father      DIABETES Father      Hypertension Father      CEREBROVASCULAR DISEASE Father      Prostate Cancer Father      Obesity Father      HEART DISEASE Maternal Grandmother      HEART DISEASE Maternal Grandfather      Hypertension  Paternal Grandmother      HEART DISEASE Paternal Grandfather      Depression Sister      Hypertension Sister      Macular Degeneration Maternal Uncle          ROS: 10 point ROS neg other than the symptoms noted above in the HPI.    Vital Signs: /81 (BP Location: Right arm, Cuff Size: Adult Large)  Pulse 93  Temp 96.9  F (36.1  C) (Oral)  SpO2 95%  Breastfeeding? No    Examination:  Constitutional:  Alert, well nourished, NAD.  HEENT: Normocephalic, atraumatic.   Pulm:  Without shortness of breath   CV:  No pitting edema of BLE.    Neurological:  Awake  Alert  Oriented x 3  Speech clear  Cranial nerves II - XII intact  PERRLA  Face symmetric    Motor exam   Shoulder Abduction:  Right:  5/5   Left:  5/5  Biceps:                      Right:  5/5   Left:  5/5  Triceps:                     Right:  5/5   Left:  5/5  Wrist Extensors:       Right:  5/5   Left:  5/5  Wrist Flexors:           Right:  5/5   Left:  5/5  Intrinsics:                   Right:  5/5   Left:  5/5  Hip Flexor:                Right: 5/5  Left:  5/5  Hip Adductor:             Right:  5/5  Left:  5/5  Hip Abductor:             Right:  5/5  Left:  5/5  Gastroc Soleus:        Right:  5/5  Left:  5/5  Tib/Ant:                      Right:  5/5  Left:  5/5  EHL:                          Right:  5/5  Left:  5/5   Sensation normal to bilateral upper and lower extremities  Godfrey's negative  Gait: Able to stand from a seated position. Normal non-antalgic, non-myelopathic gait.  Cervical examination reveals limited range of motion, most notably with right rotational movements (pain to left) and extension.  Tenderness to palpation of the cervical spine to the left of midline and along left suprascapular border    Lumbar examination reveals no tenderness of the spine or paraspinous muscles.        Imaging:   IMPRESSION:    1. Multilevel degenerative change.  2. The degenerative changes at the craniocervical junction and at the C1-C2 level have increased  "when compared to the previous scan from 2016. Degenerative changes are now seen between the right occipital condyle and lateral mass of C1 and the left occipital condyle and the left lateral mass of C1 and the left lateral masses of C1 and C2. There is associated bone marrow edema at these levels.  3. No focal left-sided disc herniations are seen.     Assessment/Plan:  Cervical DDD  Headaches  Shelia Sanchez is a 67 year old female with a long-standing history of left-sided neck pain, starting approximately in 2014.  She describes frequent headaches accompanying her neck pain, \"They usually occur in the morning when I first get up and radiate along the left side of my neck into the left side of the head/face.\"  She has had dizziness in the past, but recently had ear irrigation and she notes the dizziness has significantly improved.  She describes the pain as generally constant, however, she is occasionally pain free.  The pain is along the left lateral neck and radiates to the upper back.  She denies pain extending into the arm or hand.  She denies RUE pain.  She denies weakness to the BUE or issues with balance/gait.  Her pain increases with turning her neck, and with looking down to read.  She has relieve with taking Ibuprofen.  She underwent cervical RFA last summer and had significant relief for 6-7 months, however, recently the pain has started to gradually return.  She states she has been seen by neurology at the [facility] for evaluation of headaches and dizziness as well with no change in treatment plan.  We reviewed MRI findings.  We discussed recommendation for ongoing pain management with injections as recommended by Dr. Rebecca Vicente.  Also, encouraged patient to f/u with ENT for dizziness, which has improved somewhat.  We will have Dr. Spears also review imaging and if further recommendations aside from what was discussed we will contact th patient.      Patient Instructions   -Please contact the clinic if " pain persists at [phone].  -We will have Dr. Spears review imaging and contact you if recommendation is other than injections and pain management     Jelly Ramesh [facility]  [facility]ine and [facility] Clinic  [address] [address]    Tel [phone]  Pager [phone]

## 2018-06-14 NOTE — MR AVS SNAPSHOT
After Visit Summary   6/14/2018    Shelia Sanchez    MRN: [id number]           Patient Information     Date Of Birth          1950        Visit Information        Provider Department      6/14/2018 10:20 AM Jelly Ramesh NP [facility]        Care Instructions    -Please contact the clinic if pain persists at [phone].  -We will have Dr. Spears review imaging and contact you if recommendation is other than injections and pain management           Follow-ups after your visit        Your next 10 appointments already scheduled     Sep 10, 2018  1:30 PM CDT   Return Visit with Rome Vicente MD   [facility] ([facility])    [address] [facility] [address] [phone]              Who to contact     If you have questions or need follow up information about today's clinic visit or your schedule please contact [facility] directly at [phone].  Normal or non-critical lab and imaging results will be communicated to you by [employer], letter or phone within 4 business days after the clinic has received the results. If you do not hear from us within 7 days, please contact the clinic through [employer] or phone. If you have a critical or abnormal lab result, we will notify you by phone as soon as possible.  Submit refill requests through [employer] or call your pharmacy and they will forward the refill request to us. Please allow 3 business days for your refill to be completed.          Additional Information About Your Visit        [employer] Information     [employer] gives you secure access to your electronic health record. If you see a primary care provider, you can also send messages to your care team and make appointments. If you have questions, please call your primary care clinic.  If you do not have a primary care provider, please call [phone] and they will assist you.        Care EveryWhere ID     This  is your Care EveryWhere ID. This could be used by other organizations to access your [place] medical records  [id number]        Your Vitals Were     Pulse Temperature Pulse Oximetry Breastfeeding?          93 96.9  F (36.1  C) (Oral) 95% No         Blood Pressure from Last 3 Encounters:   06/14/18 143/81   05/30/18 132/78   05/22/18 139/84    Weight from Last 3 Encounters:   05/30/18 273 lb (123.8 kg)   05/22/18 275 lb (124.7 kg)   05/18/18 275 lb (124.7 kg)              Today, you had the following     No orders found for display       Primary Care Provider Office Phone # Fax #    Mariana Benitez [phone]       [address] [place] [address]        Equal Access to Services     VICKY ROBERTS : Hadii bruce varela Sozeinab, waaxda luqadaha, qaybta kaalmada jaja, barry landaverde . So [facility] [phone].    ATENCIÓN: Si habla español, tiene a barajas disposición servicios gratuitos de asistencia lingüística. Reji al [phone].    We comply with applicable federal civil rights laws and [place] laws. We do not discriminate on the basis of race, color, national origin, age, disability, sex, sexual orientation, or gender identity.            Thank you!     Thank you for choosing [facility]  for your care. Our goal is always to provide you with excellent care. Hearing back from our patients is one way we can continue to improve our services. Please take a few minutes to complete the written survey that you may receive in the mail after your visit with us. Thank you!             Your Updated Medication List - Protect others around you: Learn how to safely use, store and throw away your medicines at www.disposemymeds.org.          This list is accurate as of 6/14/18 10:58 AM.  Always use your most recent med list.                   Brand Name Dispense Instructions for use Diagnosis    ADVIL PM PO      Taking 2 at bedtime        ADVIL PO       Taking as needed for pain        amLODIPine 5 MG tablet    NORVASC    90 tablet    Take 1 tablet (5 mg) by mouth daily    Essential hypertension with goal blood pressure less than 140/90       aspirin 81 MG tablet      Take 81 mg by mouth daily        gabapentin 100 MG capsule    NEURONTIN    90 capsule    Take 1 capsule (100 mg) by mouth 3 times daily    Myofascial pain       hydrochlorothiazide 25 MG tablet    HYDRODIURIL    90 tablet    Take 1 tablet (25 mg) by mouth daily    Essential hypertension with goal blood pressure less than 140/90       NEXIUM 24HR PO      Take 1 tablet by mouth daily        * venlafaxine 75 MG 24 hr capsule    EFFEXOR-XR    90 capsule    Take 1 capsule (75 mg) by mouth daily    Menopausal syndrome (hot flashes)       * venlafaxine 37.5 MG 24 hr capsule    EFFEXOR-XR    30 capsule    TAKE 1 CAPSULE(37.5 MG) BY MOUTH DAILY    Mild major depression (H)       * Notice:  This list has 2 medication(s) that are the same as other medications prescribed for you. Read the directions carefully, and ask your doctor or other care provider to review them with you.

## 2018-06-14 NOTE — NURSING NOTE
"Shelia Sanchez is a 67 year old female who presents for:  Chief Complaint   Patient presents with     Neurologic Problem     referral from Dr. Rebecca Vicente for cervical radiculopathy        Vitals:    There were no vitals filed for this visit.    BMI:  Estimated body mass index is 42.76 kg/(m^2) as calculated from the following:    Height as of 5/30/18: 5' 7\" (1.702 m).    Weight as of 5/30/18: 273 lb (123.8 kg).    Pain Score:  Data Unavailable        Michelle Prince"

## 2018-06-14 NOTE — PATIENT INSTRUCTIONS
-Please contact the clinic if pain persists at [phone].  -We will have Dr. Spears review imaging and contact you if recommendation is other than injections and pain management

## 2018-06-14 NOTE — LETTER
"    6/14/2018         RE: Shelia Sanchez  [address]  [place] [address]        Dear Colleague,    Thank you for referring your patient, Shelia Sanchez, to the [facility]. Please see a copy of my visit note below.    Dr. Danie Spears  [facility]  Neurosurgery Clinic Visit      CC: Neck pain    Primary care Provider: Mariana Benitez      Reason For Visit:   I was asked by Dr. Rebecca Vicente to consult on the patient for cervical radiculopathy.      HPI: Shelia Sanchez is a 67 year old female with a long-standing history of left-sided neck pain, starting approximately in 2014.  She describes frequent headaches accompanying her neck pain, \"They usually occur in the morning when I first get up and radiate along the left side of my neck into the left side of the head/face.\"  She has had dizziness in the past, but recently had ear irrigation and she notes the dizziness has significantly improved.  She describes the pain as generally constant, however, she is occasionally pain free.  The pain is along the left lateral neck and radiates to the upper back.  She denies pain extending into the arm or hand.  She denies RUE pain.  She denies weakness to the BUE or issues with balance/gait.  Her pain increases with turning her neck, and with looking down to read.  She has relieve with taking Ibuprofen.  She underwent cervical RFA last summer and had significant relief for 6-7 months, however, recently the pain has started to gradually return.  She states she has been seen by neurology at the [facility] for evaluation of headaches and dizziness as well with no change in treatment plan.    Pain right now:  6    Past Medical History:   Diagnosis Date     Arthritis 1/28/14    Bursitis in left hip     Asteatotic eczema      Cholelithiasis with obstruction 5/10/2010     Gallstone pancreatitis      HTN      Obesity      Right knee pain 12/27/2012       Past Medical History " reviewed with patient during visit.    Past Surgical History:   Procedure Laterality Date     C VAG HYST,RMV TUBE/OVARY  2004     CHOLECYSTECTOMY, LAPOROSCOPIC      Cholecystectomy, Laparoscopic     HC ERCP W STENT PLACEMENT BILE/PANCREATIC DUCT       Past Surgical History reviewed with patient during visit.    Current Outpatient Prescriptions   Medication     amLODIPine (NORVASC) 5 MG tablet     aspirin 81 MG tablet     Esomeprazole Magnesium (NEXIUM 24HR PO)     gabapentin (NEURONTIN) 100 MG capsule     hydrochlorothiazide (HYDRODIURIL) 25 MG tablet     Ibuprofen (ADVIL PO)     venlafaxine (EFFEXOR-XR) 37.5 MG 24 hr capsule     venlafaxine (EFFEXOR-XR) 75 MG 24 hr capsule     Ibuprofen-Diphenhydramine Cit (ADVIL PM PO)     No current facility-administered medications for this visit.        No Known Allergies    Social History     Social History     Marital status: Single     Spouse name: N/A     Number of children: N/A     Years of education: N/A     Social History Main Topics     Smoking status: Never Smoker     Smokeless tobacco: Never Used     Alcohol use 0.0 oz/week      Comment: 4 DRINKS PER WEEK     Drug use: No     Sexual activity: Not Currently     Partners: Male     Birth control/ protection: Abstinence     Other Topics Concern      Service No     Blood Transfusions Yes     1974     Caffeine Concern No     Occupational Exposure No     Hobby Hazards No     Sleep Concern No     Stress Concern No     Weight Concern Yes     OVERWEIGHT     Special Diet No     Back Care No     Exercise No     Seat Belt Yes     Self-Exams Yes     Parent/Sibling W/ Cabg, Mi Or Angioplasty Before 65f 55m? No     Social History Narrative       Family History   Problem Relation Age of Onset     Arthritis Mother      Congenital Anomalies Mother      Eye Disorder Mother      Glaucoma Mother      Hypertension Mother      Depression Mother      Obesity Mother      Congenital Anomalies Father      Alzheimer Disease Father       Arthritis Father      DIABETES Father      Hypertension Father      CEREBROVASCULAR DISEASE Father      Prostate Cancer Father      Obesity Father      HEART DISEASE Maternal Grandmother      HEART DISEASE Maternal Grandfather      Hypertension Paternal Grandmother      HEART DISEASE Paternal Grandfather      Depression Sister      Hypertension Sister      Macular Degeneration Maternal Uncle          ROS: 10 point ROS neg other than the symptoms noted above in the HPI.    Vital Signs: /81 (BP Location: Right arm, Cuff Size: Adult Large)  Pulse 93  Temp 96.9  F (36.1  C) (Oral)  SpO2 95%  Breastfeeding? No    Examination:  Constitutional:  Alert, well nourished, NAD.  HEENT: Normocephalic, atraumatic.   Pulm:  Without shortness of breath   CV:  No pitting edema of BLE.    Neurological:  Awake  Alert  Oriented x 3  Speech clear  Cranial nerves II - XII intact  PERRLA  Face symmetric    Motor exam   Shoulder Abduction:  Right:  5/5   Left:  5/5  Biceps:                      Right:  5/5   Left:  5/5  Triceps:                     Right:  5/5   Left:  5/5  Wrist Extensors:       Right:  5/5   Left:  5/5  Wrist Flexors:           Right:  5/5   Left:  5/5  Intrinsics:                   Right:  5/5   Left:  5/5  Hip Flexor:                Right: 5/5  Left:  5/5  Hip Adductor:             Right:  5/5  Left:  5/5  Hip Abductor:             Right:  5/5  Left:  5/5  Gastroc Soleus:        Right:  5/5  Left:  5/5  Tib/Ant:                      Right:  5/5  Left:  5/5  EHL:                          Right:  5/5  Left:  5/5   Sensation normal to bilateral upper and lower extremities  Godfrey's negative  Gait: Able to stand from a seated position. Normal non-antalgic, non-myelopathic gait.  Cervical examination reveals limited range of motion, most notably with right rotational movements (pain to left) and extension.  Tenderness to palpation of the cervical spine to the left of midline and along left suprascapular  "border    Lumbar examination reveals no tenderness of the spine or paraspinous muscles.        Imaging:   IMPRESSION:    1. Multilevel degenerative change.  2. The degenerative changes at the craniocervical junction and at the C1-C2 level have increased when compared to the previous scan from 2016. Degenerative changes are now seen between the right occipital condyle and lateral mass of C1 and the left occipital condyle and the left lateral mass of C1 and the left lateral masses of C1 and C2. There is associated bone marrow edema at these levels.  3. No focal left-sided disc herniations are seen.     Assessment/Plan:  Cervical DDD  Headaches  Shelia Sanchez is a 67 year old female with a long-standing history of left-sided neck pain, starting approximately in 2014.  She describes frequent headaches accompanying her neck pain, \"They usually occur in the morning when I first get up and radiate along the left side of my neck into the left side of the head/face.\"  She has had dizziness in the past, but recently had ear irrigation and she notes the dizziness has significantly improved.  She describes the pain as generally constant, however, she is occasionally pain free.  The pain is along the left lateral neck and radiates to the upper back.  She denies pain extending into the arm or hand.  She denies RUE pain.  She denies weakness to the BUE or issues with balance/gait.  Her pain increases with turning her neck, and with looking down to read.  She has relieve with taking Ibuprofen.  She underwent cervical RFA last summer and had significant relief for 6-7 months, however, recently the pain has started to gradually return.  She states she has been seen by neurology at the [facility] for evaluation of headaches and dizziness as well with no change in treatment plan.  We reviewed MRI findings.  We discussed recommendation for ongoing pain management with injections as recommended by Dr. Rebecca Vicente.  Also, encouraged patient " to f/u with ENT for dizziness, which has improved somewhat.  We will have Dr. Spears also review imaging and if further recommendations aside from what was discussed we will contact th patient.      Patient Instructions   -Please contact the clinic if pain persists at [phone].  -We will have Dr. Spears review imaging and contact you if recommendation is other than injections and pain management     Jelly Ramesh CNP  [facility]  [address] [address]    Tel [phone]  Pager [phone]      Again, thank you for allowing me to participate in the care of your patient.        Sincerely,        Jelly Ramesh, NP

## 2018-07-05 DIAGNOSIS — F32.0 MILD MAJOR DEPRESSION (H): ICD-10-CM

## 2018-07-05 RX ORDER — VENLAFAXINE HYDROCHLORIDE 37.5 MG/1
CAPSULE, EXTENDED RELEASE ORAL
Qty: 90 CAPSULE | Refills: 1 | Status: SHIPPED | OUTPATIENT
Start: 2018-07-05 | End: 2018-12-30

## 2018-07-05 NOTE — TELEPHONE ENCOUNTER
"Requested Prescriptions   Pending Prescriptions Disp Refills     venlafaxine (EFFEXOR-XR) 37.5 MG 24 hr capsule [Pharmacy Med Name: VENLAFAXINE ER 37.5MG CAPSULES]  Last Written Prescription Date:  6/11/2018  Last Fill Quantity: 30 capsule,  # refills: 0   Last office visit: 5/30/2018 with prescribing provider:  MILA Benitez   Future Office Visit:   Next 5 appointments (look out 90 days)     Sep 10, 2018  1:30 PM CDT   Return Visit with Rome Vicente MD   [facility] ([facility])    [address] [address]   [phone]                  30 capsule 0     Sig: TAKE 1 CAPSULE(37.5 MG) BY MOUTH DAILY    Serotonin-Norepinephrine Reuptake Inhibitors  Failed    7/5/2018  8:57 AM       Failed - Blood pressure under 140/90 in past 12 months    BP Readings from Last 3 Encounters:   06/14/18 143/81   05/30/18 132/78   05/22/18 139/84          Failed - PHQ-9 score of less than 5 in past 6 months    Please review last PHQ-9 score.     PHQ-9 SCORE 6/6/2016 1/10/2018   Total Score 6 9     OSCAR-7 SCORE:  No flowsheet data found.         Passed - Patient is age 18 or older       Passed - No active pregnancy on record       Passed - Normal serum creatinine on file in past 12 months    Recent Labs   Lab Test  05/23/18   0811   CR  0.69            Passed - No positive pregnancy test in past 12 months       Passed - Recent (6 mo) or future (30 days) visit within the authorizing provider's specialty    Patient had office visit in the last 6 months or has a visit in the next 30 days with authorizing provider or within the authorizing provider's specialty.  See \"Patient Info\" tab in inbasket, or \"Choose Columns\" in Meds & Orders section of the refill encounter.              "

## 2018-07-05 NOTE — TELEPHONE ENCOUNTER
5/30 OV note says: Stable and controlled with increased dosage in Venlafaxine. Follow up in 6 months.  Prescription approved per [facility] Refill Protocol.  Lilliana Reeves RN

## 2018-07-27 DIAGNOSIS — M79.18 MYOFASCIAL PAIN: ICD-10-CM

## 2018-07-27 RX ORDER — GABAPENTIN 100 MG/1
100 CAPSULE ORAL 3 TIMES DAILY
Qty: 90 CAPSULE | Refills: 3 | Status: SHIPPED | OUTPATIENT
Start: 2018-07-27 | End: 2018-11-12

## 2018-07-27 NOTE — TELEPHONE ENCOUNTER
Signed Prescriptions:                        Disp   Refills    gabapentin (NEURONTIN) 100 MG capsule      90 cap*3        Sig: Take 1 capsule (100 mg) by mouth 3 times daily  Authorizing Provider: ROME CAZARES    Reviewed, signed, e-prescribed.    Rome Vicente MD  [facility]

## 2018-07-27 NOTE — TELEPHONE ENCOUNTER
Received fax request from [facility] pharmacy requesting refill(s) for gabapentin (NEURONTIN) 100 MG capsule    Last refilled on 07/01/18    Pt last seen on 05/22/18  Next appt scheduled for 09/10/18    Will facilitate refill.

## 2018-08-08 ENCOUNTER — THERAPY VISIT (OUTPATIENT)
Dept: SLEEP MEDICINE | Facility: CLINIC | Age: 68
End: 2018-08-08
Payer: COMMERCIAL

## 2018-08-08 DIAGNOSIS — G47.30 SLEEP APNEA, UNSPECIFIED TYPE: ICD-10-CM

## 2018-08-08 DIAGNOSIS — I10 ESSENTIAL HYPERTENSION: ICD-10-CM

## 2018-08-08 DIAGNOSIS — R06.83 SNORING: ICD-10-CM

## 2018-08-08 DIAGNOSIS — R06.89 DYSPNEA AND RESPIRATORY ABNORMALITY: ICD-10-CM

## 2018-08-08 DIAGNOSIS — Z72.820 LACK OF ADEQUATE SLEEP: ICD-10-CM

## 2018-08-08 DIAGNOSIS — G47.33 OSA (OBSTRUCTIVE SLEEP APNEA): Chronic | ICD-10-CM

## 2018-08-08 DIAGNOSIS — R53.81 MALAISE AND FATIGUE: ICD-10-CM

## 2018-08-08 DIAGNOSIS — G47.00 INSOMNIA, UNSPECIFIED TYPE: ICD-10-CM

## 2018-08-08 DIAGNOSIS — R53.83 MALAISE AND FATIGUE: ICD-10-CM

## 2018-08-08 DIAGNOSIS — R06.00 DYSPNEA AND RESPIRATORY ABNORMALITY: ICD-10-CM

## 2018-08-08 PROCEDURE — 95811 POLYSOM 6/>YRS CPAP 4/> PARM: CPT | Performed by: INTERNAL MEDICINE

## 2018-08-08 NOTE — MR AVS SNAPSHOT
After Visit Summary   8/8/2018    Shelia Sanchez    MRN: [id number]           Patient Information     Date Of Birth          1950        Visit Information        Provider Department      8/8/2018 8:00 PM [facility]        Today's Diagnoses     Snoring        Insomnia, unspecified type        Class 3 obesity due to excess calories with body mass index (BMI) of 40.0 to 44.9 in adult, unspecified whether serious comorbidity present (H)        Lack of adequate sleep        Essential hypertension        Sleep apnea, unspecified type        Dyspnea and respiratory abnormality        Malaise and fatigue           Follow-ups after your visit        Your next 10 appointments already scheduled     Sep 10, 2018  1:30 PM CDT   Return Visit with Rome Vicente MD   [facility] [place] ([facility] [place])    [address]  [place] [address] [phone]              Who to contact     If you have questions or need follow up information about today's clinic visit or your schedule please contact [facility] directly at [phone].  Normal or non-critical lab and imaging results will be communicated to you by Mapluckhart, letter or phone within 4 business days after the clinic has received the results. If you do not hear from us within 7 days, please contact the clinic through Mapluckhart or phone. If you have a critical or abnormal lab result, we will notify you by phone as soon as possible.  Submit refill requests through Cardize or call your pharmacy and they will forward the refill request to us. Please allow 3 business days for your refill to be completed.          Additional Information About Your Visit        Mapluckhart Information     Cardize gives you secure access to your electronic health record. If you see a primary care provider, you can also send messages to your care team and make appointments. If you have questions,  please call your primary care clinic.  If you do not have a primary care provider, please call [phone] and they will assist you.        Care EveryWhere ID     This is your Care EveryWhere ID. This could be used by other organizations to access your [facility] medical records  [id number]         Blood Pressure from Last 3 Encounters:   06/14/18 143/81   05/30/18 132/78   05/22/18 139/84    Weight from Last 3 Encounters:   05/30/18 123.8 kg (273 lb)   05/22/18 124.7 kg (275 lb)   05/18/18 124.7 kg (275 lb)              We Performed the Following     Comprehensive Sleep Study        Primary Care Provider Office Phone # Fax #    Mariana Benitez [phone]       [address] [place] [address]        Equal Access to Services     VICKY ROBERTS : Hadii bruce garcia hadasho Sozeinab, waaxda luqadaha, qaybta kaalmada adedeniceyada, barry landaverde . So [facility] [phone].    ATENCIÓN: Si habla español, tiene a barajas disposición servicios gratuitos de asistencia lingüística. Llame al [phone].    We comply with applicable federal civil rights laws and [place] laws. We do not discriminate on the basis of race, color, national origin, age, disability, sex, sexual orientation, or gender identity.            Thank you!     Thank you for choosing [facility]  for your care. Our goal is always to provide you with excellent care. Hearing back from our patients is one way we can continue to improve our services. Please take a few minutes to complete the written survey that you may receive in the mail after your visit with us. Thank you!             Your Updated Medication List - Protect others around you: Learn how to safely use, store and throw away your medicines at www.disposemymeds.org.          This list is accurate as of 8/8/18 11:59 PM.  Always use your most recent med list.                   Brand Name Dispense Instructions for use Diagnosis    ADVIL PM  PO      Taking 2 at bedtime        ADVIL PO      Taking as needed for pain        amLODIPine 5 MG tablet    NORVASC    90 tablet    Take 1 tablet (5 mg) by mouth daily    Essential hypertension with goal blood pressure less than 140/90       aspirin 81 MG tablet      Take 81 mg by mouth daily        gabapentin 100 MG capsule    NEURONTIN    90 capsule    Take 1 capsule (100 mg) by mouth 3 times daily    Myofascial pain       hydrochlorothiazide 25 MG tablet    HYDRODIURIL    90 tablet    Take 1 tablet (25 mg) by mouth daily    Essential hypertension with goal blood pressure less than 140/90       NEXIUM 24HR PO      Take 1 tablet by mouth daily        * venlafaxine 75 MG 24 hr capsule    EFFEXOR-XR    90 capsule    Take 1 capsule (75 mg) by mouth daily    Menopausal syndrome (hot flashes)       * venlafaxine 37.5 MG 24 hr capsule    EFFEXOR-XR    90 capsule    TAKE 1 CAPSULE(37.5 MG) BY MOUTH DAILY    Mild major depression (H)       * Notice:  This list has 2 medication(s) that are the same as other medications prescribed for you. Read the directions carefully, and ask your doctor or other care provider to review them with you.

## 2018-08-09 PROBLEM — G47.33 OSA (OBSTRUCTIVE SLEEP APNEA): Chronic | Status: ACTIVE | Noted: 2018-08-09

## 2018-08-09 NOTE — PROCEDURES
" SLEEP STUDY INTERPRETATION  SPLIT NIGHT STUDY      Patient: CHERIE ADRIAN  YOB: 1950  Study Date: 8/8/2018  MRN: [id number]  Referring Provider: Dr. Benitez  Ordering Provider: WILMER Geronimo    Indications for Polysomnography: The patient is a 67 y old Female who is 5' 7\" and weighs 275.0 lbs. Her BMI is 43.2, Bartley sleepiness scale 9.0 and neck circumference is 41.0 cm. A diagnostic polysomnogram was performed to evaluate for high probability of obstructive sleep apnea with modest possibility of coexisting hypoventilation based on BMI of 43, loud snoring, witnessed apnea, non-refreshing sleep, fatigue/excessive daytime sleepiness (ESS 9), crowded oropharynx, morning headaches, neck circumference of 16\" and co-morbid HTN. After 128.5 minutes of sleep time the patient exhibited sufficient respiratory events qualifying her for a CPAP trial which was then initiated.    Polysomnogram Data: A full night polysomnogram recorded the standard physiologic parameters including EEG, EOG, EMG, ECG, nasal and oral airflow. Respiratory parameters of chest and abdominal movements were recorded with respiratory inductance plethysmography. Oxygen saturation was recorded by pulse oximetry.  Hypopnea scoring rule used: 1B 4%    Diagnostic PSG  Sleep Architecture:   The total recording time of the polysomnogram was 244.8 minutes. The total sleep time was 128.5 minutes. Sleep latency was increased at 58.8 minutes without the use of a sleep aid. REM latency was n/a. Arousal index was 99.9 arousals per hour. Sleep efficiency was decreased at 52.5%. Wake after sleep onset was 42.0 minutes. The patient spent 16.7% of total sleep time in Stage N1, 83.3% in Stage N2, 0.0% in Stage N3, and 0.0% in REM.     Respiration:     Events ? The polysomnogram revealed a presence of 128 obstructive, 0 central, and 0 mixed apneas resulting in an apnea index of 59.8 events per hour. There were 89 obstructive hypopneas and 0 central " hypopneas resulting in an obstructive hypopnea index of 41.6 and central hypopnea index of 0 events per hour. The combined apnea/hypopnea index was 101.3 events per hour (central apnea/hypopnea index was 0 events per hour).  The REM AHI was n/a. The supine AHI was 101.3 events per hour. The RERA index was 2.3 events per hour. The RDI was 103.7 events per hour.    Snoring - was reported as loud.    Respiratory rate and pattern - was notable for normal respiratory rate and pattern.    Sustained Sleep Associated Hypoventilation - Transcutaneous carbon dioxide monitoring was used, however significant hypoventilation was not suggested with a maximum change from 36 to 42 mmHg and 0 minutes at or greater than 55 mmHg.    Sleep Associated Hypoxemia - Baseline oxygen saturation was 94.9%. Lowest oxygen saturation was 81.3%. Time spent less than or equal to 88% was 2.6 minutes. Time spent less than or equal to 89% was 5.2 minutes.     Treatment PSG  Sleep Architecture:   At 02:42:43 AM the patient was placed on PAP treatment and was titrated at pressures ranging from CPAP 5 cmH2O up to CPAP 8 cmH2O. The total recording time of the treatment portion of the study was 190.3 minutes. The total sleep time was 85.0 minutes. During the treatment portion of the study the sleep latency was 40.0 minutes. REM latency was n/a. Arousal index was 40.2 arousals per hour. Sleep efficiency was 44.7%. Wake after sleep onset was 54.0 minutes. The patient spent 25.3% of total sleep time in Stage N1, 71.8% in Stage N2, 2.9% in Stage N3, and 0.0% in REM.     Respiration:     The final pressure was CPAP 8 cmH2O with an AHI of 28.9 events per hour. Time in REM supine on final pressure was 0 minutes.     This titration was considered  unacceptable    Movement Activity:     Periodic Limb Movements  o During the diagnostic portion of the study, there were 11 PLMs recorded. The PLM index was 5.1 movements per hour. The PLM Arousal Index was 2.3 per  hour.  o During the treatment portion of the study, there were 19 PLMs recorded. The PLM index was 13.4 movements per hour. The PLM Arousal Index was 0.7 per hour.    REM EMG Activity - REM was not observed    Nocturnal Behavior - Abnormal sleep related behaviors were not noted    Cardiac Summary:   Arrhythmias were not noted.      Assessment:     Severe obstructive sleep apnea in the supine position    Recommendations:    Polysomnogram with all night titration starting at 8 cmH20, may need later sleep times to achieve adequate REM sleep given delayed sleep phase.     Alternative, could consider a trial of treatment of LORI with Auto?titrating PAP therapy with a range of 8 cmH2O to 18 cmH2O. Recommend clinical follow up with sleep management team, including review of compliance measures including oximetry.     Advice regarding the risks of drowsy driving.    Suggest optimizing sleep schedule    Weight management (if BMI > 30).    Pharmacologic therapy should be used for management of restless legs syndrome only if present and clinically indicated and not based on the presence of periodic limb movements alone.    Diagnostic Codes:   Obstructive Sleep Apnea G47.33            _____________________________________   Electronically Signed By: Florentino Davis MD 8/9/18         Range(%) Time in range (min)   0.0 - 88.0 2.6   0.0 - 89.0 5.2       Stage Min(mm Hg) Max(mm Hg)   Wake 33.4 42.4   NREM(1+2+3) 37.6 41.9   REM - -         Range(mmHg) Time in range (min)   55.0 - 100.0 -   Excluded data <20.0 & >90.0 13.0

## 2018-08-09 NOTE — PROGRESS NOTES
Pt arrived [facility]      Completed a split night PSG per provider order.    Preliminary AHI 96.  A final therapeutic PAP pressure was not achieved.    Supine REM was not seen on therapeutic pressure.    Patient reports feeling not refreshed in AM.

## 2018-08-10 LAB — SLPCOMP: NORMAL

## 2018-09-07 ENCOUNTER — OFFICE VISIT (OUTPATIENT)
Dept: SLEEP MEDICINE | Facility: CLINIC | Age: 68
End: 2018-09-07
Payer: COMMERCIAL

## 2018-09-07 ENCOUNTER — DOCUMENTATION ONLY (OUTPATIENT)
Dept: SLEEP MEDICINE | Facility: CLINIC | Age: 68
End: 2018-09-07
Payer: COMMERCIAL

## 2018-09-07 VITALS
BODY MASS INDEX: 42.72 KG/M2 | DIASTOLIC BLOOD PRESSURE: 85 MMHG | WEIGHT: 272.2 LBS | SYSTOLIC BLOOD PRESSURE: 132 MMHG | HEART RATE: 88 BPM | OXYGEN SATURATION: 97 % | HEIGHT: 67 IN

## 2018-09-07 DIAGNOSIS — G47.33 OSA (OBSTRUCTIVE SLEEP APNEA): ICD-10-CM

## 2018-09-07 DIAGNOSIS — G47.33 OSA (OBSTRUCTIVE SLEEP APNEA): Primary | ICD-10-CM

## 2018-09-07 PROCEDURE — 99214 OFFICE O/P EST MOD 30 MIN: CPT | Performed by: PHYSICIAN ASSISTANT

## 2018-09-07 NOTE — PROGRESS NOTES
"  Sleep Study Follow-Up Visit:    Date on this visit: 9/7/2018    Shelia Sanchez comes in today for follow-up of her sleep study done on 8/8/2018 at the [facility] [place] for high probability of obstructive sleep apnea with modest possibility of coexisting hypoventilation based on BMI of 43, loud snoring, witnessed apnea, non-refreshing sleep, fatigue/excessive daytime sleepiness(ESS 9), crowded oropharynx, morning headaches, neck circumference of 16\" and co-morbid HTN. STOP-BANG score is 7/8.     Diagnostic PSG  Sleep Architecture:   The total recording time of the polysomnogram was 244.8 minutes. The total sleep time was 128.5 minutes. Sleep latency was increased at 58.8 minutes without the use of a sleep aid. REM latency was n/a. Arousal index was 99.9 arousals per hour. Sleep efficiency was decreased at 52.5%. Wake after sleep onset was 42.0 minutes. The patient spent 16.7% of total sleep time in Stage N1, 83.3% in Stage N2, 0.0% in Stage N3, and 0.0% in REM.      Respiration:     Events ? The polysomnogram revealed a presence of 128 obstructive, 0 central, and 0 mixed apneas resulting in an apnea index of 59.8 events per hour. There were 89 obstructive hypopneas and 0 central hypopneas resulting in an obstructive hypopnea index of 41.6 and central hypopnea index of 0 events per hour. The combined apnea/hypopnea index was 101.3 events per hour (central apnea/hypopnea index was 0 events per hour).  The REM AHI was n/a. The supine AHI was 101.3 events per hour. The RERA index was 2.3 events per hour. The RDI was 103.7 events per hour.    Snoring - was reported as loud.    Respiratory rate and pattern - was notable for normal respiratory rate and pattern.    Sustained Sleep Associated Hypoventilation - Transcutaneous carbon dioxide monitoring was used, however significant hypoventilation was not suggested with a maximum change from 36 to 42 mmHg and 0 minutes at or greater than 55 " mmHg.    Sleep Associated Hypoxemia - Baseline oxygen saturation was 94.9%. Lowest oxygen saturation was 81.3%. Time spent less than or equal to 88% was 2.6 minutes. Time spent less than or equal to 89% was 5.2 minutes.      Treatment PSG  Sleep Architecture:   At 02:42:43 AM the patient was placed on PAP treatment and was titrated at pressures ranging from CPAP 5 cmH2O up to CPAP 8 cmH2O. The total recording time of the treatment portion of the study was 190.3 minutes. The total sleep time was 85.0 minutes. During the treatment portion of the study the sleep latency was 40.0 minutes. REM latency was n/a. Arousal index was 40.2 arousals per hour. Sleep efficiency was 44.7%. Wake after sleep onset was 54.0 minutes. The patient spent 25.3% of total sleep time in Stage N1, 71.8% in Stage N2, 2.9% in Stage N3, and 0.0% in REM.      Respiration:     The final pressure was CPAP 8 cmH2O with an AHI of 28.9 events per hour. Time in REM supine on final pressure was 0 minutes.     This titration was considered  unacceptable     Movement Activity:     Periodic Limb Movements    During the diagnostic portion of the study, there were 11 PLMs recorded. The PLM index was 5.1 movements per hour. The PLM Arousal Index was 2.3 per hour.    During the treatment portion of the study, there were 19 PLMs recorded. The PLM index was 13.4 movements per hour. The PLM Arousal Index was 0.7 per hour.    REM EMG Activity - REM was not observed    Nocturnal Behavior - Abnormal sleep related behaviors were not noted     Cardiac Summary:   Arrhythmias were not noted.    Past medical/surgical history, family history, social history, medications and allergies were reviewed.      Problem List:  Patient Active Problem List    Diagnosis Date Noted     LORI (obstructive sleep apnea)- severe () 08/09/2018     Priority: Medium     8/8/2018 [facility] Sleep Study (275.0 lbs) - .3, .7, Supine .3, REM AHI n/a, Low O2% 81.3%,  Time Spent ?88% 2.6, Time Spent ?89% 5.2. Treatment was titrated to a pressure of CPAP 8 with an AHI 28.9. Time spent in REM at this pressure was 0 minutes.       Mild major depression (H) 01/10/2018     Priority: Medium     Heartburn 11/16/2016     Priority: Medium     Menopausal syndrome (hot flashes) 03/30/2016     Priority: Medium     Cervical pain 11/19/2015     Priority: Medium     Left-sided headache 11/19/2015     Priority: Medium     Atypical nevus 02/13/2015     Priority: Medium     MILD, risk factor for melanoma. Needs yearly skin exam.   The entire mole was not removed, so if it grows, needs to be completely excised.   Do you wish to do the replacement in the background? yes         Urinary incontinence 02/02/2015     Priority: Medium     Cataracts, both eyes 05/07/2014     Priority: Medium     Left shoulder pain 12/27/2012     Priority: Medium     Advanced directives, counseling/discussion 02/09/2012     Priority: Medium     Advance Care Planning:   ACP Review and Resources Provided:  Reviewed chart for advance care plan.  Shelia S Laura has no plan or code status on file. Discussed available resources and provided with information. Confirmed code status reflects current choices pending further ACP discussions.  Confirmed/documented designated decision maker(s). See permanent comments section of demographics in clinical tab.   Added by Brisa Hernandez on 3/6/2015  Discussed advance care planning with patient; information given to patient to review. 2/9/2012          Hypertension goal BP (blood pressure) < 140/90 07/02/2011     Priority: Medium     CARDIOVASCULAR SCREENING; LDL GOAL LESS THAN 130 06/11/2010     Priority: Medium     BMI > 40      Priority: Medium        Impression/Plan:  1. Severe obstructive sleep apnea in the supine position without sleep related hypoxemia and hypoventilation-  - Overnight polysomnogram was reviewed in detail today and a copy given to her for her records.  -  Discussed this level of LORI is associated with increase in long-term cardiovascular risk factors  -  Treatment options for severe LORI reviewed.   - Will arrange treatment with CPAP 8-18 cm/H20    She will follow up in 6-7 weeks.     Twenty-five minutes spent with patient, all of which were spent face-to-face counseling, consulting, coordinating plan of care regarding LORI.      Sunita Sanches PA-C    CC: Mariana Benitez

## 2018-09-07 NOTE — MR AVS SNAPSHOT
After Visit Summary   9/7/2018    Shelia Sanchez    MRN: [id number]           Patient Information     Date Of Birth          1950        Visit Information        Provider Department      9/7/2018 10:00 AM Sunita Sanches PA [facility]        Today's Diagnoses     LORI (obstructive sleep apnea)    -  1      Care Instructions      Your BMI is Body mass index is 42.63 kg/(m^2).  Weight management is a personal decision.  If you are interested in exploring weight loss strategies, the following discussion covers the approaches that may be successful. Body mass index (BMI) is one way to tell whether you are at a healthy weight, overweight, or obese. It measures your weight in relation to your height.  A BMI of 18.5 to 24.9 is in the healthy range. A person with a BMI of 25 to 29.9 is considered overweight, and someone with a BMI of 30 or greater is considered obese. More than two-thirds of American adults are considered overweight or obese.  Being overweight or obese increases the risk for further weight gain. Excess weight may lead to heart disease and diabetes.  Creating and following plans for healthy eating and physical activity may help you improve your health.  Weight control is part of healthy lifestyle and includes exercise, emotional health, and healthy eating habits. Careful eating habits lifelong are the mainstay of weight control. Though there are significant health benefits from weight loss, long-term weight loss with diet alone may be very difficult to achieve- studies show long-term success with dietary management in less than 10% of people. Attaining a healthy weight may be especially difficult to achieve in those with severe obesity. In some cases, medications, devices and surgical management might be considered.  What can you do?  If you are overweight or obese and are interested in methods for weight loss, you should discuss this with your provider.     Consider  reducing daily calorie intake by 500 calories.     Keep a food journal.     Avoiding skipping meals, consider cutting portions instead.    Diet combined with exercise helps maintain muscle while optimizing fat loss. Strength training is particularly important for building and maintaining muscle mass. Exercise helps reduce stress, increase energy, and improves fitness. Increasing exercise without diet control, however, may not burn enough calories to loose weight.       Start walking three days a week 10-20 minutes at a time    Work towards walking thirty minutes five days a week     Eventually, increase the speed of your walking for 1-2 minutes at time    In addition, we recommend that you review healthy lifestyles and methods for weight loss available through the National Institutes of Health patient information sites:  http://win.niddk.nih.gov/publications/index.htm    And look into health and wellness programs that may be available through your health insurance provider, employer, local community center, or [employer] club.    Weight management plan: Patient was referred to their PCP to discuss a diet and exercise plan.              Follow-ups after your visit        Follow-up notes from your care team     Return in about 6 weeks (around 10/19/2018).      Your next 10 appointments already scheduled     Sep 10, 2018  1:30 PM CDT   Return Visit with Rome Vicente MD   [facility] ([facility])    [address] [address] [phone]              Who to contact     If you have questions or need follow up information about today's clinic visit or your schedule please contact [facility] directly at [phone].  Normal or non-critical lab and imaging results will be communicated to you by MyChart, letter or phone within 4 business days after the clinic has received the results. If you do not hear from us within 7 days, please contact the  "clinic through [employer]Turionhart or phone. If you have a critical or abnormal lab result, we will notify you by phone as soon as possible.  Submit refill requests through [employer] or call your pharmacy and they will forward the refill request to us. Please allow 3 business days for your refill to be completed.          Additional Information About Your Visit        x[employer]ion[employer] Information     [employer] gives you secure access to your electronic health record. If you see a primary care provider, you can also send messages to your care team and make appointments. If you have questions, please call your primary care clinic.  If you do not have a primary care provider, please call [phone] and they will assist you.        Care EveryWhere ID     This is your Care EveryWhere ID. This could be used by other organizations to access your [facility] medical records  [id number]        Your Vitals Were     Pulse Height Pulse Oximetry BMI (Body Mass Index)          88 1.702 m (5' 7\") 97% 42.63 kg/m2         Blood Pressure from Last 3 Encounters:   09/07/18 132/85   06/14/18 143/81   05/30/18 132/78    Weight from Last 3 Encounters:   09/07/18 123.5 kg (272 lb 3.2 oz)   05/30/18 123.8 kg (273 lb)   05/22/18 124.7 kg (275 lb)              We Performed the Following     Sleep Comprehensive DME        Primary Care Provider Office Phone # Fax #    Mariana Benitez [phone]       [facility] [facility] [address]        Equal Access to Services     VICKY ROBERTS AH: Hadii aad ku hadasho Soomaali, waaxda luqadaha, qaybta kaalmada adeegyada, waxay joce alvarenga. So [facility] [phone].    ATENCIÓN: Si habla seven, tiene a barajas disposición servicios gratuitos de asistencia lingüística. Llame al [phone].    We comply with applicable federal civil rights laws and [place] laws. We do not discriminate on the basis of race, color, national origin, age, disability, sex, sexual orientation, or " gender identity.            Thank you!     Thank you for choosing [facility]  for your care. Our goal is always to provide you with excellent care. Hearing back from our patients is one way we can continue to improve our services. Please take a few minutes to complete the written survey that you may receive in the mail after your visit with us. Thank you!             Your Updated Medication List - Protect others around you: Learn how to safely use, store and throw away your medicines at www.disposemymeds.org.          This list is accurate as of 9/7/18 10:32 AM.  Always use your most recent med list.                   Brand Name Dispense Instructions for use Diagnosis    ADVIL PO      Taking as needed for pain        amLODIPine 5 MG tablet    NORVASC    90 tablet    Take 1 tablet (5 mg) by mouth daily    Essential hypertension with goal blood pressure less than 140/90       aspirin 81 MG tablet      Take 81 mg by mouth daily        gabapentin 100 MG capsule    NEURONTIN    90 capsule    Take 1 capsule (100 mg) by mouth 3 times daily    Myofascial pain       hydrochlorothiazide 25 MG tablet    HYDRODIURIL    90 tablet    Take 1 tablet (25 mg) by mouth daily    Essential hypertension with goal blood pressure less than 140/90       NEXIUM 24HR PO      Take 1 tablet by mouth daily        * venlafaxine 75 MG 24 hr capsule    EFFEXOR-XR    90 capsule    Take 1 capsule (75 mg) by mouth daily    Menopausal syndrome (hot flashes)       * venlafaxine 37.5 MG 24 hr capsule    EFFEXOR-XR    90 capsule    TAKE 1 CAPSULE(37.5 MG) BY MOUTH DAILY    Mild major depression (H)       * Notice:  This list has 2 medication(s) that are the same as other medications prescribed for you. Read the directions carefully, and ask your doctor or other care provider to review them with you.

## 2018-09-07 NOTE — PROGRESS NOTES
Patient was offered choice of vendor and chose [facility].  Shelia Sanchez was set up at [place] on September 7, 2018 Patient received a [employer] DreamStation Auto. Pressures were set at 8-18 cm H2O.   Patient s ramp is 5 cm H2O for 5 min and FLEX/EPR is A Flex of 2.  Patient received a [employer] Mask name: Wisp  Nasal mask Size Small/medium, heated tubing and heated humidifier.  Patient is enrolled in the STM Program and does need to meet compliance. Patient has a follow up on 10/19/18 with BEBETO Geronimo.    Yeni Arnold

## 2018-09-07 NOTE — NURSING NOTE
"Chief Complaint   Patient presents with     Study Results       Initial There were no vitals taken for this visit. Estimated body mass index is 42.76 kg/(m^2) as calculated from the following:    Height as of 5/30/18: 1.702 m (5' 7\").    Weight as of 5/30/18: 123.8 kg (273 lb).    Medication Reconciliation: complete        "

## 2018-09-07 NOTE — PATIENT INSTRUCTIONS

## 2018-09-10 ENCOUNTER — OFFICE VISIT (OUTPATIENT)
Dept: PALLIATIVE MEDICINE | Facility: CLINIC | Age: 68
End: 2018-09-10
Payer: COMMERCIAL

## 2018-09-10 ENCOUNTER — DOCUMENTATION ONLY (OUTPATIENT)
Dept: SLEEP MEDICINE | Facility: CLINIC | Age: 68
End: 2018-09-10

## 2018-09-10 VITALS
BODY MASS INDEX: 42.6 KG/M2 | HEART RATE: 89 BPM | SYSTOLIC BLOOD PRESSURE: 135 MMHG | DIASTOLIC BLOOD PRESSURE: 83 MMHG | WEIGHT: 272 LBS

## 2018-09-10 DIAGNOSIS — M79.18 MYOFASCIAL PAIN: Primary | ICD-10-CM

## 2018-09-10 DIAGNOSIS — G47.33 OSA (OBSTRUCTIVE SLEEP APNEA): ICD-10-CM

## 2018-09-10 DIAGNOSIS — M54.12 CERVICAL RADICULOPATHY: ICD-10-CM

## 2018-09-10 DIAGNOSIS — M50.30 DDD (DEGENERATIVE DISC DISEASE), CERVICAL: ICD-10-CM

## 2018-09-10 DIAGNOSIS — M47.12 CERVICAL SPONDYLOSIS WITH MYELOPATHY: ICD-10-CM

## 2018-09-10 PROCEDURE — 99214 OFFICE O/P EST MOD 30 MIN: CPT | Performed by: ANESTHESIOLOGY

## 2018-09-10 ASSESSMENT — PAIN SCALES - GENERAL: PAINLEVEL: NO PAIN (0)

## 2018-09-10 NOTE — PROGRESS NOTES
[facility]    Date of visit: 9/10/2018    Chief complaint:   Chief Complaint   Patient presents with     Pain       Interval history:  Shelia Sanchez was last seen by me on 5/22/18.      Recommendations/plan at the last visit included:  Plan:  1. Physical Therapy:  Continue home exercise program - may need updated therapy prior to RFA treatments  2. Clinical Health Psychologist to address issues of relaxation, behavioral change, coping style, and other factors important to improvement.  Deferred - coping well  3. Diagnostic Studies:  Updated cervical spine MRI ordered today  4. Medication Management:    1. Continue Gabapentin 100 mg TID - trial of two at bedtime and continue usual daytime dosing  2. Continue Effexor as prescribed  3. Continue Advil in safe doses  4. Consider Tylenol 500 mg two tabs three times a day  5.   Further procedures recommended:                         1.  Consider repeating cervical medial branch RFA C3, C4, C5, and C6 on the left                        2.  May benefit from cervical epidural steroid injection  6.   Recommendations to PCP: ENT referral for evaluation of vertigo would be helpful  7.   Neurosurgical evaluation may be warranted depending on results of MRI  8.   Follow up: 3 months - patient will decide on procedures once MRI completed    Since her last visit, Shelia Sanchez reports:  Had a visit with neurosurgery and they recommended continued procedures and follow up with ENT/neurology regarding vertigo.    Her neck pain has been tolerable for most of the summer.  She has improved range of motion without as much pain.  Her pain is still more on the left.  She also continues with headaches about 3 times a week.  She is having troubles getting comfortable sleeping in her bed due to neck pain so she has been sleeping in the recliner.  She recently had a sleep study and was started on CPAP and seems to be tolerating that pretty well.    She has some  pain that will travel into the left shoulder but denies pain down the arms.  She denies numbness, tingling, or weakness of the arms or hands.    Her headaches are usually in the temporal area, and are without nausea, vomiting, dizziness, or visual changes.    Pain scores:  Pain intensity on average is 1 on a scale of 0-10.  Ranges from 0/10 to 3/10.    [place] Board of Pharmacy Data Base Reviewed:    YES; no current opioids    Current pain treatments:   Advil 200 mg three tabs prn  Effexor 37.5 mg daily  Gabapentin 100 mg 2-3 times per day - some mild swelling    Past pain treatments:  advil  effexor     Injections:    -7/25/17 cervical MB RFA left C3, C4, C5, C6  -6/2/17 cervical MBB #2  -4/13/17 cervical MBB #1  -7/11/16 left hip bursa injection ([facility])  -6/6/16 right shoulder glenohumeral joint injection ([facility])    Date of last UDS:  Not on opioids    Side Effects: no side effect    Medications:  Current Outpatient Prescriptions   Medication Sig Dispense Refill     amLODIPine (NORVASC) 5 MG tablet Take 1 tablet (5 mg) by mouth daily 90 tablet 3     aspirin 81 MG tablet Take 81 mg by mouth daily       Esomeprazole Magnesium (NEXIUM 24HR PO) Take 1 tablet by mouth daily       gabapentin (NEURONTIN) 100 MG capsule Take 1 capsule (100 mg) by mouth 3 times daily 90 capsule 3     hydrochlorothiazide (HYDRODIURIL) 25 MG tablet Take 1 tablet (25 mg) by mouth daily 90 tablet 3     Ibuprofen (ADVIL PO) Taking as needed for pain       venlafaxine (EFFEXOR-XR) 37.5 MG 24 hr capsule TAKE 1 CAPSULE(37.5 MG) BY MOUTH DAILY 90 capsule 1     venlafaxine (EFFEXOR-XR) 75 MG 24 hr capsule Take 1 capsule (75 mg) by mouth daily 90 capsule 3       Medical History: any changes in medical history since they were last seen? No    Review of Systems:  The 14 system ROS was reviewed from the intake questionnaire, and is positive for: weight gain, headache, earache, itching, edema, HTN, joint pain, urinary urgency, mood swings  Any  bowel or bladder problems: urinary urgency  Mood: good    Physical Exam:  /83  Pulse 89  Wt 123.4 kg (272 lb)  BMI 42.6 kg/m2  Constitutional: alert and no distress.  Pt is obese  Head: Normocephalic. No masses, lesions, tenderness or abnormalities  ENT: EOMI, mucosal surfaces moist.  Neck with good ROM, posture fair  Cardiovascular: No edema or JVD appreciated.  Respiratory: Good diaphragmatic excursion. No wheezes appreciated.  Speaking in complete sentences without shortness of breath.  No accessory muscle use.   Musculoskeletal: extremities normal- no gross deformities noted, normal muscle tone and able to move about the exam room without difficulty.    Skin: no suspicious lesions or rashes appreciated on exposed areas  Neurologic: Gait normal and non-antalgic. Moving all extremities spontaneously, no apparent weakness.    Psychiatric: mentation appears normal, affect full and good eye contact.      Cervical Spine:  Decreased ROM on left lateral rotation, tender paraspinal muscles, facet loading equivocal  Lumbar Spine:  Moves well, exam limited    MRI CERVICAL SPINE WITHOUT CONTRAST May 30, 2018 8:36 AM   FINDINGS: The cervical spinal cord and visualized portions of the  thoracic spinal cord appear normal.  The visualized portions of the  brainstem and cerebellum appear normal.  Alignment is normal.  The  paraspinal soft tissues appear normal. The bone marrow has normal  signal intensity.      Craniocervical Junction and C1-C2: Arthritic changes are seen between  the right and left occipital condyles and the lateral masses of C1.  Arthritic changes also seen between the left C1 and left C2 lateral  masses. These arthritic changes have increased when compared to 2016.     C2-C3: Mild annular disc bulge. Mild degenerative change in the facet  joints. The central canal and neural foramen are patent.     C3-C4: Degeneration of the disc. Mild annular disc bulge.  Mild-moderate degenerative change in the  facet joints. The central  canal and neural foramen are patent.     C4-C5: Degeneration of the disc. Mild symmetric annular disc bulge.  There is an associated osteophyte extending greater to the right of  midline. Degenerative change in the facet joints. Central canal is  still normal. There is mild-moderate right foraminal stenosis due to  an uncinate spur.     C5-C6: Degeneration of the disc. Loss of disc space height. Mild  symmetric annular disc bulge. Central canal mildly narrowed moderate  right and mild left foraminal stenosis due to uncinate spurs.     C6-C7: Mild annular disc bulge. Central canal and neural foramen are  patent.     C7-T1: Normal disc, facet joints, spinal canal and neural foramina.     T1-T2: Normal disc, facet joints, spinal canal and neural foramina.        IMPRESSION:    1. Multilevel degenerative change.  2. The degenerative changes at the craniocervical junction and at the  C1-C2 level have increased when compared to the previous scan from  2016. Degenerative changes are now seen between the right occipital  condyle and lateral mass of C1 and the left occipital condyle and the  left lateral mass of C1 and the left lateral masses of C1 and C2.  There is associated bone marrow edema at these levels.  3. No focal left-sided disc herniations are seen.    Assessment:   1. Chronic neck pain  2. Cervical spondylosis (arithritis)  3. Cervical radiculopathy  4. Cervical facet joint arthropathy  5. Cervicogenic headache  6. Myofascial pain  7. Chronic right shoulder pain  8. Right acromioclavicular joint arthropathy  9. vertigo    Shelia Sanchez is a 68 year old female who is seen at the pain clinic for chronic neck and shoulder pain.  She has been doing well lately with improved neck and shoulder pain but has continued with headaches.  Her MRI cervical spine was reviewed again and shows disc/osteophyte complexes at C3-4 and C5-6 on the left and her current pain is likely from the C3-4  level.  She also has  Palpable spasm in the paraspinal muscles and trapezius muscles.  Our treatment plan is as outlined below.    Plan:  1. Physical Therapy:  Continue self directed exercise  2. Clinical Health Psychologist to address issues of relaxation, behavioral change, coping style, and other factors important to improvement.  deferred  3. Diagnostic Studies:  Reviewed cervical MRI  4. Medication Management:    1. Continue Gabapentin 100 mg one BID and two tabs QHS  2. Continue Effexor as prescribed  3. Continue Advil in safe doses  5. Further procedures recommended:   1. Cervical trigger point injections ordered today  2. Consider cervical epidural steroid injection C3-4 on the left (transforaminal approach)  3. Repeat cervical MB RFA if/when needed  6. Recommendations to PCP: continue current treatment  7. Follow up: for injections and in 3 months    Total time spent was 30 minutes, and more than 50% of face to face time was spent in counseling and/or coordination of care regarding diagnosis, physical activity, medication management, and interventional procedures.    Rome Vicente MD  [facility]

## 2018-09-10 NOTE — MR AVS SNAPSHOT
After Visit Summary   9/10/2018    Shelia Sanchez    MRN: [id number]           Patient Information     Date Of Birth          1950        Visit Information        Provider Department      9/10/2018 1:30 PM Rome Fowler MD [facility]        Today's Diagnoses     Myofascial pain    -  1      Care Instructions    Assessment:   1. Chronic neck pain  2. Cervical spondylosis (arithritis)  3. Cervical radiculopathy  4. Cervical facet joint arthropathy  5. Cervicogenic headache  6. Myofascial pain  7. Chronic right shoulder pain  8. Right acromioclavicular joint arthropathy  9. vertigo      Plan:  1. Physical Therapy:  Continue self directed exercise  2. Clinical Health Psychologist to address issues of relaxation, behavioral change, coping style, and other factors important to improvement.  deferred  3. Diagnostic Studies:  Reviewed cervical MRI  4. Medication Management:    1. Continue Gabapentin 100 mg one BID and two tabs QHS  2. Continue Effexor as prescribed  3. Continue Advil in safe doses  5. Further procedures recommended:   1. Cervical trigger point injections ordered today  2. Consider cervical epidural steroid injection C3-4 on the left (transforaminal approach)  3. Repeat cervical MB RFA if/when needed  6. Recommendations to PCP: continue current treatment  7. Follow up: for injections and in 3 months        ----------------------------------------------------------------  Clinic Number:  [phone]   Call this number with any questions about your care and for scheduling assistance. Calls are returned Monday through Friday between 8 AM and 4:30 PM. We usually get back to you within 2 business days depending on the issue/request.       Medication refills:    For non-narcotic medications, call your pharmacy directly to request a refill. The pharmacy will contact the [facility] for authorization. Please allow 3-4 days for these refills to be processed.      For narcotic refills, call the nurse triage line or send a trippiece message. Please contact us 7-10 days before your refill is due. The message MUST include the name of the specific medication(s) requested and how you would like to receive the prescription(s). The options are as follows:    Pain Clinic staff can mail the prescription to your pharmacy. Please tell us the name of the pharmacy.    You may pick the prescription up at the Pain Clinic (tell us the location) or during a clinic visit with your pain provider    Pain Clinic staff can deliver the prescription to the [facility] pharmacy in the clinic building. Please tell us the location.      We believe regular attendance is key to your success in our program.    Any time you are unable to keep your appointment we ask that you call us at least 24 hours in advance to let us know. This will allow us to offer the appointment time to another patient.               Follow-ups after your visit        Additional Services     PAIN INJECTION EVAL/TREAT/FOLLOW UP                 Your next 10 appointments already scheduled     Oct 19, 2018  1:30 PM CDT   Return Sleep Patient with BEBETO Abraham   [facility] ([facility])    [address] [place] [id number] [phone]              Who to contact     If you have questions or need follow up information about today's clinic visit or your schedule please contact [facility] directly at [phone].  Normal or non-critical lab and imaging results will be communicated to you by MyChart, letter or phone within 4 business days after the clinic has received the results. If you do not hear from us within 7 days, please contact the clinic through MyChart or phone. If you have a critical or abnormal lab result, we will notify you by phone as soon as possible.  Submit refill requests through trippiece or call your pharmacy and they will forward  the refill request to us. Please allow 3 business days for your refill to be completed.          Additional Information About Your Visit        [email or web address] Information     [employer] gives you secure access to your electronic health record. If you see a primary care provider, you can also send messages to your care team and make appointments. If you have questions, please call your primary care clinic.  If you do not have a primary care provider, please call [phone] and they will assist you.        Care EveryWhere ID     This is your Care EveryWhere ID. This could be used by other organizations to access your [place] medical records  [id number]        Your Vitals Were     Pulse BMI (Body Mass Index)                89 42.6 kg/m2           Blood Pressure from Last 3 Encounters:   09/10/18 135/83   09/07/18 132/85   06/14/18 143/81    Weight from Last 3 Encounters:   09/10/18 123.4 kg (272 lb)   09/07/18 123.5 kg (272 lb 3.2 oz)   05/30/18 123.8 kg (273 lb)              We Performed the Following     PAIN INJECTION EVAL/TREAT/FOLLOW UP        Primary Care Provider Office Phone # Fax #    Mariana Benitez [phone]       [address] [place] [id number]        Equal Access to Services     VICKY ROBERTS : Hadii aad ku hadasho Soomaali, waaxda luqadaha, qaybta kaalmada adeegyada, waxay idiin haydemi dominique lapito alvarenga. So [facility] [phone].    ATENCIÓN: Si habla español, tiene a barajas disposición servicios gratuitos de asistencia lingüística. Llame al [phone].    We comply with applicable federal civil rights laws and [place] laws. We do not discriminate on the basis of race, color, national origin, age, disability, sex, sexual orientation, or gender identity.            Thank you!     [facility] you [facility]  for your care. Our goal is always to provide you with excellent care. Hearing back from our patients is one way we can continue to improve our  services. Please take a few minutes to complete the written survey that you may receive in the mail after your visit with us. Thank you!             Your Updated Medication List - Protect others around you: Learn how to safely use, store and throw away your medicines at www.disposemymeds.org.          This list is accurate as of 9/10/18  2:05 PM.  Always use your most recent med list.                   Brand Name Dispense Instructions for use Diagnosis    ADVIL PO      Taking as needed for pain        amLODIPine 5 MG tablet    NORVASC    90 tablet    Take 1 tablet (5 mg) by mouth daily    Essential hypertension with goal blood pressure less than 140/90       aspirin 81 MG tablet      Take 81 mg by mouth daily        gabapentin 100 MG capsule    NEURONTIN    90 capsule    Take 1 capsule (100 mg) by mouth 3 times daily    Myofascial pain       hydrochlorothiazide 25 MG tablet    HYDRODIURIL    90 tablet    Take 1 tablet (25 mg) by mouth daily    Essential hypertension with goal blood pressure less than 140/90       NEXIUM 24HR PO      Take 1 tablet by mouth daily        * venlafaxine 75 MG 24 hr capsule    EFFEXOR-XR    90 capsule    Take 1 capsule (75 mg) by mouth daily    Menopausal syndrome (hot flashes)       * venlafaxine 37.5 MG 24 hr capsule    EFFEXOR-XR    90 capsule    TAKE 1 CAPSULE(37.5 MG) BY MOUTH DAILY    Mild major depression (H)       * Notice:  This list has 2 medication(s) that are the same as other medications prescribed for you. Read the directions carefully, and ask your doctor or other care provider to review them with you.

## 2018-09-10 NOTE — PATIENT INSTRUCTIONS
Assessment:   1. Chronic neck pain  2. Cervical spondylosis (arithritis)  3. Cervical radiculopathy  4. Cervical facet joint arthropathy  5. Cervicogenic headache  6. Myofascial pain  7. Chronic right shoulder pain  8. Right acromioclavicular joint arthropathy  9. vertigo      Plan:  1. Physical Therapy:  Continue self directed exercise  2. Clinical Health Psychologist to address issues of relaxation, behavioral change, coping style, and other factors important to improvement.  deferred  3. Diagnostic Studies:  Reviewed cervical MRI  4. Medication Management:    1. Continue Gabapentin 100 mg one BID and two tabs QHS  2. Continue Effexor as prescribed  3. Continue Advil in safe doses  5. Further procedures recommended:   1. Cervical trigger point injections ordered today  2. Consider cervical epidural steroid injection C3-4 on the left (transforaminal approach)  3. Repeat cervical MB RFA if/when needed  6. Recommendations to PCP: continue current treatment  7. Follow up: for injections and in 3 months        ----------------------------------------------------------------  Clinic Number:  [phone]   Call this number with any questions about your care and for scheduling assistance. Calls are returned Monday through Friday between 8 AM and 4:30 PM. We usually get back to you within 2 business days depending on the issue/request.       Medication refills:    For non-narcotic medications, call your pharmacy directly to request a refill. The pharmacy will contact the [facility] for authorization. Please allow 3-4 days for these refills to be processed.     For narcotic refills, call the nurse triage line or send a [employer] message. Please contact us 7-10 days before your refill is due. The message MUST include the name of the specific medication(s) requested and how you would like to receive the prescription(s). The options are as follows:    Pain Clinic staff can mail the prescription to your pharmacy.  Please tell us the name of the pharmacy.    You may pick the prescription up at the Pain Clinic (tell us the location) or during a clinic visit with your pain provider    Pain Clinic staff can deliver the prescription to the [facility] pharmacy in the clinic building. Please tell us the location.      We believe regular attendance is key to your success in our program.    Any time you are unable to keep your appointment we ask that you call us at least 24 hours in advance to let us know. This will allow us to offer the appointment time to another patient.

## 2018-09-10 NOTE — PROGRESS NOTES
3 DAY STM VISIT    Diagnostic AHI: 101.3 PSG    Patient contacted for 3 day STM visit  Subjective measures:  Pt states that she's struggling to get used to it.  She was able to wear it one night all night so far.  She is getting a little congested and is going to try a saline nasal spray.     Device type: Auto-CPAP  PAP settings from order::  CPAP min 8 cm  H20       CPAP max 18 cm  H20  Mask type:    Nasal Mask     Device settings from machine      Min CPAP 8            Max CPAP 18      Assessment: Nightly usage, most nights under four hours.  Action plan: Pt to have f/u 14 day STM visit.  Patient has a follow up visit scheduled:   yes within 31-90 days of set up.

## 2018-09-20 ENCOUNTER — OFFICE VISIT (OUTPATIENT)
Dept: PALLIATIVE MEDICINE | Facility: CLINIC | Age: 68
End: 2018-09-20
Payer: COMMERCIAL

## 2018-09-20 VITALS — SYSTOLIC BLOOD PRESSURE: 154 MMHG | HEART RATE: 86 BPM | DIASTOLIC BLOOD PRESSURE: 84 MMHG

## 2018-09-20 DIAGNOSIS — M62.838 MUSCLE SPASM: ICD-10-CM

## 2018-09-20 DIAGNOSIS — M79.18 MYOFASCIAL PAIN: Primary | ICD-10-CM

## 2018-09-20 PROCEDURE — 20553 NJX 1/MLT TRIGGER POINTS 3/>: CPT | Performed by: ANESTHESIOLOGY

## 2018-09-20 ASSESSMENT — PAIN SCALES - GENERAL: PAINLEVEL: MILD PAIN (2)

## 2018-09-20 NOTE — PATIENT INSTRUCTIONS
[facility]   Post Procedure Instructions    Today you had:  trigger point injections       Medications used:  lidocaine   bupivicaine            Monitor the injection sites for signs and symptoms of infection-fever, chills, redness, swelling, warmth, or drainage to areas.    You may have soreness at injection sites for up to 24 hours.    You may apply ice to the painful areas to help minimize the discomfort of the needle pokes.    Do not apply heat to sites for at least 12 hours.    You may use anti-inflammatory medications or Tylenol for pain control if necessary    Pain Clinic phone number during work hours Monday-Friday:  [phone]    After hours provider line: [phone]

## 2018-09-20 NOTE — PROGRESS NOTES
Pre procedure Diagnosis: myofascial pain   Post procedure Diagnosis: Same  Procedure performed: trigger point injections  Anesthesia: none  Complications: none  Operators: Rome Vicente MD     Indications:   Shelia Sanchez is a 68 year old female with a history of chronic left neck and shoulder pain and spasm.  Exam shows myofascial pain of the muscle groups listed below and they have tried conservative treatment including physical therapy, medications, and interventional procedures.    Options/alternatives, benefits and risks were discussed with the patient including bleeding, infection, tissue trauma and pnuemothorax.  Questions were answered to her satisfaction and she agrees to proceed. Voluntary informed consent was obtained and signed.     Vitals were reviewed: Yes  /84  Pulse 86  Allergies were reviewed:  Yes   Medications were reviewed:  Yes   Pre-procedure pain score: 2/10    Procedure:  After getting informed consent, a Pause for the Cause was performed.    Trigger points were identified by patient, and marked when appropriate.  The area was prepped with Chloroprep.    Using clean technique, injections were completed using a 30G, 1 inch needle.  After negative aspiration, injection was completed.  A total of 10 locations were injected.  When possible, tissue was retracted from the chest wall to avoid lung injury.    Muscle groups injected:  Cervical paraspinal muscles  Thoracic paraspinal muscles  Trapezius muscles    Injection solution contained:  5 ml of 1% lidocaine and 5 ml of 0.5% bupivacaine (total injectate volume 10 ml).    Hemostasis was achieved, the area was cleaned, and bandaids were placed when appropriate.  The patient tolerated the procedure well.  Breath sounds were normal.      Post-procedure pain score: 0/10  Follow-up includes:   -repeat prn    Rome Vicente MD  [facility]

## 2018-09-20 NOTE — MR AVS SNAPSHOT
After Visit Summary   9/20/2018    Shelia Sanchez    MRN: [id number]           Patient Information     Date Of Birth          1950        Visit Information        Provider Department      9/20/2018 1:30 PM Rome Fowler MD [facility]        Care Instructions    [facility]   Post Procedure Instructions    Today you had:  trigger point injections       Medications used:  lidocaine   bupivicaine            Monitor the injection sites for signs and symptoms of infection-fever, chills, redness, swelling, warmth, or drainage to areas.    You may have soreness at injection sites for up to 24 hours.    You may apply ice to the painful areas to help minimize the discomfort of the needle pokes.    Do not apply heat to sites for at least 12 hours.    You may use anti-inflammatory medications or Tylenol for pain control if necessary    Pain Clinic phone number during work hours Monday-Friday:  [phone]    After hours provider line: [phone]                Follow-ups after your visit        Your next 10 appointments already scheduled     Oct 19, 2018  1:30 PM CDT   Return Sleep Patient with BEBETO Abraham   [facility] ([facility])    [address] [place] [id number] [phone]            Dec 10, 2018  1:30 PM CST   Return Visit with Rome Vicente MD   [facility] ([facility])    [id number] [place] [id number] [phone]              Who to contact     If you have questions or need follow up information about today's clinic visit or your schedule please contact [facility] directly at [phone].  Normal or non-critical lab and imaging results will be communicated to you by MyChart, letter or phone within 4 business days after the clinic has received the results. If you  do not hear from us within 7 days, please contact the clinic through [employer] or phone. If you have a critical or abnormal lab result, we will notify you by phone as soon as possible.  Submit refill requests through [employer] or call your pharmacy and they will forward the refill request to us. Please allow 3 business days for your refill to be completed.          Additional Information About Your Visit        [employer] Information     [employer] gives you secure access to your electronic health record. If you see a primary care provider, you can also send messages to your care team and make appointments. If you have questions, please call your primary care clinic.  If you do not have a primary care provider, please call [phone] and they will assist you.        Care EveryWhere ID     This is your Care EveryWhere ID. This could be used by other organizations to access your [place] medical records  [id number]        Your Vitals Were     Pulse                   86            Blood Pressure from Last 3 Encounters:   09/20/18 154/84   09/10/18 135/83   09/07/18 132/85    Weight from Last 3 Encounters:   09/10/18 123.4 kg (272 lb)   09/07/18 123.5 kg (272 lb 3.2 oz)   05/30/18 123.8 kg (273 lb)              Today, you had the following     No orders found for display       Primary Care Provider Office Phone # Fax #    Mariana Benitez [phone]       [address] [facility] [address]        Equal Access to Services     VICKY ROBERTS : Hadii bruce ku hadasho Socindyali, waaxda luqadaha, qaybta kaalmada adeegyada, barry alvarenga. So [facility] [phone].    ATENCIÓN: Si habla español, tiene a barajas disposición servicios gratuitos de asistencia lingüística. Llame al [phone].    We comply with applicable federal civil rights laws and [place] laws. We do not discriminate on the basis of race, color, national origin, age, disability, sex, sexual orientation, or gender  identity.            Thank you!     Thank you for choosing [facility]  for your care. Our goal is always to provide you with excellent care. Hearing back from our patients is one way we can continue to improve our services. Please take a few minutes to complete the written survey that you may receive in the mail after your visit with us. Thank you!             Your Updated Medication List - Protect others around you: Learn how to safely use, store and throw away your medicines at www.disposemymeds.org.          This list is accurate as of 9/20/18  1:59 PM.  Always use your most recent med list.                   Brand Name Dispense Instructions for use Diagnosis    ADVIL PO      Taking as needed for pain        amLODIPine 5 MG tablet    NORVASC    90 tablet    Take 1 tablet (5 mg) by mouth daily    Essential hypertension with goal blood pressure less than 140/90       aspirin 81 MG tablet      Take 81 mg by mouth daily        gabapentin 100 MG capsule    NEURONTIN    90 capsule    Take 1 capsule (100 mg) by mouth 3 times daily    Myofascial pain       hydrochlorothiazide 25 MG tablet    HYDRODIURIL    90 tablet    Take 1 tablet (25 mg) by mouth daily    Essential hypertension with goal blood pressure less than 140/90       NEXIUM 24HR PO      Take 1 tablet by mouth daily        * venlafaxine 75 MG 24 hr capsule    EFFEXOR-XR    90 capsule    Take 1 capsule (75 mg) by mouth daily    Menopausal syndrome (hot flashes)       * venlafaxine 37.5 MG 24 hr capsule    EFFEXOR-XR    90 capsule    TAKE 1 CAPSULE(37.5 MG) BY MOUTH DAILY    Mild major depression (H)       * Notice:  This list has 2 medication(s) that are the same as other medications prescribed for you. Read the directions carefully, and ask your doctor or other care provider to review them with you.

## 2018-09-24 ENCOUNTER — DOCUMENTATION ONLY (OUTPATIENT)
Dept: SLEEP MEDICINE | Facility: CLINIC | Age: 68
End: 2018-09-24

## 2018-09-24 DIAGNOSIS — G47.33 OSA (OBSTRUCTIVE SLEEP APNEA): ICD-10-CM

## 2018-09-24 NOTE — PROGRESS NOTES
14 DAY STM VISIT    Diagnostic AHI: 101.3  PSG    Subjective measures:   Pt states things are going ok. She still has bouts of insomnia and takes off the mask. Pt is benefiting from therapy.    Assessment: Pt not meeting objective benchmarks for compliance  Patient meeting subjective benchmarks.   Action plan: pt to have 30 day STM visit.   Device type: Auto-CPAP  PAP settings: CPAP min 8 cm  H20     CPAP max 18 cm  H20     90th % pjotngfs49.1 cm  H20    Mask type:   Nasal Mask     Objective measures: 14 day rolling measures         Compliance  53 %      % of night spent in large leak  0 % last  upload      AHI 7.89   last  Upload (one night OA and CA index were elevated)      Average number of minutes 342     Average hours of usage 5.7        Objective measure goal  Compliance   Goal >70%  Leak   Goal < 10%  AHI  Goal < 5  Usage  Goal >240

## 2018-10-09 ENCOUNTER — DOCUMENTATION ONLY (OUTPATIENT)
Dept: SLEEP MEDICINE | Facility: CLINIC | Age: 68
End: 2018-10-09
Payer: COMMERCIAL

## 2018-10-09 NOTE — PROGRESS NOTES
30 DAY [facility] VISIT    Diagnostic AHI: 101.3 PSG      Message left for patient to return call.     Assessment: Pt meeting objective benchmarks AHI slightly elevated.   Action plan: Waiting for patient to return call.   Patient hasscheduled a follow up visit with BEBETO Geronimo on 10/19/2018  Device type: Auto-CPAP  PAP settings: CPAP min 8 cm  H20     CPAP max 18 cm  H20     90th % eybegkcd64.7 cm  H20    Mask type:  Nasal Mask    Objective measures: 14 day rolling measures         Compliance  92 %     % of night spent in large leak  0 % last  upload      AHI 6.04   last  upload      Average number of minutes 380          Objective measure goal  Compliance   Goal >70%  Leak   Goal < 10%  AHI  Goal < 5  Usage  Goal >240

## 2018-10-17 ENCOUNTER — DOCUMENTATION ONLY (OUTPATIENT)
Dept: SLEEP MEDICINE | Facility: CLINIC | Age: 68
End: 2018-10-17
Payer: COMMERCIAL

## 2018-10-17 ENCOUNTER — DOCUMENTATION ONLY (OUTPATIENT)
Dept: SLEEP MEDICINE | Facility: CLINIC | Age: 68
End: 2018-10-17

## 2018-10-17 DIAGNOSIS — G47.33 OSA (OBSTRUCTIVE SLEEP APNEA): ICD-10-CM

## 2018-10-17 NOTE — PROGRESS NOTES
Patient came to [facility] for a troubleshoot of her [employer] Dreamstation (setup on 09/07/18).  Patient reported that the machine would shut off at night and indicate a leak error.  Checked pressure with manometer and confirmed the machine is functioning properly.  Turned off auto start feature.  Added tubing and ran machine.  There was a leak at the end of the tubing that connects to the machine, the thing plastic was more pliable than usual and that's where the leak was coming out.  Provided new tubing; zero billed due to defective tubing.  Patient also received two Small/Medium cushions.

## 2018-10-17 NOTE — PROGRESS NOTES
On 10/15/18 at 10:01:38AM patient called [facility] ([phone]) and spoke with Sonya Pathak reporting a machine leak.  Patient called and said on Friday night her machine kept shutting off by itself and saying there was a leak.  Sonay requested that someone on the respiratory team call patient back at [phone].  Sonya emailed Eliza Oscar.   On 10/15/2018 11:33:57AM, [facility], Shanthi Martinez called patient back.  Unable to troubleshoot over the phone.  Scheduled troubleshoot for 10/17/18 at 10AM at [facility].  Patient will bring machine and supplies.

## 2018-10-19 ENCOUNTER — OFFICE VISIT (OUTPATIENT)
Dept: SLEEP MEDICINE | Facility: CLINIC | Age: 68
End: 2018-10-19
Payer: COMMERCIAL

## 2018-10-19 VITALS
HEIGHT: 67 IN | DIASTOLIC BLOOD PRESSURE: 78 MMHG | BODY MASS INDEX: 43.32 KG/M2 | HEART RATE: 87 BPM | SYSTOLIC BLOOD PRESSURE: 139 MMHG | OXYGEN SATURATION: 96 % | WEIGHT: 276 LBS

## 2018-10-19 DIAGNOSIS — G47.33 OSA (OBSTRUCTIVE SLEEP APNEA): Primary | ICD-10-CM

## 2018-10-19 PROCEDURE — 99213 OFFICE O/P EST LOW 20 MIN: CPT | Performed by: PHYSICIAN ASSISTANT

## 2018-10-19 NOTE — MR AVS SNAPSHOT
After Visit Summary   10/19/2018    Shelia Sanchez    MRN: [id number]           Patient Information     Date Of Birth          1950        Visit Information        Provider Department      10/19/2018 1:30 PM Sunita Sanches PA [facility]        Today's Diagnoses     LORI (obstructive sleep apnea)    -  1      Care Instructions      Your BMI is Body mass index is 43.23 kg/(m^2).  Weight management is a personal decision.  If you are interested in exploring weight loss strategies, the following discussion covers the approaches that may be successful. Body mass index (BMI) is one way to tell whether you are at a healthy weight, overweight, or obese. It measures your weight in relation to your height.  A BMI of 18.5 to 24.9 is in the healthy range. A person with a BMI of 25 to 29.9 is considered overweight, and someone with a BMI of 30 or greater is considered obese. More than two-thirds of American adults are considered overweight or obese.  Being overweight or obese increases the risk for further weight gain. Excess weight may lead to heart disease and diabetes.  Creating and following plans for healthy eating and physical activity may help you improve your health.  Weight control is part of healthy lifestyle and includes exercise, emotional health, and healthy eating habits. Careful eating habits lifelong are the mainstay of weight control. Though there are significant health benefits from weight loss, long-term weight loss with diet alone may be very difficult to achieve- studies show long-term success with dietary management in less than 10% of people. Attaining a healthy weight may be especially difficult to achieve in those with severe obesity. In some cases, medications, devices and surgical management might be considered.  What can you do?  If you are overweight or obese and are interested in methods for weight loss, you should discuss this with your provider.     Consider  reducing daily calorie intake by 500 calories.     Keep a food journal.     Avoiding skipping meals, consider cutting portions instead.    Diet combined with exercise helps maintain muscle while optimizing fat loss. Strength training is particularly important for building and maintaining muscle mass. Exercise helps reduce stress, increase energy, and improves fitness. Increasing exercise without diet control, however, may not burn enough calories to loose weight.       Start walking three days a week 10-20 minutes at a time    Work towards walking thirty minutes five days a week     Eventually, increase the speed of your walking for 1-2 minutes at time    In addition, we recommend that you review healthy lifestyles and methods for weight loss available through the National Institutes of Health patient information sites:  http://win.niddk.nih.gov/publications/index.htm    And look into health and wellness programs that may be available through your health insurance provider, employer, local community center, or gregory club.    Weight management plan: Patient was referred to their PCP to discuss a diet and exercise plan.          Your Body mass index is 43.23 kg/(m^2).  Weight management is a personal decision.  If you are interested in exploring weight loss strategies, the following discussion covers the approaches that may be successful. Body mass index (BMI) is one way to tell whether you are at a healthy weight, overweight, or obese. It measures your weight in relation to your height.  A BMI of 18.5 to 24.9 is in the healthy range. A person with a BMI of 25 to 29.9 is considered overweight, and someone with a BMI of 30 or greater is considered obese. More than two-thirds of American adults are considered overweight or obese.  Being overweight or obese increases the risk for further weight gain. Excess weight may lead to heart disease and diabetes.  Creating and following plans for healthy eating and physical  activity may help you improve your health.  Weight control is part of healthy lifestyle and includes exercise, emotional health, and healthy eating habits. Careful eating habits lifelong are the mainstay of weight control. Though there are significant health benefits from weight loss, long-term weight loss with diet alone may be very difficult to achieve- studies show long-term success with dietary management in less than 10% of people. Attaining a healthy weight may be especially difficult to achieve in those with severe obesity. In some cases, medications, devices and surgical management might be considered.  What can you do?  If you are overweight or obese and are interested in methods for weight loss, you should discuss this with your provider.     Consider reducing daily calorie intake by 500 calories.     Keep a food journal.     Avoiding skipping meals, consider cutting portions instead.    Diet combined with exercise helps maintain muscle while optimizing fat loss. Strength training is particularly important for building and maintaining muscle mass. Exercise helps reduce stress, increase energy, and improves fitness. Increasing exercise without diet control, however, may not burn enough calories to loose weight.       Start walking three days a week 10-20 minutes at a time    Work towards walking thirty minutes five days a week     Eventually, increase the speed of your walking for 1-2 minutes at time    In addition, we recommend that you review healthy lifestyles and methods for weight loss available through the National Institutes of Health patient information sites:  http://win.niddk.nih.gov/publications/index.htm    And look into health and wellness programs that may be available through your health insurance provider, employer, local community center, or gregory club.    Weight management plan: Patient was referred to their PCP to discuss a diet and exercise plan.          Follow-ups after your visit       "  Follow-up notes from your care team     Return in 1 year (on 10/19/2019) for PAP follow up.      Your next 10 appointments already scheduled     Dec 10, 2018  1:30 PM CST   Return Visit with Rome Vicente MD   [facility] [place] ([facility] [place])    [address]  [place] [address] [phone]              Who to contact     If you have questions or need follow up information about today's clinic visit or your schedule please contact [facility] directly at [phone].  Normal or non-critical lab and imaging results will be communicated to you by [employer], letter or phone within 4 business days after the clinic has received the results. If you do not hear from us within 7 days, please contact the clinic through [employer] or phone. If you have a critical or abnormal lab result, we will notify you by phone as soon as possible.  Submit refill requests through [employer] or call your pharmacy and they will forward the refill request to us. Please allow 3 business days for your refill to be completed.          Additional Information About Your Visit        MyChart Information     [employer] gives you secure access to your electronic health record. If you see a primary care provider, you can also send messages to your care team and make appointments. If you have questions, please call your primary care clinic.  If you do not have a primary care provider, please call [phone] and they will assist you.        Care EveryWhere ID     This is your Care EveryWhere ID. This could be used by other organizations to access your [facility] medical records  [id number]        Your Vitals Were     Pulse Height Pulse Oximetry BMI (Body Mass Index)          87 1.702 m (5' 7\") 96% 43.23 kg/m2         Blood Pressure from Last 3 Encounters:   10/19/18 139/78   09/20/18 154/84   09/10/18 135/83    Weight from Last 3 Encounters:   10/19/18 125.2 kg (276 lb)   09/10/18 123.4 kg " (272 lb)   09/07/18 123.5 kg (272 lb 3.2 oz)              We Performed the Following     Sleep Comprehensive DME        Primary Care Provider Office Phone # Fax #    Mariana Benitez [phone]       [address] [facility] [facility] [address]        Equal Access to Services     VICKY ROBERTS : Hadii aad ku hadasho Soomaali, waaxda luqadaha, qaybta kaalmada adeegyada, waxay idiin hayaan adeeg kharash lajuann . So [facility] [place] [facility] [phone].    ATENCIÓN: Si habla español, tiene a barajas disposición servicios gratuitos de asistencia lingüística. Llame al [phone].    We comply with applicable federal civil rights laws and [place] laws. We do not discriminate on the basis of race, color, national origin, age, disability, sex, sexual orientation, or gender identity.            Thank you!     Thank you for choosing [facility]  for your care. Our goal is always to provide you with excellent care. Hearing back from our patients is one way we can continue to improve our services. Please take a few minutes to complete the written survey that you may receive in the mail after your visit with us. Thank you!             Your Updated Medication List - Protect others around you: Learn how to safely use, store and throw away your medicines at www.disposemymeds.org.          This list is accurate as of 10/19/18  1:40 PM.  Always use your most recent med list.                   Brand Name Dispense Instructions for use Diagnosis    ADVIL PO      Taking as needed for pain        amLODIPine 5 MG tablet    NORVASC    90 tablet    Take 1 tablet (5 mg) by mouth daily    Essential hypertension with goal blood pressure less than 140/90       aspirin 81 MG tablet      Take 81 mg by mouth daily        gabapentin 100 MG capsule    NEURONTIN    90 capsule    Take 1 capsule (100 mg) by mouth 3 times daily    Myofascial pain       hydrochlorothiazide 25 MG tablet    HYDRODIURIL    90 tablet    Take 1 tablet (25 mg)  by mouth daily    Essential hypertension with goal blood pressure less than 140/90       NEXIUM 24HR PO      Take 1 tablet by mouth daily        * venlafaxine 75 MG 24 hr capsule    EFFEXOR-XR    90 capsule    Take 1 capsule (75 mg) by mouth daily    Menopausal syndrome (hot flashes)       * venlafaxine 37.5 MG 24 hr capsule    EFFEXOR-XR    90 capsule    TAKE 1 CAPSULE(37.5 MG) BY MOUTH DAILY    Mild major depression (H)       * Notice:  This list has 2 medication(s) that are the same as other medications prescribed for you. Read the directions carefully, and ask your doctor or other care provider to review them with you.

## 2018-10-19 NOTE — NURSING NOTE
"Chief Complaint   Patient presents with     CPAP Follow Up       Initial /80  Pulse 87  Ht 1.702 m (5' 7\")  Wt 125.2 kg (276 lb)  SpO2 96%  BMI 43.23 kg/m2 Estimated body mass index is 43.23 kg/(m^2) as calculated from the following:    Height as of this encounter: 1.702 m (5' 7\").    Weight as of this encounter: 125.2 kg (276 lb).    Medication Reconciliation: complete      "

## 2018-10-19 NOTE — PROGRESS NOTES
"Obstructive Sleep Apnea - PAP Follow-Up Visit:    Chief Complaint   Patient presents with     CPAP Follow Up       Shelia Sanchez comes in today for follow-up of their severe sleep apnea, managed with CPAP.     Shelia Sanchez initially presented with high probability of obstructive sleep apnea with modest possibility of coexisting hypoventilation based on BMI of 43, loud snoring, witnessed apnea, non-refreshing sleep, fatigue/excessive daytime sleepiness(ESS 9), crowded oropharynx, morning headaches, neck circumference of 16\" and co-morbid HTN. STOP-BANG score is 7/8.    8/8/2018 [facility] Sleep Study (275.0 lbs) - .3, .7, Supine .3, REM AHI n/a, Low O2% 81.3%, Time Spent =88% 2.6, Time Spent =89% 5.2. Treatment was titrated to a pressure of CPAP 8 with an AHI 28.9. Time spent in REM at this pressure was 0 minutes.    Overall, she rates the experience with PAP as 10 (0 poor, 10 great). The mask is comfortable.    The mask is not leaking .  She is not snoring with the mask on. She is not having gasp arousals.  She is not having significant oral/nasal dryness. The pressure is comfortable.     Her PAP interface is Nasal Mask.    Bedtime is typically 2300. Usually it takes about 30 min minutes to fall asleep with the mask on. Wake time is typically 0800.  Patient is using PAP therapy 9 hours per night. The patient is usually getting 6 hours of sleep per night.    She does feel rested in the morning.    Total score - [place]: 0 (10/19/2018  1:00 PM)    NATALIA Total Score: 11    Respironics  Auto-PAP 8 - 18 cmH2O 30 day usage data:    76% of days with > 4 hours of use. 2/30 days with no use. She did not use if for 5 nights due to high leak from the hose.   Average use 334 minutes per day.   Average leak 31.02 LPM.  Average % of night in large leak 0%.    CPAP 90% pressure 13.71cm.   AHI 5.6 events per hour.        Past medical/surgical history, family history, social history, medications and " allergies were reviewed.      Problem List:  Patient Active Problem List    Diagnosis Date Noted     LORI (obstructive sleep apnea)- severe () 08/09/2018     Priority: Medium     8/8/2018 [facility] (275.0 lbs) - .3, .7, Supine .3, REM AHI n/a, Low O2% 81.3%, Time Spent ?88% 2.6, Time Spent ?89% 5.2. Treatment was titrated to a pressure of CPAP 8 with an AHI 28.9. Time spent in REM at this pressure was 0 minutes.       Mild major depression (H) 01/10/2018     Priority: Medium     Heartburn 11/16/2016     Priority: Medium     Menopausal syndrome (hot flashes) 03/30/2016     Priority: Medium     Cervical pain 11/19/2015     Priority: Medium     Left-sided headache 11/19/2015     Priority: Medium     Atypical nevus 02/13/2015     Priority: Medium     MILD, risk factor for melanoma. Needs yearly skin exam.   The entire mole was not removed, so if it grows, needs to be completely excised.   Do you wish to do the replacement in the background? yes         Urinary incontinence 02/02/2015     Priority: Medium     Cataracts, both eyes 05/07/2014     Priority: Medium     Left shoulder pain 12/27/2012     Priority: Medium     Advanced directives, counseling/discussion 02/09/2012     Priority: Medium     Advance Care Planning:   ACP Review and Resources Provided:  Reviewed chart for advance care plan.  Shelia Sanchez has no plan or code status on file. Discussed available resources and provided with information. Confirmed code status reflects current choices pending further ACP discussions.  Confirmed/documented designated decision maker(s). See permanent comments section of demographics in clinical tab.   Added by Brisa Hernandez on 3/6/2015  Discussed advance care planning with patient; information given to patient to review. 2/9/2012          Hypertension goal BP (blood pressure) < 140/90 07/02/2011     Priority: Medium     CARDIOVASCULAR SCREENING; LDL GOAL LESS THAN 130  "06/11/2010     Priority: Medium     BMI > 40      Priority: Medium        /78  Pulse 87  Ht 1.702 m (5' 7\")  Wt 125.2 kg (276 lb)  SpO2 96%  BMI 43.23 kg/m2    Impression/Plan:    Severe Sleep apnea-  Tolerating PAP well. Daytime symptoms are improved.   Continue current treatment.   Comprehensive DME    Shelia Sanchez will follow up in about 1 year(s).     Fifteen minutes spent with patient, all of which were spent face-to-face counseling, consulting, coordinating plan of care regarding LORI.      Sunita Sanches PA-C    CC:  Mariana Benitez    "

## 2018-10-19 NOTE — PATIENT INSTRUCTIONS

## 2018-11-06 ENCOUNTER — TRANSFERRED RECORDS (OUTPATIENT)
Dept: HEALTH INFORMATION MANAGEMENT | Facility: CLINIC | Age: 68
End: 2018-11-06

## 2018-11-12 DIAGNOSIS — M79.18 MYOFASCIAL PAIN: ICD-10-CM

## 2018-11-12 RX ORDER — GABAPENTIN 100 MG/1
100 CAPSULE ORAL 3 TIMES DAILY
Qty: 90 CAPSULE | Refills: 3 | Status: SHIPPED | OUTPATIENT
Start: 2018-11-12 | End: 2019-03-11

## 2018-11-12 NOTE — TELEPHONE ENCOUNTER
"Received 11- at 12:04 PM    Reason for fax:  Medication  If this is a refill request, has the caller requested the refill from the pharmacy already? Yes  Will the patient be using a [facility]? No  Name of the pharmacy and phone number for the current request: [facility] [address] [place], [place] [id number] [address] AT [facility]    Name of the medication requested: Gabapentin (Neurontin) 100 mg capsule    Per request, \"Requested  time: 11- at 1:00 AM\"    Genesis Branscomb  Patient Representative  [facility] [place]  "

## 2018-11-12 NOTE — TELEPHONE ENCOUNTER
Signed Prescriptions:                        Disp   Refills    gabapentin (NEURONTIN) 100 MG capsule      90 cap*3        Sig: Take 1 capsule (100 mg) by mouth 3 times daily  Authorizing Provider: ROME CAZARES    Reviewed, signed, e-prescribed.    Rome Vicente MD  [facility]

## 2018-11-20 ENCOUNTER — MYC MEDICAL ADVICE (OUTPATIENT)
Dept: FAMILY MEDICINE | Facility: CLINIC | Age: 68
End: 2018-11-20

## 2018-11-20 NOTE — TELEPHONE ENCOUNTER
Panel Management Review      Patient has the following on her problem list: None      Composite cancer screening  Chart review shows that this patient is due/due soon for the following Fecal Colorectal (FIT)  Summary:    Patient is due/failing the following:   FIT and PHYSICAL    Action needed:   Patient needs office visit for Physical due in January and FIT card.    Type of outreach:    Sent [facility] message.    Questions for provider review:    None                                                                                                                                      Fauzia Jasmine MA       Chart routed to Care Team .

## 2018-11-20 NOTE — LETTER
[facility]  [address] [address] [phone]                                                                                                November 27, 2018    Shelia S Laura  [address] [address]        Dear MsZeke Addissoraida,    In order to ensure we are providing the best quality care, we have reviewed your chart and see that you are due for:    1. Yearly Physical due in January 2019    2.  FIT (stool card)    Please call the clinic at your earliest convenience to schedule an appointment.    Thank you for trusting us with your health care.    Sincerely,    Mariana Benitez DO/wilbur

## 2018-12-10 ENCOUNTER — OFFICE VISIT (OUTPATIENT)
Dept: PALLIATIVE MEDICINE | Facility: CLINIC | Age: 68
End: 2018-12-10
Payer: COMMERCIAL

## 2018-12-10 VITALS
DIASTOLIC BLOOD PRESSURE: 86 MMHG | WEIGHT: 280 LBS | HEART RATE: 83 BPM | BODY MASS INDEX: 43.85 KG/M2 | SYSTOLIC BLOOD PRESSURE: 146 MMHG

## 2018-12-10 DIAGNOSIS — G89.29 CHRONIC NECK PAIN: ICD-10-CM

## 2018-12-10 DIAGNOSIS — M47.812 CERVICAL SPONDYLOSIS WITHOUT MYELOPATHY: ICD-10-CM

## 2018-12-10 DIAGNOSIS — M47.812 CERVICAL FACET JOINT SYNDROME: Primary | ICD-10-CM

## 2018-12-10 DIAGNOSIS — R51.9 LEFT-SIDED HEADACHE: Chronic | ICD-10-CM

## 2018-12-10 DIAGNOSIS — E66.01 MORBID OBESITY (H): Chronic | ICD-10-CM

## 2018-12-10 DIAGNOSIS — F32.0 MILD MAJOR DEPRESSION (H): Chronic | ICD-10-CM

## 2018-12-10 DIAGNOSIS — M54.2 CHRONIC NECK PAIN: ICD-10-CM

## 2018-12-10 PROCEDURE — 99213 OFFICE O/P EST LOW 20 MIN: CPT | Performed by: ANESTHESIOLOGY

## 2018-12-10 ASSESSMENT — PAIN SCALES - GENERAL: PAINLEVEL: NO PAIN (0)

## 2018-12-10 NOTE — PROGRESS NOTES
[facility]    Date of visit: 12/10/2018    Chief complaint:   Chief Complaint   Patient presents with     Pain       Interval history:  Shelia Sanchez was last seen by me on 9/20/18 for cervical trigger point injections.  Her last office visit was on 9/10/18.      Recommendations/plan at the last visit included:  Plan:  1. Physical Therapy:  Continue self directed exercise  2. Clinical Health Psychologist to address issues of relaxation, behavioral change, coping style, and other factors important to improvement.  deferred  3. Diagnostic Studies:  Reviewed cervical MRI  4. Medication Management:    1. Continue Gabapentin 100 mg one BID and two tabs QHS  2. Continue Effexor as prescribed  3. Continue Advil in safe doses  5. Further procedures recommended:   1. Cervical trigger point injections ordered today  2. Consider cervical epidural steroid injection C3-4 on the left (transforaminal approach)  3. Repeat cervical MB RFA if/when needed  6. Recommendations to PCP: continue current treatment  7. Follow up: for injections and in 3 months    Since her last visit, Shelia Sanchez reports:  She states that her neck is feeling better since her injections.  She does have a headache on the left temporal area that has been there since she had a dental appointment.  She purchased a cervical pillow that is helpful with her neck pain as well.  The headache is throbbing in nature and stays in the same spot.  She denies visual changes, nausea, or vomiting.  She continues with good relief and improved ROM of her neck since her medial branch RFA on 7/25/17.    Pain scores:  Pain intensity on average is 0 on a scale of 0-10.  Ranges from 0/10 to 10/10.    [place] Board of Pharmacy Data Base Reviewed:    N/A; not on opioids    Current pain treatments:   Advil 200 mg three tabs prn  Effexor 37.5 mg daily  Gabapentin 100 mg 2-3 times per day - some mild swelling    Past pain  treatments:  advil  effexor     Injections:    -9/20/18 cervical TPI  -7/25/17 cervical MB RFA left C3, C4, C5, C6  -6/2/17 cervical MBB #2  -4/13/17 cervical MBB #1  -7/11/16 left hip bursa injection ([facility])  -6/6/16 right shoulder glenohumeral joint injection ([facility])    Date of last UDS:  Not on opioids    Side Effects: edema and dry mouth    Medications:  Current Outpatient Medications   Medication Sig Dispense Refill     amLODIPine (NORVASC) 5 MG tablet Take 1 tablet (5 mg) by mouth daily 90 tablet 3     aspirin 81 MG tablet Take 81 mg by mouth daily       Esomeprazole Magnesium (NEXIUM 24HR PO) Take 1 tablet by mouth daily       gabapentin (NEURONTIN) 100 MG capsule Take 1 capsule (100 mg) by mouth 3 times daily 90 capsule 3     hydrochlorothiazide (HYDRODIURIL) 25 MG tablet Take 1 tablet (25 mg) by mouth daily 90 tablet 3     Ibuprofen (ADVIL PO) Taking as needed for pain       venlafaxine (EFFEXOR-XR) 37.5 MG 24 hr capsule TAKE 1 CAPSULE(37.5 MG) BY MOUTH DAILY 90 capsule 1     venlafaxine (EFFEXOR-XR) 75 MG 24 hr capsule Take 1 capsule (75 mg) by mouth daily 90 capsule 3       Medical History: any changes in medical history since they were last seen? No    Review of Systems:  The 14 system ROS was reviewed from the intake questionnaire, and is positive for: fatigue, weight gain, headache, edema, HTN, arthritis, urinary incontinence, depression, anxiety  Any bowel or bladder problems: denies changes  Mood: good    Physical Exam:  /86   Pulse 83   Wt 127 kg (280 lb)   BMI 43.85 kg/m    Constitutional: alert and no distress.  Pt is obese  Head: Normocephalic. No masses, lesions, tenderness or abnormalities  ENT: EOMI, mucosal surfaces moist.  Neck with good ROM, posture fair  Cardiovascular: No edema or JVD appreciated.  Respiratory: Good diaphragmatic excursion. No wheezes appreciated.  Speaking in complete sentences without shortness of breath.  No accessory muscle use.   Musculoskeletal:  extremities normal- no gross deformities noted, normal muscle tone and able to move about the exam room without difficulty.    Skin: no suspicious lesions or rashes appreciated on exposed areas  Neurologic: Gait normal. Moving all extremities spontaneously, no apparent weakness.    Psychiatric: mentation appears normal, affect full and good eye contact.      Cervical Spine:  Good ROM, exam limited    Assessment:   1. Chronic neck pain  2. Cervical spondylosis (arithritis)  3. Cervical radiculopathy  4. Cervical facet joint arthropathy  5. Cervicogenic headache  6. Myofascial pain  7. Chronic right shoulder pain  8. Right acromioclavicular joint arthropathy  9. vertigo    Shelia Sanchez is a 68 year old female who is seen at the pain clinic for chronic neck pain.  She continues to do well since her cervical MB RFA in July of 2017.  She does continue to have headaches in the left temporal area but is able to control these with ibuprofen and rest.  Our treatment plan is as outlined below.    Plan:  1. Physical Therapy:  Continue self directed exercise  2. Clinical Health Psychologist to address issues of relaxation, behavioral change, coping style, and other factors important to improvement.  deferred  3. Diagnostic Studies:  n/a  4. Medication Management:    1. Continue Advil in safe doses  2. Continue Effexor 37.5 mg daily  3. Continue Gabapentin 100 mg 2-3 times per day  5. Further procedures recommended:   1. Repeat cervical trigger point injections if needed  2. Repeat cervical MB RFA if/when needed  6. Recommendations to PCP: continue current treatment  7. Follow up: 3 months    Total time spent was 20 minutes, and more than 50% of face to face time was spent in counseling and/or coordination of care regarding diagnosis, physical activity, medication management, and interventional procedures.    Rome Vicente MD  [facility]

## 2018-12-10 NOTE — PATIENT INSTRUCTIONS
Assessment:   1. Chronic neck pain  2. Cervical spondylosis (arithritis)  3. Cervical radiculopathy  4. Cervical facet joint arthropathy  5. Cervicogenic headache  6. Myofascial pain  7. Chronic right shoulder pain  8. Right acromioclavicular joint arthropathy  9. vertigo      Plan:  1. Physical Therapy:  Continue self directed exercise  2. Clinical Health Psychologist to address issues of relaxation, behavioral change, coping style, and other factors important to improvement.  deferred  3. Diagnostic Studies:  n/a  4. Medication Management:    1. Continue Advil in safe doses  2. Continue Effexor 37.5 mg daily  3. Continue Gabapentin 100 mg 2-3 times per day  5. Further procedures recommended:   1. Repeat cervical trigger point injections if needed  2. Repeat cervical MB RFA if/when needed  6. Recommendations to PCP: continue current treatment  7. Follow up: 3 months    ----------------------------------------------------------------  Clinic Number:  [phone]   Call this number with any questions about your care and for scheduling assistance. Calls are returned Monday through Friday between 8 AM and 4:30 PM. We usually get back to you within 2 business days depending on the issue/request.       Medication refills:    For non-narcotic medications, call your pharmacy directly to request a refill. The pharmacy will contact the [facility] for authorization. Please allow 3-4 days for these refills to be processed.     For narcotic refills, call the clinic number or send a Booktrack message. Please contact us 7-10 days before your refill is due. The message MUST include the name of the specific medication(s) requested and how you would like to receive the prescription(s). The options are as follows:    Pain Clinic staff can mail the prescription to your pharmacy. Please tell us the name of the pharmacy.    You may pick the prescription up at the Pain Clinic (tell us the location) or during a clinic visit  with your pain provider    Pain Clinic staff can deliver the prescription to the [facility] pharmacy in the clinic building. Please tell us the location.      We believe regular attendance is key to your success in our program.    Any time you are unable to keep your appointment we ask that you call us at least 24 hours in advance to let us know. This will allow us to offer the appointment time to another patient.

## 2018-12-19 ENCOUNTER — MYC MEDICAL ADVICE (OUTPATIENT)
Dept: FAMILY MEDICINE | Facility: CLINIC | Age: 68
End: 2018-12-19

## 2018-12-30 DIAGNOSIS — F32.0 MILD MAJOR DEPRESSION (H): ICD-10-CM

## 2018-12-31 RX ORDER — VENLAFAXINE HYDROCHLORIDE 37.5 MG/1
CAPSULE, EXTENDED RELEASE ORAL
Qty: 30 CAPSULE | Refills: 0 | Status: SHIPPED | OUTPATIENT
Start: 2018-12-31 | End: 2019-01-11

## 2018-12-31 NOTE — TELEPHONE ENCOUNTER
"Requested Prescriptions   Pending Prescriptions Disp Refills     venlafaxine (EFFEXOR-XR) 37.5 MG 24 hr capsule [Pharmacy Med Name: VENLAFAXINE ER 37.5MG CAPSULES]  Last Written Prescription Date:  7/5/2018  Last Fill Quantity: 90 capsule,  # refills: 1   Last office visit: 5/30/2018 with prescribing provider:  MILA Benitez   Future Office Visit:   Next 5 appointments (look out 90 days)    Jan 11, 2019 11:00 AM CST  SHORT with Mariana Benitez DO  [facility] ([facility] [address] [facility] [address]  [phone]   Mar 11, 2019  2:30 PM CDT  Return Visit with Rome Vicente MD  [facility] ([facility]) [id number] [place] [address]  [place] [address]  [phone]        90 capsule 0     Sig: TAKE 1 CAPSULE(37.5 MG) BY MOUTH DAILY    Serotonin-Norepinephrine Reuptake Inhibitors  Failed - 12/30/2018  3:58 AM       Failed - Blood pressure under 140/90 in past 12 months    BP Readings from Last 3 Encounters:   12/10/18 146/86   10/19/18 139/78   09/20/18 154/84          Failed - PHQ-9 score of less than 5 in past 6 months    Please review last PHQ-9 score.     PHQ-9 SCORE 6/6/2016 1/10/2018   PHQ-9 Total Score 6 9   OSCAR-7 SCORE:  No flowsheet data found.         Passed - Patient is age 18 or older       Passed - No active pregnancy on record       Passed - Normal serum creatinine on file in past 12 months    Recent Labs   Lab Test 05/23/18  0811   CR 0.69            Passed - No positive pregnancy test in past 12 months       Passed - Recent (6 mo) or future (30 days) visit within the authorizing provider's specialty    Patient had office visit in the last 6 months or has a visit in the next 30 days with authorizing provider or within the authorizing provider's specialty.  See \"Patient Info\" tab in inbasket, or \"Choose Columns\" in Meds & Orders section of the refill encounter.              "

## 2019-01-11 ENCOUNTER — OFFICE VISIT (OUTPATIENT)
Dept: FAMILY MEDICINE | Facility: CLINIC | Age: 69
End: 2019-01-11
Payer: COMMERCIAL

## 2019-01-11 VITALS
WEIGHT: 279 LBS | DIASTOLIC BLOOD PRESSURE: 78 MMHG | SYSTOLIC BLOOD PRESSURE: 132 MMHG | TEMPERATURE: 97.7 F | HEIGHT: 67 IN | BODY MASS INDEX: 43.79 KG/M2

## 2019-01-11 DIAGNOSIS — Z12.11 SCREEN FOR COLON CANCER: Primary | ICD-10-CM

## 2019-01-11 DIAGNOSIS — N95.1 MENOPAUSAL SYNDROME (HOT FLASHES): ICD-10-CM

## 2019-01-11 DIAGNOSIS — E66.01 MORBID OBESITY (H): ICD-10-CM

## 2019-01-11 DIAGNOSIS — F32.0 MILD MAJOR DEPRESSION (H): ICD-10-CM

## 2019-01-11 DIAGNOSIS — I10 ESSENTIAL HYPERTENSION WITH GOAL BLOOD PRESSURE LESS THAN 140/90: ICD-10-CM

## 2019-01-11 DIAGNOSIS — Z13.29 SCREENING FOR THYROID DISORDER: ICD-10-CM

## 2019-01-11 DIAGNOSIS — R63.5 WEIGHT GAIN: ICD-10-CM

## 2019-01-11 LAB — HGB BLD-MCNC: 12.6 G/DL (ref 11.7–15.7)

## 2019-01-11 PROCEDURE — 84460 ALANINE AMINO (ALT) (SGPT): CPT | Performed by: FAMILY MEDICINE

## 2019-01-11 PROCEDURE — 85018 HEMOGLOBIN: CPT | Performed by: FAMILY MEDICINE

## 2019-01-11 PROCEDURE — 99214 OFFICE O/P EST MOD 30 MIN: CPT | Performed by: FAMILY MEDICINE

## 2019-01-11 PROCEDURE — 80048 BASIC METABOLIC PNL TOTAL CA: CPT | Performed by: FAMILY MEDICINE

## 2019-01-11 PROCEDURE — 36415 COLL VENOUS BLD VENIPUNCTURE: CPT | Performed by: FAMILY MEDICINE

## 2019-01-11 PROCEDURE — 84443 ASSAY THYROID STIM HORMONE: CPT | Performed by: FAMILY MEDICINE

## 2019-01-11 RX ORDER — HYDROCHLOROTHIAZIDE 25 MG/1
25 TABLET ORAL DAILY
Qty: 90 TABLET | Refills: 3 | Status: SHIPPED | OUTPATIENT
Start: 2019-01-11 | End: 2019-08-08

## 2019-01-11 RX ORDER — AMLODIPINE BESYLATE 5 MG/1
TABLET ORAL
Qty: 90 TABLET | Refills: 1 | Status: SHIPPED | OUTPATIENT
Start: 2019-01-11 | End: 2019-08-07

## 2019-01-11 RX ORDER — VENLAFAXINE HYDROCHLORIDE 75 MG/1
75 CAPSULE, EXTENDED RELEASE ORAL DAILY
Qty: 90 CAPSULE | Refills: 3 | Status: SHIPPED | OUTPATIENT
Start: 2019-01-11 | End: 2019-08-08

## 2019-01-11 RX ORDER — VENLAFAXINE HYDROCHLORIDE 37.5 MG/1
37.5 CAPSULE, EXTENDED RELEASE ORAL DAILY
Qty: 90 CAPSULE | Refills: 1 | Status: SHIPPED | OUTPATIENT
Start: 2019-01-11 | End: 2019-07-22

## 2019-01-11 ASSESSMENT — ANXIETY QUESTIONNAIRES
2. NOT BEING ABLE TO STOP OR CONTROL WORRYING: SEVERAL DAYS
1. FEELING NERVOUS, ANXIOUS, OR ON EDGE: SEVERAL DAYS
5. BEING SO RESTLESS THAT IT IS HARD TO SIT STILL: NOT AT ALL
6. BECOMING EASILY ANNOYED OR IRRITABLE: MORE THAN HALF THE DAYS
IF YOU CHECKED OFF ANY PROBLEMS ON THIS QUESTIONNAIRE, HOW DIFFICULT HAVE THESE PROBLEMS MADE IT FOR YOU TO DO YOUR WORK, TAKE CARE OF THINGS AT HOME, OR GET ALONG WITH OTHER PEOPLE: SOMEWHAT DIFFICULT
GAD7 TOTAL SCORE: 5
3. WORRYING TOO MUCH ABOUT DIFFERENT THINGS: SEVERAL DAYS
7. FEELING AFRAID AS IF SOMETHING AWFUL MIGHT HAPPEN: NOT AT ALL

## 2019-01-11 ASSESSMENT — MIFFLIN-ST. JEOR: SCORE: 1818.17

## 2019-01-11 ASSESSMENT — PATIENT HEALTH QUESTIONNAIRE - PHQ9
SUM OF ALL RESPONSES TO PHQ QUESTIONS 1-9: 12
5. POOR APPETITE OR OVEREATING: NOT AT ALL

## 2019-01-11 NOTE — PATIENT INSTRUCTIONS
Follow up with the [facility], or [facility] as advised  CBT (therapy) as advised  communicate with us regarding mood and weight in one month  Follow up here in 3-6 months  Physical and fasting labs in august after your birthday  FIT stool test, please send it back [facility]   Discharged by : Ruthie Jasmine MA  Paper scripts provided to patient : none     If you have any questions regarding your visit please contact your care team:     Team Gold                Clinic Hours Telephone Number     Dr. Kailyn Marks, CNP 7am-7pm  Monday - Thursday   7am-5pm  Fridays  [phone]   (Appointment scheduling available 24/7)     RN Line  [phone] option 2     Urgent Care - [place] - 11am-9pm Monday-Friday Saturday-Sunday- 9am-5pm     [place] -   5pm-9pm Monday-Friday Saturday-Sunday- 9am-5pm    [phone] - [place]    [phone] - [place]     For a Price Quote for your services, please call our Consumer Price Line at [phone].     What options do I have for visits at the clinic other than the traditional office visit?     To expand how we care for you, many of our providers are utilizing electronic visits (e-visits) and telephone visits, when medically appropriate, for interactions with their patients rather than a visit in the clinic. We also offer nurse visits for many medical concerns. Just like any other service, we will bill your insurance company for this type of visit based on time spent on the phone with your provider. Not all insurance companies cover these visits. Please check with your medical insurance if this type of visit is covered. You will be responsible for any charges that are not paid by your insurance.   E-visits via gamigo: generally incur a $35.00 fee.     Telephone visits:  Time spent on the phone: *charged based on time that is spent on the phone in  increments of 10 minutes. Estimated cost:   5-10 mins $30.00   11-20 mins. $59.00   21-30 mins. $85.00     Use INPHI (secure email communication and access to your chart) to send your primary care provider a message or make an appointment. Ask someone on your Team how to sign up for INPHI.     As always, Thank you for trusting us with your health care needs!    [facility]:    Scheduling Appointments  [place]  Call: [phone]    [facility]  Call: [phone]    [facility]  Call: [phone]    For Gastroenterology referrals   [facility]   Clinics and Surgery Center, 4th Floor   [address] [place] [address]   Appointments: [phone]    WHERE TO GO FOR CARE?  Clinic    Make an appointment if you:       Are sick (cold, cough, flu, sore throat, earache or in pain).       Have a small injury (sprain, small cut, burn or broken bone).       Need a physical exam, Pap smear, vaccine or prescription refill.       Have questions about your health or medicines.    To reach us:      Call [phone] ([phone]). Open 24 hours every day. (For counseling services, call [phone].)    Log into INPHI at [email or web address]. (Visit [email or web address] to create an account.) Hospital emergency room    An emergency is a serious or life- threatening problem that must be treated right away.    Call 159 or get to the hospital if you have:      Very bad or sudden:            - Chest pain or pressure         - Bleeding         - Head or belly pain         - Dizziness or trouble seeing, walking or                          Speaking      Problems breathing      Blood in your vomit or you are coughing up blood      A major injury (knocked out, loss of a finger or limb, rape, broken bone protruding from skin)    A mental health crisis. (Or call the Mental Health Crisis line at 1-554.692.2275 or  Suicide Prevention Hotline at 1-586.243.6090.)    Open 24 hours every day. You don't need an appointment.     Urgent care    Visit urgent care for sickness or small injuries when the clinic is closed. You don't need an appointment. To check hours or find an urgent care near you, visit [email or web address]. Online care    Get online care from [facility] for more than 70 common problems, like colds, allergies and infections. Open 24 hours every day at:   [email or web address]   Need help deciding?    For advice about where to be seen, you may call your clinic and ask to speak with a nurse. We're here for you 24 hours every day.         If you are deaf or hard of hearing, please let us know. We provide many free services including sign language interpreters, oral interpreters, TTYs, telephone amplifiers, note takers and written materials.

## 2019-01-11 NOTE — PROGRESS NOTES
SUBJECTIVE:   Shelia Sanchez is a 68 year old female who presents to clinic today for the following health issues:      Hypertension Follow-up      Outpatient blood pressures are not being checked.    Low Salt Diet: stop eating frozen dinner, trying to lower salt intake.    Depression and Anxiety Follow-Up    Status since last visit: Worsened     Other associated symptoms:when nervous/upset she eats.     Complicating factors:     Significant life event: Yes-  Care giver for older sister. Older sister not doing well.     Current substance abuse: None    PHQ 6/6/2016 1/10/2018   PHQ-9 Total Score 6 9   Q9: Suicide Ideation Not at all Not at all     No flowsheet data found.    PHQ-9  English  PHQ-9   Any Language  OSCAR-7  Suicide Assessment Five-step Evaluation and Treatment (SAFE-T)    Amount of exercise or physical activity: None    Problems taking medications regularly: No    Medication side effects: dry mouth    Diet: regular (no restrictions)    She is eatting a lot more to cope with stress  She has seen therapist about 10 years ago for 2 years, seemed to her that it did not help    Taking care of her sister who is clinically depressed  effexor for a long time last visit 37.5, to 75mg -not that much better  Gabapentin-300mg a day helping chronic neck pain    No suicidal thoughts, she has gained weight   weight watchers-has been to weight clinic, she has been on meds for weight loss and did not help, since she has been a kid she has been overweight    htn-hydrochlorothiazide , novasc 5mg taking it daily, tolerating it well    No family history of colon canners, no       Problem list and histories reviewed & adjusted, as indicated.  Additional history: as documented    Patient Active Problem List   Diagnosis     BMI > 40     CARDIOVASCULAR SCREENING; LDL GOAL LESS THAN 130     Hypertension goal BP (blood pressure) < 140/90     Advanced directives, counseling/discussion     Left shoulder pain     Cataracts, both  "eyes     Urinary incontinence     Atypical nevus     Cervical pain     Left-sided headache     Menopausal syndrome (hot flashes)     Heartburn     Mild major depression (H)     LORI (obstructive sleep apnea)- severe ()     Past Surgical History:   Procedure Laterality Date     C VAG HYST,RMV TUBE/OVARY  2004     CHOLECYSTECTOMY, LAPOROSCOPIC      Cholecystectomy, Laparoscopic     HC ERCP W STENT PLACEMENT BILE/PANCREATIC DUCT         Social History     Tobacco Use     Smoking status: Never Smoker     Smokeless tobacco: Never Used   Substance Use Topics     Alcohol use: Yes     Alcohol/week: 0.0 oz     Comment: 4 DRINKS PER WEEK     Family History   Problem Relation Age of Onset     Arthritis Mother      Congenital Anomalies Mother      Eye Disorder Mother      Glaucoma Mother      Hypertension Mother      Depression Mother      Obesity Mother      Congenital Anomalies Father      Alzheimer Disease Father      Arthritis Father      Diabetes Father      Hypertension Father      Cerebrovascular Disease Father      Prostate Cancer Father      Obesity Father      Heart Disease Maternal Grandmother      Heart Disease Maternal Grandfather      Hypertension Paternal Grandmother      Heart Disease Paternal Grandfather      Depression Sister      Hypertension Sister      Macular Degeneration Maternal Uncle            Reviewed and updated as needed this visit by clinical staff  Allergies       Reviewed and updated as needed this visit by Provider         ROS:  Constitutional, HEENT, cardiovascular, pulmonary, GI, , musculoskeletal, neuro, skin, endocrine and psych systems are negative, except as otherwise noted.    OBJECTIVE:     /78 (BP Location: Right arm, Patient Position: Sitting, Cuff Size: Adult Regular)   Temp 97.7  F (36.5  C) (Oral)   Ht 1.702 m (5' 7\")   Wt 126.6 kg (279 lb)   BMI 43.70 kg/m    Body mass index is 43.7 kg/m .  GENERAL: healthy, alert and no distress  NECK: no adenopathy, no " asymmetry, masses, or scars and thyroid normal to palpation  RESP: lungs clear to auscultation - no rales, rhonchi or wheezes  CV: regular rate and rhythm, normal S1 S2, no S3 or S4, no murmur, click or rub, no peripheral edema and peripheral pulses strong  ABDOMEN: soft, nontender, no hepatosplenomegaly, no masses and bowel sounds normal  MS: no gross musculoskeletal defects noted, no edema  PSYCH: mentation appears normal, affect normal/bright    Diagnostic Test Results:  No results found for this or any previous visit (from the past 24 hour(s)).    ASSESSMENT/PLAN:       ICD-10-CM    1. Screen for colon cancer Z12.11    2. Essential hypertension with goal blood pressure less than 140/90 I10 amLODIPine (NORVASC) 5 MG tablet     aspirin (ASA) 81 MG tablet     hydrochlorothiazide (HYDRODIURIL) 25 MG tablet     ALT     BASIC METABOLIC PANEL     TSH with free T4 reflex     Hemoglobin   3. Menopausal syndrome (hot flashes) N95.1 venlafaxine (EFFEXOR-XR) 75 MG 24 hr capsule   4. Mild major depression (H) F32.0 venlafaxine (EFFEXOR-XR) 37.5 MG 24 hr capsule   5. Weight gain R63.5 TSH with free T4 reflex   6. Morbid obesity (H) E66.01 TSH with free T4 reflex     Hemoglobin   7. Screening for thyroid disorder Z13.29 TSH with free T4 reflex       Overweight-Follow up with the [facility], or [facility] as advised  Depression/anxiety-CBT (therapy) as advised  communicate with us regarding mood and weight in one month  Follow up here in 3-6 months  Physical and fasting labs in august after your birthday  FIT stool test, please send it back asap      See Patient Instructions    Mariana Benitez DO  [facility]

## 2019-01-11 NOTE — LETTER
January 14, 2019      Shelia Sanchez  [address] [address]        Dear Shelia,     The results of your recent lab tests were within normal limits. Enclosed is a copy of these results.  If you have any further questions or problems, please contact our office.    Results for orders placed or performed in visit on 01/11/19   ALT   Result Value Ref Range    ALT 28 0 - 50 U/L   BASIC METABOLIC PANEL   Result Value Ref Range    Sodium 137 133 - 144 mmol/L    Potassium 3.8 3.4 - 5.3 mmol/L    Chloride 106 94 - 109 mmol/L    Carbon Dioxide 25 20 - 32 mmol/L    Anion Gap 6 3 - 14 mmol/L    Glucose 82 70 - 99 mg/dL    Urea Nitrogen 22 7 - 30 mg/dL    Creatinine 0.68 0.52 - 1.04 mg/dL    GFR Estimate 89 >60 mL/min/[1.73_m2]    GFR Estimate If Black >90 >60 mL/min/[1.73_m2]    Calcium 9.2 8.5 - 10.1 mg/dL   TSH with free T4 reflex   Result Value Ref Range    TSH 1.18 0.40 - 4.00 mU/L   Hemoglobin   Result Value Ref Range    Hemoglobin 12.6 11.7 - 15.7 g/dL     Sincerely,        Mariana Benitez DO

## 2019-01-12 ASSESSMENT — ANXIETY QUESTIONNAIRES: GAD7 TOTAL SCORE: 5

## 2019-01-13 LAB
ALT SERPL W P-5'-P-CCNC: 28 U/L (ref 0–50)
ANION GAP SERPL CALCULATED.3IONS-SCNC: 6 MMOL/L (ref 3–14)
BUN SERPL-MCNC: 22 MG/DL (ref 7–30)
CALCIUM SERPL-MCNC: 9.2 MG/DL (ref 8.5–10.1)
CHLORIDE SERPL-SCNC: 106 MMOL/L (ref 94–109)
CO2 SERPL-SCNC: 25 MMOL/L (ref 20–32)
CREAT SERPL-MCNC: 0.68 MG/DL (ref 0.52–1.04)
GFR SERPL CREATININE-BSD FRML MDRD: 89 ML/MIN/{1.73_M2}
GLUCOSE SERPL-MCNC: 82 MG/DL (ref 70–99)
POTASSIUM SERPL-SCNC: 3.8 MMOL/L (ref 3.4–5.3)
SODIUM SERPL-SCNC: 137 MMOL/L (ref 133–144)
TSH SERPL DL<=0.005 MIU/L-ACNC: 1.18 MU/L (ref 0.4–4)

## 2019-01-18 DIAGNOSIS — I10 ESSENTIAL HYPERTENSION WITH GOAL BLOOD PRESSURE LESS THAN 140/90: ICD-10-CM

## 2019-01-18 RX ORDER — HYDROCHLOROTHIAZIDE 25 MG/1
TABLET ORAL
Qty: 90 TABLET | Refills: 0 | OUTPATIENT
Start: 2019-01-18

## 2019-01-18 NOTE — TELEPHONE ENCOUNTER
"Requested Prescriptions   Pending Prescriptions Disp Refills     hydrochlorothiazide (HYDRODIURIL) 25 MG tablet [Pharmacy Med Name: HYDROCHLOROTHIAZIDE 25MG TABLETS]  Last Written Prescription Date:  1/11/2019  Last Fill Quantity: 90 tablet,  # refills: 3   Last office visit: 1/11/2019 with prescribing provider:  MILA Benitez   Future Office Visit:   Next 5 appointments (look out 90 days)    Mar 11, 2019  2:30 PM CDT  Return Visit with Rome Vicente MD  [facility] [place] ([facility]) [address] at the [facility]  [place] [address] [phone]          90 tablet 0     Sig: TAKE 1 TABLET BY MOUTH DAILY    Diuretics (Including Combos) Protocol Failed - 1/18/2019  3:58 AM       Failed - Recent (12 mo) or future (30 days) visit within the authorizing provider's specialty    Patient had office visit in the last 12 months or has a visit in the next 30 days with authorizing provider or within the authorizing provider's specialty.  See \"Patient Info\" tab in inbasket, or \"Choose Columns\" in Meds & Orders section of the refill encounter.           Passed - Blood pressure under 140/90 in past 12 months    BP Readings from Last 3 Encounters:   01/11/19 132/78   12/10/18 146/86   10/19/18 139/78          Passed - Medication is active on med list       Passed - Patient is age 18 or older       Passed - No active pregancy on record       Passed - Normal serum creatinine on file in past 12 months    Recent Labs   Lab Test 01/11/19  1150   CR 0.68             Passed - Normal serum potassium on file in past 12 months    Recent Labs   Lab Test 01/11/19  1150   POTASSIUM 3.8           Passed - Normal serum sodium on file in past 12 months    Recent Labs   Lab Test 01/11/19  1150                Passed - No positive pregnancy test in past 12 months          "

## 2019-01-18 NOTE — TELEPHONE ENCOUNTER
Duplicate from the same pharmacy and we received a confirmation receipt. Sent a message asking if they received it.  Lilliana Reeves RN

## 2019-02-04 ENCOUNTER — MYC MEDICAL ADVICE (OUTPATIENT)
Dept: FAMILY MEDICINE | Facility: CLINIC | Age: 69
End: 2019-02-04

## 2019-02-04 DIAGNOSIS — E55.9 VITAMIN D DEFICIENCY: Primary | ICD-10-CM

## 2019-03-07 ENCOUNTER — TELEPHONE (OUTPATIENT)
Dept: FAMILY MEDICINE | Facility: CLINIC | Age: 69
End: 2019-03-07

## 2019-03-07 NOTE — TELEPHONE ENCOUNTER
Panel Management Review      Patient has the following on her problem list:     Hypertension   Last three blood pressure readings:  BP Readings from Last 3 Encounters:   01/11/19 132/78   12/10/18 146/86   10/19/18 139/78     Blood pressure: MONITOR    HTN Guidelines:  Age 18-59 BP range:  Less than 140/90  Age 60-85 with Diabetes:  Less than 140/90  Age 60-85 without Diabetes:  less than 150/90      Composite cancer screening  Chart review shows that this patient is due/due soon for the following Fecal Colorectal (FIT)  Summary:    Patient is due/failing the following:   FIT and PHYSICAL    Action needed:   Patient needs office visit for physical.    Type of outreach:    Phone, left message for patient to call back.  and Sent letter.    Questions for provider review:    None                                                                                                                                    Marlyn Beavers CMA ([facility])

## 2019-03-07 NOTE — LETTER
[facility]  [address] [place] [address]  [phone]  March 7, 2019    Shelia Sanchez  [address]  [facility] [address]    Dear Shelia,    I care about your health and have reviewed your health plan. I have reviewed your medical conditions, medication list, and lab results and am making recommendations based on this review, to better manage your health.    You are in particular need of attention regarding:  -Colon Cancer Screening  -Wellness (Physical) Visit     I am recommending that you:  -schedule a WELLNESS (Physical) APPOINTMENT with me.   (If you go elsewhere for Wellness visits then please disregard this reminder.)      Here is a list of Health Maintenance topics that are due now or due soon:  Health Maintenance Due   Topic Date Due     Colon Cancer Screening - FIT Test - yearly  [birth year]20/1960     Depression Action Plan Review  08/20/1966     Zoster (Shingles) Vaccine (2 of 3) 04/18/2016     Eye Exam - yearly  06/30/2018     Flu Vaccine (1) 09/01/2018     Annual Wellness Visit  01/10/2019     Cholesterol Lab - yearly  01/10/2019       Please call us at [phone] (or use appAttach) to address the above recommendations.     Thank you for trusting [facility] and we appreciate the opportunity to serve you.  We look forward to supporting your healthcare needs in the future.    Healthy Regards,  Mariana Benitez DO   ap

## 2019-03-11 ENCOUNTER — OFFICE VISIT (OUTPATIENT)
Dept: PALLIATIVE MEDICINE | Facility: CLINIC | Age: 69
End: 2019-03-11
Payer: COMMERCIAL

## 2019-03-11 VITALS
BODY MASS INDEX: 43.23 KG/M2 | WEIGHT: 276 LBS | DIASTOLIC BLOOD PRESSURE: 79 MMHG | SYSTOLIC BLOOD PRESSURE: 145 MMHG | HEART RATE: 83 BPM

## 2019-03-11 DIAGNOSIS — F32.0 MILD MAJOR DEPRESSION (H): Chronic | ICD-10-CM

## 2019-03-11 DIAGNOSIS — M54.2 CERVICAL PAIN: Primary | Chronic | ICD-10-CM

## 2019-03-11 DIAGNOSIS — M79.18 MYOFASCIAL PAIN: ICD-10-CM

## 2019-03-11 DIAGNOSIS — R51.9 LEFT-SIDED HEADACHE: Chronic | ICD-10-CM

## 2019-03-11 PROCEDURE — 99213 OFFICE O/P EST LOW 20 MIN: CPT | Performed by: ANESTHESIOLOGY

## 2019-03-11 RX ORDER — GABAPENTIN 100 MG/1
100 CAPSULE ORAL 4 TIMES DAILY
Qty: 120 CAPSULE | Refills: 11 | Status: SHIPPED | OUTPATIENT
Start: 2019-03-11 | End: 2019-08-08

## 2019-03-11 ASSESSMENT — PAIN SCALES - GENERAL: PAINLEVEL: NO PAIN (0)

## 2019-03-11 NOTE — PROGRESS NOTES
[facility]    Date of visit: 3/11/2019    Chief complaint:   Chief Complaint   Patient presents with     Pain       Interval history:  Shelia Sanchez was last seen by me on 12/10/18.      Recommendations/plan at the last visit included:  Plan:  1. Physical Therapy:  Continue self directed exercise  2. Clinical Health Psychologist to address issues of relaxation, behavioral change, coping style, and other factors important to improvement.  deferred  3. Diagnostic Studies:  n/a  4. Medication Management:    1. Continue Advil in safe doses  2. Continue Effexor 37.5 mg daily  3. Continue Gabapentin 100 mg 2-3 times per day  5. Further procedures recommended:   1. Repeat cervical trigger point injections if needed  2. Repeat cervical MB RFA if/when needed  6. Recommendations to PCP: continue current treatment  7. Follow up: 3 months    Since her last visit, Shelia Sanchez reports:  She has been doing very well since her last visit.  She will still get headaches on the left side of her head if she lifts something too heavy or if she sleeps wrong.  She will get occasional left sided neck pain as well.  When she gets a headache, she takes ibuprofen and they go away in a couple of hours.  Her neck pain is pretty infrequent at this point and her headaches have not been as frequent either.   She describes her pain as throbbing in nature.  She has recently been started on a CPAP machine and states that she has been sleeping very well since.    Pain scores:  Pain intensity on average is 0 on a scale of 0-10.  Ranges from 0/10 to 1/10.    [place] Board of Pharmacy Data Base Reviewed:    N/A; not on opioids    Current pain treatments:   Advil 200 mg three tabs prn  Effexor 37.5 mg daily  Gabapentin 100 mg 2-3 times per day - some mild swelling    Past pain treatments:  advil  effexor     Injections:    -9/20/18 cervical TPI  -7/25/17 cervical MB RFA left C3, C4, C5, C6  -6/2/17 cervical MBB  #2  -4/13/17 cervical MBB #1  -7/11/16 left hip bursa injection ([facility])  -6/6/16 right shoulder glenohumeral joint injection ([facility])    Date of last UDS:  Not on opioids    Side Effects: no side effect    Medications:  Current Outpatient Medications   Medication Sig Dispense Refill     amLODIPine (NORVASC) 5 MG tablet TAKE 1 TABLET(5 MG) BY MOUTH DAILY 90 tablet 1     aspirin (ASA) 81 MG tablet Take 1 tablet (81 mg) by mouth daily 90 tablet 1     Esomeprazole Magnesium (NEXIUM 24HR PO) Take 1 tablet by mouth daily       gabapentin (NEURONTIN) 100 MG capsule Take 1 capsule (100 mg) by mouth 3 times daily 90 capsule 3     hydrochlorothiazide (HYDRODIURIL) 25 MG tablet Take 1 tablet (25 mg) by mouth daily 90 tablet 3     Ibuprofen (ADVIL PO) Taking as needed for pain       venlafaxine (EFFEXOR-XR) 37.5 MG 24 hr capsule Take 1 capsule (37.5 mg) by mouth daily 90 capsule 1     venlafaxine (EFFEXOR-XR) 75 MG 24 hr capsule Take 1 capsule (75 mg) by mouth daily 90 capsule 3       Medical History: any changes in medical history since they were last seen? No    Review of Systems:  The 14 system ROS was reviewed from the intake questionnaire, and is positive for: fatigue, weight gain, headache, edema, HTN, arthritis, neck pain, urinary incontinence, depression, anxiety  Any bowel or bladder problems: denies changes  Mood: good    Physical Exam:  /79   Pulse 83   Wt 125.2 kg (276 lb)   BMI 43.23 kg/m    Constitutional: alert and no distress.  Pt is obese  Head: Normocephalic. No masses, lesions, tenderness or abnormalities  ENT: EOMI, mucosal surfaces moist.  Neck with full ROM, posture fair  Cardiovascular: No edema or JVD appreciated.  Respiratory: Good diaphragmatic excursion. No wheezes appreciated.  Speaking in complete sentences without shortness of breath.  No accessory muscle use.   Musculoskeletal: extremities normal- no gross deformities noted, normal muscle tone and able to move about the exam room  without difficulty.    Skin: no suspicious lesions or rashes appreciated on exposed areas  Neurologic: Gait normal and non-antalgic. Moving all extremities spontaneously, no apparent weakness.    Psychiatric: mentation appears normal, affect full and good eye contact.      Cervical Spine:  Good ROM, exam limited since patient doing well    Assessment:   1. Chronic neck pain  2. Cervical spondylosis (arithritis)  3. Cervical radiculopathy  4. Cervical facet joint arthropathy  5. Cervicogenic headache  6. Myofascial pain  7. Chronic right shoulder pain  8. Right acromioclavicular joint arthropathy  9. vertigo    Shelia Sanchez is a 68 year old female who is seen at the pain clinic for chronic neck pain and headache.  She has been doing very well since her last visit and her pain episodes are much less frequent and severe.  She continues to find Gabapentin and ibuprofen helpful with her pain.  Our treatment plan is as outlined below.    Plan:  1. Physical Therapy:  Continue current treatment  2. Clinical Health Psychologist to address issues of relaxation, behavioral change, coping style, and other factors important to improvement.  deferred  3. Diagnostic Studies:  n/a  4. Medication Management:    1. Continue Ibuprofen in safe doses  2. Continue Gabapentin 100 mg 3-4 tabs per day  5. Further procedures recommended:   1. Repeat cervical medial branch RFA when ready  2. Repeat trigger point injections if needed  6. Recommendations to PCP: continue current treatment  7. Patient to follow up with Primary Care provider regarding elevated blood pressure.  8. Follow up: as needed    Total time spent was 30 minutes, and more than 50% of face to face time was spent in counseling and/or coordination of care regarding diagnosis, physical activity, medication management, and interventional procedures.    Rome Vicente MD  [facility]

## 2019-03-11 NOTE — PATIENT INSTRUCTIONS
Assessment:   1. Chronic neck pain  2. Cervical spondylosis (arithritis)  3. Cervical radiculopathy  4. Cervical facet joint arthropathy  5. Cervicogenic headache  6. Myofascial pain  7. Chronic right shoulder pain  8. Right acromioclavicular joint arthropathy  9. vertigo    Plan:  1. Physical Therapy:  Continue current treatment  2. Clinical Health Psychologist to address issues of relaxation, behavioral change, coping style, and other factors important to improvement.  deferred  3. Diagnostic Studies:  n/a  4. Medication Management:    1. Continue Ibuprofen in safe doses  2. Continue Gabapentin 100 mg 3-4 tabs per day  5. Further procedures recommended:   1. Repeat cervical medial branch RFA when ready  2. Repeat trigger point injections if needed  6. Recommendations to PCP: continue current treatment  7. Patient to follow up with Primary Care provider regarding elevated blood pressure.  8. Follow up: as needed    ----------------------------------------------------------------  Clinic Number:  [phone]   Call this number with any questions about your care and for scheduling assistance. Calls are returned Monday through Friday between 8 AM and 4:30 PM. We usually get back to you within 2 business days depending on the issue/request.       Medication refills:    For non-narcotic medications, call your pharmacy directly to request a refill. The pharmacy will contact the [facility] for authorization. Please allow 3-4 days for these refills to be processed.     For narcotic refills, call the clinic number or send a [employer] message. Please contact us 7-10 days before your refill is due. The message MUST include the name of the specific medication(s) requested and how you would like to receive the prescription(s). The options are as follows:    Pain Clinic staff can mail the prescription to your pharmacy. Please tell us the name of the pharmacy.    You may pick the prescription up at the Pain Clinic  (tell us the location) or during a clinic visit with your pain provider    Pain Clinic staff can deliver the prescription to the [facility] pharmacy in the clinic building. Please tell us the location.      We believe regular attendance is key to your success in our program.    Any time you are unable to keep your appointment we ask that you call us at least 24 hours in advance to let us know. This will allow us to offer the appointment time to another patient.

## 2019-03-12 ENCOUNTER — DOCUMENTATION ONLY (OUTPATIENT)
Dept: SLEEP MEDICINE | Facility: CLINIC | Age: 69
End: 2019-03-12

## 2019-03-12 DIAGNOSIS — G47.33 OSA (OBSTRUCTIVE SLEEP APNEA): ICD-10-CM

## 2019-03-12 NOTE — PROGRESS NOTES
6 Month [facility] visit    Diagnostic AHI: 101.3    PSG    Data only recheck     Assessment: Pt meeting objective benchmarks. AHI, slighlty elevated however initial AHI was  101     Action plan:   pt to follow up per provider request    Device type: Auto-CPAP  PAP settings: CPAP min 8 cm  H20     CPAP max 18 cm  H20          90th % aorrjnaj65.5 cm  H20    Objective measures: 14 day rolling measures         Compliance  71 %     % of night spent in large leak  0 % last  upload      AHI 6.48   last  upload      Average number of minutes 401           Objective measure goal  Compliance   Goal >70%  Leak   Goal < 10%  AHI  Goal < 5  Usage  Goal >240

## 2019-05-06 DIAGNOSIS — E55.9 VITAMIN D DEFICIENCY: ICD-10-CM

## 2019-05-06 PROCEDURE — 36415 COLL VENOUS BLD VENIPUNCTURE: CPT | Performed by: FAMILY MEDICINE

## 2019-05-06 PROCEDURE — 82306 VITAMIN D 25 HYDROXY: CPT | Performed by: FAMILY MEDICINE

## 2019-05-07 LAB — DEPRECATED CALCIDIOL+CALCIFEROL SERPL-MC: 11 UG/L (ref 20–75)

## 2019-05-13 NOTE — RESULT ENCOUNTER NOTE
Your vit D is very low, please start vit D supplementation, if you are not taking any.  Consider high dose vit D , if your are taking vit d now    RAMON MenonO

## 2019-07-09 DIAGNOSIS — I10 ESSENTIAL HYPERTENSION WITH GOAL BLOOD PRESSURE LESS THAN 140/90: ICD-10-CM

## 2019-07-09 RX ORDER — ASPIRIN 81 MG/1
TABLET, CHEWABLE ORAL
Qty: 90 TABLET | Refills: 0 | Status: SHIPPED | OUTPATIENT
Start: 2019-07-09 | End: 2019-10-08

## 2019-07-09 NOTE — TELEPHONE ENCOUNTER
Prescription approved per [facility] Refill Protocol.  Davida Low RN

## 2019-07-22 ENCOUNTER — TELEPHONE (OUTPATIENT)
Dept: FAMILY MEDICINE | Facility: CLINIC | Age: 69
End: 2019-07-22

## 2019-07-22 DIAGNOSIS — F32.0 MILD MAJOR DEPRESSION (H): ICD-10-CM

## 2019-07-22 NOTE — TELEPHONE ENCOUNTER
"Requested Prescriptions   Pending Prescriptions Disp Refills     venlafaxine (EFFEXOR-XR) 37.5 MG 24 hr capsule [Pharmacy Med Name: VENLAFAXINE ER 37.5MG CAPSULES]  Last Written Prescription Date:  1/11/2019  Last Fill Quantity: 90 capsule,  # refills: 1   Last office visit: 1/11/2019 with prescribing provider:  MILA Benitez   Future Office Visit:     90 capsule 0     Sig: TAKE 1 CAPSULE(37.5 MG) BY MOUTH DAILY       Serotonin-Norepinephrine Reuptake Inhibitors  Failed - 7/22/2019  3:58 AM        Failed - Blood pressure under 140/90 in past 12 months     BP Readings from Last 3 Encounters:   03/11/19 145/79   01/11/19 132/78   12/10/18 146/86           Failed - PHQ-9 score of less than 5 in past 6 months     Please review last PHQ-9 score.   PHQ-9 SCORE 6/6/2016 1/10/2018 1/11/2019   PHQ-9 Total Score 6 9 12     OSCAR-7 SCORE 1/11/2019   Total Score 5           Failed - Recent (6 mo) or future (30 days) visit within the authorizing provider's specialty     Patient had office visit in the last 6 months or has a visit in the next 30 days with authorizing provider or within the authorizing provider's specialty.  See \"Patient Info\" tab in inbasket, or \"Choose Columns\" in Meds & Orders section of the refill encounter.            Passed - Medication is active on med list        Passed - Patient is age 18 or older        Passed - No active pregnancy on record        Passed - Normal serum creatinine on file in past 12 months     Recent Labs   Lab Test 01/11/19  1150   CR 0.68             Passed - No positive pregnancy test in past 12 months          "

## 2019-07-23 NOTE — TELEPHONE ENCOUNTER
TC, please assist patient in making an appointment with provider and then route to provider to consider isamar refill x 1.    Jeffery Thomas RN

## 2019-07-26 ENCOUNTER — TELEPHONE (OUTPATIENT)
Dept: FAMILY MEDICINE | Facility: CLINIC | Age: 69
End: 2019-07-26

## 2019-07-26 DIAGNOSIS — I10 ESSENTIAL HYPERTENSION WITH GOAL BLOOD PRESSURE LESS THAN 140/90: Primary | ICD-10-CM

## 2019-07-26 DIAGNOSIS — Z13.220 SCREENING FOR LIPOID DISORDERS: ICD-10-CM

## 2019-07-26 DIAGNOSIS — Z13.29 SCREENING FOR THYROID DISORDER: ICD-10-CM

## 2019-07-26 NOTE — TELEPHONE ENCOUNTER
Pended orders for labs that you may want done for upcoming physical. Do you want to add any?  Thank you,  Barbara Rhoades RN

## 2019-07-26 NOTE — TELEPHONE ENCOUNTER
LMOM for patient to call back main clinic number to schedule an appointment for a physical.    Thank you,  Jackie MARC    [facility]

## 2019-07-26 NOTE — TELEPHONE ENCOUNTER
Reason for Call: Request for an order or referral:    Order or referral being requested: Fasting Lab orders    Date needed: at your convenience    Has the patient been seen by the PCP for this problem? Not Applicable    Additional comments: Patient set up her physical for 8/7 and wants to know if she can get the orders placed ahead of time so that she can get her fasting labs done before the appointment and have the results ready for when she comes in for the physical. Please call patient to discuss.     Phone number Patient can be reached at:  Cell number on file:    Telephone Information:   Mobile [phone]       Best Time:  Any     Can we leave a detailed message on this number?  YES    Call taken on 7/26/2019 at 10:52 AM by Aishwarya Malin

## 2019-07-29 RX ORDER — VENLAFAXINE HYDROCHLORIDE 37.5 MG/1
CAPSULE, EXTENDED RELEASE ORAL
Qty: 90 CAPSULE | Refills: 0 | Status: SHIPPED | OUTPATIENT
Start: 2019-07-29 | End: 2019-08-07

## 2019-07-29 NOTE — TELEPHONE ENCOUNTER
Routing refill request for venlafaxine to PCP due to failed protocol. Please see below. Patient has upcoming appt in August.  Your last visit note from 1/11/19 is pasted below.     Keke Winter, RN    3. Menopausal syndrome (hot flashes) N95.1 venlafaxine (EFFEXOR-XR) 75 MG 24 hr capsule   4. Mild major depression (H) F32.0 venlafaxine (EFFEXOR-XR) 37.5 MG 24 hr capsule   5. Weight gain R63.5 TSH with free T4 reflex   6. Morbid obesity (H) E66.01 TSH with free T4 reflex       Hemoglobin   7. Screening for thyroid disorder Z13.29 TSH with free T4 reflex         Overweight-Follow up with the [facility], or [facility] as advised  Depression/anxiety-CBT (therapy) as advised  communicate with us regarding mood and weight in one month  Follow up here in 3-6 months  Physical and fasting labs in august after your birthday  FIT stool test, please send it back asap        See Patient Instructions     Mariana Benitez DO  [facility]

## 2019-07-30 ENCOUNTER — TELEPHONE (OUTPATIENT)
Dept: FAMILY MEDICINE | Facility: CLINIC | Age: 69
End: 2019-07-30

## 2019-07-30 DIAGNOSIS — E55.9 VITAMIN D DEFICIENCY: Primary | ICD-10-CM

## 2019-07-30 NOTE — TELEPHONE ENCOUNTER
Reason for Call: Request for an order or referral:    Order or referral being requested: Vitamin D    Date needed: as soon as possible    Has the patient been seen by the PCP for this problem? YES    Additional comments: Patient would like to add Vitamin D to future lab orders    Phone number Patient can be reached at:  Home number on file [phone] (home)    Best Time:  Any      Can we leave a detailed message on this number?  YES    Call taken on 7/30/2019 at 11:14 AM by Melony Castro

## 2019-07-30 NOTE — TELEPHONE ENCOUNTER
Route request for repeat Vitamin D lab order to PCP. Order pended. You had ordered this on 5/6/19 with result of 11, advised patient to start Vitamin D supplement, and now patient is wanting rechecked with other labs she is doing (lipids,TSH, BMP).    Keke Winter RN

## 2019-07-30 NOTE — TELEPHONE ENCOUNTER
Left detailed VM for patient, as states okay below. Instructed that all labs are ordered, and she can schedule a lab-only appt prior to PCP appt if she wants, will need to fast 10-12 hours prior for the lipid panel. Instructed she can call us back with any further questions.    Keke Winter RN

## 2019-08-05 DIAGNOSIS — E55.9 VITAMIN D DEFICIENCY: ICD-10-CM

## 2019-08-05 DIAGNOSIS — Z13.29 SCREENING FOR THYROID DISORDER: ICD-10-CM

## 2019-08-05 DIAGNOSIS — Z13.220 SCREENING FOR LIPOID DISORDERS: ICD-10-CM

## 2019-08-05 DIAGNOSIS — I10 ESSENTIAL HYPERTENSION WITH GOAL BLOOD PRESSURE LESS THAN 140/90: ICD-10-CM

## 2019-08-05 LAB
ANION GAP SERPL CALCULATED.3IONS-SCNC: 7 MMOL/L (ref 3–14)
BUN SERPL-MCNC: 18 MG/DL (ref 7–30)
CALCIUM SERPL-MCNC: 9.4 MG/DL (ref 8.5–10.1)
CHLORIDE SERPL-SCNC: 107 MMOL/L (ref 94–109)
CHOLEST SERPL-MCNC: 222 MG/DL
CO2 SERPL-SCNC: 25 MMOL/L (ref 20–32)
CREAT SERPL-MCNC: 0.76 MG/DL (ref 0.52–1.04)
DEPRECATED CALCIDIOL+CALCIFEROL SERPL-MC: 42 UG/L (ref 20–75)
GFR SERPL CREATININE-BSD FRML MDRD: 80 ML/MIN/{1.73_M2}
GLUCOSE SERPL-MCNC: 136 MG/DL (ref 70–99)
HDLC SERPL-MCNC: 47 MG/DL
LDLC SERPL CALC-MCNC: 146 MG/DL
NONHDLC SERPL-MCNC: 175 MG/DL
POTASSIUM SERPL-SCNC: 3.7 MMOL/L (ref 3.4–5.3)
SODIUM SERPL-SCNC: 139 MMOL/L (ref 133–144)
TRIGL SERPL-MCNC: 146 MG/DL
TSH SERPL DL<=0.005 MIU/L-ACNC: 3.45 MU/L (ref 0.4–4)

## 2019-08-05 PROCEDURE — 82306 VITAMIN D 25 HYDROXY: CPT | Performed by: FAMILY MEDICINE

## 2019-08-05 PROCEDURE — 80061 LIPID PANEL: CPT | Performed by: FAMILY MEDICINE

## 2019-08-05 PROCEDURE — 36415 COLL VENOUS BLD VENIPUNCTURE: CPT | Performed by: FAMILY MEDICINE

## 2019-08-05 PROCEDURE — 80048 BASIC METABOLIC PNL TOTAL CA: CPT | Performed by: FAMILY MEDICINE

## 2019-08-05 PROCEDURE — 84443 ASSAY THYROID STIM HORMONE: CPT | Performed by: FAMILY MEDICINE

## 2019-08-05 NOTE — LETTER
"[facility]  [address] [address] [phone]                                                                                                August 12, 2019    Shelia Sanchez  [address]  [place] [address]        Dear Ms. Sanchez,    Your cholesterol is abnormal, please use the recommendations below and recheck labs in 6-12 months.     Ways to improve your cholesterol...     1- Eats less saturated fats (including avoiding \"trans\" fats).     2 - Eat more unsaturated fats  - found in vege   tables, grains, and tree nuts.   Also by replacing butter with canola oil or olive oil.     3 - Eat more nuts.   1-2 ounces (a small handful) of almonds, walnuts, hazelnuts or pecans once a day in place of other less healthy snacks.     4 - Eat more high   fiber foods - vegetables and whole grains including oat bran, oats, beans, peas, and flax seed.     5 - Eat more fish - such as salmon, tuna, mackerel, and sardines.  1 or 2 six ounce servings per week is a healthy replacement for other proteins.     6 - Exercise for at least 120 minutes per week - which is equal to 30 minutes 4 days per week.     Sincerely,      Mariana Benitez, DO/hl    Results for orders placed or performed in visit on 08/05/19   **Vitamin D Deficiency FUTURE anytime   Result Value Ref Range    Vitamin D Deficiency screening 42 20 - 75 ug/L   **Basic metabolic panel FUTURE anytime   Result Value Ref Range    Sodium 139 133 - 144 mmol/L    Potassium 3.7 3.4 - 5.3 mmol/L    Chloride 107 94 - 109 mmol/L    Carbon Dioxide 25 20 - 32 mmol/L    Anion Gap 7 3 - 14 mmol/L    Glucose 136 (H) 70 - 99 mg/dL    Urea Nitrogen 18 7 - 30 mg/dL    Creatinine 0.76 0.52 - 1.04 mg/dL    GFR Estimate 80 >60 mL/min/[1.73_m2]    GFR Estimate If Black >90 >60 mL/min/[1.73_m2]    Calcium 9.4 8.5 - 10.1 mg/dL   **TSH with free T4 reflex FUTURE anytime   Result Value Ref Range    TSH 3.45 0.40 - 4.00 mU/L   Lipid panel " reflex to direct LDL Fasting   Result Value Ref Range    Cholesterol 222 (H) <200 mg/dL    Triglycerides 146 <150 mg/dL    HDL Cholesterol 47 (L) >49 mg/dL    LDL Cholesterol Calculated 146 (H) <100 mg/dL    Non HDL Cholesterol 175 (H) <130 mg/dL

## 2019-08-07 ENCOUNTER — OFFICE VISIT (OUTPATIENT)
Dept: FAMILY MEDICINE | Facility: CLINIC | Age: 69
End: 2019-08-07
Payer: COMMERCIAL

## 2019-08-07 VITALS
SYSTOLIC BLOOD PRESSURE: 154 MMHG | TEMPERATURE: 98.4 F | DIASTOLIC BLOOD PRESSURE: 90 MMHG | BODY MASS INDEX: 43.16 KG/M2 | WEIGHT: 275 LBS | HEIGHT: 67 IN

## 2019-08-07 DIAGNOSIS — E66.01 MORBID OBESITY (H): Chronic | ICD-10-CM

## 2019-08-07 DIAGNOSIS — R73.03 PREDIABETES: ICD-10-CM

## 2019-08-07 DIAGNOSIS — M54.2 CERVICAL PAIN: Chronic | ICD-10-CM

## 2019-08-07 DIAGNOSIS — I10 ESSENTIAL HYPERTENSION WITH GOAL BLOOD PRESSURE LESS THAN 140/90: ICD-10-CM

## 2019-08-07 DIAGNOSIS — I10 HYPERTENSION GOAL BP (BLOOD PRESSURE) < 140/90: Chronic | ICD-10-CM

## 2019-08-07 DIAGNOSIS — Z00.00 ENCOUNTER FOR MEDICARE ANNUAL WELLNESS EXAM: Primary | ICD-10-CM

## 2019-08-07 DIAGNOSIS — F50.819 BINGE EATING DISORDER: ICD-10-CM

## 2019-08-07 DIAGNOSIS — N95.1 MENOPAUSAL SYNDROME (HOT FLASHES): ICD-10-CM

## 2019-08-07 DIAGNOSIS — M79.18 MYOFASCIAL PAIN: ICD-10-CM

## 2019-08-07 DIAGNOSIS — N39.46 MIXED STRESS AND URGE URINARY INCONTINENCE: ICD-10-CM

## 2019-08-07 DIAGNOSIS — R51.9 LEFT-SIDED HEADACHE: Chronic | ICD-10-CM

## 2019-08-07 DIAGNOSIS — H25.13 AGE-RELATED NUCLEAR CATARACT OF BOTH EYES: ICD-10-CM

## 2019-08-07 DIAGNOSIS — G47.33 OSA (OBSTRUCTIVE SLEEP APNEA): Chronic | ICD-10-CM

## 2019-08-07 DIAGNOSIS — R73.9 ELEVATED BLOOD SUGAR LEVEL: ICD-10-CM

## 2019-08-07 DIAGNOSIS — F32.0 MILD MAJOR DEPRESSION (H): ICD-10-CM

## 2019-08-07 LAB — HBA1C MFR BLD: 6.1 % (ref 0–5.6)

## 2019-08-07 PROCEDURE — 99214 OFFICE O/P EST MOD 30 MIN: CPT | Mod: 25 | Performed by: FAMILY MEDICINE

## 2019-08-07 PROCEDURE — G0439 PPPS, SUBSEQ VISIT: HCPCS | Performed by: FAMILY MEDICINE

## 2019-08-07 PROCEDURE — 83036 HEMOGLOBIN GLYCOSYLATED A1C: CPT | Performed by: FAMILY MEDICINE

## 2019-08-07 PROCEDURE — 36415 COLL VENOUS BLD VENIPUNCTURE: CPT | Performed by: FAMILY MEDICINE

## 2019-08-07 RX ORDER — VENLAFAXINE HYDROCHLORIDE 37.5 MG/1
37.5 CAPSULE, EXTENDED RELEASE ORAL DAILY
Qty: 90 CAPSULE | Refills: 0 | Status: SHIPPED | OUTPATIENT
Start: 2019-08-07 | End: 2019-08-08

## 2019-08-07 RX ORDER — AMLODIPINE BESYLATE 5 MG/1
TABLET ORAL
Qty: 90 TABLET | Refills: 1 | Status: SHIPPED | OUTPATIENT
Start: 2019-08-07 | End: 2019-08-08

## 2019-08-07 ASSESSMENT — ENCOUNTER SYMPTOMS
CONSTIPATION: 1
HEMATURIA: 0
HEMATOCHEZIA: 0
FEVER: 0
NAUSEA: 0
DIZZINESS: 0
SORE THROAT: 0
JOINT SWELLING: 0
BREAST MASS: 0
PARESTHESIAS: 0
MYALGIAS: 0
EYE PAIN: 0
DIARRHEA: 0
ABDOMINAL PAIN: 0
PALPITATIONS: 0
SHORTNESS OF BREATH: 0
ARTHRALGIAS: 1
HEARTBURN: 1
DYSURIA: 0
CHILLS: 0
COUGH: 1
HEADACHES: 1
WEAKNESS: 0
NERVOUS/ANXIOUS: 1
FREQUENCY: 0

## 2019-08-07 ASSESSMENT — ACTIVITIES OF DAILY LIVING (ADL): CURRENT_FUNCTION: NO ASSISTANCE NEEDED

## 2019-08-07 ASSESSMENT — PATIENT HEALTH QUESTIONNAIRE - PHQ9: SUM OF ALL RESPONSES TO PHQ QUESTIONS 1-9: 10

## 2019-08-07 ASSESSMENT — MIFFLIN-ST. JEOR: SCORE: 1800.02

## 2019-08-07 NOTE — PATIENT INSTRUCTIONS
Your sugar is stable, avoid sugary foods  Follow up in 3 months  Follow up with urology   Therapist as planned  Mariana Benitez D.O.    Patient Education   Personalized Prevention Plan  You are due for the preventive services outlined below.  Your care team is available to assist you in scheduling these services.  If you have already completed any of these items, please share that information with your care team to update in your medical record.  Health Maintenance Due   Topic Date Due     Depression Action Plan  1950     FIT Test  08/20/1958     Zoster (Shingles) Vaccine (2 of 3) 04/18/2016     Eye Exam  06/30/2018     Annual Wellness Visit  01/10/2019     Depression Assessment  07/11/2019     Your Health Risk Assessment indicates you feel you are not in good health    A healthy lifestyle helps keep the body fit and the mind alert. It helps protect you from disease, helps you fight disease, and helps prevent chronic disease (disease that doesn't go away) from getting worse. This is important as you get older and begin to notice twinges in muscles and joints and a decline in the strength and stamina you once took for granted. A healthy lifestyle includes good healthcare, good nutrition, weight control, recreation, and regular exercise. Avoid harmful substances and do what you can to keep safe. Another part of a healthy lifestyle is stay mentally active and socially involved.    Good healthcare     Have a wellness visit every year.     If you have new symptoms, let us know right away. Don't wait until the next checkup.     Take medicines exactly as prescribed and keep your medicines in a safe place. Tell us if your medicine causes problems.   Healthy diet and weight control     Eat 3 or 4 small, nutritious, low-fat, high-fiber meals a day. Include a variety of fruits, vegetables, and whole-grain foods.     Make sure you get enough calcium in your diet. Calcium, vitamin D, and exercise help prevent osteoporosis  (bone thinning).     If you live alone, try eating with others when you can. That way you get a good meal and have company while you eat it.     Try to keep a healthy weight. If you eat more calories than your body uses for energy, it will be stored as fat and you will gain weight.     Recreation   Recreation is not limited to sports and team events. It includes any activity that provides relaxation, interest, enjoyment, and exercise. Recreation provides an outlet for physical, mental, and social energy. It can give a sense of worth and achievement. It can help you stay healthy.    Mental Exercise and Social Involvement  Mental and emotional health is as important as physical health. Keep in touch with friends and family. Stay as active as possible. Continue to learn and challenge yourself.   Things you can do to stay mentally active are:    Learn something new, like a foreign language or musical instrument.     Play SCRABBLE or do crossword puzzles. If you cannot find people to play these games with you at home, you can play them with others on your computer through the Internet.     Join a games club--anything from card games to chess or checkers or lawn bowling.     Start a new hobby.     Go back to school.     Volunteer.     Read.   Keep up with world events.    Understanding USDA MyPlate  The USDA (U.S. Department of Agriculture) has guidelines to help you make healthy food choices. These are called MyPlate. MyPlate shows the food groups that make up healthy meals using the image of a place setting. Before you eat, think about the healthiest choices for what to put onto your plate or into your cup or bowl. To learn more about building a healthy plate, visit www.choosemyplate.gov.    The food groups    Fruits. Any fruit or 100% fruit juice counts as part of the Fruit Group. Fruits may be fresh, canned, frozen, or dried, and may be whole, cut-up, or pureed. Make half your plate fruits and  vegetables.    Vegetables. Any vegetable or 100% vegetable juice counts as a member of the Vegetable Group. Vegetables may be fresh, frozen, canned, or dried. They can be served raw or cooked and may be whole, cut-up, or mashed. Make half your plate fruits and vegetables.    Grains. All foods made from grains are part of the Grains Group. These include wheat, rice, oats, cornmeal, and barley such as bread, pasta, oatmeal, cereal, tortillas, and grits. Grains should be no more than a quarter of your plate. At least half of your grains should be whole grains.    Protein. This group includes meat, poultry, seafood, beans and peas, eggs, processed soy products (like tofu), nuts (including nut butters), and seeds. Make protein choices no more than a quarter of your plate. Meat and poultry choices should be lean or low fat.    Dairy. All fluid milk products and foods made from milk that contain calcium, like yogurt and cheese, are part of the Dairy Group. (Foods that have little calcium, such as cream, butter, and cream cheese, are not part of the group.) Most dairy choices should be low-fat or fat-free.    Oils. These are fats that are liquid at room temperature. They include canola, corn, olive, soybean, and sunflower oil. Foods that are mainly oil include mayonnaise, certain salad dressings, and soft margarines. You should have only 5 to 7 teaspoons of oils a day. You probably already get this much from the food you eat.  Date Last Reviewed: 8/1/2017 2000-2018 [employer]. [address] [place] [address]. All rights reserved. This information is not intended as a substitute for professional medical care. Always follow your healthcare professional's instructions.          Urinary Incontinence, Female (Adult)  Urinary incontinence means loss of control of the bladder. This problem affects many women, especially as they get older. If you have incontinence, you may be embarrassed to ask for help.  But know that this problem can be treated.  Types of Incontinence  There are different types of incontinence. Two of the main types are described here. You can have more than one type.    Stress incontinence. With this type, urine leaks when pressure (stress) is put on the bladder. This may happen when you cough, sneeze, or laugh. Stress incontinence most often occurs because the pelvic floor muscles that support the bladder and urethra are weak. This can happen after pregnancy and vaginal childbirth or a hysterectomy. It can also be due to excess body weight or hormone changes.    Urge incontinence (also called overactive bladder). With this type, a sudden urge to urinate is felt often. This may happen even though there may not be much urine in the bladder. The need to urinate often during the night is common. Urge incontinence most often occurs because of bladder spasms. This may be due to bladder irritation or infection. Damage to bladder nerves or pelvic muscles, constipation, and certain medicines can also lead to urge incontinence.  Treatment of urinary incontinence depends on the cause. Further evaluation is needed to find the type you have. This will likely include an exam and certain tests. Based on the results, you and your healthcare provider can then plan treatment. Until a diagnosis is made, the home care tips below can help relieve symptoms.  Home care    Do pelvic floor muscle exercises, if they are prescribed. The pelvic floor muscles help support the bladder and urethra. Many women find that their symptoms improve when doing special exercises that strengthen these muscles. To do the exercises contract the muscles you would use to stop your stream of urine, but do this when you re not urinating. Hold for 10 seconds, then relax. Repeat 10 to 20 times in a row, at least 3 times a day. Your provider may give you other instructions for how to do the exercises and how often.    Keep a bladder diary. This  helps track how often and how much you urinate over a set period of time. Bring this diary with you to your next visit with the provider. The information can help your provider learn more about your bladder problem.    Lose weight, if advised to by your provider. Excess weight puts pressure on the bladder. Your provider can help you create a weight-loss plan that s right for you. This may include exercising more and making certain diet changes.    Don't consume foods and drinks that may irritate the bladder. These can include alcohol and caffeinated drinks.    Quit smoking. Smoking and other tobacco use can lead to chronic cough that strains the pelvic floor muscles. Smoking may also damage the bladder and urethra. Talk with your provider about treatments or methods you can use to quit smoking.    If drinking large amounts of fluid causes you to have symptoms, you may be advised to limit your fluid intake. You may also be advised to drink most of your fluids during the day and to limit fluids at night.    If you re worried about urine leakage or accidents, you may wear absorbent pads to catch urine. Change the pads often. This helps reduce discomfort. It may also reduce the risk of skin or bladder infections.  Follow-up care  Follow up with your healthcare provider, or as directed. It may take some to find the right treatment for your problem. Your treatment plan may include special therapies or medicines. Certain procedures or surgery may also be options. Be sure to discuss any questions you have with your provider.  When to seek medical advice  Call the healthcare provider right away if any of these occur:    Fever of 100.4 F (38 C) or higher, or as directed by your provider    Bladder pain or fullness    Abdominal swelling    Nausea or vomiting    Back pain    Weakness, dizziness or fainting  Date Last Reviewed: 10/1/2017    9040-9745 [employer]. [address] [place] [address], [place] [address]. All  rights reserved. This information is not intended as a substitute for professional medical care. Always follow your healthcare professional's instructions.        Your Health Risk Assessment indicates you feel you are not in good emotional health.    Recreation   Recreation is not limited to sports and team events. It includes any activity that provides relaxation, interest, enjoyment, and exercise. Recreation provides an outlet for physical, mental, and social energy. It can give a sense of worth and achievement. It can help you stay healthy.    Mental Exercise and Social Involvement  Mental and emotional health is as important as physical health. Keep in touch with friends and family. Stay as active as possible. Continue to learn and challenge yourself.   Things you can do to stay mentally active are:    Learn something new, like a foreign language or musical instrument.     Play SCRABBLE or do crossword puzzles. If you cannot find people to play these games with you at home, you can play them with others on your computer through the Internet.     Join a games club--anything from card games to chess or checkers or lawn bowling.     Start a new hobby.     Go back to school.     Volunteer.     Read.   Keep up with world events.
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
## 2019-08-08 PROBLEM — F50.819 BINGE EATING DISORDER: Status: ACTIVE | Noted: 2019-08-08

## 2019-08-08 PROBLEM — R73.03 PREDIABETES: Status: ACTIVE | Noted: 2019-08-08

## 2019-08-08 RX ORDER — VENLAFAXINE HYDROCHLORIDE 75 MG/1
75 CAPSULE, EXTENDED RELEASE ORAL DAILY
Qty: 90 CAPSULE | Refills: 3 | Status: SHIPPED | OUTPATIENT
Start: 2019-08-08 | End: 2019-11-18

## 2019-08-08 RX ORDER — AMLODIPINE BESYLATE 5 MG/1
TABLET ORAL
Qty: 90 TABLET | Refills: 1 | Status: SHIPPED | OUTPATIENT
Start: 2019-08-08 | End: 2019-11-18

## 2019-08-08 RX ORDER — VENLAFAXINE HYDROCHLORIDE 37.5 MG/1
37.5 CAPSULE, EXTENDED RELEASE ORAL DAILY
Qty: 90 CAPSULE | Refills: 0 | Status: SHIPPED | OUTPATIENT
Start: 2019-08-08 | End: 2019-11-18

## 2019-08-08 RX ORDER — HYDROCHLOROTHIAZIDE 25 MG/1
25 TABLET ORAL DAILY
Qty: 90 TABLET | Refills: 3 | Status: SHIPPED | OUTPATIENT
Start: 2019-08-08 | End: 2020-05-15

## 2019-08-08 RX ORDER — GABAPENTIN 100 MG/1
100 CAPSULE ORAL 4 TIMES DAILY
Qty: 120 CAPSULE | Refills: 11 | Status: SHIPPED | OUTPATIENT
Start: 2019-08-08 | End: 2020-03-03

## 2019-08-09 ENCOUNTER — TELEPHONE (OUTPATIENT)
Dept: FAMILY MEDICINE | Facility: CLINIC | Age: 69
End: 2019-08-09

## 2019-08-12 NOTE — RESULT ENCOUNTER NOTE
"Your cholesterol is abnormal, please use the recommendations below and recheck labs in 6-12 months.    Ways to improve your cholesterol...    1- Eats less saturated fats (including avoiding \"trans\" fats).    2 - Eat more unsaturated fats  - found in vege  tables, grains, and tree nuts.   Also by replacing butter with canola oil or olive oil.    3 - Eat more nuts.   1-2 ounces (a small handful) of almonds, walnuts, hazelnuts or pecans once a  day in place of other less healthy snacks.    4 - Eat more high   fiber foods - vegetables and whole grains including oat bran, oats, beans, peas, and flax seed.    5 - Eat more fish - such as salmon, tuna, mackerel, and sardines.  1 or 2 six ounce servings per week is a healthy replacement for other proteins.    6 - E  xercise for at least 120 minutes per week - which is equal to 30 minutes 4 days per week.    Mariana Benitez D.O.    "

## 2019-08-29 ENCOUNTER — OFFICE VISIT (OUTPATIENT)
Dept: OPTOMETRY | Facility: CLINIC | Age: 69
End: 2019-08-29
Payer: COMMERCIAL

## 2019-08-29 DIAGNOSIS — H52.223 REGULAR ASTIGMATISM OF BOTH EYES: ICD-10-CM

## 2019-08-29 DIAGNOSIS — H52.13 MYOPIA OF BOTH EYES: ICD-10-CM

## 2019-08-29 DIAGNOSIS — H52.4 PRESBYOPIA: ICD-10-CM

## 2019-08-29 DIAGNOSIS — Z01.01 ENCOUNTER FOR EXAMINATION OF EYES AND VISION WITH ABNORMAL FINDINGS: Primary | ICD-10-CM

## 2019-08-29 DIAGNOSIS — H47.321 OPTIC NERVE DRUSEN, RIGHT: ICD-10-CM

## 2019-08-29 DIAGNOSIS — H25.13 NUCLEAR AGE-RELATED CATARACT, BOTH EYES: ICD-10-CM

## 2019-08-29 PROCEDURE — 92014 COMPRE OPH EXAM EST PT 1/>: CPT | Performed by: OPTOMETRIST

## 2019-08-29 PROCEDURE — 92015 DETERMINE REFRACTIVE STATE: CPT | Performed by: OPTOMETRIST

## 2019-08-29 ASSESSMENT — VISUAL ACUITY
OD_CC: 20/40
OD_SC: 20/40
OS_CC: 20/30
OS_CC: 20/25
OS_SC+: -1
METHOD: SNELLEN - LINEAR
OD_SC: 20/20
OD_SC+: -1
OD_CC: 20/30
OS_SC: 20/30
CORRECTION_TYPE: GLASSES
OS_SC: 20/40

## 2019-08-29 ASSESSMENT — REFRACTION_WEARINGRX
OS_SPHERE: -0.75
OS_CYLINDER: +1.00
OD_ADD: +2.00
OD_SPHERE: PLANO
OS_AXIS: 025
OD_AXIS: 163
SPECS_TYPE: PAL
OS_ADD: +2.00
OD_CYLINDER: +1.00

## 2019-08-29 ASSESSMENT — REFRACTION_MANIFEST
OD_ADD: +2.25
OS_CYLINDER: +1.50
OS_AXIS: 025
OD_CYLINDER: +1.25
OD_AXIS: 176
OS_SPHERE: -1.00
OS_ADD: +2.25
OD_SPHERE: -0.50

## 2019-08-29 ASSESSMENT — SLIT LAMP EXAM - LIDS
COMMENTS: NORMAL
COMMENTS: NORMAL

## 2019-08-29 ASSESSMENT — CUP TO DISC RATIO
OD_RATIO: 0.2
OS_RATIO: 0.15

## 2019-08-29 ASSESSMENT — TONOMETRY
OS_IOP_MMHG: 15
IOP_METHOD: APPLANATION
OD_IOP_MMHG: 15

## 2019-08-29 ASSESSMENT — EXTERNAL EXAM - LEFT EYE: OS_EXAM: NORMAL

## 2019-08-29 ASSESSMENT — CONF VISUAL FIELD
OD_NORMAL: 1
OS_NORMAL: 1

## 2019-08-29 ASSESSMENT — EXTERNAL EXAM - RIGHT EYE: OD_EXAM: NORMAL

## 2019-08-29 NOTE — PROGRESS NOTES
Chief Complaint   Patient presents with     Annual Eye Exam      Accompanied by self  Last Eye Exam: 1 year ago  Dilated Previously: Yes    What are you currently using to see?  Glasses-1 year old       Distance Vision Acuity: Noticed gradual change in both eyes    Near Vision Acuity: Satisfied with vision while reading  with glasses    Eye Comfort: good  Do you use eye drops? : No  Occupation or Hobbies: retired    Melissa Sheyak, Optometric Tech          Medical, surgical and family histories reviewed and updated 8/29/2019.       OBJECTIVE: See Ophthalmology exam    ASSESSMENT:    ICD-10-CM    1. Encounter for examination of eyes and vision with abnormal findings Z01.01 EYE EXAM (SIMPLE-NONBILLABLE)   2. 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 Nuclear age-related cataract, both eyes H25.13 EYE EXAM (SIMPLE-NONBILLABLE)   4. Myopia of both eyes H52.13 EYE EXAM (SIMPLE-NONBILLABLE)     REFRACTION   5. Regular astigmatism of both eyes H52.223 EYE EXAM (SIMPLE-NONBILLABLE)     REFRACTION   6. Presbyopia H52.4 EYE EXAM (SIMPLE-NONBILLABLE)     REFRACTION      PLAN:     Patient Instructions   Optic nerve head drusen right eye. Monitor with annual exams. If any sudden changes to vision, return to clinic immediately.     You have the start of mild cataracts.  You may notice some blurred vision or glare with night driving.  It is important that you wear good sunglasses to protect your eyes from the ultraviolet light from the sun.     Shelia was advised of today's exam findings.  Optional to fill new glasses prescription, mild change  Copy of glasses Rx provided today.  Return in 1 year for eye exam, or sooner if needed.    The effects of the dilating drops last for 4- 6 hours.  You will be more sensitive to light and vision will be blurry up close.  Mydriatic sunglasses were given if needed.      Chip Shoemaker O.D.  [address] [place]   [address]    [phone]

## 2019-08-29 NOTE — PATIENT INSTRUCTIONS
Optic nerve head drusen right eye. Monitor with annual exams. If any sudden changes to vision, return to clinic immediately.     You have the start of mild cataracts.  You may notice some blurred vision or glare with night driving.  It is important that you wear good sunglasses to protect your eyes from the ultraviolet light from the sun.     Shelia was advised of today's exam findings.  Optional to fill new glasses prescription, mild change  Copy of glasses Rx provided today.  Return in 1 year for eye exam, or sooner if needed.    The effects of the dilating drops last for 4- 6 hours.  You will be more sensitive to light and vision will be blurry up close.  Mydriatic sunglasses were given if needed.      Chip Shoemaker O.D.  [address] [place]  [address]    [phone]

## 2019-08-29 NOTE — LETTER
8/29/2019         RE: Shelia Sanchez  [address]  [place] [id number]        Dear Colleague,    Thank you for referring your patient, Shelia Sanchez, to the [facility]. Please see a copy of my visit note below.    Chief Complaint   Patient presents with     Annual Eye Exam      Accompanied by self  Last Eye Exam: 1 year ago  Dilated Previously: Yes    What are you currently using to see?  Glasses-1 year old       Distance Vision Acuity: Noticed gradual change in both eyes    Near Vision Acuity: Satisfied with vision while reading  with glasses    Eye Comfort: good  Do you use eye drops? : No  Occupation or Hobbies: retired    Melissa Sunshine, [employer] Tech          Medical, surgical and family histories reviewed and updated 8/29/2019.       OBJECTIVE: See Ophthalmology exam    ASSESSMENT:    ICD-10-CM    1. Encounter for examination of eyes and vision with abnormal findings Z01.01 EYE EXAM (SIMPLE-NONBILLABLE)   2. 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 Nuclear age-related cataract, both eyes H25.13 EYE EXAM (SIMPLE-NONBILLABLE)   4. Myopia of both eyes H52.13 EYE EXAM (SIMPLE-NONBILLABLE)     REFRACTION   5. Regular astigmatism of both eyes H52.223 EYE EXAM (SIMPLE-NONBILLABLE)     REFRACTION   6. Presbyopia H52.4 EYE EXAM (SIMPLE-NONBILLABLE)     REFRACTION      PLAN:     Patient Instructions   Optic nerve head drusen right eye. Monitor with annual exams. If any sudden changes to vision, return to clinic immediately.     You have the start of mild cataracts.  You may notice some blurred vision or glare with night driving.  It is important that you wear good sunglasses to protect your eyes from the ultraviolet light from the sun.     Shelia was advised of today's exam findings.  Optional to fill new glasses prescription, mild change  Copy of glasses Rx provided today.  Return in 1 year for eye exam, or sooner if needed.    The effects of the  dilating drops last for 4- 6 hours.  You will be more sensitive to light and vision will be blurry up close.  Mydriatic sunglasses were given if needed.      Chip Shoemaker O.D.  [address]  [address]    [phone]           Again, thank you for allowing me to participate in the care of your patient.        Sincerely,        Chip Shoemaker, OD

## 2019-09-09 ENCOUNTER — OFFICE VISIT (OUTPATIENT)
Dept: SLEEP MEDICINE | Facility: CLINIC | Age: 69
End: 2019-09-09
Payer: COMMERCIAL

## 2019-09-09 VITALS
SYSTOLIC BLOOD PRESSURE: 122 MMHG | DIASTOLIC BLOOD PRESSURE: 74 MMHG | WEIGHT: 271 LBS | OXYGEN SATURATION: 98 % | BODY MASS INDEX: 42.53 KG/M2 | HEIGHT: 67 IN | HEART RATE: 82 BPM

## 2019-09-09 DIAGNOSIS — G47.33 OSA (OBSTRUCTIVE SLEEP APNEA): Primary | ICD-10-CM

## 2019-09-09 PROCEDURE — 99213 OFFICE O/P EST LOW 20 MIN: CPT | Performed by: PHYSICIAN ASSISTANT

## 2019-09-09 ASSESSMENT — MIFFLIN-ST. JEOR: SCORE: 1776.88

## 2019-09-09 NOTE — NURSING NOTE
"Chief Complaint   Patient presents with     CPAP Follow Up       Initial /74   Pulse 82   Ht 1.702 m (5' 7\")   Wt 122.9 kg (271 lb)   SpO2 98%   BMI 42.44 kg/m   Estimated body mass index is 42.44 kg/m  as calculated from the following:    Height as of this encounter: 1.702 m (5' 7\").    Weight as of this encounter: 122.9 kg (271 lb).    Medication Reconciliation: complete      "

## 2019-09-09 NOTE — PROGRESS NOTES
"Obstructive Sleep Apnea - PAP Follow-Up Visit:    Chief Complaint   Patient presents with     CPAP Follow Up       Shelia Sanchez comes in today for follow-up of their severe sleep apnea, managed with CPAP.     Shelia Sanchez initially presented with high probability of obstructive sleep apnea with modest possibility of coexisting hypoventilation based on BMI of 43, loud snoring, witnessed apnea, non-refreshing sleep, fatigue/excessive daytime sleepiness(ESS 9), crowded oropharynx, morning headaches, neck circumference of 16\" and co-morbid HTN. STOP-BANG score is 7/8.    8/8/2018 [facility] (275.0 lbs) - .3, .7, Supine .3, REM AHI n/a, Low O2% 81.3%, Time Spent =88% 2.6, Time Spent =89% 5.2. Treatment was titrated to a pressure of CPAP 8 with an AHI 28.9. Time spent in REM at this pressure was 0 minutes.    Overall, she rates the experience with PAP as 10 (0 poor, 10 great). The mask is comfortable.   The mask is leaking  She is not snoring with the mask on. She is not having gasp arousals.  She is having significant oral/nasal dryness. The pressure is comfortable.     Her PAP interface is Nasal Mask.    Bedtime is typically 2300. Usually it takes about 35 min minutes to fall asleep with the mask on. Wake time is typically 0800.  Patient is using PAP therapy 8 hours per night. The patient is usually getting 8 hours of sleep per night.    She does feel rested in the morning.    Total score - [place]: 6 (9/9/2019  3:00 PM)      NATALIA Total Score: 10      Respironics  Auto-PAP 8 - 18 cmH2O 30 day usage data:    76% of days with > 4 hours of use. 5/30 days with no use.   Average use 406 minutes per day.   Average leak 65.65 LPM.  Average % of night in large leak 7%.    CPAP 90% pressure 16.42cm.   AHI 8.7 events per hour. Likely due to high leaks with old mask.    Past medical/surgical history, family history, social history, medications and allergies were reviewed.  "     Problem List:  Patient Active Problem List    Diagnosis Date Noted     Binge eating disorder 08/08/2019     Priority: Medium     Prediabetes 08/08/2019     Priority: Medium     LORI (obstructive sleep apnea)- severe () 08/09/2018     Priority: Medium     8/8/2018 [facility] Sleep Study (275.0 lbs) - .3, .7, Supine .3, REM AHI n/a, Low O2% 81.3%, Time Spent ?88% 2.6, Time Spent ?89% 5.2. Treatment was titrated to a pressure of CPAP 8 with an AHI 28.9. Time spent in REM at this pressure was 0 minutes.       Mild major depression (H) 01/10/2018     Priority: Medium     Heartburn 11/16/2016     Priority: Medium     Menopausal syndrome (hot flashes) 03/30/2016     Priority: Medium     Cervical pain 11/19/2015     Priority: Medium     Left-sided headache 11/19/2015     Priority: Medium     Atypical nevus 02/13/2015     Priority: Medium     MILD, risk factor for melanoma. Needs yearly skin exam.   The entire mole was not removed, so if it grows, needs to be completely excised.   Do you wish to do the replacement in the background? yes         Urinary incontinence 02/02/2015     Priority: Medium     Cataracts, both eyes 05/07/2014     Priority: Medium     Left shoulder pain 12/27/2012     Priority: Medium     Advanced directives, counseling/discussion 02/09/2012     Priority: Medium     Advance Care Planning:   ACP Review and Resources Provided:  Reviewed chart for advance care plan.  Shelia Sanchez has no plan or code status on file. Discussed available resources and provided with information. Confirmed code status reflects current choices pending further ACP discussions.  Confirmed/documented designated decision maker(s). See permanent comments section of demographics in clinical tab.   Added by Brisa Hernandez on 3/6/2015  Discussed advance care planning with patient; information given to patient to review. 2/9/2012          Hypertension goal BP (blood pressure) < 140/90  "07/02/2011     Priority: Medium     CARDIOVASCULAR SCREENING; LDL GOAL LESS THAN 130 06/11/2010     Priority: Medium     BMI > 40      Priority: Medium        /74   Pulse 82   Ht 1.702 m (5' 7\")   Wt 122.9 kg (271 lb)   SpO2 98%   BMI 42.44 kg/m      Impression/Plan:    Severe obstructive sleep apnea.   Tolerating PAP well. Daytime symptoms are improved.   Continue comprehensive.  Advised the patient to replace parts as recommended.       Shelia Sanchez will follow up in about 1 year or sooner if any concerns.    Fifteen minutes spent with patient, all of which were spent face-to-face counseling, consulting, coordinating plan of care regarding LORI.      Sunita Sanches PA-C    "

## 2019-09-09 NOTE — PATIENT INSTRUCTIONS
Your BMI is Body mass index is 42.44 kg/m .  Weight management is a personal decision.  If you are interested in exploring weight loss strategies, the following discussion covers the approaches that may be successful. Body mass index (BMI) is one way to tell whether you are at a healthy weight, overweight, or obese. It measures your weight in relation to your height.  A BMI of 18.5 to 24.9 is in the healthy range. A person with a BMI of 25 to 29.9 is considered overweight, and someone with a BMI of 30 or greater is considered obese. More than two-thirds of American adults are considered overweight or obese.  Being overweight or obese increases the risk for further weight gain. Excess weight may lead to heart disease and diabetes.  Creating and following plans for healthy eating and physical activity may help you improve your health.  Weight control is part of healthy lifestyle and includes exercise, emotional health, and healthy eating habits. Careful eating habits lifelong are the mainstay of weight control. Though there are significant health benefits from weight loss, long-term weight loss with diet alone may be very difficult to achieve- studies show long-term success with dietary management in less than 10% of people. Attaining a healthy weight may be especially difficult to achieve in those with severe obesity. In some cases, medications, devices and surgical management might be considered.  What can you do?  If you are overweight or obese and are interested in methods for weight loss, you should discuss this with your provider.     Consider reducing daily calorie intake by 500 calories.     Keep a food journal.     Avoiding skipping meals, consider cutting portions instead.    Diet combined with exercise helps maintain muscle while optimizing fat loss. Strength training is particularly important for building and maintaining muscle mass. Exercise helps reduce stress, increase energy, and improves fitness.  Increasing exercise without diet control, however, may not burn enough calories to loose weight.       Start walking three days a week 10-20 minutes at a time    Work towards walking thirty minutes five days a week     Eventually, increase the speed of your walking for 1-2 minutes at time    In addition, we recommend that you review healthy lifestyles and methods for weight loss available through the National Institutes of Health patient information sites:  http://win.niddk.nih.gov/publications/index.htm    And look into health and wellness programs that may be available through your health insurance provider, employer, local community center, or gregory club.    Weight management plan: Patient was referred to their PCP to discuss a diet and exercise plan.

## 2019-10-08 DIAGNOSIS — I10 ESSENTIAL HYPERTENSION WITH GOAL BLOOD PRESSURE LESS THAN 140/90: ICD-10-CM

## 2019-10-08 RX ORDER — ASPIRIN 81 MG/1
TABLET, CHEWABLE ORAL
Qty: 90 TABLET | Refills: 0 | Status: SHIPPED | OUTPATIENT
Start: 2019-10-08 | End: 2019-11-18

## 2019-10-08 NOTE — TELEPHONE ENCOUNTER
"Requested Prescriptions   Pending Prescriptions Disp Refills     aspirin (ASA) 81 MG chewable tablet [Pharmacy Med Name: ASPIRIN 81MG CHEWABLE TABLETS] 90 tablet 0     Sig: TAKE 1 TABLET BY MOUTH DAILY       Analgesics (Non-Narcotic Tylenol and ASA Only) Passed - 10/8/2019  3:58 AM        Passed - Recent (12 mo) or future (30 days) visit within the authorizing provider's specialty     Patient has had an office visit with the authorizing provider or a provider within the authorizing providers department within the previous 12 mos or has a future within next 30 days. See \"Patient Info\" tab in inbasket, or \"Choose Columns\" in Meds & Orders section of the refill encounter.              Passed - Patient is age 20 years or older     If ASA is flagged for ages under 20 years old. Forward to provider for confirmation Ryes Syndrome is not a concern.              Passed - Medication is active on med list        Last Written Prescription Date:  7/9/19  Last Fill Quantity: 90,  # refills: 0   Last office visit: 8/7/2019 with prescribing provider:  Mariana Benitez     Future Office Visit:      "

## 2019-10-22 ENCOUNTER — TRANSFERRED RECORDS (OUTPATIENT)
Dept: HEALTH INFORMATION MANAGEMENT | Facility: CLINIC | Age: 69
End: 2019-10-22

## 2019-10-27 DIAGNOSIS — F32.0 MILD MAJOR DEPRESSION (H): ICD-10-CM

## 2019-10-28 RX ORDER — VENLAFAXINE HYDROCHLORIDE 37.5 MG/1
CAPSULE, EXTENDED RELEASE ORAL
Qty: 30 CAPSULE | Refills: 0 | Status: SHIPPED | OUTPATIENT
Start: 2019-10-28 | End: 2020-04-14

## 2019-10-28 NOTE — TELEPHONE ENCOUNTER
See plan at 8/7/19 visit:    Instructions         Return in about 53 weeks (around 8/12/2020) for Annual Wellness Visit.   Your sugar is stable, avoid sugary foods  Follow up in 3 months  Follow up with urology   Therapist as planned  Mariana Benitez D.O.               Due for follow up 11/7/19.    Medication is being filled for 1 time refill only due to:  Patient needs to be seen because due for follow up.     Encounter routed to team to reach out to patient to schedule.    Janeth Bernal RN  [facility]

## 2019-10-28 NOTE — TELEPHONE ENCOUNTER
"Requested Prescriptions   Pending Prescriptions Disp Refills     venlafaxine (EFFEXOR-XR) 37.5 MG 24 hr capsule [Pharmacy Med Name: VENLAFAXINE ER 37.5MG CAPSULES]  Last Written Prescription Date:  8/8/2019  Last Fill Quantity: 90 capsule,  # refills: 0   Last office visit: 8/7/2019 with prescribing provider:  MILA Benitez   Future Office Visit:   90 capsule 0     Sig: TAKE 1 CAPSULE(37.5 MG) BY MOUTH DAILY       Serotonin-Norepinephrine Reuptake Inhibitors  Failed - 10/27/2019 10:39 AM        Failed - PHQ-9 score of less than 5 in past 6 months     Please review last PHQ-9 score.   PHQ-9 SCORE 1/10/2018 1/11/2019 8/7/2019   PHQ-9 Total Score 9 12 10     OSCAR-7 SCORE 1/11/2019   Total Score 5           Passed - Blood pressure under 140/90 in past 12 months     BP Readings from Last 3 Encounters:   09/09/19 122/74   08/07/19 (!) 154/90   03/11/19 145/79           Passed - Medication is active on med list        Passed - Patient is age 18 or older        Passed - No active pregnancy on record        Passed - Normal serum creatinine on file in past 12 months     Recent Labs   Lab Test 08/05/19  0741   CR 0.76             Passed - No positive pregnancy test in past 12 months        Passed - Recent (6 mo) or future (30 days) visit within the authorizing provider's specialty     Patient had office visit in the last 6 months or has a visit in the next 30 days with authorizing provider or within the authorizing provider's specialty.  See \"Patient Info\" tab in inbasket, or \"Choose Columns\" in Meds & Orders section of the refill encounter.            "

## 2019-11-18 ENCOUNTER — ANCILLARY PROCEDURE (OUTPATIENT)
Dept: GENERAL RADIOLOGY | Facility: CLINIC | Age: 69
End: 2019-11-18
Attending: FAMILY MEDICINE
Payer: COMMERCIAL

## 2019-11-18 ENCOUNTER — OFFICE VISIT (OUTPATIENT)
Dept: FAMILY MEDICINE | Facility: CLINIC | Age: 69
End: 2019-11-18
Payer: COMMERCIAL

## 2019-11-18 VITALS
SYSTOLIC BLOOD PRESSURE: 130 MMHG | WEIGHT: 261 LBS | HEIGHT: 67 IN | BODY MASS INDEX: 40.97 KG/M2 | TEMPERATURE: 97.9 F | DIASTOLIC BLOOD PRESSURE: 68 MMHG

## 2019-11-18 DIAGNOSIS — N95.1 MENOPAUSAL SYNDROME (HOT FLASHES): ICD-10-CM

## 2019-11-18 DIAGNOSIS — I10 ESSENTIAL HYPERTENSION WITH GOAL BLOOD PRESSURE LESS THAN 140/90: Primary | ICD-10-CM

## 2019-11-18 DIAGNOSIS — M70.51 PES ANSERINUS BURSITIS OF RIGHT KNEE: ICD-10-CM

## 2019-11-18 DIAGNOSIS — G89.29 CHRONIC PAIN OF RIGHT KNEE: ICD-10-CM

## 2019-11-18 DIAGNOSIS — R73.03 PREDIABETES: ICD-10-CM

## 2019-11-18 DIAGNOSIS — M25.561 CHRONIC PAIN OF RIGHT KNEE: ICD-10-CM

## 2019-11-18 DIAGNOSIS — F32.0 MILD MAJOR DEPRESSION (H): ICD-10-CM

## 2019-11-18 LAB — HBA1C MFR BLD: 5.7 % (ref 0–5.6)

## 2019-11-18 PROCEDURE — 36415 COLL VENOUS BLD VENIPUNCTURE: CPT | Performed by: FAMILY MEDICINE

## 2019-11-18 PROCEDURE — 99207 C PAF COMPLETED  NO CHARGE: CPT | Performed by: FAMILY MEDICINE

## 2019-11-18 PROCEDURE — 90471 IMMUNIZATION ADMIN: CPT | Performed by: FAMILY MEDICINE

## 2019-11-18 PROCEDURE — 90714 TD VACC NO PRESV 7 YRS+ IM: CPT | Performed by: FAMILY MEDICINE

## 2019-11-18 PROCEDURE — 73562 X-RAY EXAM OF KNEE 3: CPT | Mod: RT

## 2019-11-18 PROCEDURE — 99214 OFFICE O/P EST MOD 30 MIN: CPT | Mod: 25 | Performed by: FAMILY MEDICINE

## 2019-11-18 PROCEDURE — 83036 HEMOGLOBIN GLYCOSYLATED A1C: CPT | Performed by: FAMILY MEDICINE

## 2019-11-18 RX ORDER — VENLAFAXINE HYDROCHLORIDE 37.5 MG/1
37.5 CAPSULE, EXTENDED RELEASE ORAL DAILY
Qty: 30 CAPSULE | Refills: 0 | Status: CANCELLED | OUTPATIENT
Start: 2019-11-18

## 2019-11-18 RX ORDER — VENLAFAXINE HYDROCHLORIDE 37.5 MG/1
37.5 CAPSULE, EXTENDED RELEASE ORAL DAILY
Qty: 90 CAPSULE | Refills: 0 | Status: SHIPPED | OUTPATIENT
Start: 2019-11-18 | End: 2019-11-25

## 2019-11-18 RX ORDER — AMLODIPINE BESYLATE 5 MG/1
TABLET ORAL
Qty: 90 TABLET | Refills: 1 | Status: SHIPPED | OUTPATIENT
Start: 2019-11-18 | End: 2020-05-15

## 2019-11-18 RX ORDER — ASPIRIN 81 MG/1
81 TABLET, CHEWABLE ORAL DAILY
Qty: 90 TABLET | Refills: 0 | Status: SHIPPED | OUTPATIENT
Start: 2019-11-18 | End: 2020-01-07

## 2019-11-18 RX ORDER — VENLAFAXINE HYDROCHLORIDE 75 MG/1
75 CAPSULE, EXTENDED RELEASE ORAL DAILY
Qty: 90 CAPSULE | Refills: 3 | Status: SHIPPED | OUTPATIENT
Start: 2019-11-18 | End: 2020-05-15

## 2019-11-18 ASSESSMENT — ANXIETY QUESTIONNAIRES
6. BECOMING EASILY ANNOYED OR IRRITABLE: MORE THAN HALF THE DAYS
5. BEING SO RESTLESS THAT IT IS HARD TO SIT STILL: NOT AT ALL
2. NOT BEING ABLE TO STOP OR CONTROL WORRYING: SEVERAL DAYS
IF YOU CHECKED OFF ANY PROBLEMS ON THIS QUESTIONNAIRE, HOW DIFFICULT HAVE THESE PROBLEMS MADE IT FOR YOU TO DO YOUR WORK, TAKE CARE OF THINGS AT HOME, OR GET ALONG WITH OTHER PEOPLE: SOMEWHAT DIFFICULT
7. FEELING AFRAID AS IF SOMETHING AWFUL MIGHT HAPPEN: SEVERAL DAYS
3. WORRYING TOO MUCH ABOUT DIFFERENT THINGS: SEVERAL DAYS
GAD7 TOTAL SCORE: 7
1. FEELING NERVOUS, ANXIOUS, OR ON EDGE: SEVERAL DAYS

## 2019-11-18 ASSESSMENT — PATIENT HEALTH QUESTIONNAIRE - PHQ9
SUM OF ALL RESPONSES TO PHQ QUESTIONS 1-9: 8
5. POOR APPETITE OR OVEREATING: SEVERAL DAYS

## 2019-11-18 ASSESSMENT — MIFFLIN-ST. JEOR: SCORE: 1731.52

## 2019-11-18 NOTE — PATIENT INSTRUCTIONS
Vaccinated you today for Tetanus.     Follow-up with ortho to get injections for your knee.     We also did a quick X-RAY     Checked your A1C today!

## 2019-11-18 NOTE — NURSING NOTE
Prior to immunization administration, verified patients identity using patient s name and date of birth. Please see Immunization Activity for additional information.     Screening Questionnaire for Adult Immunization    Are you sick today?   Yes   Do you have allergies to medications, food, a vaccine component or latex?   No   Have you ever had a serious reaction after receiving a vaccination?   No   Do you have a long-term health problem with heart disease, lung disease, asthma, kidney disease, metabolic disease (e.g. diabetes), anemia, or other blood disorder?   Yes   Do you have cancer, leukemia, HIV/AIDS, or any other immune system problem?   No   In the past 3 months, have you taken medications that affect  your immune system, such as prednisone, other steroids, or anticancer drugs; drugs for the treatment of rheumatoid arthritis, Crohn s disease, or psoriasis; or have you had radiation treatments?   No   Have you had a seizure, or a brain or other nervous system problem?   No   During the past year, have you received a transfusion of blood or blood     products, or been given immune (gamma) globulin or antiviral drug?   No   For women: Are you pregnant or is there a chance you could become        pregnant during the next month?   No   Have you received any vaccinations in the past 4 weeks?   No     Immunization questionnaire was positive for at least one answer.  Notified Dr. Benitez.        Per orders of Dr. Pennington, injection of TD given by Kt Bronson CMA. Patient instructed to remain in clinic for 15 minutes afterwards, and to report any adverse reaction to me immediately.       Screening performed by Kt Bronson CMA on 11/18/2019 at 11:05 AM.

## 2019-11-18 NOTE — PROGRESS NOTES
Subjective     Shelia Sanchez is a 69 year old female who presents to clinic today for the following health issues:    HPI       Hypertension Follow-up  She was  on a 1,200 calory diet, but she ended up quitting due to increased frequent triggers of bingeing disorder.Patient reports that she keeps track of the food she consumes. She states that in the last weeks, she has binged about 3 times, but she is working with dietician at [facility] to discover what brings on these episodes.She has lost about 20 lb since last January as reviewed on epic today. Patient admits to being an emotional eater today.    Do you check your blood pressure regularly outside of the clinic? No bought a wrist machine, but has not checked it.     Are you following a low salt diet? Yes patient is trying her best    Are your blood pressures ever more than 140 on the top number (systolic) OR more   than 90 on the bottom number (diastolic), for example 140/90?     Depression Follow-up   Currently follow-up with therapist for binge eating episodes in an effort to discover her triggers.    How are you doing with your depression since your last visit? About the same    Are you having other symptoms that might be associated with depression? Comes/goes. Seeing someone for being eating.     Have you had a significant life event?  No     Are you feeling anxious or having panic attacks?   No panic attacks but feeling anxious seeing [facility] for this     Do you have any concerns with your use of alcohol or other drugs? No     Arthritic changes in the knee   Patient reports difficulty walking especially going up the stairs due to arthritic knee pain.   She would like to get cortisone injections to ease her pain. She feels pain is restricting her mobility.      Health maintenance  Colon cancer screening, she plans to do cologuard in the next few months  Headaches are better with use of pillow    Social History     Tobacco Use     Smoking  status: Never Smoker     Smokeless tobacco: Never Used   Substance Use Topics     Alcohol use: Yes     Alcohol/week: 0.0 standard drinks     Comment: 4 DRINKS PER WEEK     Drug use: No     PHQ 1/11/2019 8/7/2019 11/18/2019   PHQ-9 Total Score 12 10 8   Q9: Thoughts of better off dead/self-harm past 2 weeks Not at all Not at all Not at all     OSCAR-7 SCORE 1/11/2019 11/18/2019   Total Score 5 7     Last PHQ-9 11/18/2019   1.  Little interest or pleasure in doing things 1   2.  Feeling down, depressed, or hopeless 1   3.  Trouble falling or staying asleep, or sleeping too much 1   4.  Feeling tired or having little energy 1   5.  Poor appetite or overeating 2   6.  Feeling bad about yourself 1   7.  Trouble concentrating 1   8.  Moving slowly or restless 0   Q9: Thoughts of better off dead/self-harm past 2 weeks 0   PHQ-9 Total Score 8   Difficulty at work, home, or with people Somewhat difficult     OSCAR-7  11/18/2019   1. Feeling nervous, anxious, or on edge 1   2. Not being able to stop or control worrying 1   3. Worrying too much about different things 1   4. Trouble relaxing 1   5. Being so restless that it is hard to sit still 0   6. Becoming easily annoyed or irritable 2   7. Feeling afraid, as if something awful might happen 1   OSCAR-7 Total Score 7   If you checked any problems, how difficult have they made it for you to do your work, take care of things at home, or get along with other people? Somewhat difficult         Suicide Assessment Five-step Evaluation and Treatment (SAFE-T)      How many servings of fruits and vegetables do you eat daily?  4 or more    On average, how many sweetened beverages do you drink each day (soda, juice, sweet tea, etc)?   0    How many days per week do you miss taking your medication? 0          Current Outpatient Medications   Medication Sig Dispense Refill     amLODIPine (NORVASC) 5 MG tablet TAKE 1 TABLET(5 MG) BY MOUTH DAILY 90 tablet 1     aspirin (ASA) 81 MG chewable  "tablet Take 1 tablet (81 mg) by mouth daily 90 tablet 0     Cholecalciferol (VITAMIN D PO) Take 5,000 Units by mouth       Esomeprazole Magnesium (NEXIUM 24HR PO) Take 1 tablet by mouth daily       gabapentin (NEURONTIN) 100 MG capsule Take 1 capsule (100 mg) by mouth 4 times daily 120 capsule 11     hydrochlorothiazide (HYDRODIURIL) 25 MG tablet Take 1 tablet (25 mg) by mouth daily 90 tablet 3     Ibuprofen (ADVIL PO) Taking as needed for pain       venlafaxine (EFFEXOR-XR) 37.5 MG 24 hr capsule Take 1 capsule (37.5 mg) by mouth daily 90 capsule 0     venlafaxine (EFFEXOR-XR) 37.5 MG 24 hr capsule TAKE 1 CAPSULE(37.5 MG) BY MOUTH DAILY 30 capsule 0     venlafaxine (EFFEXOR-XR) 75 MG 24 hr capsule Take 1 capsule (75 mg) by mouth daily 90 capsule 3     BP Readings from Last 3 Encounters:   11/18/19 130/68   09/09/19 122/74   08/07/19 (!) 154/90    Wt Readings from Last 3 Encounters:   11/18/19 118.4 kg (261 lb)   09/09/19 122.9 kg (271 lb)   08/07/19 124.7 kg (275 lb)                    Reviewed and updated as needed this visit by Provider         Review of Systems   ROS COMP: Constitutional, HEENT, cardiovascular, pulmonary, GI, , musculoskeletal, neuro, skin, endocrine and psych systems are negative, except as otherwise noted.    This document serves as a record of the services and decisions personally performed and made by Mariana Benitez DO. It was created on her behalf by Maura Allen, a trained medical scribe. The creation of this document is based on the provider's statements to the medical scribe.  Maura Allen 12:13 PM November 18, 2019        Objective    /68   Temp 97.9  F (36.6  C) (Oral)   Ht 1.702 m (5' 7\")   Wt 118.4 kg (261 lb)   BMI 40.88 kg/m    Body mass index is 40.88 kg/m .  Physical Exam   GENERAL: healthy, alert and no distress  EYES: Eyes grossly normal to inspection, PERRL and conjunctivae and sclerae normal  HENT: ear canals and TM's normal, nose and mouth without ulcers or " lesions  NECK: no adenopathy, no asymmetry, masses, or scars and thyroid normal to palpation  RESP: lungs clear to auscultation - no rales, rhonchi or wheezes  CV: regular rate and rhythm, normal S1 S2, no S3 or S4, no murmur, click or rub, no peripheral edema and peripheral pulses strong  ABDOMEN: soft, nontender, no hepatosplenomegaly, no masses and bowel sounds normal  MS: tenderness to pes anserine bursitis no gross musculoskeletal defects noted, no edema  SKIN: no suspicious lesions or rashes  NEURO: Normal strength and tone, mentation intact and speech normal  PSYCH: mentation appears normal, affect normal/bright    Diagnostic Test Results: X-Ray showed arthritic changes in the medial aspect of the joint.   Labs reviewed in Epic  Results for orders placed or performed in visit on 11/18/19 (from the past 24 hour(s))   Hemoglobin A1c   Result Value Ref Range    Hemoglobin A1C 5.7 (H) 0 - 5.6 %           Assessment & Plan   Patient plans to follow-up with cologuard in the next few months  1. Essential hypertension with goal blood pressure less than 140/90  Ongoing. Well managed with medication. Recommend she continue implementing healthy lifestyle changes. Follow-up with dietician at [facility] as planned. Patient also had tetanus vaccine today.   - amLODIPine (NORVASC) 5 MG tablet; TAKE 1 TABLET(5 MG) BY MOUTH DAILY  Dispense: 90 tablet; Refill: 1  - aspirin (ASA) 81 MG chewable tablet; Take 1 tablet (81 mg) by mouth daily  Dispense: 90 tablet; Refill: 0    2. Mild major depression (H)  Patient reports that she is currently follow-up with therapist to discover what triggers her binge eating episodes. She reports that she is doing well at sessions and on medication. Recommend that she continue seeing therapist and take medication as prescribed.   - venlafaxine (EFFEXOR-XR) 37.5 MG 24 hr capsule; Take 1 capsule (37.5 mg) by mouth daily  Dispense: 90 capsule; Refill: 0    3. Menopausal syndrome (hot  "flashes)  Ongoing, currently stable. Refilled medication today, continue to take as prescribed.   - venlafaxine (EFFEXOR-XR) 75 MG 24 hr capsule; Take 1 capsule (75 mg) by mouth daily  Dispense: 90 capsule; Refill: 3    4. Chronic pain of right knee  Ongoing, worsening. Patient reports difficulty going up and down the stairs due to arthritic changes. She would like to see orthopedic for cortisone injection in the future. Placed a referral, Follow-up with ortho in the future.    - ORTHOPEDICS ADULT REFERRAL  - XR Knee Right 3 Views; Future    5. Prediabetes  Improved. Rechecked labs today. Her A1C went down to 5.7. Recommend that she continue follow-up with dietician at [facility] and work with therapist to help with binge eating disorder. Will recheck in the future    - Hemoglobin A1c     BMI:   Estimated body mass index is 40.88 kg/m  as calculated from the following:    Height as of this encounter: 1.702 m (5' 7\").    Weight as of this encounter: 118.4 kg (261 lb).   Weight management plan: Discussed healthy diet and exercise guidelines    See Patient Instructions    No follow-ups on file.  The information in this document, created by the medical scribe for me, accurately reflects the services I personally performed and the decisions made by me. I have reviewed and approved this document for accuracy prior to leaving the patient care area.  November 18, 2019 12:24 PM    Mariana Benitez DO  [facility]    "

## 2019-11-19 ASSESSMENT — ANXIETY QUESTIONNAIRES: GAD7 TOTAL SCORE: 7

## 2019-11-21 NOTE — TELEPHONE ENCOUNTER
DIAGNOSIS: Chronic pain of RT knee - Internal referral Emmanuel DO    APPOINTMENT DATE: 11/25/19   NOTES STATUS DETAILS   OFFICE NOTE from referring provider Internal Ov note 11/18/19    OFFICE NOTE from other specialist N/A    DISCHARGE SUMMARY from hospital N/A    DISCHARGE REPORT from the ER N/A    OPERATIVE REPORT N/A    MEDICATION LIST N/A    MRI N/A    CT SCAN N/A    XRAYS (IMAGES & REPORTS) Internal XR Knee RT 11/18/19

## 2019-11-25 ENCOUNTER — OFFICE VISIT (OUTPATIENT)
Dept: ORTHOPEDICS | Facility: CLINIC | Age: 69
End: 2019-11-25
Payer: COMMERCIAL

## 2019-11-25 ENCOUNTER — PRE VISIT (OUTPATIENT)
Dept: ORTHOPEDICS | Facility: CLINIC | Age: 69
End: 2019-11-25

## 2019-11-25 VITALS — RESPIRATION RATE: 16 BRPM | WEIGHT: 261 LBS | HEIGHT: 67 IN | BODY MASS INDEX: 40.97 KG/M2

## 2019-11-25 DIAGNOSIS — M17.11 PRIMARY LOCALIZED OSTEOARTHRITIS OF RIGHT KNEE: Primary | ICD-10-CM

## 2019-11-25 RX ORDER — TRIAMCINOLONE ACETONIDE 40 MG/ML
40 INJECTION, SUSPENSION INTRA-ARTICULAR; INTRAMUSCULAR
Status: DISCONTINUED | OUTPATIENT
Start: 2019-11-25 | End: 2020-05-15

## 2019-11-25 RX ORDER — LIDOCAINE HYDROCHLORIDE 10 MG/ML
4 INJECTION, SOLUTION INFILTRATION; PERINEURAL
Status: DISCONTINUED | OUTPATIENT
Start: 2019-11-25 | End: 2020-05-15

## 2019-11-25 RX ADMIN — LIDOCAINE HYDROCHLORIDE 4 ML: 10 INJECTION, SOLUTION INFILTRATION; PERINEURAL at 11:37

## 2019-11-25 RX ADMIN — TRIAMCINOLONE ACETONIDE 40 MG: 40 INJECTION, SUSPENSION INTRA-ARTICULAR; INTRAMUSCULAR at 11:37

## 2019-11-25 ASSESSMENT — MIFFLIN-ST. JEOR: SCORE: 1731.52

## 2019-11-25 NOTE — NURSING NOTE
[address] [address]  Dept: [phone]  ______________________________________________________________________________    Patient: Shelia Sanchez   : 1950   MRN: [id number]   2019    INVASIVE PROCEDURE SAFETY CHECKLIST    Date: 19   Procedure:Right knee kenalog injection  Patient Name: Shelia Sanchez  MRN: [id number]  YOB: 1950    Action: Complete sections as appropriate. Any discrepancy results in a HARD COPY until resolved.     PRE PROCEDURE:  Patient ID verified with 2 identifiers (name and  or MRN): Yes  Procedure and site verified with patient/designee (when able): Yes  Accurate consent documentation in medical record: Yes  H&P (or appropriate assessment) documented in medical record: Yes  H&P must be up to 20 days prior to procedure and updates within 24 hours of procedure as applicable: NA  Relevant diagnostic and radiology test results appropriately labeled and displayed as applicable: Yes  Procedure site(s) marked with provider initials: NA    TIMEOUT:  Time-Out performed immediately prior to starting procedure, including verbal and active participation of all team members addressing the following:Yes  * Correct patient identify  * Confirmed that the correct side and site are marked  * An accurate procedure consent form  * Agreement on the procedure to be done  * Correct patient position  * Relevant images and results are properly labeled and appropriately displayed  * The need to administer antibiotics or fluids for irrigation purposes during the procedure as applicable   * Safety precautions based on patient history or medication use    DURING PROCEDURE: Verification of correct person, site, and procedures any time the responsibility for care of the patient is transferred to another member of the care team.       Prior to injection, verified patient identity using patient's name and date  of birth.  Due to injection administration, patient instructed to remain in clinic for 15 minutes  afterwards, and to report any adverse reaction to me immediately.    Joint injection was performed.      Drug Amount Wasted:  Yes: 1 mg/ml lidocaine  Vial/Syringe: Single dose vial  Expiration Date:  1/23      Tobi Valencia ATC  November 25, 2019

## 2019-11-25 NOTE — PROGRESS NOTES
Subjective:   Shelia Sanchez is a 69 year old female who presents with right knee pain. No recent DOT. She had 2 falls 20 years ago. She is retired.  She states that for the past several years she has had discomfort and pain.  She describes the pain is being in the medial aspect of the knee and along the medial joint line.  When she is out walking longer distances she has now started using a cane in the past year.  She states that walking downstairs is the most painful type activity that she does.  She is working on weight loss and has lost 15 pounds.  She joined the [employer] at [place].  She has a long history of taking Advil which did give her relief of her knee pain but no longer is working.  She takes 600 mg of Advil 2-3 times per week but to very little benefit.  She has not had any prior knee injections.  She is here for further evaluation so that she can assist with her weight loss.    Background:   Date of injury: NA   Duration of symptoms: 3 years  Mechanism of Injury: Chronic; Unknown   Aggravating factors: walking, going down stairs, upstairs  Relieving Factors: rest, advil  Prior Evaluation: Prior Physician Evalutation:  11/18 and X-rays    PAST MEDICAL, SOCIAL, SURGICAL AND FAMILY HISTORY: She  has a past medical history of Arthritis (1/28/14), Asteatotic eczema, Cholelithiasis with obstruction (5/10/2010), Gallstone pancreatitis (5/11/2010), and Right knee pain (12/27/2012). She also has no past medical history of Cerebral infarction (H), Congestive heart failure, unspecified, COPD (chronic obstructive pulmonary disease) (H), Coronary artery disease, History of blood transfusion, Thyroid disease, or Uncomplicated asthma.  She  has a past surgical history that includes VAG HYST,RMV TUBE/OVARY (2004); cholecystectomy, laporoscopic; and ERCP W STENT PLACEMENT BILE/PANCREATIC DUCT.  Her family history includes Alzheimer Disease in her father; Arthritis in her father and mother; Cerebrovascular  "Disease in her father; Congenital Anomalies in her father and mother; Depression in her mother and sister; Diabetes in her father; Eye Disorder in her mother; Glaucoma in her mother; Heart Disease in her maternal grandfather, maternal grandmother, and paternal grandfather; Hypertension in her father, mother, paternal grandmother, and sister; Macular Degeneration in her maternal uncle; Obesity in her father and mother; Prostate Cancer in her father.  She reports that she has never smoked. She has never used smokeless tobacco. She reports current alcohol use. She reports that she does not use drugs.    ALLERGIES: She has No Known Allergies.    CURRENT MEDICATIONS: She has a current medication list which includes the following prescription(s): amlodipine, aspirin, vitamin d, esomeprazole magnesium, gabapentin, hydrochlorothiazide, ibuprofen, venlafaxine, and venlafaxine.     REVIEW OF SYSTEMS: 12 point review of systems is negative except as noted above.     Exam:   Resp 16   Ht 1.702 m (5' 7\")   Wt 118.4 kg (261 lb)   BMI 40.88 kg/m        CONSTITUTIONAL: alert and no distress  HEAD: Normocephalic. No masses, lesions, tenderness or abnormalities  SKIN: no suspicious lesions or rashes  GAIT: obese pattern  NEUROLOGIC: Non-focal  PSYCHIATRIC: affect normal/bright and mentation appears normal.    MUSCULOSKELETAL:   RIGHT KNEE: Comprehensive knee examination demonstrates ROM from 0 to 98 degrees at which point she has discomfort, (+small) effusion, (++) medial/lateral patellar facet tenderness, (+) patellar inhibition, (-) apprehension; (-) pain or laxity with valgus stress testing in 0 or 30 degrees of knee flexion, (-) pain or laxity with varus stress testing in 0 or 30 degrees of knee flexion, (-) lachman; (+++) diffuse because of medial joint line tenderness, (-) lateral joint line tenderness, (-) bounce test, (-) William test.         Assessment/Plan:   (M17.11) Primary localized osteoarthritis of right knee  " (primary encounter diagnosis)  Comment: We have discussed knee osteoarthritis at length.  She will continue to work on weight loss.  We discussed regular physical activity and exercise.  She will work on either the recumbent bicycle [facility]  We will proceed with an intra-articular corticosteroid injection.    Large Joint Injection/Arthocentesis: R knee joint  Date/Time: 11/25/2019 11:37 AM  Performed by: Martinez Stoll MD  Authorized by: Martinez Stoll MD     Indications:  Osteoarthritis  Needle Size:  22 G  Guidance: landmark guided    Approach:  Anterolateral  Location:  Knee      Medications:  40 mg triamcinolone 40 MG/ML; 4 mL lidocaine 1 %  Procedure discussed: discussed risks, benefits, and alternatives    Consent Given by:  Patient  Timeout: timeout called immediately prior to procedure    Prep: patient was prepped and draped in usual sterile fashion     Scribed by Tobi Valencia ATC, ATC for  on 11/25/19 at 11:38, based on providers statements to me.            Thank you for allowing me to participate in her care.

## 2020-01-03 ENCOUNTER — MYC REFILL (OUTPATIENT)
Dept: FAMILY MEDICINE | Facility: CLINIC | Age: 70
End: 2020-01-03

## 2020-01-03 DIAGNOSIS — I10 ESSENTIAL HYPERTENSION WITH GOAL BLOOD PRESSURE LESS THAN 140/90: ICD-10-CM

## 2020-01-03 RX ORDER — HYDROCHLOROTHIAZIDE 25 MG/1
25 TABLET ORAL DAILY
Qty: 90 TABLET | Refills: 3 | Status: CANCELLED | OUTPATIENT
Start: 2020-01-03

## 2020-01-03 NOTE — TELEPHONE ENCOUNTER
"Requested Prescriptions   Pending Prescriptions Disp Refills     hydrochlorothiazide (HYDRODIURIL) 25 MG tablet  This medication may not be due for a refill.    Last Written Prescription Date:  8/8/2019  Last Fill Quantity: 90 tablet,  # refills: 3   Last office visit: 11/18/2019 with prescribing provider:  IMLA Benitez   Future Office Visit:   90 tablet 3     Sig: Take 1 tablet (25 mg) by mouth daily       Diuretics (Including Combos) Protocol Passed - 1/3/2020 11:01 AM        Passed - Blood pressure under 140/90 in past 12 months     BP Readings from Last 3 Encounters:   11/18/19 130/68   09/09/19 122/74   08/07/19 (!) 154/90             Passed - Recent (12 mo) or future (30 days) visit within the authorizing provider's specialty     Patient has had an office visit with the authorizing provider or a provider within the authorizing providers department within the previous 12 mos or has a future within next 30 days. See \"Patient Info\" tab in inbasket, or \"Choose Columns\" in Meds & Orders section of the refill encounter.              Passed - Medication is active on med list        Passed - Patient is age 18 or older        Passed - No active pregancy on record        Passed - Normal serum creatinine on file in past 12 months     Recent Labs   Lab Test 08/05/19  0741   CR 0.76              Passed - Normal serum potassium on file in past 12 months     Recent Labs   Lab Test 08/05/19  0741   POTASSIUM 3.7                    Passed - Normal serum sodium on file in past 12 months     Recent Labs   Lab Test 08/05/19  0741                 Passed - No positive pregnancy test in past 12 months        "

## 2020-01-04 DIAGNOSIS — I10 ESSENTIAL HYPERTENSION WITH GOAL BLOOD PRESSURE LESS THAN 140/90: ICD-10-CM

## 2020-01-06 NOTE — TELEPHONE ENCOUNTER
"Requested Prescriptions   Pending Prescriptions Disp Refills     aspirin (ASA) 81 MG chewable tablet [Pharmacy Med Name: ASPIRIN 81MG CHEWABLE TABLETS]  Last Written Prescription Date:  11/18/2019  Last Fill Quantity: 90 tablet,  # refills: 0   Last office visit: 11/18/2019 with prescribing provider:  MILA Benitez   Future Office Visit:   90 tablet 0     Sig: TAKE 1 TABLET BY MOUTH DAILY       Analgesics (Non-Narcotic Tylenol and ASA Only) Passed - 1/4/2020  3:58 AM        Passed - Recent (12 mo) or future (30 days) visit within the authorizing provider's specialty     Patient has had an office visit with the authorizing provider or a provider within the authorizing providers department within the previous 12 mos or has a future within next 30 days. See \"Patient Info\" tab in inbasket, or \"Choose Columns\" in Meds & Orders section of the refill encounter.              Passed - Patient is age 20 years or older     If ASA is flagged for ages under 20 years old. Forward to provider for confirmation Ryes Syndrome is not a concern.              Passed - Medication is active on med list        "

## 2020-01-06 NOTE — TELEPHONE ENCOUNTER
I see hydrochlorothiazide 25 mg, 90 tablets with 3 refill was sent to requested pharmacy on 8/8/19, appears was received by pharmacy.   This is a MyChart request from patient.    MyChart response routed to patient advising they call the pharmacy directly for refill now and in the future.   Pharmacy will notify us if they need our approval.    Janeth Bernal RN  [facility]

## 2020-01-07 RX ORDER — ASPIRIN 81 MG/1
TABLET, CHEWABLE ORAL
Qty: 90 TABLET | Refills: 0 | Status: SHIPPED | OUTPATIENT
Start: 2020-01-07

## 2020-01-07 NOTE — TELEPHONE ENCOUNTER
Prescription approved per [facility] [place] Refill Protocol.    Tiana Valentine, RN, BSN, PHN  [facility]: [place]

## 2020-02-06 ENCOUNTER — OFFICE VISIT (OUTPATIENT)
Dept: ORTHOPEDICS | Facility: CLINIC | Age: 70
End: 2020-02-06
Payer: COMMERCIAL

## 2020-02-06 VITALS — BODY MASS INDEX: 38.45 KG/M2 | WEIGHT: 245 LBS | HEIGHT: 67 IN

## 2020-02-06 DIAGNOSIS — M17.11 PRIMARY OSTEOARTHRITIS OF RIGHT KNEE: Primary | ICD-10-CM

## 2020-02-06 ASSESSMENT — MIFFLIN-ST. JEOR: SCORE: 1658.94

## 2020-02-06 NOTE — LETTER
"2/6/2020    RE: Shelia Sanchez  [address]  [place] [address]     Sports Medicine Clinic Visit    PCP: Mariana Benitez    Shelia Sanchez is a 69 year old female who is seen  as self referral presenting with right knee pain. She was seen by Dr. Stoll on 11/25/19, for a right knee injection. She had no relief from this injection.     Injury: None    Location of Pain: right knee  Duration of Pain: Over a year(s)  Pain is better with: Rest and Elevation  Pain is worse with: Walking on hard cement, stairs  Additional Features:   Treatment so far consists of: Injection   Prior History of related problems:     Ht 1.702 m (5' 7\")   Wt 111.1 kg (245 lb)   BMI 38.37 kg/m            PMH:  Past Medical History:   Diagnosis Date     Arthritis 1/28/14    Bursitis in left hip     Asteatotic eczema      Cholelithiasis with obstruction 5/10/2010     Gallstone pancreatitis 5/11/2010     Right knee pain 12/27/2012       Active problem list:  Patient Active Problem List   Diagnosis     BMI > 40     CARDIOVASCULAR SCREENING; LDL GOAL LESS THAN 130     Hypertension goal BP (blood pressure) < 140/90     Advanced directives, counseling/discussion     Left shoulder pain     Cataracts, both eyes     Urinary incontinence     Atypical nevus     Cervical pain     Left-sided headache     Menopausal syndrome (hot flashes)     Heartburn     Mild major depression (H)     LORI (obstructive sleep apnea)- severe ()     Binge eating disorder     Prediabetes       FH:  Family History   Problem Relation Age of Onset     Arthritis Mother      Congenital Anomalies Mother      Eye Disorder Mother      Glaucoma Mother      Hypertension Mother      Depression Mother      Obesity Mother      Congenital Anomalies Father      Alzheimer Disease Father      Arthritis Father      Diabetes Father      Hypertension Father      Cerebrovascular Disease Father      Prostate Cancer Father      Obesity Father      Heart Disease " Maternal Grandmother      Heart Disease Maternal Grandfather      Hypertension Paternal Grandmother      Heart Disease Paternal Grandfather      Depression Sister      Hypertension Sister      Macular Degeneration Maternal Uncle        SH:  Social History     Socioeconomic History     Marital status: Single     Spouse name: Not on file     Number of children: Not on file     Years of education: Not on file     Highest education level: Not on file   Occupational History     Not on file   Social Needs     Financial resource strain: Not on file     Food insecurity:     Worry: Not on file     Inability: Not on file     Transportation needs:     Medical: Not on file     Non-medical: Not on file   Tobacco Use     Smoking status: Never Smoker     Smokeless tobacco: Never Used   Substance and Sexual Activity     Alcohol use: Yes     Alcohol/week: 0.0 standard drinks     Comment: 4 DRINKS PER WEEK     Drug use: No     Sexual activity: Not Currently     Partners: Male     Birth control/protection: Abstinence   Lifestyle     Physical activity:     Days per week: Not on file     Minutes per session: Not on file     Stress: Not on file   Relationships     Social connections:     Talks on phone: Not on file     Gets together: Not on file     Attends Nondenominational service: Not on file     Active member of club or organization: Not on file     Attends meetings of clubs or organizations: Not on file     Relationship status: Not on file     Intimate partner violence:     Fear of current or ex partner: Not on file     Emotionally abused: Not on file     Physically abused: Not on file     Forced sexual activity: Not on file   Other Topics Concern      Service No     Blood Transfusions Yes     Comment: 1974     Caffeine Concern No     Occupational Exposure No     Hobby Hazards No     Sleep Concern No     Stress Concern No     Weight Concern Yes     Comment: OVERWEIGHT     Special Diet No     Back Care No     Exercise No     Bike  Helmet Not Asked     Seat Belt Yes     Self-Exams Yes     Parent/sibling w/ CABG, MI or angioplasty before 65F 55M? No   Social History Narrative     Not on file       MEDS:  See EMR, reviewed  ALL:  See EMR, reviewed    REVIEW OF SYSTEMS:  CONSTITUTIONAL:NEGATIVE for fever, chills, change in weight  INTEGUMENTARY/SKIN: NEGATIVE for worrisome rashes, moles or lesions  EYES: NEGATIVE for vision changes or irritation  ENT/MOUTH: NEGATIVE for ear, mouth and throat problems  RESP:NEGATIVE for significant cough or SOB  BREAST: NEGATIVE for masses, tenderness or discharge  CV: NEGATIVE for chest pain, palpitations or peripheral edema  GI: NEGATIVE for nausea, abdominal pain, heartburn, or change in bowel habits  :NEGATIVE for frequency, dysuria, or hematuria  :NEGATIVE for frequency, dysuria, or hematuria  NEURO: NEGATIVE for weakness, dizziness or paresthesias  ENDOCRINE: NEGATIVE for temperature intolerance, skin/hair changes  HEME/ALLERGY/IMMUNE: NEGATIVE for bleeding problems  PSYCHIATRIC: NEGATIVE for changes in mood or affect      Subjective: This 69-year-old female she of recurrent right-sided knee discomfort over many years.  She points to the medial aspect of her knee and says it bothers her when she walks or tries to grocery shop.  She had a intra-articular cortisone injection 3 months ago and did not provide any significant relief.  She has had no additional injections in this knee in the past.  She had an MRI of the right knee in 2005 that showed patellofemoral DJD.  She had x-rays of the knee in 2012 that showed patellofemoral DJD and medial joint space narrowing.  She had x-rays 3 months ago that show a moderate amount of medial joint space narrowing and moderate amount of patellofemoral DJD.  The knee does not lock or catch.  She is without radicular discomfort in the extremity.  She is paying close attention to weight with the help of the therapist.  She is aware of the need to get a BMI close to 40 to  consider surgery.  She is hoping to avoid surgery for now.  She is a non-smoker.  She likes to use the health club and is planning on using either the exercise bike or treadmill if tolerated.  She will use occasional ibuprofen and it does provide some relief.    Objective above ideal weight.  There is no effusion at the right knee.  I can flex and extend it fully.  She is adequate range of motion at the right hip.  She is tender over the medial joint line.  Nontender over the lateral joint line.  Nontender over the patellar tendon or peds anserine bursa.  Anterior posterior drawer is negative.  No swelling in the calf.  Peripheral pulses strong and symmetrical.  Appropriate conversation and affect.    Assessment right knee DJD    Plan: We discussed a medial  brace but she is adverse to using a brace and with her patellofemoral DJD she may not tolerate it.  She would like to try a Synvisc injection but she knows this needs to be qualified by her insurance.  She would like to see physical therapy in [place].  She will follow-up in 3 weeks for a Synvisc injection if her insurance allows it.  Insurance inquiry is placed.  She has had 25 pounds of weight loss by pain close attention to her nutrition.  She was congratulated on this.    This note was created with the use of Dragon software and unintentional spelling or errors may have occurred.        Nura Castellon MD

## 2020-02-06 NOTE — PROGRESS NOTES
"Sports Medicine Clinic Visit    PCP: Mariana Benitez    Shelia Sanchez is a 69 year old female who is seen  as self referral presenting with right knee pain. She was seen by Dr. Stoll on 11/25/19, for a right knee injection. She had no relief from this injection.     Injury: None    Location of Pain: right knee  Duration of Pain: Over a year(s)  Pain is better with: Rest and Elevation  Pain is worse with: Walking on hard cement, stairs  Additional Features:   Treatment so far consists of: Injection   Prior History of related problems:     Ht 1.702 m (5' 7\")   Wt 111.1 kg (245 lb)   BMI 38.37 kg/m           PMH:  Past Medical History:   Diagnosis Date     Arthritis 1/28/14    Bursitis in left hip     Asteatotic eczema      Cholelithiasis with obstruction 5/10/2010     Gallstone pancreatitis 5/11/2010     Right knee pain 12/27/2012       Active problem list:  Patient Active Problem List   Diagnosis     BMI > 40     CARDIOVASCULAR SCREENING; LDL GOAL LESS THAN 130     Hypertension goal BP (blood pressure) < 140/90     Advanced directives, counseling/discussion     Left shoulder pain     Cataracts, both eyes     Urinary incontinence     Atypical nevus     Cervical pain     Left-sided headache     Menopausal syndrome (hot flashes)     Heartburn     Mild major depression (H)     LORI (obstructive sleep apnea)- severe ()     Binge eating disorder     Prediabetes       FH:  Family History   Problem Relation Age of Onset     Arthritis Mother      Congenital Anomalies Mother      Eye Disorder Mother      Glaucoma Mother      Hypertension Mother      Depression Mother      Obesity Mother      Congenital Anomalies Father      Alzheimer Disease Father      Arthritis Father      Diabetes Father      Hypertension Father      Cerebrovascular Disease Father      Prostate Cancer Father      Obesity Father      Heart Disease Maternal Grandmother      Heart Disease Maternal Grandfather      Hypertension Paternal " Grandmother      Heart Disease Paternal Grandfather      Depression Sister      Hypertension Sister      Macular Degeneration Maternal Uncle        SH:  Social History     Socioeconomic History     Marital status: Single     Spouse name: Not on file     Number of children: Not on file     Years of education: Not on file     Highest education level: Not on file   Occupational History     Not on file   Social Needs     Financial resource strain: Not on file     Food insecurity:     Worry: Not on file     Inability: Not on file     Transportation needs:     Medical: Not on file     Non-medical: Not on file   Tobacco Use     Smoking status: Never Smoker     Smokeless tobacco: Never Used   Substance and Sexual Activity     Alcohol use: Yes     Alcohol/week: 0.0 standard drinks     Comment: 4 DRINKS PER WEEK     Drug use: No     Sexual activity: Not Currently     Partners: Male     Birth control/protection: Abstinence   Lifestyle     Physical activity:     Days per week: Not on file     Minutes per session: Not on file     Stress: Not on file   Relationships     Social connections:     Talks on phone: Not on file     Gets together: Not on file     Attends [employer] service: Not on file     Active member of club or organization: Not on file     Attends meetings of clubs or organizations: Not on file     Relationship status: Not on file     Intimate partner violence:     Fear of current or ex partner: Not on file     Emotionally abused: Not on file     Physically abused: Not on file     Forced sexual activity: Not on file   Other Topics Concern      Service No     Blood Transfusions Yes     Comment: 1974     Caffeine Concern No     Occupational Exposure No     Hobby Hazards No     Sleep Concern No     Stress Concern No     Weight Concern Yes     Comment: OVERWEIGHT     Special Diet No     Back Care No     Exercise No     Bike Helmet Not Asked     Seat Belt Yes     Self-Exams Yes     Parent/sibling w/ CABG, MI or  angioplasty before 65F 55M? No   Social History Narrative     Not on file       MEDS:  See EMR, reviewed  ALL:  See EMR, reviewed    REVIEW OF SYSTEMS:  CONSTITUTIONAL:NEGATIVE for fever, chills, change in weight  INTEGUMENTARY/SKIN: NEGATIVE for worrisome rashes, moles or lesions  EYES: NEGATIVE for vision changes or irritation  ENT/MOUTH: NEGATIVE for ear, mouth and throat problems  RESP:NEGATIVE for significant cough or SOB  BREAST: NEGATIVE for masses, tenderness or discharge  CV: NEGATIVE for chest pain, palpitations or peripheral edema  GI: NEGATIVE for nausea, abdominal pain, heartburn, or change in bowel habits  :NEGATIVE for frequency, dysuria, or hematuria  :NEGATIVE for frequency, dysuria, or hematuria  NEURO: NEGATIVE for weakness, dizziness or paresthesias  ENDOCRINE: NEGATIVE for temperature intolerance, skin/hair changes  HEME/ALLERGY/IMMUNE: NEGATIVE for bleeding problems  PSYCHIATRIC: NEGATIVE for changes in mood or affect      Subjective: This 69-year-old female she of recurrent right-sided knee discomfort over many years.  She points to the medial aspect of her knee and says it bothers her when she walks or tries to grocery shop.  She had a intra-articular cortisone injection 3 months ago and did not provide any significant relief.  She has had no additional injections in this knee in the past.  She had an MRI of the right knee in 2005 that showed patellofemoral DJD.  She had x-rays of the knee in 2012 that showed patellofemoral DJD and medial joint space narrowing.  She had x-rays 3 months ago that show a moderate amount of medial joint space narrowing and moderate amount of patellofemoral DJD.  The knee does not lock or catch.  She is without radicular discomfort in the extremity.  She is paying close attention to weight with the help of the therapist.  She is aware of the need to get a BMI close to 40 to consider surgery.  She is hoping to avoid surgery for now.  She is a non-smoker.  She  likes to use the health club and is planning on using either the exercise bike or treadmill if tolerated.  She will use occasional ibuprofen and it does provide some relief.    Objective above ideal weight.  There is no effusion at the right knee.  I can flex and extend it fully.  She is adequate range of motion at the right hip.  She is tender over the medial joint line.  Nontender over the lateral joint line.  Nontender over the patellar tendon or peds anserine bursa.  Anterior posterior drawer is negative.  No swelling in the calf.  Peripheral pulses strong and symmetrical.  Appropriate conversation and affect.    Assessment right knee DJD    Plan: We discussed a medial  brace but she is adverse to using a brace and with her patellofemoral DJD she may not tolerate it.  She would like to try a Synvisc injection but she knows this needs to be qualified by her insurance.  She would like to see physical therapy in [place].  She will follow-up in 3 weeks for a Synvisc injection if her insurance allows it.  Insurance inquiry is placed.  She has had 25 pounds of weight loss by pain close attention to her nutrition.  She was congratulated on this.    This note was created with the use of Dragon software and unintentional spelling or errors may have occurred.

## 2020-02-17 ENCOUNTER — THERAPY VISIT (OUTPATIENT)
Dept: PHYSICAL THERAPY | Facility: CLINIC | Age: 70
End: 2020-02-17
Attending: FAMILY MEDICINE
Payer: COMMERCIAL

## 2020-02-17 DIAGNOSIS — M25.561 CHRONIC PAIN OF RIGHT KNEE: ICD-10-CM

## 2020-02-17 DIAGNOSIS — M17.11 PRIMARY OSTEOARTHRITIS OF RIGHT KNEE: ICD-10-CM

## 2020-02-17 DIAGNOSIS — G89.29 CHRONIC PAIN OF RIGHT KNEE: ICD-10-CM

## 2020-02-17 PROCEDURE — 97110 THERAPEUTIC EXERCISES: CPT | Mod: GP | Performed by: PHYSICAL THERAPIST

## 2020-02-17 PROCEDURE — 97161 PT EVAL LOW COMPLEX 20 MIN: CPT | Mod: GP | Performed by: PHYSICAL THERAPIST

## 2020-02-17 ASSESSMENT — ACTIVITIES OF DAILY LIVING (ADL)
SWELLING: I HAVE THE SYMPTOM BUT IT DOES NOT AFFECT MY ACTIVITY
SQUAT: I AM UNABLE TO DO THE ACTIVITY
LIMPING: THE SYMPTOM PREVENTS ME FROM ALL DAILY ACTIVITIES
SIT WITH YOUR KNEE BENT: ACTIVITY IS SOMEWHAT DIFFICULT
WALK: ACTIVITY IS VERY DIFFICULT
PAIN: THE SYMPTOM PREVENTS ME FROM ALL DAILY ACTIVITIES
RISE FROM A CHAIR: ACTIVITY IS SOMEWHAT DIFFICULT
GIVING WAY, BUCKLING OR SHIFTING OF KNEE: THE SYMPTOM PREVENTS ME FROM ALL DAILY ACTIVITIES
GO UP STAIRS: ACTIVITY IS VERY DIFFICULT
KNEE_ACTIVITY_OF_DAILY_LIVING_SCORE: 25.71
AS_A_RESULT_OF_YOUR_KNEE_INJURY,_HOW_WOULD_YOU_RATE_YOUR_CURRENT_LEVEL_OF_DAILY_ACTIVITY?: ABNORMAL
GO DOWN STAIRS: ACTIVITY IS VERY DIFFICULT
KNEE_ACTIVITY_OF_DAILY_LIVING_SUM: 18
STAND: ACTIVITY IS SOMEWHAT DIFFICULT
RAW_SCORE: 18
HOW_WOULD_YOU_RATE_THE_CURRENT_FUNCTION_OF_YOUR_KNEE_DURING_YOUR_USUAL_DAILY_ACTIVITIES_ON_A_SCALE_FROM_0_TO_100_WITH_100_BEING_YOUR_LEVEL_OF_KNEE_FUNCTION_PRIOR_TO_YOUR_INJURY_AND_0_BEING_THE_INABILITY_TO_PERFORM_ANY_OF_YOUR_USUAL_DAILY_ACTIVITIES?: 40
STIFFNESS: THE SYMPTOM AFFECTS MY ACTIVITY MODERATELY
HOW_WOULD_YOU_RATE_THE_OVERALL_FUNCTION_OF_YOUR_KNEE_DURING_YOUR_USUAL_DAILY_ACTIVITIES?: ABNORMAL
KNEEL ON THE FRONT OF YOUR KNEE: I AM UNABLE TO DO THE ACTIVITY
WEAKNESS: THE SYMPTOM PREVENTS ME FROM ALL DAILY ACTIVITIES

## 2020-02-17 NOTE — PROGRESS NOTES
[facility] Initial Evaluation  Subjective:  The history is provided by the patient. No  was used.   Patient Entered Health History - See Therapist Evaluation below  Shelia Sanchez being seen for R knee arthritis.     Problem began: 2/6/2020 (date of referral).   Problem occurred: over time  and reported as 7/10 on pain scale.  General health as reported by patient is fair.  Pertinent medical history includes: overweight, sleep disorder/apnea, depression, incontinence and high blood pressure.     Medical allergies: none.   Surgeries include:  Other. Other surgery history details: gallbladder, hysterectomy.    Current medications:  High blood pressure medication.    Current occupation is Retired.                     Therapist Generated HPI Evaluation         Type of problem:  Right knee.    This is a chronic condition.  Condition occurred with:  Degenerative joint disease.  Where condition occurred: for unknown reasons.  Patient reports pain:  In the joint.  Pain is described as aching and is intermittent.  Pain is the same all the time.  Since onset symptoms are unchanged.  Associated symptoms:  Loss of strength. Symptoms are exacerbated by ascending stairs, descending stairs, walking, standing and bending/squatting  and relieved by rest.  Special tests included:  X-ray.        Physical Exam                    Objective:    Gait:    Gait Type:  Antalgic   Assistive Devices:  None  Deviations:  Hip:  Circumduction RKnee:  Excessive extension R                                                      Knee Evaluation:  ROM:    AROM    Hyperextension: Left:  2    Right:  Extension: Left:    Right:  8  Flexion: Left: 123    Right: 113  PROM      Extension: Left:   Right:  6  Flexion: Left:   Right:  117      Strength:     Extension:  Left: 5-/5    Pain:-      Right: 4+/5    Pain:-  Flexion:  Left: 4+/5    Pain:-      Right: 4+/5    Pain:-    Quad Set Left:  Fair    Pain: -   Quad  Set Right:  Poor    Pain: +                  General     ROS    Assessment/Plan:    Patient is a 69 year old female with right knee complaints.    Patient has the following significant findings with corresponding treatment plan.                Diagnosis 1:  Knee pain/DJD  Pain -  manual therapy, self management, education and home program  Decreased ROM/flexibility - manual therapy, therapeutic exercise and home program  Decreased strength - therapeutic exercise, therapeutic activities and home program  Impaired gait - gait training and home program  Decreased function - therapeutic activities and home program    Cumulative Therapy Evaluation is: Low complexity.    Previous and current functional limitations:  (See Goal Flow Sheet for this information)    Short term and Long term goals: (See Goal Flow Sheet for this information)     Communication ability:  Patient appears to be able to clearly communicate and understand verbal and written communication and follow directions correctly.  Treatment Explanation - The following has been discussed with the patient:   RX ordered/plan of care  Anticipated outcomes  Possible risks and side effects  This patient would benefit from PT intervention to resume normal activities.   Rehab potential is good.    Frequency:  1 X week, once daily  Duration:  for 6 weeks  Discharge Plan:  Achieve all LTG.  Independent in home treatment program.  Reach maximal therapeutic benefit.    Please refer to the daily flowsheet for treatment today, total treatment time and time spent performing 1:1 timed codes.

## 2020-02-21 ENCOUNTER — OFFICE VISIT (OUTPATIENT)
Dept: URGENT CARE | Facility: URGENT CARE | Age: 70
End: 2020-02-21
Payer: COMMERCIAL

## 2020-02-21 VITALS
TEMPERATURE: 97.5 F | BODY MASS INDEX: 38.37 KG/M2 | SYSTOLIC BLOOD PRESSURE: 140 MMHG | DIASTOLIC BLOOD PRESSURE: 77 MMHG | RESPIRATION RATE: 16 BRPM | HEART RATE: 77 BPM | WEIGHT: 245 LBS | OXYGEN SATURATION: 99 %

## 2020-02-21 DIAGNOSIS — L25.9 CONTACT DERMATITIS AND ECZEMA: Primary | ICD-10-CM

## 2020-02-21 PROCEDURE — 99213 OFFICE O/P EST LOW 20 MIN: CPT | Performed by: INTERNAL MEDICINE

## 2020-02-21 RX ORDER — TRIAMCINOLONE ACETONIDE 1 MG/G
CREAM TOPICAL 2 TIMES DAILY
Qty: 45 G | Refills: 0 | Status: SHIPPED | OUTPATIENT
Start: 2020-02-21 | End: 2021-02-08

## 2020-02-22 NOTE — PATIENT INSTRUCTIONS
While the appearance of the rash is reminiscent of shingles, the location and the low level of pain are not consistent.  We'll treat this as a contact dermatitis with some topical steroid cream.  If things should continue spreading around to just the right side of your body, then would be reasonable in the next day or two to check in with your doctor about shingles.

## 2020-02-22 NOTE — PROGRESS NOTES
"SUBJECTIVE:  Shelia Sanchez, a 69 year old female, presents for evaluation of a rash.  This has been present for 3-4 days.  Located over the posterior neck, upper back.  Itchy.  She has been treating with hydrocortisone. She has some history of \"sensitive skin\" and atopic dermatitis.  She isn't sure if this had anything to do with some new clothing.  The rash was initially a burning sensation; now not so much painful.    OBJECTIVE:  BP (!) 140/77   Pulse 77   Temp 97.5  F (36.4  C) (Oral)   Resp 16   Wt 111.1 kg (245 lb)   SpO2 99%   BMI 38.37 kg/m    GEN: overweight adult female  SKIN: there are clusters of papulovesicular lesions on the left side of the lower neck, the mid upper back, across the right shoulder blade and some on the right lower flank.    ASSESSMENT/PLAN:    ICD-10-CM    1. Contact dermatitis and eczema L25.9 triamcinolone (KENALOG) 0.1 % external cream  The appearance of the rash (clusters of papulovesicular lesions) is reminiscent of shingles but the location is not typical (crosses multiple dermatomes, on the left and the right).  Considered unroofing a lesion for PCR but the turnaround time is too long to be practical.  It's mild if it is shingles. Will treat with topical steroids.       Yonatan Neal MD   "

## 2020-02-27 ENCOUNTER — OFFICE VISIT (OUTPATIENT)
Dept: ORTHOPEDICS | Facility: CLINIC | Age: 70
End: 2020-02-27
Payer: COMMERCIAL

## 2020-02-27 VITALS — HEIGHT: 67 IN | BODY MASS INDEX: 38.45 KG/M2 | WEIGHT: 245 LBS

## 2020-02-27 DIAGNOSIS — M17.11 PRIMARY OSTEOARTHRITIS OF RIGHT KNEE: Primary | ICD-10-CM

## 2020-02-27 ASSESSMENT — MIFFLIN-ST. JEOR: SCORE: 1658.94

## 2020-02-27 NOTE — LETTER
2/27/2020      RE: Shelia Sanchez  [address]  [place] [address]       February 27, 2020: Shelia Sanchez is a 69 year old female who is seen in f/u up for a right knee Synvisc One injection.        PMH:  Past Medical History:   Diagnosis Date     Arthritis 1/28/14    Bursitis in left hip     Asteatotic eczema      Cholelithiasis with obstruction 5/10/2010     Gallstone pancreatitis 5/11/2010     Right knee pain 12/27/2012       Active problem list:  Patient Active Problem List   Diagnosis     BMI > 40     CARDIOVASCULAR SCREENING; LDL GOAL LESS THAN 130     Hypertension goal BP (blood pressure) < 140/90     Advanced directives, counseling/discussion     Left shoulder pain     Cataracts, both eyes     Urinary incontinence     Atypical nevus     Cervical pain     Left-sided headache     Menopausal syndrome (hot flashes)     Heartburn     Mild major depression (H)     LORI (obstructive sleep apnea)- severe ()     Binge eating disorder     Prediabetes     Chronic pain of right knee       FH:  Family History   Problem Relation Age of Onset     Arthritis Mother      Congenital Anomalies Mother      Eye Disorder Mother      Glaucoma Mother      Hypertension Mother      Depression Mother      Obesity Mother      Congenital Anomalies Father      Alzheimer Disease Father      Arthritis Father      Diabetes Father      Hypertension Father      Cerebrovascular Disease Father      Prostate Cancer Father      Obesity Father      Heart Disease Maternal Grandmother      Heart Disease Maternal Grandfather      Hypertension Paternal Grandmother      Heart Disease Paternal Grandfather      Depression Sister      Hypertension Sister      Macular Degeneration Maternal Uncle        SH:  Social History     Socioeconomic History     Marital status: Single     Spouse name: Not on file     Number of children: Not on file     Years of education: Not on file     Highest education level: Not on file    Occupational History     Not on file   Social Needs     Financial resource strain: Not on file     Food insecurity:     Worry: Not on file     Inability: Not on file     Transportation needs:     Medical: Not on file     Non-medical: Not on file   Tobacco Use     Smoking status: Never Smoker     Smokeless tobacco: Never Used   Substance and Sexual Activity     Alcohol use: Yes     Alcohol/week: 0.0 standard drinks     Comment: 4 DRINKS PER WEEK     Drug use: No     Sexual activity: Not Currently     Partners: Male     Birth control/protection: Abstinence   Lifestyle     Physical activity:     Days per week: Not on file     Minutes per session: Not on file     Stress: Not on file   Relationships     Social connections:     Talks on phone: Not on file     Gets together: Not on file     Attends Yazidism service: Not on file     Active member of club or organization: Not on file     Attends meetings of clubs or organizations: Not on file     Relationship status: Not on file     Intimate partner violence:     Fear of current or ex partner: Not on file     Emotionally abused: Not on file     Physically abused: Not on file     Forced sexual activity: Not on file   Other Topics Concern      Service No     Blood Transfusions Yes     Comment: 1974     Caffeine Concern No     Occupational Exposure No     Hobby Hazards No     Sleep Concern No     Stress Concern No     Weight Concern Yes     Comment: OVERWEIGHT     Special Diet No     Back Care No     Exercise No     Bike Helmet Not Asked     Seat Belt Yes     Self-Exams Yes     Parent/sibling w/ CABG, MI or angioplasty before 65F 55M? No   Social History Narrative     Not on file       MEDS:  See EMR, reviewed  ALL:  See EMR, reviewed    REVIEW OF SYSTEMS:  CONSTITUTIONAL:NEGATIVE for fever, chills, change in weight  INTEGUMENTARY/SKIN: NEGATIVE for worrisome rashes, moles or lesions  EYES: NEGATIVE for vision changes or irritation  ENT/MOUTH: NEGATIVE for ear, mouth  and throat problems  RESP:NEGATIVE for significant cough or SOB  BREAST: NEGATIVE for masses, tenderness or discharge  CV: NEGATIVE for chest pain, palpitations or peripheral edema  GI: NEGATIVE for nausea, abdominal pain, heartburn, or change in bowel habits  :NEGATIVE for frequency, dysuria, or hematuria  :NEGATIVE for frequency, dysuria, or hematuria  NEURO: NEGATIVE for weakness, dizziness or paresthesias  ENDOCRINE: NEGATIVE for temperature intolerance, skin/hair changes  HEME/ALLERGY/IMMUNE: NEGATIVE for bleeding problems  PSYCHIATRIC: NEGATIVE for changes in mood or affect        Subjective: 69-year-old female is seen in follow-up for knee DJD.  She presents for a Synvisc 1 injection.  Please see previous dictation, reviewed.    Objective the right knee reveals no effusion.  I can flex and extend it fully.  She is nontender over the lateral joint line.  Not tender over the peds anserine bursa.  Mildly tender over the medial joint line.  Overlying skin is intact.  Appropriate in conversation and affect.    Right-sided knee DJD    Plan: After informed consent about the possibility of bleeding, infection, discomfort, and allergic reaction and after prep with surgical scrub she was injected in the right knee from a lateral approach in the seated position with Synvisc 1.  After the injection she moved her knee through full range of motion multiple times and left the clinic ambulatory.  She will look for improvement with the injection she knows if it is helpful it could be repeated 6 months from now but she would need to give us notice prior to the injection so we can have it authorized again by insurance.  She will follow-up if not improved.  This note was created with the use of Dragon software and unintentional spelling or errors may have occurred.        Large Joint Injection/Arthocentesis: R knee joint  Date/Time: 2/27/2020 12:23 PM  Performed by: Nura Castellon MD  Authorized by: Nura Castellon  MD Job     Indications:  Osteoarthritis  Needle Size:  22 G  Guidance: landmark guided    Approach:  Lateral  Location:  Knee      Medications:  48 mg hylan 48 MG/6ML  Outcome:  Tolerated well, no immediate complications  Procedure discussed: discussed risks, benefits, and alternatives    Consent Given by:  Patient  Timeout: timeout called immediately prior to procedure    Prep: patient was prepped and draped in usual sterile fashion     Scribed by Annie De Anda MS, LAT, ATC for Dr. Castellon on 2/27/2020 at 12:24 PM, based on the provider s statements to me.          Nura Castellon MD

## 2020-02-27 NOTE — PROGRESS NOTES
February 27, 2020: Shelia Sanchez is a 69 year old female who is seen in f/u up for a right knee Synvisc One injection.        PMH:  Past Medical History:   Diagnosis Date     Arthritis 1/28/14    Bursitis in left hip     Asteatotic eczema      Cholelithiasis with obstruction 5/10/2010     Gallstone pancreatitis 5/11/2010     Right knee pain 12/27/2012       Active problem list:  Patient Active Problem List   Diagnosis     BMI > 40     CARDIOVASCULAR SCREENING; LDL GOAL LESS THAN 130     Hypertension goal BP (blood pressure) < 140/90     Advanced directives, counseling/discussion     Left shoulder pain     Cataracts, both eyes     Urinary incontinence     Atypical nevus     Cervical pain     Left-sided headache     Menopausal syndrome (hot flashes)     Heartburn     Mild major depression (H)     LORI (obstructive sleep apnea)- severe ()     Binge eating disorder     Prediabetes     Chronic pain of right knee       FH:  Family History   Problem Relation Age of Onset     Arthritis Mother      Congenital Anomalies Mother      Eye Disorder Mother      Glaucoma Mother      Hypertension Mother      Depression Mother      Obesity Mother      Congenital Anomalies Father      Alzheimer Disease Father      Arthritis Father      Diabetes Father      Hypertension Father      Cerebrovascular Disease Father      Prostate Cancer Father      Obesity Father      Heart Disease Maternal Grandmother      Heart Disease Maternal Grandfather      Hypertension Paternal Grandmother      Heart Disease Paternal Grandfather      Depression Sister      Hypertension Sister      Macular Degeneration Maternal Uncle        SH:  Social History     Socioeconomic History     Marital status: Single     Spouse name: Not on file     Number of children: Not on file     Years of education: Not on file     Highest education level: Not on file   Occupational History     Not on file   Social Needs     Financial resource strain: Not on file     Food  insecurity:     Worry: Not on file     Inability: Not on file     Transportation needs:     Medical: Not on file     Non-medical: Not on file   Tobacco Use     Smoking status: Never Smoker     Smokeless tobacco: Never Used   Substance and Sexual Activity     Alcohol use: Yes     Alcohol/week: 0.0 standard drinks     Comment: 4 DRINKS PER WEEK     Drug use: No     Sexual activity: Not Currently     Partners: Male     Birth control/protection: Abstinence   Lifestyle     Physical activity:     Days per week: Not on file     Minutes per session: Not on file     Stress: Not on file   Relationships     Social connections:     Talks on phone: Not on file     Gets together: Not on file     Attends Mormonism service: Not on file     Active member of club or organization: Not on file     Attends meetings of clubs or organizations: Not on file     Relationship status: Not on file     Intimate partner violence:     Fear of current or ex partner: Not on file     Emotionally abused: Not on file     Physically abused: Not on file     Forced sexual activity: Not on file   Other Topics Concern      Service No     Blood Transfusions Yes     Comment: 1974     Caffeine Concern No     Occupational Exposure No     Hobby Hazards No     Sleep Concern No     Stress Concern No     Weight Concern Yes     Comment: OVERWEIGHT     Special Diet No     Back Care No     Exercise No     Bike Helmet Not Asked     Seat Belt Yes     Self-Exams Yes     Parent/sibling w/ CABG, MI or angioplasty before 65F 55M? No   Social History Narrative     Not on file       MEDS:  See EMR, reviewed  ALL:  See EMR, reviewed    REVIEW OF SYSTEMS:  CONSTITUTIONAL:NEGATIVE for fever, chills, change in weight  INTEGUMENTARY/SKIN: NEGATIVE for worrisome rashes, moles or lesions  EYES: NEGATIVE for vision changes or irritation  ENT/MOUTH: NEGATIVE for ear, mouth and throat problems  RESP:NEGATIVE for significant cough or SOB  BREAST: NEGATIVE for masses, tenderness  or discharge  CV: NEGATIVE for chest pain, palpitations or peripheral edema  GI: NEGATIVE for nausea, abdominal pain, heartburn, or change in bowel habits  :NEGATIVE for frequency, dysuria, or hematuria  :NEGATIVE for frequency, dysuria, or hematuria  NEURO: NEGATIVE for weakness, dizziness or paresthesias  ENDOCRINE: NEGATIVE for temperature intolerance, skin/hair changes  HEME/ALLERGY/IMMUNE: NEGATIVE for bleeding problems  PSYCHIATRIC: NEGATIVE for changes in mood or affect        Subjective: 69-year-old female is seen in follow-up for knee DJD.  She presents for a Synvisc 1 injection.  Please see previous dictation, reviewed.    Objective the right knee reveals no effusion.  I can flex and extend it fully.  She is nontender over the lateral joint line.  Not tender over the peds anserine bursa.  Mildly tender over the medial joint line.  Overlying skin is intact.  Appropriate in conversation and affect.    Right-sided knee DJD    Plan: After informed consent about the possibility of bleeding, infection, discomfort, and allergic reaction and after prep with surgical scrub she was injected in the right knee from a lateral approach in the seated position with Synvisc 1.  After the injection she moved her knee through full range of motion multiple times and left the clinic ambulatory.  She will look for improvement with the injection she knows if it is helpful it could be repeated 6 months from now but she would need to give us notice prior to the injection so we can have it authorized again by insurance.  She will follow-up if not improved.  This note was created with the use of Dragon software and unintentional spelling or errors may have occurred.        Large Joint Injection/Arthocentesis: R knee joint  Date/Time: 2/27/2020 12:23 PM  Performed by: Nura Castellon MD  Authorized by: Nura Castellon MD     Indications:  Osteoarthritis  Needle Size:  22 G  Guidance: landmark guided    Approach:   Lateral  Location:  Knee      Medications:  48 mg hylan 48 MG/6ML  Outcome:  Tolerated well, no immediate complications  Procedure discussed: discussed risks, benefits, and alternatives    Consent Given by:  Patient  Timeout: timeout called immediately prior to procedure    Prep: patient was prepped and draped in usual sterile fashion     Scribed by Annie De Anda MS, LAT, ATC for Dr. Castellon on 2/27/2020 at 12:24 PM, based on the provider s statements to me.

## 2020-02-27 NOTE — NURSING NOTE
[address] [address]  Dept: [phone]  ______________________________________________________________________________    Patient: Shelia Sanchez   : 1950   MRN: [id number]   2020    INVASIVE PROCEDURE SAFETY CHECKLIST    Date: 2020   Procedure: Right knee Synvisc One injection  Patient Name: Shelia Sanchez  MRN: [id number]  YOB: 1950    Action: Complete sections as appropriate. Any discrepancy results in a HARD COPY until resolved.     PRE PROCEDURE:  Patient ID verified with 2 identifiers (name and  or MRN): Yes  Procedure and site verified with patient/designee (when able): Yes  Accurate consent documentation in medical record: Yes  H&P (or appropriate assessment) documented in medical record: Yes  H&P must be up to 20 days prior to procedure and updates within 24 hours of procedure as applicable: NA  Relevant diagnostic and radiology test results appropriately labeled and displayed as applicable: Yes  Procedure site(s) marked with provider initials: NA    TIMEOUT:  Time-Out performed immediately prior to starting procedure, including verbal and active participation of all team members addressing the following:Yes  * Correct patient identify  * Confirmed that the correct side and site are marked  * An accurate procedure consent form  * Agreement on the procedure to be done  * Correct patient position  * Relevant images and results are properly labeled and appropriately displayed  * The need to administer antibiotics or fluids for irrigation purposes during the procedure as applicable   * Safety precautions based on patient history or medication use    DURING PROCEDURE: Verification of correct person, site, and procedures any time the responsibility for care of the patient is transferred to another member of the care team.       Prior to injection, verified patient identity using patient's name and  date of birth.  Due to injection administration, patient instructed to remain in clinic for 15 minutes  afterwards, and to report any adverse reaction to me immediately.    Joint injection was performed.      Drug Amount Wasted:  None.  Vial/Syringe: Syringe  Expiration Date:  2022-02      Annie De Anda, ATC  February 27, 2020

## 2020-02-28 ENCOUNTER — THERAPY VISIT (OUTPATIENT)
Dept: PHYSICAL THERAPY | Facility: CLINIC | Age: 70
End: 2020-02-28
Payer: COMMERCIAL

## 2020-02-28 DIAGNOSIS — M25.561 CHRONIC PAIN OF RIGHT KNEE: ICD-10-CM

## 2020-02-28 DIAGNOSIS — G89.29 CHRONIC PAIN OF RIGHT KNEE: ICD-10-CM

## 2020-02-28 PROCEDURE — 97110 THERAPEUTIC EXERCISES: CPT | Mod: GP | Performed by: PHYSICAL THERAPIST

## 2020-02-28 PROCEDURE — 97530 THERAPEUTIC ACTIVITIES: CPT | Mod: GP | Performed by: PHYSICAL THERAPIST

## 2020-02-28 NOTE — PROGRESS NOTES
SUBJECTIVE  Subjective: Had an allergic reaction that caused a rash on her back and she couldn't lay down for her exercises for several days   Current Pain level: 6/10   Changes in function:  None     Adverse reaction to treatment or activity:  None    OBJECTIVE  Objective: Tired easily but had good technique overall.  The most cuing was needed for chair transfers     ASSESSMENT  Shelia continues to require intervention to meet STG and LTG's: PT  Patient is progressing as expected.  Response to therapy has shown an improvement in  pain level  Progress made towards STG/LTG?  None    PLAN  Current treatment program is being advanced to more complex exercises.    PTA/ATC plan:  N/A    Please refer to the daily flowsheet for treatment today, total treatment time and time spent performing 1:1 timed codes.

## 2020-03-02 DIAGNOSIS — M79.18 MYOFASCIAL PAIN: ICD-10-CM

## 2020-03-02 NOTE — TELEPHONE ENCOUNTER
Received fax request from   [id number] - [facility], [place] - [address]  [address]  [facility] [address]  Phone: [phone] Fax: [phone]    pharmacy requesting refill(s) for gabapentin (NEURONTIN) 100 MG capsule    Last refilled on 1/30/2020    Pt last seen on 3/11/19  Next appt scheduled for none    Will facilitate refill.    Sandi JOSÉ CMA

## 2020-03-03 RX ORDER — GABAPENTIN 100 MG/1
100 CAPSULE ORAL 4 TIMES DAILY
Qty: 120 CAPSULE | Refills: 0 | Status: SHIPPED | OUTPATIENT
Start: 2020-03-03 | End: 2020-05-15

## 2020-03-03 NOTE — TELEPHONE ENCOUNTER
I would need a visit to take over this med  I need her to follow up as planned for office visit  Last seen by pain clinic 3/19-please refill one time as she needs to be seen at our office to get ongoing med    Mariana Benitez D.O.

## 2020-03-03 NOTE — TELEPHONE ENCOUNTER
It appears patient was discharged back to primary care. Will route refill request to primary care provider.     Elissa Vann PA-C on 3/3/2020 at 8:45 AM

## 2020-03-03 NOTE — TELEPHONE ENCOUNTER
Patient returning call. I provided providers message below. Patient verbalized understanding to me and will see Dr. Benitez for a follow up to continue refills.    Sandi JOSÉ CMA

## 2020-03-03 NOTE — TELEPHONE ENCOUNTER
I called and left a voicemail requesting patient to call back to give provider's message below. Awaiting patient call back.    Sandi JOSÉ CMA

## 2020-03-03 NOTE — TELEPHONE ENCOUNTER
Okay for 1 month refill. Patient needs to schedule appointment with PCP in order for them to take over prescription. Please have her schedule an appointment with her primary care provider.     Elissa Vann PA-C on 3/3/2020 at 12:49 PM

## 2020-03-05 ENCOUNTER — THERAPY VISIT (OUTPATIENT)
Dept: PHYSICAL THERAPY | Facility: CLINIC | Age: 70
End: 2020-03-05
Payer: COMMERCIAL

## 2020-03-05 DIAGNOSIS — M25.561 CHRONIC PAIN OF RIGHT KNEE: ICD-10-CM

## 2020-03-05 DIAGNOSIS — G89.29 CHRONIC PAIN OF RIGHT KNEE: ICD-10-CM

## 2020-03-05 PROCEDURE — 97530 THERAPEUTIC ACTIVITIES: CPT | Mod: GP | Performed by: PHYSICAL THERAPIST

## 2020-03-05 PROCEDURE — 97110 THERAPEUTIC EXERCISES: CPT | Mod: GP | Performed by: PHYSICAL THERAPIST

## 2020-03-12 ENCOUNTER — THERAPY VISIT (OUTPATIENT)
Dept: PHYSICAL THERAPY | Facility: CLINIC | Age: 70
End: 2020-03-12
Payer: COMMERCIAL

## 2020-03-12 DIAGNOSIS — G89.29 CHRONIC PAIN OF RIGHT KNEE: ICD-10-CM

## 2020-03-12 DIAGNOSIS — M25.561 CHRONIC PAIN OF RIGHT KNEE: ICD-10-CM

## 2020-03-12 PROCEDURE — 97110 THERAPEUTIC EXERCISES: CPT | Mod: GP | Performed by: PHYSICAL THERAPIST

## 2020-03-12 PROCEDURE — 97530 THERAPEUTIC ACTIVITIES: CPT | Mod: GP | Performed by: PHYSICAL THERAPIST

## 2020-03-12 NOTE — PROGRESS NOTES
SUBJECTIVE  Subjective: The wet weather is really doing a number on the knee.  Not much of a difference overall in the knee   Current Pain level: 5/10   Changes in function:  None    Adverse reaction to treatment or activity:  None    OBJECTIVE  Objective: Pt felt really challenged by retro step ups and needed cues to get weight back     ASSESSMENT  Shelia continues to require intervention to meet STG and LTG's: PT  Patient is progressing as expected.  Response to therapy has shown an improvement in  pain level  Progress made towards STG/LTG?  None    PLAN  Current treatment program is being advanced to more complex exercises.    PTA/ATC plan:  N/A    Please refer to the daily flowsheet for treatment today, total treatment time and time spent performing 1:1 timed codes.

## 2020-03-30 PROBLEM — M25.561 CHRONIC PAIN OF RIGHT KNEE: Status: RESOLVED | Noted: 2020-02-17 | Resolved: 2020-03-30

## 2020-03-30 PROBLEM — G89.29 CHRONIC PAIN OF RIGHT KNEE: Status: RESOLVED | Noted: 2020-02-17 | Resolved: 2020-03-30

## 2020-03-30 NOTE — PROGRESS NOTES
DISCHARGE REPORT    Progress reporting period is from 2/17/20 to 3/30/20.     SUBJECTIVE  Subjective: The wet weather is really doing a number on the knee.  Not much of a difference overall in the knee   Current Pain level: 5/10   Initial Pain level: 7/10       The subjective and objective information are from the last SOAP note on this patient.  Exchanged phone messages with patient and she feels she can manage on her own but needs to be more consistent with HEP.  Due to COVID-19 concerns pt will not be returning to the clinic at this time    OBJECTIVE  Objective: Pt felt really challenged by retro step ups and needed cues to get weight back      ASSESSMENT/PLAN  Updated problem list and treatment plan: Diagnosis 1:  Knee pain/DJD     STG/LTGs have been met or progress has been made towards goals:  Yes (See Goal flow sheet completed today.)  Assessment of Progress: The patient's condition is improving.  Self Management Plans:  Patient is independent in a home treatment program.  Shelia continues to require the following intervention to meet STG and LTG's: PT intervention is no longer required to meet STG/LTG.  We will discharge this patient from PT.    Recommendations:  This patient is ready to be discharged from therapy and continue their home treatment program.    Please refer to the daily flowsheet for treatment today, total treatment time and time spent performing 1:1 timed codes.

## 2020-05-13 ENCOUNTER — MYC MEDICAL ADVICE (OUTPATIENT)
Dept: FAMILY MEDICINE | Facility: CLINIC | Age: 70
End: 2020-05-13

## 2020-05-15 ENCOUNTER — VIRTUAL VISIT (OUTPATIENT)
Dept: FAMILY MEDICINE | Facility: CLINIC | Age: 70
End: 2020-05-15
Payer: COMMERCIAL

## 2020-05-15 DIAGNOSIS — T14.8XXA BRUISING: ICD-10-CM

## 2020-05-15 DIAGNOSIS — G47.33 OSA (OBSTRUCTIVE SLEEP APNEA): Chronic | ICD-10-CM

## 2020-05-15 DIAGNOSIS — I10 ESSENTIAL HYPERTENSION WITH GOAL BLOOD PRESSURE LESS THAN 140/90: Primary | ICD-10-CM

## 2020-05-15 DIAGNOSIS — N95.1 MENOPAUSAL SYNDROME (HOT FLASHES): ICD-10-CM

## 2020-05-15 DIAGNOSIS — F32.0 MILD MAJOR DEPRESSION (H): ICD-10-CM

## 2020-05-15 DIAGNOSIS — M79.18 MYOFASCIAL PAIN: ICD-10-CM

## 2020-05-15 DIAGNOSIS — E66.01 MORBID OBESITY (H): ICD-10-CM

## 2020-05-15 PROCEDURE — 99214 OFFICE O/P EST MOD 30 MIN: CPT | Mod: 95 | Performed by: FAMILY MEDICINE

## 2020-05-15 RX ORDER — AMLODIPINE BESYLATE 5 MG/1
TABLET ORAL
Qty: 90 TABLET | Refills: 1 | Status: SHIPPED | OUTPATIENT
Start: 2020-05-15 | End: 2020-11-05

## 2020-05-15 RX ORDER — AMLODIPINE BESYLATE 5 MG/1
TABLET ORAL
Qty: 90 TABLET | Refills: 1 | Status: SHIPPED | OUTPATIENT
Start: 2020-05-15 | End: 2020-05-15

## 2020-05-15 RX ORDER — GABAPENTIN 100 MG/1
100 CAPSULE ORAL 4 TIMES DAILY
Qty: 120 CAPSULE | Refills: 0 | Status: SHIPPED | OUTPATIENT
Start: 2020-05-15 | End: 2020-07-27

## 2020-05-15 RX ORDER — HYDROCHLOROTHIAZIDE 25 MG/1
25 TABLET ORAL DAILY
Qty: 90 TABLET | Refills: 3 | Status: SHIPPED | OUTPATIENT
Start: 2020-05-15 | End: 2021-02-08

## 2020-05-15 RX ORDER — VENLAFAXINE HYDROCHLORIDE 37.5 MG/1
37.5 CAPSULE, EXTENDED RELEASE ORAL DAILY
Qty: 90 CAPSULE | Refills: 0 | Status: SHIPPED | OUTPATIENT
Start: 2020-05-15 | End: 2022-09-02

## 2020-05-15 RX ORDER — VENLAFAXINE HYDROCHLORIDE 75 MG/1
75 CAPSULE, EXTENDED RELEASE ORAL DAILY
Qty: 90 CAPSULE | Refills: 0 | Status: SHIPPED | OUTPATIENT
Start: 2020-05-15 | End: 2022-01-03 | Stop reason: DRUGHIGH

## 2020-05-15 NOTE — PROGRESS NOTES
"Shelia Sanchez is a 69 year old female who is being evaluated via a billable video visit.      The patient has been notified of following:     \"This video visit will be conducted via a call between you and your physician/provider. We have found that certain health care needs can be provided without the need for an in-person physical exam.  This service lets us provide the care you need with a video conversation.  If a prescription is necessary we can send it directly to your pharmacy.  If lab work is needed we can place an order for that and you can then stop by our lab to have the test done at a later time.    Video visits are billed at different rates depending on your insurance coverage.  Please reach out to your insurance provider with any questions.    If during the course of the call the physician/provider feels a video visit is not appropriate, you will not be charged for this service.\"    Patient has given verbal consent for Video visit? Yes    How would you like to obtain your AVS? Rojas    Patient would like the video invitation sent by: Text to cell phone: [phone]    Will anyone else be joining your video visit? No    Subjective     Shelia Sanchez is a 69 year old female who presents today via video visit for the following health issues:    HPI  Concern - wound on left leg  Onset: April 10th    Description:   Left leg, about two inches above ankle.   Dropped a heavy metal lamp on leg.   Bruise appeared. Tender to touch. Tender when sock rubs on it. Bruise is lessening since injury.   She can walk without pain.   She denies an red streaking of wound     Intensity: mild    Progression of Symptoms:  Improving      She lives alone  She takes care of her     Chronic neck pain -doing well it for years    bp stable -on her current meds    [facility]-binge eating well , her therapist is working well for anxiety        Video Start Time: 12:45PM        Patient Active Problem List   Diagnosis     " BMI > 40     CARDIOVASCULAR SCREENING; LDL GOAL LESS THAN 130     Hypertension goal BP (blood pressure) < 140/90     Advanced directives, counseling/discussion     Left shoulder pain     Cataracts, both eyes     Urinary incontinence     Atypical nevus     Cervical pain     Left-sided headache     Menopausal syndrome (hot flashes)     Heartburn     Mild major depression (H)     LORI (obstructive sleep apnea)- severe ()     Binge eating disorder     Prediabetes     Past Surgical History:   Procedure Laterality Date     C VAG HYST,RMV TUBE/OVARY  2004     CHOLECYSTECTOMY, LAPOROSCOPIC      Cholecystectomy, Laparoscopic     HC ERCP W STENT PLACEMENT BILE/PANCREATIC DUCT         Social History     Tobacco Use     Smoking status: Never Smoker     Smokeless tobacco: Never Used   Substance Use Topics     Alcohol use: Yes     Alcohol/week: 0.0 standard drinks     Comment: 4 DRINKS PER WEEK     Family History   Problem Relation Age of Onset     Arthritis Mother      Congenital Anomalies Mother      Eye Disorder Mother      Glaucoma Mother      Hypertension Mother      Depression Mother      Obesity Mother      Congenital Anomalies Father      Alzheimer Disease Father      Arthritis Father      Diabetes Father      Hypertension Father      Cerebrovascular Disease Father      Prostate Cancer Father      Obesity Father      Heart Disease Maternal Grandmother      Heart Disease Maternal Grandfather      Hypertension Paternal Grandmother      Heart Disease Paternal Grandfather      Depression Sister      Hypertension Sister      Macular Degeneration Maternal Uncle            Reviewed and updated as needed this visit by Provider         Review of Systems   Constitutional, HEENT, cardiovascular, pulmonary, GI, , musculoskeletal, neuro, skin, endocrine and psych systems are negative, except as otherwise noted.      Objective    There were no vitals taken for this visit.  Estimated body mass index is 38.37 kg/m  as calculated  "from the following:    Height as of 2/27/20: 1.702 m (5' 7\").    Weight as of 2/27/20: 111.1 kg (245 lb).  Physical Exam     GENERAL: Healthy, alert and no distress  EYES: Eyes grossly normal to inspection.  No discharge or erythema, or obvious scleral/conjunctival abnormalities.  RESP: No audible wheeze, cough, or visible cyanosis.  No visible retractions or increased work of breathing.    SKIN: Visible skin clear. No significant rash, abnormal pigmentation or lesions.  NEURO: Cranial nerves grossly intact.  Mentation and speech appropriate for age.  PSYCH: Mentation appears normal, affect normal/bright, judgement and insight intact, normal speech and appearance well-groomed.      Diagnostic Test Results:  Labs reviewed in Epic        Assessment & Plan       ICD-10-CM    1. Essential hypertension with goal blood pressure less than 140/90  I10 amLODIPine (NORVASC) 5 MG tablet     hydrochlorothiazide (HYDRODIURIL) 25 MG tablet     DISCONTINUED: amLODIPine (NORVASC) 5 MG tablet   2. Myofascial pain  M79.18 gabapentin (NEURONTIN) 100 MG capsule   3. Menopausal syndrome (hot flashes)  N95.1 venlafaxine (EFFEXOR-XR) 75 MG 24 hr capsule   4. Mild major depression (H)  F32.0 venlafaxine (EFFEXOR-XR) 37.5 MG 24 hr capsule   5. Morbid obesity (H)  E66.01    6. LORI (obstructive sleep apnea)- severe ()  G47.33    7. Bruising  T14.8XXA      Bruising from trauma on leg, no signs of infection, ambulating well, no redness, advised close monitoring  Anxiety/depression-stable, working with therapist, refilled meds  History of chronic neck pain-stable on meds, cont gabapentin  Obesity-weight loss management, she is going to [facility]  hypertension-stable, cont meds  LORI-cont cpap       Follow up with labs in July /august  BMI:   Estimated body mass index is 38.37 kg/m  as calculated from the following:    Height as of 2/27/20: 1.702 m (5' 7\").    Weight as of 2/27/20: 111.1 kg (245 lb).   Weight management plan: Discussed " healthy diet and exercise guidelines        See Patient Instructions    No follow-ups on file.    Mariana Benitez DO  [facility]      Video-Visit Details    Type of service:  Video Visit    Video End Time:1:09 PM    Originating Location (pt. Location): Home    Distant Location (provider location):  [facility]     Platform used for Video Visit: Doximity    No follow-ups on file.       Mariana Benitez DO

## 2020-05-15 NOTE — TELEPHONE ENCOUNTER
Routing to PCP to review and advise.    Please see My Chart message with attached pictures.      Wilda Worthy RN  [facility]

## 2020-05-15 NOTE — TELEPHONE ENCOUNTER
MyChart sent instructing patient to call main clinic line to schedule.  Closing encounter.    Keke Winter RN

## 2020-06-14 DIAGNOSIS — M79.18 MYOFASCIAL PAIN: ICD-10-CM

## 2020-06-17 NOTE — TELEPHONE ENCOUNTER
Requested Prescriptions   Pending Prescriptions Disp Refills     gabapentin (NEURONTIN) 100 MG capsule [Pharmacy Med Name: GABAPENTIN 100MG CAPSULES] 120 capsule 0     Sig: TAKE 1 CAPSULE(100 MG) BY MOUTH FOUR TIMES DAILY       There is no refill protocol information for this order        Routing refill request to provider for review/approval because:  Drug not on the [facility] refill protocol   Genesis POLANCO,RN  [facility]

## 2020-07-13 ENCOUNTER — DOCUMENTATION ONLY (OUTPATIENT)
Dept: SLEEP MEDICINE | Facility: CLINIC | Age: 70
End: 2020-07-13

## 2020-07-13 DIAGNOSIS — G47.33 OSA (OBSTRUCTIVE SLEEP APNEA): ICD-10-CM

## 2020-07-13 DIAGNOSIS — G47.33 OSA (OBSTRUCTIVE SLEEP APNEA): Primary | ICD-10-CM

## 2020-07-13 NOTE — PROGRESS NOTES
Patient HAD A VIRTUAL MASK FITTING FROM [place] on July 13, 2020. Patient requested to switch masks because soreness/skin irritation . Patient DISCUSSED the following masks: AIRTOUCH F20 Patient selected a Resmed Mask name: AIRTOUCH F20 Full Face mask size Medium

## 2020-07-27 RX ORDER — GABAPENTIN 100 MG/1
CAPSULE ORAL
Qty: 120 CAPSULE | Refills: 0 | Status: SHIPPED | OUTPATIENT
Start: 2020-07-27 | End: 2021-02-08

## 2020-08-21 ENCOUNTER — TELEPHONE (OUTPATIENT)
Dept: ORTHOPEDICS | Facility: CLINIC | Age: 70
End: 2020-08-21

## 2020-08-21 NOTE — TELEPHONE ENCOUNTER
[facility] Call Center    Phone Message    May a detailed message be left on voicemail: yes     Reason for Call: Other:   Pt would like to schedule a synvisc injection. Pt would like to make sure that she is able to schedule injection and is now open to any provider. Please call back to assist with scheduling this injection.     Action Taken: Other:   sports  Travel Screening: Not Applicable

## 2020-08-21 NOTE — TELEPHONE ENCOUNTER
Returned call to patient, informing her that Dr. Castellon is still only offering virtual appointments at this time. She was offered an appointment with Dr. Fischer on 8/26/20 at 10am, which was accepted and scheduled. Building restrictions were reviewed and she had no further questions at this time.

## 2020-08-24 NOTE — TELEPHONE ENCOUNTER
DIAGNOSIS: Knee Synvisc One injection, patient of Dr. Castellon; ok'd by Dr. Fischer   APPOINTMENT DATE: 8.26.20   NOTES STATUS DETAILS   OFFICE NOTE from referring provider Internal 2.27.20 [facility]  2.6.20    OFFICE NOTE from other specialist Internal 11.25.19 [facility]  11.18.19 TK Benitez   DISCHARGE SUMMARY from hospital N/A    DISCHARGE REPORT from the ER N/A    OPERATIVE REPORT N/A    MEDICATION LIST Internal    MRI N/A    CT SCAN N/A    XRAYS (IMAGES & REPORTS) Internal 11.18.19 R knee

## 2020-08-26 ENCOUNTER — PRE VISIT (OUTPATIENT)
Dept: ORTHOPEDICS | Facility: CLINIC | Age: 70
End: 2020-08-26

## 2020-09-16 ENCOUNTER — PRE VISIT (OUTPATIENT)
Dept: ORTHOPEDICS | Facility: CLINIC | Age: 70
End: 2020-09-16

## 2020-09-23 ENCOUNTER — OFFICE VISIT (OUTPATIENT)
Dept: ORTHOPEDICS | Facility: CLINIC | Age: 70
End: 2020-09-23
Payer: COMMERCIAL

## 2020-09-23 VITALS — WEIGHT: 274.1 LBS | BODY MASS INDEX: 43.02 KG/M2 | HEIGHT: 67 IN

## 2020-09-23 DIAGNOSIS — M17.11 PRIMARY LOCALIZED OSTEOARTHRITIS OF RIGHT KNEE: Primary | ICD-10-CM

## 2020-09-23 RX ORDER — VENLAFAXINE HYDROCHLORIDE 75 MG/1
75 CAPSULE, EXTENDED RELEASE ORAL
COMMUNITY
Start: 2018-01-10 | End: 2021-01-04

## 2020-09-23 RX ORDER — TOPIRAMATE 25 MG/1
TABLET, FILM COATED ORAL
COMMUNITY
Start: 2020-08-31 | End: 2021-02-08

## 2020-09-23 ASSESSMENT — MIFFLIN-ST. JEOR: SCORE: 1785.94

## 2020-09-23 NOTE — LETTER
9/23/2020      RE: Shelia Sanchez  [address]  [place] [address]        Subjective:   Shelia Sanchez is a 70 year old female who presents for a repeat right knee Synvisc One injection.  Right knee OA for some years.  Lasted Feb to late June.  Using a cane, does Ok.  Limping now.  CSI didn't do much of anything for her.  COVID-19- doing PT, cancelled and hasn't been back.  [place]- binge eating, sticking to diet for a while and then eating correctly.  Goes for 1 cookie and eats 12.  Therapy for 1 year.  Weight up since COVID-19.  Overweight kid, fighting whole life.  COVID was scaring her.  Doesn't go out much.    Background:   Date of injury: None  Duration of symptoms: years  Mechanism of Injury: Chronic; Unknown   Relieving Factors: Synvisc One (2/27/2020)  Prior Evaluation: Dr. Stoll, Dr. Castellon, xrays    PAST MEDICAL, SOCIAL, SURGICAL AND FAMILY HISTORY: She  has a past medical history of Arthritis (1/28/14), Asteatotic eczema, Cholelithiasis with obstruction (5/10/2010), Gallstone pancreatitis (5/11/2010), and Right knee pain (12/27/2012). She also has no past medical history of Cerebral infarction (H), Congestive heart failure, unspecified, COPD (chronic obstructive pulmonary disease) (H), Coronary artery disease, History of blood transfusion, Thyroid disease, or Uncomplicated asthma.  She  has a past surgical history that includes VAG HYST,RMV TUBE/OVARY (2004); cholecystectomy, laporoscopic; and ERCP W STENT PLACEMENT BILE/PANCREATIC DUCT.  Her family history includes Alzheimer Disease in her father; Arthritis in her father and mother; Cerebrovascular Disease in her father; Congenital Anomalies in her father and mother; Depression in her mother and sister; Diabetes in her father; Eye Disorder in her mother; Glaucoma in her mother; Heart Disease in her maternal grandfather, maternal grandmother, and paternal grandfather; Hypertension in her father, mother, paternal grandmother, and  "sister; Macular Degeneration in her maternal uncle; Obesity in her father and mother; Prostate Cancer in her father.  She reports that she has never smoked. She has never used smokeless tobacco. She reports current alcohol use. She reports that she does not use drugs.    ALLERGIES: She has No Known Allergies.    CURRENT MEDICATIONS: She has a current medication list which includes the following prescription(s): amlodipine, aspirin, vitamin d, esomeprazole magnesium, gabapentin, hydrochlorothiazide, ibuprofen, triamcinolone, venlafaxine, and venlafaxine.     REVIEW OF SYSTEMS: 10 point review of systems is negative except as noted above.     Exam:   Ht 1.702 m (5' 7\")   BMI 38.37 kg/m             CONSTITUTIONAL: alert, moderate distress, cooperative and obese  HEAD: Normocephalic. No masses, lesions, tenderness or abnormalities  SKIN: no suspicious lesions or rashes  GAIT: antalgic and cane  NEUROLOGIC: Non-focal, Normal muscle tone and strength, reflexes normal, sensation grossly normal.  PSYCHIATRIC: affect normal/bright and mentation appears normal.    MUSCULOSKELETAL: right knee pain      Inspection:       AP/lateral alignment normal  Tender: medial joint line      Non-tender: patellar facets, MCL, LCL, lateral joint line, IT band, posterior knee   Active Range of Motion: all normal  Strength: quad  5-/5, Hamstrings  5-/5  Special tests: normal Valgus stress test, normal Varus, negative Lachman's test, negative William's, no apprehension with lateral stress of the patella.         Assessment/Plan:   Patient is a 70-year-old white female with past medical history of obstructive sleep apnea, heartburn presenting with right knee osteoarthritis\    1.  Right knee osteoarthritis-  Patient presented for Synvisc injection today and this was delivered without complication  Patient reports with COVID she has been quite isolated and has not been very motivated to do exercises.  After discussion today patient reports that " there is a PT in [place] that she has worked with in the past and has a good working relationship with Eric in [place].  Patient also recognizes that weight could be contributing to her knee pain she is currently working with [facility].    Return to clinic approximately 6 months for repeat Synvisc if she finds this injection helpful      X-RAY INTERPRETATION:   X-Ray of the Right Knee: 3-view, Aguiar, lateral, sunrise   ordered and interpreted in the office today was positive for mild to mod OA    Large Joint Injection/Arthocentesis: R knee joint    Date/Time: 9/23/2020 2:46 PM  Performed by: Moni Fischer MD  Authorized by: Moni Fischer MD     Indications:  Osteoarthritis  Needle Size:  22 G  Guidance: landmark guided    Approach:  Anterolateral  Location:  Knee      Medications:  48 mg hylan 48 MG/6ML  Outcome:  Tolerated well, no immediate complications  Procedure discussed: discussed risks, benefits, and alternatives    Consent Given by:  Patient  Timeout: timeout called immediately prior to procedure    Prep: patient was prepped and draped in usual sterile fashion     Scribed by Annie De Anda, MS, LAT, ATC for Dr. Fischer on 9/23/2020 at 2:47 PM, based on the provider s statements to me.      I agree with the following injection documentation.    MD Moni Tong MD

## 2020-09-23 NOTE — PROGRESS NOTES
Subjective:   Shelia Sanchez is a 70 year old female who presents for a repeat right knee Synvisc One injection.  Right knee OA for some years.  Lasted Feb to late June.  Using a cane, does Ok.  Limping now.  CSI didn't do much of anything for her.  COVID-19- doing PT, cancelled and hasn't been back.  [place]- binge eating, sticking to diet for a while and then eating correctly.  Goes for 1 cookie and eats 12.  Therapy for 1 year.  Weight up since COVID-19.  Overweight kid, fighting whole life.  COVID was scaring her.  Doesn't go out much.    Background:   Date of injury: None  Duration of symptoms: years  Mechanism of Injury: Chronic; Unknown   Relieving Factors: Synvisc One (2/27/2020)  Prior Evaluation: Dr. Stoll, Dr. Castellon, xrays    PAST MEDICAL, SOCIAL, SURGICAL AND FAMILY HISTORY: She  has a past medical history of Arthritis (1/28/14), Asteatotic eczema, Cholelithiasis with obstruction (5/10/2010), Gallstone pancreatitis (5/11/2010), and Right knee pain (12/27/2012). She also has no past medical history of Cerebral infarction (H), Congestive heart failure, unspecified, COPD (chronic obstructive pulmonary disease) (H), Coronary artery disease, History of blood transfusion, Thyroid disease, or Uncomplicated asthma.  She  has a past surgical history that includes VAG HYST,RMV TUBE/OVARY (2004); cholecystectomy, laporoscopic; and ERCP W STENT PLACEMENT BILE/PANCREATIC DUCT.  Her family history includes Alzheimer Disease in her father; Arthritis in her father and mother; Cerebrovascular Disease in her father; Congenital Anomalies in her father and mother; Depression in her mother and sister; Diabetes in her father; Eye Disorder in her mother; Glaucoma in her mother; Heart Disease in her maternal grandfather, maternal grandmother, and paternal grandfather; Hypertension in her father, mother, paternal grandmother, and sister; Macular Degeneration in her maternal uncle; Obesity in her father and mother; Prostate  "Cancer in her father.  She reports that she has never smoked. She has never used smokeless tobacco. She reports current alcohol use. She reports that she does not use drugs.    ALLERGIES: She has No Known Allergies.    CURRENT MEDICATIONS: She has a current medication list which includes the following prescription(s): amlodipine, aspirin, vitamin d, esomeprazole magnesium, gabapentin, hydrochlorothiazide, ibuprofen, triamcinolone, venlafaxine, and venlafaxine.     REVIEW OF SYSTEMS: 10 point review of systems is negative except as noted above.     Exam:   Ht 1.702 m (5' 7\")   BMI 38.37 kg/m             CONSTITUTIONAL: alert, moderate distress, cooperative and obese  HEAD: Normocephalic. No masses, lesions, tenderness or abnormalities  SKIN: no suspicious lesions or rashes  GAIT: antalgic and cane  NEUROLOGIC: Non-focal, Normal muscle tone and strength, reflexes normal, sensation grossly normal.  PSYCHIATRIC: affect normal/bright and mentation appears normal.    MUSCULOSKELETAL: right knee pain      Inspection:       AP/lateral alignment normal  Tender: medial joint line      Non-tender: patellar facets, MCL, LCL, lateral joint line, IT band, posterior knee   Active Range of Motion: all normal  Strength: quad  5-/5, Hamstrings  5-/5  Special tests: normal Valgus stress test, normal Varus, negative Lachman's test, negative William's, no apprehension with lateral stress of the patella.         Assessment/Plan:   Patient is a 70-year-old white female with past medical history of obstructive sleep apnea, heartburn presenting with right knee osteoarthritis\    1.  Right knee osteoarthritis-  Patient presented for Synvisc injection today and this was delivered without complication  Patient reports with COVID she has been quite isolated and has not been very motivated to do exercises.  After discussion today patient reports that there is a PT in [place] that she has worked with in the past and has a good working " relationship with Eric Sauer.  Patient also recognizes that weight could be contributing to her knee pain she is currently working with [facility].    Return to clinic approximately 6 months for repeat Synvisc if she finds this injection helpful      X-RAY INTERPRETATION:   X-Ray of the Right Knee: 3-view, Aguiar, lateral, sunrise   ordered and interpreted in the office today was positive for mild to mod OA    Large Joint Injection/Arthocentesis: R knee joint    Date/Time: 9/23/2020 2:46 PM  Performed by: Moni Fischer MD  Authorized by: Moni Fiscehr MD     Indications:  Osteoarthritis  Needle Size:  22 G  Guidance: landmark guided    Approach:  Anterolateral  Location:  Knee      Medications:  48 mg hylan 48 MG/6ML  Outcome:  Tolerated well, no immediate complications  Procedure discussed: discussed risks, benefits, and alternatives    Consent Given by:  Patient  Timeout: timeout called immediately prior to procedure    Prep: patient was prepped and draped in usual sterile fashion     Scribed by Annie De Anda MS, LAT, ATC for Dr. Fischer on 9/23/2020 at 2:47 PM, based on the provider s statements to me.      I agree with the following injection documentation.    VIGNESH Fischer MD

## 2020-09-23 NOTE — NURSING NOTE
[address] [address]  Dept: [phone]  ______________________________________________________________________________    Patient: Shelia Sanchez   : 1950   MRN: [id number]   2020    INVASIVE PROCEDURE SAFETY CHECKLIST    Date: 2020   Procedure: Right knee Synvisc One injection  Patient Name: Shelia Sanchez  MRN: [id number]  YOB: 1950    Action: Complete sections as appropriate. Any discrepancy results in a HARD COPY until resolved.     PRE PROCEDURE:  Patient ID verified with 2 identifiers (name and  or MRN): Yes  Procedure and site verified with patient/designee (when able): Yes  Accurate consent documentation in medical record: Yes  H&P (or appropriate assessment) documented in medical record: Yes  H&P must be up to 20 days prior to procedure and updates within 24 hours of procedure as applicable: NA  Relevant diagnostic and radiology test results appropriately labeled and displayed as applicable: Yes  Procedure site(s) marked with provider initials: NA    TIMEOUT:  Time-Out performed immediately prior to starting procedure, including verbal and active participation of all team members addressing the following:Yes  * Correct patient identify  * Confirmed that the correct side and site are marked  * An accurate procedure consent form  * Agreement on the procedure to be done  * Correct patient position  * Relevant images and results are properly labeled and appropriately displayed  * The need to administer antibiotics or fluids for irrigation purposes during the procedure as applicable   * Safety precautions based on patient history or medication use    DURING PROCEDURE: Verification of correct person, site, and procedures any time the responsibility for care of the patient is transferred to another member of the care team.       Prior to injection, verified patient identity using patient's name  and date of birth.  Due to injection administration, patient instructed to remain in clinic for 15 minutes  afterwards, and to report any adverse reaction to me immediately.    Joint injection was performed.      Drug Amount Wasted:  None.  Vial/Syringe: Syringe  Expiration Date:  2022-12      Annie De Anda, ATC  September 23, 2020

## 2020-09-24 ENCOUNTER — OFFICE VISIT (OUTPATIENT)
Dept: OPTOMETRY | Facility: CLINIC | Age: 70
End: 2020-09-24
Payer: COMMERCIAL

## 2020-09-24 DIAGNOSIS — H52.4 PRESBYOPIA: ICD-10-CM

## 2020-09-24 DIAGNOSIS — H25.13 NUCLEAR AGE-RELATED CATARACT, BOTH EYES: ICD-10-CM

## 2020-09-24 DIAGNOSIS — Z01.01 ENCOUNTER FOR EXAMINATION OF EYES AND VISION WITH ABNORMAL FINDINGS: Primary | ICD-10-CM

## 2020-09-24 DIAGNOSIS — H52.223 REGULAR ASTIGMATISM OF BOTH EYES: ICD-10-CM

## 2020-09-24 DIAGNOSIS — H47.321 OPTIC NERVE DRUSEN, RIGHT: ICD-10-CM

## 2020-09-24 DIAGNOSIS — H52.13 MYOPIA OF BOTH EYES: ICD-10-CM

## 2020-09-24 PROCEDURE — 92014 COMPRE OPH EXAM EST PT 1/>: CPT | Performed by: OPTOMETRIST

## 2020-09-24 PROCEDURE — 92015 DETERMINE REFRACTIVE STATE: CPT | Performed by: OPTOMETRIST

## 2020-09-24 ASSESSMENT — REFRACTION_WEARINGRX
OS_AXIS: 025
SPECS_TYPE: PAL
OD_CYLINDER: +1.00
OS_AXIS: 025
OS_CYLINDER: +1.00
OD_CYLINDER: +1.25
OD_SPHERE: -0.50
OD_AXIS: 163
OS_SPHERE: -1.00
OD_ADD: +2.00
OS_SPHERE: -0.75
OD_AXIS: 176
OD_SPHERE: PLANO
OS_CYLINDER: +1.50
OS_ADD: +2.00
OS_ADD: +2.25
OD_ADD: +2.25

## 2020-09-24 ASSESSMENT — SLIT LAMP EXAM - LIDS
COMMENTS: NORMAL
COMMENTS: NORMAL

## 2020-09-24 ASSESSMENT — TONOMETRY
OD_IOP_MMHG: 18
OS_IOP_MMHG: 18
IOP_METHOD: APPLANATION

## 2020-09-24 ASSESSMENT — VISUAL ACUITY
OD_CC+: +2
OS_CC+: +1
OD_CC: 20/25
OS_SC: 20/40
CORRECTION_TYPE: GLASSES
OS_CC: 20/40
OD_CC: 20/40
OD_SC: 20/25
OS_CC: 20/30
METHOD: SNELLEN - LINEAR

## 2020-09-24 ASSESSMENT — CONF VISUAL FIELD
OD_NORMAL: 1
OS_NORMAL: 1
METHOD: COUNTING FINGERS

## 2020-09-24 ASSESSMENT — CUP TO DISC RATIO
OS_RATIO: 0.15
OD_RATIO: 0.2

## 2020-09-24 ASSESSMENT — REFRACTION_MANIFEST
OS_AXIS: 023
OD_CYLINDER: +0.75
OD_SPHERE: -1.00
OS_CYLINDER: +1.50
OS_SPHERE: -1.00
OD_ADD: +2.25
OS_ADD: +2.25
OD_AXIS: 015

## 2020-09-24 ASSESSMENT — EXTERNAL EXAM - LEFT EYE: OS_EXAM: NORMAL

## 2020-09-24 ASSESSMENT — EXTERNAL EXAM - RIGHT EYE: OD_EXAM: NORMAL

## 2020-09-24 NOTE — PATIENT INSTRUCTIONS
You have the start of mild cataracts.  You may notice some blurred vision or glare with night driving.  It is important that you wear good sunglasses to protect your eyes from the ultraviolet light from the sun.     Shelia was advised of today's exam findings.  Fill glasses prescription  Allow 2 weeks to adapt to change in glasses  Wear glasses full time  Copy of glasses Rx provided today.    Return in 1 year for eye exam, or sooner if needed.    The effects of the dilating drops last for 4- 6 hours.  You will be more sensitive to light and vision will be blurry up close.  Mydriatic sunglasses were given if needed.    Chip Shoemaker O.D.  [facility]  [address] [place] [address]. ALEXANDER Posey  [id number]    [phone]

## 2020-09-24 NOTE — LETTER
9/24/2020         RE: Shelia Sanchez  [address]  [facility] [address]        Dear Colleague,    Thank you for referring your patient, Shelia Sanchez, to the [facility]. Please see a copy of my visit note below.    Chief Complaint   Patient presents with     Annual Eye Exam         Last Eye Exam: 08/2019  Dilated Previously: Yes    What are you currently using to see?  Glasses progressive       Distance Vision Acuity: Noticed gradual change in both eyes    Near Vision Acuity: Patient is finding the transition between her ipad and tv is more difficult.  Patient is also making things bigger on her ipad.    Eye Comfort: good  Do you use eye drops? : No  Occupation or Hobbies: Patient reads a ton. Retired. Volunteer  for condo board. Lots of typing    [employer]       Medical, surgical and family histories reviewed and updated 9/24/2020.       OBJECTIVE: See Ophthalmology exam    ASSESSMENT:    ICD-10-CM    1. Encounter for examination of eyes and vision with abnormal findings  Z01.01    2. 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 Nuclear age-related cataract, both eyes  H25.13    4. Myopia of both eyes  H52.13    5. Regular astigmatism of both eyes  H52.223    6. Presbyopia  H52.4       PLAN:     Patient Instructions   You have the start of mild cataracts.  You may notice some blurred vision or glare with night driving.  It is important that you wear good sunglasses to protect your eyes from the ultraviolet light from the sun.     Shelia was advised of today's exam findings.  Fill glasses prescription  Allow 2 weeks to adapt to change in glasses  Wear glasses full time  Copy of glasses Rx provided today.    Return in 1 year for eye exam, or sooner if needed.    The effects of the dilating drops last for 4- 6 hours.  You will be more sensitive to light and vision will be blurry up close.  Mydriatic sunglasses were given if needed.    Chip DEL ROSARIO  ILEANA Shoemaker.  [address]  [place]  [address]    [phone]           Again, thank you for allowing me to participate in the care of your patient.        Sincerely,        Chip Shoemaker, OD

## 2020-09-24 NOTE — PROGRESS NOTES
Chief Complaint   Patient presents with     Annual Eye Exam         Last Eye Exam: 08/2019  Dilated Previously: Yes    What are you currently using to see?  Glasses progressive       Distance Vision Acuity: Noticed gradual change in both eyes    Near Vision Acuity: Patient is finding the transition between her ipad and tv is more difficult.  Patient is also making things bigger on her ipad.    Eye Comfort: good  Do you use eye drops? : No  Occupation or Hobbies: Patient reads a ton. Retired. Volunteer  for condo board. Lots of [employer]       Medical, surgical and family histories reviewed and updated 9/24/2020.       OBJECTIVE: See Ophthalmology exam    ASSESSMENT:    ICD-10-CM    1. Encounter for examination of eyes and vision with abnormal findings  Z01.01    2. 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 Nuclear age-related cataract, both eyes  H25.13    4. Myopia of both eyes  H52.13    5. Regular astigmatism of both eyes  H52.223    6. Presbyopia  H52.4       PLAN:     Patient Instructions   You have the start of mild cataracts.  You may notice some blurred vision or glare with night driving.  It is important that you wear good sunglasses to protect your eyes from the ultraviolet light from the sun.     Shelia was advised of today's exam findings.  Fill glasses prescription  Allow 2 weeks to adapt to change in glasses  Wear glasses full time  Copy of glasses Rx provided today.    Return in 1 year for eye exam, or sooner if needed.    The effects of the dilating drops last for 4- 6 hours.  You will be more sensitive to light and vision will be blurry up close.  Mydriatic sunglasses were given if needed.    Chip Shoemaker O.D.  [address]  [address]    [phone]

## 2020-10-09 ENCOUNTER — THERAPY VISIT (OUTPATIENT)
Dept: PHYSICAL THERAPY | Facility: CLINIC | Age: 70
End: 2020-10-09
Attending: FAMILY MEDICINE
Payer: COMMERCIAL

## 2020-10-09 DIAGNOSIS — G89.29 CHRONIC PAIN OF RIGHT KNEE: ICD-10-CM

## 2020-10-09 DIAGNOSIS — M25.561 CHRONIC PAIN OF RIGHT KNEE: ICD-10-CM

## 2020-10-09 DIAGNOSIS — M17.11 PRIMARY LOCALIZED OSTEOARTHRITIS OF RIGHT KNEE: ICD-10-CM

## 2020-10-09 PROCEDURE — 97110 THERAPEUTIC EXERCISES: CPT | Mod: GP | Performed by: PHYSICAL THERAPIST

## 2020-10-09 PROCEDURE — 97161 PT EVAL LOW COMPLEX 20 MIN: CPT | Mod: GP | Performed by: PHYSICAL THERAPIST

## 2020-10-09 ASSESSMENT — ACTIVITIES OF DAILY LIVING (ADL)
STIFFNESS: THE SYMPTOM AFFECTS MY ACTIVITY MODERATELY
GIVING WAY, BUCKLING OR SHIFTING OF KNEE: THE SYMPTOM AFFECTS MY ACTIVITY SLIGHTLY
HOW_WOULD_YOU_RATE_THE_OVERALL_FUNCTION_OF_YOUR_KNEE_DURING_YOUR_USUAL_DAILY_ACTIVITIES?: ABNORMAL
WALK: ACTIVITY IS FAIRLY DIFFICULT
SWELLING: THE SYMPTOM AFFECTS MY ACTIVITY SLIGHTLY
GO DOWN STAIRS: ACTIVITY IS VERY DIFFICULT
AS_A_RESULT_OF_YOUR_KNEE_INJURY,_HOW_WOULD_YOU_RATE_YOUR_CURRENT_LEVEL_OF_DAILY_ACTIVITY?: ABNORMAL
WEAKNESS: THE SYMPTOM AFFECTS MY ACTIVITY MODERATELY
KNEEL ON THE FRONT OF YOUR KNEE: I AM UNABLE TO DO THE ACTIVITY
SQUAT: I AM UNABLE TO DO THE ACTIVITY
RISE FROM A CHAIR: ACTIVITY IS FAIRLY DIFFICULT
PAIN: THE SYMPTOM AFFECTS MY ACTIVITY MODERATELY
STAND: ACTIVITY IS FAIRLY DIFFICULT
KNEE_ACTIVITY_OF_DAILY_LIVING_SCORE: 35.71
SIT WITH YOUR KNEE BENT: ACTIVITY IS FAIRLY DIFFICULT
GO UP STAIRS: ACTIVITY IS FAIRLY DIFFICULT
LIMPING: THE SYMPTOM AFFECTS MY ACTIVITY MODERATELY
HOW_WOULD_YOU_RATE_THE_CURRENT_FUNCTION_OF_YOUR_KNEE_DURING_YOUR_USUAL_DAILY_ACTIVITIES_ON_A_SCALE_FROM_0_TO_100_WITH_100_BEING_YOUR_LEVEL_OF_KNEE_FUNCTION_PRIOR_TO_YOUR_INJURY_AND_0_BEING_THE_INABILITY_TO_PERFORM_ANY_OF_YOUR_USUAL_DAILY_ACTIVITIES?: 25
KNEE_ACTIVITY_OF_DAILY_LIVING_SUM: 25
RAW_SCORE: 25

## 2020-10-09 NOTE — PROGRESS NOTES
"[facility] Initial Evaluation  Subjective:  The history is provided by the patient. No  was used.   Patient Health History  Shelia Sanchez being seen for R knee DJD.     Problem began: 9/23/2020 (date of referral).   Problem occurred: \"old age\"   Pain is reported as 6/10 on pain scale.  General health as reported by patient is fair.  Pertinent medical history includes: depression, high blood pressure, incontinence, osteoarthritis, overweight and sleep disorder/apnea.   Red flags:  None as reported by patient.  Medical allergies: none.   Surgeries include:  Other. Other surgery history details: gall bladder, hysterectomy.    Current medications:  Anti-depressants and high blood pressure medication.    Current occupation is retired.   Primary job tasks include:  Computer work, driving, lifting/carrying, prolonged sitting and repetitive tasks.                  Therapist Generated HPI Evaluation  Problem details: At least 4 years of knee pain due to DJD.  Has been seen in PT for this previously.  Had to stop due to pandemic and has been struggling with depression and weight gain and it is making her knee worse..         Type of problem:  Right knee.    This is a chronic condition.  Condition occurred with:  Degenerative joint disease.  Where condition occurred: for unknown reasons.  Patient reports pain:  In the joint and anterior.  Pain is described as aching and sharp and is intermittent.  Pain is the same all the time.  Since onset symptoms are gradually worsening.  Associated symptoms:  Loss of strength and loss of motion/stiffness. Symptoms are exacerbated by ascending stairs, descending stairs, bending/squatting and walking  and relieved by rest.  Special tests included:  X-ray.    Barriers include:  Lives alone.                        Objective:    Gait:    Gait Type:  Antalgic     Deviations:  General Deviations:  Base of support incr and karissa decr                  "                                     Knee Evaluation:  ROM:    AROM      Extension:  Left: 2    Right:  4  Flexion: Left: 122    Right: 116        Strength:     Extension:  Left: 5/5    Pain:-      Right: 4+/5    Pain:-  Flexion:  Left: 5/5    Pain:-      Right: 5/5    Pain:-            Palpation:      Right knee tenderness present at:  Medial Joint Line and Lateral Joint Line            General     ROS    Assessment/Plan:    Patient is a 70 year old female with right knee complaints.    Patient has the following significant findings with corresponding treatment plan.                Diagnosis 1:  R knee DJD  Pain -  manual therapy, self management, education and home program  Decreased ROM/flexibility - manual therapy, therapeutic exercise and home program  Decreased strength - therapeutic exercise, therapeutic activities and home program  Impaired gait - gait training and home program  Decreased function - therapeutic activities and home program    Cumulative Therapy Evaluation is: Low complexity.    Previous and current functional limitations:  (See Goal Flow Sheet for this information)    Short term and Long term goals: (See Goal Flow Sheet for this information)     Communication ability:  Patient appears to be able to clearly communicate and understand verbal and written communication and follow directions correctly.  Treatment Explanation - The following has been discussed with the patient:   RX ordered/plan of care  Anticipated outcomes  Possible risks and side effects  This patient would benefit from PT intervention to resume normal activities.   Rehab potential is good.    Frequency:  1 X week, once daily  Duration:  for 6 weeks  Discharge Plan:  Achieve all LTG.  Independent in home treatment program.  Reach maximal therapeutic benefit.    Please refer to the daily flowsheet for treatment today, total treatment time and time spent performing 1:1 timed codes.

## 2020-10-15 NOTE — PROGRESS NOTES
"  Video-Visit Details    Type of service:  Video Visit  Platform used for Video Visit: Unable to complete video visit     Shelia Sanchez is a 70 year old female who is being evaluated via a billable telephone visit.      The patient has been notified of following:     \"This telephone visit will be conducted via a call between you and your physician/provider. We have found that certain health care needs can be provided without the need for a physical exam.  This service lets us provide the care you need with a short phone conversation.  If a prescription is necessary we can send it directly to your pharmacy.  If lab work is needed we can place an order for that and you can then stop by our lab to have the test done at a later time.    Telephone visits are billed at different rates depending on your insurance coverage. During this emergency period, for some insurers they may be billed the same as an in-person visit.  Please reach out to your insurance provider with any questions.    If during the course of the call the physician/provider feels a telephone visit is not appropriate, you will not be charged for this service.\"    Patient has given verbal consent for Telephone visit?  Yes    What phone number would you like to be contacted at?     How would you like to obtain your AVS? [employer]    Phone call duration: 14 minutes      Obstructive Sleep Apnea - PAP Follow-Up Visit:    Chief Complaint   Patient presents with     CPAP Follow Up       Shelia Sanchez comes in today for follow-up of their severe sleep apnea, managed with CPAP.     Shelia Sanchez initially presented with high probability of obstructive sleep apnea with modest possibility of coexisting hypoventilation based on BMI of 43, loud snoring, witnessed apnea, non-refreshing sleep, fatigue/excessive daytime sleepiness(ESS 9), crowded oropharynx, morning headaches, neck circumference of 16\" and co-morbid HTN. STOP-BANG score is 7/8.    8/8/2018 " [place] Split Sleep Study (275.0 lbs) - .3, .7, Supine .3, REM AHI n/a, Low O2% 81.3%, Time Spent =88% 2.6, Time Spent =89% 5.2. Treatment was titrated to a pressure of CPAP 8 with an AHI 28.9. Time spent in REM at this pressure was 0 minutes.    Overall, the patient rates her experience with PAP as 8-9 (0 poor, 10 great). The mask is comfortable, she using a pad with it. The mask is not leaking.  She is not snoring with the mask on. She is not having gasp arousals. She is not having any oral or nasal dryness. The pressure settings are comfortable     Her PAP interface is Nasal Pillows.    Bedtime is typically 10 PM. Usually it takes about 30 minutes to fall asleep with the mask on. Wake time is typically 7 AM.  Patient is using PAP therapy 8 hours per night. The patient is usually getting 7-8 hours of sleep per night.    She does feel rested in the morning.    Total score - [place]: 4 (10/15/2020  8:45 AM)      NATALIA Total Score: 8      Respironics  Auto-PAP 8 - 18 cmH2O 30 day usage data:    71% of days with > 4 hours of use. 0/30 days with no use.   Average use 479 minutes per day.   Average leak 27.44 LPM.  Average % of night in large leak 0%.    CPAP 90% pressure 14.6cm.   AHI 4.61 events per hour.    Past medical/surgical history, family history, social history, medications and allergies were reviewed.      Problem List:  Patient Active Problem List    Diagnosis Date Noted     Chronic pain of right knee 10/09/2020     Priority: Medium     Binge eating disorder 08/08/2019     Priority: Medium     Prediabetes 08/08/2019     Priority: Medium     LORI (obstructive sleep apnea)- severe () 08/09/2018     Priority: Medium     8/8/2018 [facility] Split Sleep Study (275.0 lbs) - .3, .7, Supine .3, REM AHI n/a, Low O2% 81.3%, Time Spent ?88% 2.6, Time Spent ?89% 5.2. Treatment was titrated to a pressure of CPAP 8 with an AHI 28.9. Time spent in REM at this pressure was 0  minutes.       Mild major depression (H) 01/10/2018     Priority: Medium     Heartburn 11/16/2016     Priority: Medium     Menopausal syndrome (hot flashes) 03/30/2016     Priority: Medium     Cervical pain 11/19/2015     Priority: Medium     Left-sided headache 11/19/2015     Priority: Medium     Atypical nevus 02/13/2015     Priority: Medium     MILD, risk factor for melanoma. Needs yearly skin exam.   The entire mole was not removed, so if it grows, needs to be completely excised.   Do you wish to do the replacement in the background? yes         Urinary incontinence 02/02/2015     Priority: Medium     Cataracts, both eyes 05/07/2014     Priority: Medium     Left shoulder pain 12/27/2012     Priority: Medium     Advanced directives, counseling/discussion 02/09/2012     Priority: Medium     Advance Care Planning:   ACP Review and Resources Provided:  Reviewed chart for advance care plan.  Shelia Sanchez has no plan or code status on file. Discussed available resources and provided with information. Confirmed code status reflects current choices pending further ACP discussions.  Confirmed/documented designated decision maker(s). See permanent comments section of demographics in clinical tab.   Added by Brisa Hernandez on 3/6/2015  Discussed advance care planning with patient; information given to patient to review. 2/9/2012          Hypertension goal BP (blood pressure) < 140/90 07/02/2011     Priority: Medium     CARDIOVASCULAR SCREENING; LDL GOAL LESS THAN 130 06/11/2010     Priority: Medium     BMI > 40      Priority: Medium        There were no vitals taken for this visit.    Impression/Plan:     Severe obstructive sleep apnea-  Tolerating PAP well. Daytime symptoms are improved.   Continue current treatment.  Comprehensive DME    Shelia Sanchez will follow up in about 1 year(s).     Sunita Sanches PA-C

## 2020-10-16 ENCOUNTER — VIRTUAL VISIT (OUTPATIENT)
Dept: SLEEP MEDICINE | Facility: CLINIC | Age: 70
End: 2020-10-16
Payer: COMMERCIAL

## 2020-10-16 DIAGNOSIS — G47.33 OSA (OBSTRUCTIVE SLEEP APNEA): Primary | ICD-10-CM

## 2020-10-16 PROCEDURE — 99212 OFFICE O/P EST SF 10 MIN: CPT | Mod: 95 | Performed by: PHYSICIAN ASSISTANT

## 2020-11-04 ENCOUNTER — TELEPHONE (OUTPATIENT)
Dept: FAMILY MEDICINE | Facility: CLINIC | Age: 70
End: 2020-11-04

## 2020-11-04 NOTE — TELEPHONE ENCOUNTER
Panel Management Review      Patient has the following on her problem list:     Hypertension   Last three blood pressure readings:  BP Readings from Last 3 Encounters:   02/21/20 (!) 140/77   11/18/19 130/68   09/09/19 122/74     Blood pressure: MONITOR    HTN Guidelines:  Less than 140/90      Composite cancer screening  Chart review shows that this patient is due/due soon for the following Mammogram  Summary:    Patient is due/failing the following:   BP CHECK, MAMMOGRAM and PHYSICAL    Action needed:   Patient needs office visit for Medicare Wellness Visit.    Type of outreach:    Sent Minggl message.    Questions for provider review:    None                                                                                                                                    Paty Gandara MA       Chart routed to Care Team .

## 2020-12-01 NOTE — TELEPHONE ENCOUNTER
Panel Management Review  Summary:    Type of outreach:    Patient read Mychart.    Encounter routed to No Action Needed.                                                                                                                               Paty Gandara MA

## 2020-12-07 PROBLEM — M25.561 CHRONIC PAIN OF RIGHT KNEE: Status: RESOLVED | Noted: 2020-10-09 | Resolved: 2020-12-07

## 2020-12-07 PROBLEM — G89.29 CHRONIC PAIN OF RIGHT KNEE: Status: RESOLVED | Noted: 2020-10-09 | Resolved: 2020-12-07

## 2020-12-13 ENCOUNTER — HEALTH MAINTENANCE LETTER (OUTPATIENT)
Age: 70
End: 2020-12-13

## 2020-12-16 DIAGNOSIS — G47.33 OSA (OBSTRUCTIVE SLEEP APNEA): ICD-10-CM

## 2020-12-31 DIAGNOSIS — F32.0 MILD MAJOR DEPRESSION (H): Primary | ICD-10-CM

## 2020-12-31 NOTE — LETTER
January 22, 2021      Shelia Sanchez  [address] [address]        Dear Shelia,     We recently received a call from your pharmacy requesting a refill of your medication.     A review of your chart indicates that an appointment is required with your provider. Please call the clinic at [phone] to schedule your appointment.     We have authorized one refill of your medication to allow time for you to schedule. If you have a history of diabetes or high cholesterol, please come fasting to the appointment. Fasting entails nothing to eat or drink 8 hours prior to your appointment; with the exception of water. You may take your medication the day of the appointment.        Sincerely,        Mariana Benitez, DO

## 2020-12-31 NOTE — LETTER
January 22, 2021      Shelia Sanchez  [address]  [place] [address]        Dear Shelia,         Your clinic record indicates that you are due for Mammogram, complete physical exam, fasting labs and depression follow-up. Please call the  at [phone] to schedule an appointment.     If we indicated that you are due for cholesterol testing, please come in fasting for twelve hours prior to the test.     If you have questions about this letter please contact your provider.    Sincerely,    Your [facility] Clinic Team      Sincerely,        Mariana Benitez, DO

## 2021-01-02 NOTE — TELEPHONE ENCOUNTER
Routing refill request to provider for review/approval because:  PHQ-9 score:    PHQ 8/4/2020   PHQ-9 Total Score 11   Q9: Thoughts of better off dead/self-harm past 2 weeks Not at all

## 2021-01-04 RX ORDER — VENLAFAXINE HYDROCHLORIDE 75 MG/1
CAPSULE, EXTENDED RELEASE ORAL
Qty: 90 CAPSULE | Refills: 0 | Status: SHIPPED | OUTPATIENT
Start: 2021-01-04

## 2021-01-19 ENCOUNTER — MYC MEDICAL ADVICE (OUTPATIENT)
Dept: FAMILY MEDICINE | Facility: CLINIC | Age: 71
End: 2021-01-19

## 2021-01-19 NOTE — TELEPHONE ENCOUNTER
Called and left voicemail for patient to return call to clinic for scheduling assistance  Sent mychart.     Paty Gandara MA

## 2021-01-22 NOTE — TELEPHONE ENCOUNTER
Letter sent to patient's home address.  Marlyn Beavers CMA ([facility])

## 2021-02-08 ENCOUNTER — OFFICE VISIT (OUTPATIENT)
Dept: FAMILY MEDICINE | Facility: CLINIC | Age: 71
End: 2021-02-08
Payer: COMMERCIAL

## 2021-02-08 VITALS
WEIGHT: 280.25 LBS | OXYGEN SATURATION: 96 % | DIASTOLIC BLOOD PRESSURE: 76 MMHG | HEIGHT: 67 IN | SYSTOLIC BLOOD PRESSURE: 134 MMHG | HEART RATE: 97 BPM | BODY MASS INDEX: 43.99 KG/M2

## 2021-02-08 DIAGNOSIS — Z13.220 SCREENING FOR HYPERLIPIDEMIA: ICD-10-CM

## 2021-02-08 DIAGNOSIS — E66.01 MORBID OBESITY (H): ICD-10-CM

## 2021-02-08 DIAGNOSIS — Z00.00 ENCOUNTER FOR MEDICARE ANNUAL WELLNESS EXAM: Primary | ICD-10-CM

## 2021-02-08 DIAGNOSIS — I10 ESSENTIAL HYPERTENSION WITH GOAL BLOOD PRESSURE LESS THAN 140/90: ICD-10-CM

## 2021-02-08 DIAGNOSIS — R10.13 EPIGASTRIC PAIN: ICD-10-CM

## 2021-02-08 DIAGNOSIS — Z12.31 VISIT FOR SCREENING MAMMOGRAM: ICD-10-CM

## 2021-02-08 DIAGNOSIS — Z13.220 LIPID SCREENING: ICD-10-CM

## 2021-02-08 DIAGNOSIS — M79.18 MYOFASCIAL PAIN: ICD-10-CM

## 2021-02-08 DIAGNOSIS — F32.0 MILD MAJOR DEPRESSION (H): ICD-10-CM

## 2021-02-08 DIAGNOSIS — R73.03 PREDIABETES: ICD-10-CM

## 2021-02-08 DIAGNOSIS — E78.00 HIGH BLOOD CHOLESTEROL: ICD-10-CM

## 2021-02-08 DIAGNOSIS — F50.819 BINGE EATING DISORDER: ICD-10-CM

## 2021-02-08 DIAGNOSIS — Z12.11 SCREEN FOR COLON CANCER: ICD-10-CM

## 2021-02-08 LAB
BASOPHILS # BLD AUTO: 0 10E9/L (ref 0–0.2)
BASOPHILS NFR BLD AUTO: 0.3 %
DIFFERENTIAL METHOD BLD: NORMAL
EOSINOPHIL # BLD AUTO: 0.2 10E9/L (ref 0–0.7)
EOSINOPHIL NFR BLD AUTO: 1.9 %
ERYTHROCYTE [DISTWIDTH] IN BLOOD BY AUTOMATED COUNT: 13.9 % (ref 10–15)
HBA1C MFR BLD: 6.3 % (ref 0–5.6)
HCT VFR BLD AUTO: 42.2 % (ref 35–47)
HGB BLD-MCNC: 13.7 G/DL (ref 11.7–15.7)
LIPASE SERPL-CCNC: 88 U/L (ref 73–393)
LYMPHOCYTES # BLD AUTO: 1.6 10E9/L (ref 0.8–5.3)
LYMPHOCYTES NFR BLD AUTO: 16.2 %
MCH RBC QN AUTO: 27.8 PG (ref 26.5–33)
MCHC RBC AUTO-ENTMCNC: 32.5 G/DL (ref 31.5–36.5)
MCV RBC AUTO: 86 FL (ref 78–100)
MONOCYTES # BLD AUTO: 0.8 10E9/L (ref 0–1.3)
MONOCYTES NFR BLD AUTO: 7.9 %
NEUTROPHILS # BLD AUTO: 7.5 10E9/L (ref 1.6–8.3)
NEUTROPHILS NFR BLD AUTO: 73.7 %
PLATELET # BLD AUTO: 265 10E9/L (ref 150–450)
RBC # BLD AUTO: 4.92 10E12/L (ref 3.8–5.2)
WBC # BLD AUTO: 10.1 10E9/L (ref 4–11)

## 2021-02-08 PROCEDURE — 80053 COMPREHEN METABOLIC PANEL: CPT | Performed by: FAMILY MEDICINE

## 2021-02-08 PROCEDURE — 80061 LIPID PANEL: CPT | Performed by: FAMILY MEDICINE

## 2021-02-08 PROCEDURE — 83036 HEMOGLOBIN GLYCOSYLATED A1C: CPT | Performed by: FAMILY MEDICINE

## 2021-02-08 PROCEDURE — 99397 PER PM REEVAL EST PAT 65+ YR: CPT | Performed by: FAMILY MEDICINE

## 2021-02-08 PROCEDURE — 36415 COLL VENOUS BLD VENIPUNCTURE: CPT | Performed by: FAMILY MEDICINE

## 2021-02-08 PROCEDURE — 99214 OFFICE O/P EST MOD 30 MIN: CPT | Mod: 25 | Performed by: FAMILY MEDICINE

## 2021-02-08 PROCEDURE — 83690 ASSAY OF LIPASE: CPT | Performed by: FAMILY MEDICINE

## 2021-02-08 PROCEDURE — 85025 COMPLETE CBC W/AUTO DIFF WBC: CPT | Performed by: FAMILY MEDICINE

## 2021-02-08 RX ORDER — ROSUVASTATIN CALCIUM 20 MG/1
20 TABLET, COATED ORAL EVERY OTHER DAY
Qty: 90 TABLET | Refills: 1 | Status: SHIPPED | OUTPATIENT
Start: 2021-02-08 | End: 2021-02-26 | Stop reason: SINTOL

## 2021-02-08 RX ORDER — VENLAFAXINE HYDROCHLORIDE 75 MG/1
CAPSULE, EXTENDED RELEASE ORAL
Qty: 90 CAPSULE | Refills: 0 | Status: CANCELLED | OUTPATIENT
Start: 2021-02-08

## 2021-02-08 RX ORDER — HYDROCHLOROTHIAZIDE 25 MG/1
25 TABLET ORAL DAILY
Qty: 90 TABLET | Refills: 3 | Status: SHIPPED | OUTPATIENT
Start: 2021-02-08 | End: 2022-02-17

## 2021-02-08 RX ORDER — VENLAFAXINE HYDROCHLORIDE 37.5 MG/1
37.5 CAPSULE, EXTENDED RELEASE ORAL DAILY
Qty: 90 CAPSULE | Refills: 0 | Status: CANCELLED | OUTPATIENT
Start: 2021-02-08

## 2021-02-08 RX ORDER — BUPROPION HYDROCHLORIDE 150 MG/1
300 TABLET ORAL EVERY MORNING
COMMUNITY
Start: 2021-01-28 | End: 2022-10-14

## 2021-02-08 RX ORDER — AMLODIPINE BESYLATE 5 MG/1
TABLET ORAL
Qty: 90 TABLET | Refills: 3 | Status: SHIPPED | OUTPATIENT
Start: 2021-02-08 | End: 2022-02-17

## 2021-02-08 RX ORDER — GABAPENTIN 100 MG/1
CAPSULE ORAL
Qty: 120 CAPSULE | Refills: 1 | Status: SHIPPED | OUTPATIENT
Start: 2021-02-08 | End: 2021-05-10

## 2021-02-08 ASSESSMENT — ANXIETY QUESTIONNAIRES
2. NOT BEING ABLE TO STOP OR CONTROL WORRYING: MORE THAN HALF THE DAYS
1. FEELING NERVOUS, ANXIOUS, OR ON EDGE: MORE THAN HALF THE DAYS
3. WORRYING TOO MUCH ABOUT DIFFERENT THINGS: MORE THAN HALF THE DAYS
7. FEELING AFRAID AS IF SOMETHING AWFUL MIGHT HAPPEN: SEVERAL DAYS
6. BECOMING EASILY ANNOYED OR IRRITABLE: MORE THAN HALF THE DAYS
GAD7 TOTAL SCORE: 11
5. BEING SO RESTLESS THAT IT IS HARD TO SIT STILL: SEVERAL DAYS

## 2021-02-08 ASSESSMENT — MIFFLIN-ST. JEOR: SCORE: 1813.84

## 2021-02-08 ASSESSMENT — PATIENT HEALTH QUESTIONNAIRE - PHQ9
5. POOR APPETITE OR OVEREATING: SEVERAL DAYS
SUM OF ALL RESPONSES TO PHQ QUESTIONS 1-9: 15

## 2021-02-08 NOTE — LETTER
"[facility]  [address] [address] [phone]                                                                                                February 9, 2021    Shelia Sanchez  [address]  [place] [address]        Dear Ms. Sanchez,    Your cholesterol is abnormal, I would recommend cholesterol med management.  You calcium is slightly high, avoid calcium supplements for now.  Recheck in 3 months please use the recommendations below     Ways to improve your cholesterol...     1- Eats less saturated fats (including avoiding \"trans\" fats).     2 - Eat more unsaturated fats  - found in vegetables, grains, and tree nuts.   Also by replacing butter with canola oil or olive oil.     3 - Eat more nuts. 1-2 ounces (a small handful) of almonds, walnuts, hazelnuts or pecans once a day in place of other less healthy snacks.     4 - Eat more high fiber foods - vegetables and whole grains including oat bran, oats, beans, peas, and flax seed.     5 - Eat more fish - such as salmon, tuna, mackerel, and sardines.  1 or 2 six ounce servings per week is a healthy replacement for other proteins.     6 - Exercise for at least 120 minutes per week - which is equal to 30 minutes 4 days per week.         Sincerely,      Mariana Benitez DO/hl    Results for orders placed or performed in visit on 02/08/21   Lipid panel reflex to direct LDL Fasting     Status: Abnormal   Result Value Ref Range    Cholesterol 226 (H) <200 mg/dL    Triglycerides 136 <150 mg/dL    HDL Cholesterol 59 >49 mg/dL    LDL Cholesterol Calculated 140 (H) <100 mg/dL    Non HDL Cholesterol 167 (H) <130 mg/dL   Comprehensive metabolic panel     Status: Abnormal   Result Value Ref Range    Sodium 139 133 - 144 mmol/L    Potassium 3.4 3.4 - 5.3 mmol/L    Chloride 107 94 - 109 mmol/L    Carbon Dioxide 25 20 - 32 mmol/L    Anion Gap 7 3 - 14 mmol/L    Glucose 139 (H) 70 - 99 mg/dL    Urea Nitrogen 19 7 " - 30 mg/dL    Creatinine 0.98 0.52 - 1.04 mg/dL    GFR Estimate 58 (L) >60 mL/min/[1.73_m2]    GFR Estimate If Black 67 >60 mL/min/[1.73_m2]    Calcium 10.2 (H) 8.5 - 10.1 mg/dL    Bilirubin Total 0.4 0.2 - 1.3 mg/dL    Albumin 3.7 3.4 - 5.0 g/dL    Protein Total 7.1 6.8 - 8.8 g/dL    Alkaline Phosphatase 95 40 - 150 U/L    ALT 36 0 - 50 U/L    AST 19 0 - 45 U/L   CBC with platelets and differential     Status: None   Result Value Ref Range    WBC 10.1 4.0 - 11.0 10e9/L    RBC Count 4.92 3.8 - 5.2 10e12/L    Hemoglobin 13.7 11.7 - 15.7 g/dL    Hematocrit 42.2 35.0 - 47.0 %    MCV 86 78 - 100 fl    MCH 27.8 26.5 - 33.0 pg    MCHC 32.5 31.5 - 36.5 g/dL    RDW 13.9 10.0 - 15.0 %    Platelet Count 265 150 - 450 10e9/L    Diff Method Automated Method     % Neutrophils 73.7 %    % Lymphocytes 16.2 %    % Monocytes 7.9 %    % Eosinophils 1.9 %    % Basophils 0.3 %    Absolute Neutrophil 7.5 1.6 - 8.3 10e9/L    Absolute Lymphocytes 1.6 0.8 - 5.3 10e9/L    Absolute Monocytes 0.8 0.0 - 1.3 10e9/L    Absolute Eosinophils 0.2 0.0 - 0.7 10e9/L    Absolute Basophils 0.0 0.0 - 0.2 10e9/L   Hemoglobin A1c     Status: Abnormal   Result Value Ref Range    Hemoglobin A1C 6.3 (H) 0 - 5.6 %   Lipase     Status: None   Result Value Ref Range    Lipase 88 73 - 393 U/L

## 2021-02-08 NOTE — PROGRESS NOTES
"  SUBJECTIVE:   Shelia Sanchez is a 70 year old female who presents for Preventive Visit.      Patient has been advised of split billing requirements and indicates understanding: Yes  Are you in the first 12 months of your Medicare Part B coverage?  No    Physical Health:    In general, how would you rate your overall physical health? fair    Outside of work, how many days during the week do you exercise? none    Outside of work, approximately how many minutes a day do you exercise?not applicable    If you drink alcohol do you typically have >3 drinks per day or >7 drinks per week? No    Do you usually eat at least 4 servings of fruit and vegetables a day, include whole grains & fiber and avoid regularly eating high fat or \"junk\" foods? NO eating habits are horrendous     Do you have any problems taking medications regularly?  No    Do you have any side effects from medications? A little dizziness, not sure what medication causing this     Needs assistance for the following daily activities: no assistance needed    Which of the following safety concerns are present in your home?  none identified     Hearing impairment: No    In the past 6 months, have you been bothered by leaking of urine? Yes, not a new problem       Mental Health:fairIn general, how would you rate your overall mental or emotional health? fair  PHQ-2 Score:  GAVE PHQ9    Do you feel safe in your environment? Yes    Have you ever done Advance Care Planning? (For example, a Health Directive, POLST, or a discussion with a medical provider or your loved ones about your wishes): not complete, gave patient info     Additional concerns to address?      Fall risk:  Fallen 2 or more times in the past year?: No  Any fall with injury in the past year?: No     Cognitive Screenin) Repeat 3 items (Leader, Season, Table)    2) Clock draw: NORMAL  3) 3 item recall: Recalls 3 objects  Results: 3 items recalled: COGNITIVE IMPAIRMENT LESS " LIKELY    Mini-CogTM Copyright MAIA Tucker. Licensed by the author for use in [facility]; reprinted with permission ([email or web address].[email or web address]). All rights reserved.      Do you have sleep apnea, excessive snoring or daytime drowsiness?: no    Hypertension Follow-up      Do you check your blood pressure regularly outside of the clinic? No     Are you following a low salt diet? Yes    Are your blood pressures ever more than 140 on the top number (systolic) OR more   than 90 on the bottom number (diastolic), for example 140/90? No    Depression and Anxiety Follow-Up    How are you doing with your depression since your last visit? helping a little with adding bupropion     How are you doing with your anxiety since your last visit?  Improved     Are you having other symptoms that might be associated with depression or anxiety? No    Have you had a significant life event? No     Do you have any concerns with your use of alcohol or other drugs? No      Effexor 187mg, Wellbutrin new med is helping-psych , therapy      Heartburn really bad-can not get off Nexium, she has hard time  No tarry stools, no bright red stools  Never had coloscopy  Does not want to do colon cancer screening    The 10-year ASCVD risk score (Pinson DAYNA Jr., et al., 2013) is: 17.2%    Values used to calculate the score:      Age: 70 years      Sex: Female      Is Non- : No      Diabetic: No      Tobacco smoker: No      Systolic Blood Pressure: 134 mmHg      Is BP treated: Yes      HDL Cholesterol: 59 mg/dL      Total Cholesterol: 226 mg/dL    Social History     Tobacco Use     Smoking status: Never Smoker     Smokeless tobacco: Never Used   Substance Use Topics     Alcohol use: Yes     Alcohol/week: 0.0 standard drinks     Comment: 4 DRINKS PER WEEK     Drug use: No     PHQ 11/18/2019 8/4/2020 2/8/2021   PHQ-9 Total Score 8 11 15   Q9: Thoughts of better off dead/self-harm past 2 weeks Not at all Not at all Not at all     OSCAR-7 SCORE  11/18/2019 8/4/2020 2/8/2021   Total Score - 8 (mild anxiety) -   Total Score 7 8 11     Last PHQ-9 2/8/2021   1.  Little interest or pleasure in doing things 2   2.  Feeling down, depressed, or hopeless 2   3.  Trouble falling or staying asleep, or sleeping too much 2   4.  Feeling tired or having little energy 3   5.  Poor appetite or overeating 3   6.  Feeling bad about yourself 2   7.  Trouble concentrating 1   8.  Moving slowly or restless 0   Q9: Thoughts of better off dead/self-harm past 2 weeks 0   PHQ-9 Total Score 15   Difficulty at work, home, or with people Very difficult     OSCAR-7  2/8/2021   1. Feeling nervous, anxious, or on edge 2   2. Not being able to stop or control worrying 2   3. Worrying too much about different things 2   4. Trouble relaxing 1   5. Being so restless that it is hard to sit still 1   6. Becoming easily annoyed or irritable 2   7. Feeling afraid, as if something awful might happen 1   OSCAR-7 Total Score 11   If you checked any problems, how difficult have they made it for you to do your work, take care of things at home, or get along with other people? -       Suicide Assessment Five-step Evaluation and Treatment (SAFE-T)      Reviewed and updated as needed this visit by clinical staff  Tobacco  Allergies  Meds              Reviewed and updated as needed this visit by Provider                Social History     Tobacco Use     Smoking status: Never Smoker     Smokeless tobacco: Never Used   Substance Use Topics     Alcohol use: Yes     Alcohol/week: 0.0 standard drinks     Comment: 4 DRINKS PER WEEK                           Current providers sharing in care for this patient include:   Patient Care Team:  Mariana Benitez DO as PCP - General (Family Practice)  Caitlin Reeves, RN as   Mariana Benitez DO as Assigned PCP  Nura Castellon MD as Assigned Musculoskeletal Provider    The following health maintenance items are reviewed in Epic and  "correct as of today:  Health Maintenance   Topic Date Due     DEPRESSION ACTION PLAN  1950     COLORECTAL CANCER SCREENING  08/20/1958     ZOSTER IMMUNIZATION (2 of 3) 04/18/2016     MAMMO SCREENING  01/24/2020     ADVANCE CARE PLANNING  03/06/2020     BMP  08/08/2021     PHQ-9  08/08/2021     EYE EXAM  09/24/2021     MEDICARE ANNUAL WELLNESS VISIT  02/08/2022     ALT  02/08/2022     LIPID  02/08/2022     FALL RISK ASSESSMENT  02/08/2022     DTAP/TDAP/TD IMMUNIZATION (3 - Td) 11/18/2029     DEXA  01/24/2033     HEPATITIS C SCREENING  Completed     INFLUENZA VACCINE  Completed     Pneumococcal Vaccine: 65+ Years  Completed     Pneumococcal Vaccine: Pediatrics (0 to 5 Years) and At-Risk Patients (6 to 64 Years)  Aged Out     IPV IMMUNIZATION  Aged Out     MENINGITIS IMMUNIZATION  Aged Out     HEPATITIS B IMMUNIZATION  Aged Out     BP Readings from Last 3 Encounters:   02/08/21 134/76   02/21/20 (!) 140/77   11/18/19 130/68    Wt Readings from Last 3 Encounters:   02/08/21 127.1 kg (280 lb 4 oz)   09/23/20 124.3 kg (274 lb 1.6 oz)   02/27/20 111.1 kg (245 lb)                  Pneumonia Vaccine:Adults age 65+ who received Pneumovax (PPSV23) at 65 years or older: Should be given PCV13 > 1 year after their most recent PPSV23  Mammogram Screening: Mammogram Screening: Recommended mammography every 1-2 years with patient discussion and risk factor consideration    ROS:  Constitutional, HEENT, cardiovascular, pulmonary, GI, , musculoskeletal, neuro, skin, endocrine and psych systems are negative, except as otherwise noted.    OBJECTIVE:   /76   Pulse 97   Ht 1.702 m (5' 7\")   Wt 127.1 kg (280 lb 4 oz)   SpO2 96%   BMI 43.89 kg/m   Estimated body mass index is 43.89 kg/m  as calculated from the following:    Height as of this encounter: 1.702 m (5' 7\").    Weight as of this encounter: 127.1 kg (280 lb 4 oz).  EXAM:   GENERAL: healthy, alert and no distress  EYES: Eyes grossly normal to inspection, PERRL and " conjunctivae and sclerae normal  HENT: ear canals and TM's normal, nose and mouth without ulcers or lesions  NECK: no adenopathy, no asymmetry, masses, or scars and thyroid normal to palpation  RESP: lungs clear to auscultation - no rales, rhonchi or wheezes  BREAST: normal without masses, tenderness or nipple discharge and no palpable axillary masses or adenopathy  CV: regular rate and rhythm, normal S1 S2, no S3 or S4, no murmur, click or rub, no peripheral edema and peripheral pulses strong  ABDOMEN: soft, nontender, no hepatosplenomegaly, no masses and bowel sounds normal  MS: no gross musculoskeletal defects noted, no edema  SKIN: no suspicious lesions or rashes  NEURO: Normal strength and tone, mentation intact and speech normal  PSYCH: mentation appears normal, affect normal/bright    Diagnostic Test Results:  Labs reviewed in Epic    ASSESSMENT / PLAN:       ICD-10-CM    1. Encounter for Medicare annual wellness exam  Z00.00 CBC with platelets and differential   2. Essential hypertension with goal blood pressure less than 140/90  I10 amLODIPine (NORVASC) 5 MG tablet     hydrochlorothiazide (HYDRODIURIL) 25 MG tablet   3. Myofascial pain  M79.18 gabapentin (NEURONTIN) 100 MG capsule   4. Mild major depression (H)  F32.0    5. Screen for colon cancer  Z12.11 COLOGUARD(EXACT SCIENCES)     GASTROENTEROLOGY ADULT REF PROCEDURE ONLY   6. Screening for hyperlipidemia  Z13.220    7. Morbid obesity (H)  E66.01 Comprehensive metabolic panel   8. Binge eating disorder  F50.81    9. Visit for screening mammogram  Z12.31 MA SCREENING DIGITAL BILAT - Future  (s+30)   10. Lipid screening  Z13.220 Lipid panel reflex to direct LDL Fasting   11. Prediabetes  R73.03 Hemoglobin A1c   12. High blood cholesterol  E78.00 rosuvastatin (CRESTOR) 20 MG tablet   13. Epigastric pain  R10.13 Lipase     GASTROENTEROLOGY ADULT REF PROCEDURE ONLY     Shingles shot(new) at the pharmacy-after covid  cologaurd-for colon cancer screening  "  Mammogram    History of heart burn-Strongly advise monitoring heartburn, consider EGD with GI referral placed for colonoscopy as well if wanting to do it.     prediabetes-stable,  control with diet as planned    High cholesterol-start cholesterol med every 3 days, follow up in 3 months for recheck    Patient has been advised of split billing requirements and indicates understanding: Yes    COUNSELING:  Reviewed preventive health counseling, as reflected in patient instructions       Regular exercise       Healthy diet/nutrition    Estimated body mass index is 43.89 kg/m  as calculated from the following:    Height as of this encounter: 1.702 m (5' 7\").    Weight as of this encounter: 127.1 kg (280 lb 4 oz).    Weight management plan: Discussed healthy diet and exercise guidelines    She reports that she has never smoked. She has never used smokeless tobacco.    Appropriate preventive services were discussed with this patient, including applicable screening as appropriate for cardiovascular disease, diabetes, osteopenia/osteoporosis, and glaucoma.  As appropriate for age/gender, discussed screening for colorectal cancer, prostate cancer, breast cancer, and cervical cancer. Checklist reviewing preventive services available has been given to the patient.    Reviewed patients plan of care and provided an AVS. The Intermediate Care Plan ( asthma action plan, low back pain action plan, and migraine action plan) for Shelia meets the Care Plan requirement. This Care Plan has been established and reviewed with the Patient.    Counseling Resources:  ATP IV Guidelines  Pooled Cohorts Equation Calculator  Breast Cancer Risk Calculator  BRCA-Related Cancer Risk Assessment: FHS-7 Tool  FRAX Risk Assessment  ICSI Preventive Guidelines  Dietary Guidelines for Americans, 2010  USDA's MyPlate  ASA Prophylaxis  Lung CA Screening    DO KAROLYN Parekh [facility]  "

## 2021-02-08 NOTE — PATIENT INSTRUCTIONS
Shingles shot(new) at the pharmacy-after covid  cologaurd-for colon cancer screening   Mammogram  Strongly advise monitoring heartburn, consider EGD with GI referral placed for colonoscopy as well if wanting to do it  Great news -you are still prediabetic, control with diet as planned  Start cholesterol med every 3 days, follow up in 3 months for recheck  Mariana Benitez D.O.      Mammogram Scheduling  [place] [phone] or [phone]  [facility] [phone]      Patient Education   Personalized Prevention Plan  You are due for the preventive services outlined below.  Your care team is available to assist you in scheduling these services.  If you have already completed any of these items, please share that information with your care team to update in your medical record.  Health Maintenance Due   Topic Date Due     Depression Action Plan  1950     Colorectal Cancer Screening  08/20/1958     Zoster (Shingles) Vaccine (2 of 3) 04/18/2016     Liver Monitoring Lab  01/11/2020     Mammogram  01/24/2020     Basic Metabolic Panel  02/05/2020     Discuss Advance Care Planning  03/06/2020     Cholesterol Lab  08/05/2020     FALL RISK ASSESSMENT  08/07/2020     Depression Assessment  01/28/2021

## 2021-02-09 LAB
ALBUMIN SERPL-MCNC: 3.7 G/DL (ref 3.4–5)
ALP SERPL-CCNC: 95 U/L (ref 40–150)
ALT SERPL W P-5'-P-CCNC: 36 U/L (ref 0–50)
ANION GAP SERPL CALCULATED.3IONS-SCNC: 7 MMOL/L (ref 3–14)
AST SERPL W P-5'-P-CCNC: 19 U/L (ref 0–45)
BILIRUB SERPL-MCNC: 0.4 MG/DL (ref 0.2–1.3)
BUN SERPL-MCNC: 19 MG/DL (ref 7–30)
CALCIUM SERPL-MCNC: 10.2 MG/DL (ref 8.5–10.1)
CHLORIDE SERPL-SCNC: 107 MMOL/L (ref 94–109)
CHOLEST SERPL-MCNC: 226 MG/DL
CO2 SERPL-SCNC: 25 MMOL/L (ref 20–32)
CREAT SERPL-MCNC: 0.98 MG/DL (ref 0.52–1.04)
GFR SERPL CREATININE-BSD FRML MDRD: 58 ML/MIN/{1.73_M2}
GLUCOSE SERPL-MCNC: 139 MG/DL (ref 70–99)
HDLC SERPL-MCNC: 59 MG/DL
LDLC SERPL CALC-MCNC: 140 MG/DL
NONHDLC SERPL-MCNC: 167 MG/DL
POTASSIUM SERPL-SCNC: 3.4 MMOL/L (ref 3.4–5.3)
PROT SERPL-MCNC: 7.1 G/DL (ref 6.8–8.8)
SODIUM SERPL-SCNC: 139 MMOL/L (ref 133–144)
TRIGL SERPL-MCNC: 136 MG/DL

## 2021-02-09 ASSESSMENT — ANXIETY QUESTIONNAIRES: GAD7 TOTAL SCORE: 11

## 2021-02-09 NOTE — RESULT ENCOUNTER NOTE
"Your cholesterol is abnormal, I would recommend cholesterol med management.  You calcium is slightly high, avoid calcium supplements for now.  Recheck in 3 months please use the recommendations below    Ways to improve your cholesterol...    1- Eats less saturated fats (including avoiding \"trans\" fats).    2 - Eat more unsaturated fats  - found in vege  tables, grains, and tree nuts.   Also by replacing butter with canola oil or olive oil.    3 - Eat more nuts.   1-2 ounces (a small handful) of almonds, walnuts, hazelnuts or pecans once a  day in place of other less healthy snacks.    4 - Eat more high   fiber foods - vegetables and whole grains including oat bran, oats, beans, peas, and flax seed.    5 - Eat more fish - such as salmon, tuna, mackerel, and sardines.  1 or 2 six ounce servings per week is a healthy replacement for other proteins.    6 - E  xercise for at least 120 minutes per week - which is equal to 30 minutes 4 days per week.    Mariana Benitez D.O.    "

## 2021-02-10 ENCOUNTER — MYC MEDICAL ADVICE (OUTPATIENT)
Dept: FAMILY MEDICINE | Facility: CLINIC | Age: 71
End: 2021-02-10

## 2021-02-11 NOTE — TELEPHONE ENCOUNTER
Routing to PCP see Facebook message.    Pt wanting alternative to Crestor d/t symptoms of stomach pains, diarrhea, pain 5/10 lasting 24 hours after taking med.    Sandi Skelton RN  [facility]: [place]  Phone: [phone]  Fax:[phone]

## 2021-02-11 NOTE — TELEPHONE ENCOUNTER
RN replied to patient via [employer]. See message for details.     Tiana Valentine RN, BSN, PHN  [facility]: [place]

## 2021-02-25 ENCOUNTER — TELEPHONE (OUTPATIENT)
Dept: ORTHOPEDICS | Facility: CLINIC | Age: 71
End: 2021-02-25

## 2021-02-25 NOTE — TELEPHONE ENCOUNTER
[facility] Call Center    Phone Message    May a detailed message be left on voicemail: yes     Reason for Call: Other: Patient would like to know what steps should she take next?     Action Taken: Message routed to:  [facility] ([facility] Orthopedics    Travel Screening: Not Applicable

## 2021-02-25 NOTE — TELEPHONE ENCOUNTER
Patient reports that she was receiving CSI from Dr. Castellon until that stopped working for her knees, then she transitioned to Synvisc One injections. The last Synvisc One injection done by Dr. Fischer did not provide relief, and now she is experiencing extreme pain. She reports that it has been hard to walk and she has gained more weight. I offered a video visit with Dr. Castellon to discuss her next steps. The patient accepted a video appointment on Monday, 3/1/2021 at 10 am. Reviewed with patient on how to access video through [employer]. She had no other questions.

## 2021-02-26 ENCOUNTER — DOCUMENTATION ONLY (OUTPATIENT)
Dept: CARE COORDINATION | Facility: CLINIC | Age: 71
End: 2021-02-26

## 2021-03-01 ENCOUNTER — DOCUMENTATION ONLY (OUTPATIENT)
Dept: CARE COORDINATION | Facility: CLINIC | Age: 71
End: 2021-03-01

## 2021-03-01 ENCOUNTER — VIRTUAL VISIT (OUTPATIENT)
Dept: ORTHOPEDICS | Facility: CLINIC | Age: 71
End: 2021-03-01
Payer: COMMERCIAL

## 2021-03-01 ENCOUNTER — TELEPHONE (OUTPATIENT)
Dept: ORTHOPEDICS | Facility: CLINIC | Age: 71
End: 2021-03-01

## 2021-03-01 DIAGNOSIS — F32.0 MILD MAJOR DEPRESSION (H): ICD-10-CM

## 2021-03-01 DIAGNOSIS — I10 HYPERTENSION GOAL BP (BLOOD PRESSURE) < 140/90: ICD-10-CM

## 2021-03-01 DIAGNOSIS — F50.819 BINGE EATING DISORDER: ICD-10-CM

## 2021-03-01 DIAGNOSIS — R73.03 PREDIABETES: ICD-10-CM

## 2021-03-01 DIAGNOSIS — M17.11 PRIMARY LOCALIZED OSTEOARTHRITIS OF RIGHT KNEE: Primary | ICD-10-CM

## 2021-03-01 DIAGNOSIS — E66.01 MORBID OBESITY (H): ICD-10-CM

## 2021-03-01 PROCEDURE — 99213 OFFICE O/P EST LOW 20 MIN: CPT | Mod: 95 | Performed by: FAMILY MEDICINE

## 2021-03-01 NOTE — LETTER
3/1/2021       RE: Shelia Sanchez  [address]  [place] [address]     Dear Colleague,    Thank you for referring your patient, Shelia Sanchez, to the [facility] [place] at [facility]. Please see a copy of my visit note below.      Shelia is a 70 year old who is being evaluated via a billable video visit.      How would you like to obtain your AVS? MyChart  If the video visit is dropped, the invitation should be resent by: Send to e-mail at: [email or web address]  Will anyone else be joining your video visit? No      PMH:  Past Medical History:   Diagnosis Date     Arthritis 1/28/14    Bursitis in left hip     Asteatotic eczema      Cholelithiasis with obstruction 5/10/2010     Depressive disorder      Diabetes (H) Summer 2019    Pre-diabetes     Gallstone pancreatitis 5/11/2010     Hypertension      Right knee pain 12/27/2012       Active problem list:  Patient Active Problem List   Diagnosis     BMI > 40     CARDIOVASCULAR SCREENING; LDL GOAL LESS THAN 130     Hypertension goal BP (blood pressure) < 140/90     Advanced directives, counseling/discussion     Left shoulder pain     Cataracts, both eyes     Urinary incontinence     Atypical nevus     Cervical pain     Left-sided headache     Menopausal syndrome (hot flashes)     Heartburn     Mild major depression (H)     LORI (obstructive sleep apnea)- severe ()     Binge eating disorder     Prediabetes       FH:  Family History   Problem Relation Age of Onset     Arthritis Mother      Congenital Anomalies Mother      Eye Disorder Mother      Glaucoma Mother      Hypertension Mother      Depression Mother      Obesity Mother      Hyperlipidemia Mother      Congenital Anomalies Father      Alzheimer Disease Father      Arthritis Father      Diabetes Father      Hypertension Father      Cerebrovascular Disease Father      Prostate Cancer Father      Obesity  Father      Coronary Artery Disease Father      Hyperlipidemia Father      Heart Disease Maternal Grandmother      Heart Disease Maternal Grandfather      Hypertension Paternal Grandmother      Heart Disease Paternal Grandfather      Depression Sister      Hypertension Sister      Anxiety Disorder Sister      Macular Degeneration Maternal Uncle        SH:  Social History     Socioeconomic History     Marital status: Single     Spouse name: Not on file     Number of children: Not on file     Years of education: Not on file     Highest education level: Not on file   Occupational History     Not on file   Social Needs     Financial resource strain: Not on file     Food insecurity     Worry: Not on file     Inability: Not on file     Transportation needs     Medical: Not on file     Non-medical: Not on file   Tobacco Use     Smoking status: Never Smoker     Smokeless tobacco: Never Used   Substance and Sexual Activity     Alcohol use: Yes     Alcohol/week: 0.0 standard drinks     Comment: 4 DRINKS PER WEEK     Drug use: No     Sexual activity: Not Currently     Partners: Male     Birth control/protection: Abstinence   Lifestyle     Physical activity     Days per week: Not on file     Minutes per session: Not on file     Stress: Not on file   Relationships     Social connections     Talks on phone: Not on file     Gets together: Not on file     Attends Gnosticist service: Not on file     Active member of club or organization: Not on file     Attends meetings of clubs or organizations: Not on file     Relationship status: Not on file     Intimate partner violence     Fear of current or ex partner: Not on file     Emotionally abused: Not on file     Physically abused: Not on file     Forced sexual activity: Not on file   Other Topics Concern      Service No     Blood Transfusions Yes     Comment: 1974     Caffeine Concern No     Occupational Exposure No     Hobby Hazards No     Sleep Concern No     Stress Concern No  "    Weight Concern Yes     Comment: OVERWEIGHT     Special Diet No     Back Care No     Exercise No     Bike Helmet Not Asked     Seat Belt Yes     Self-Exams Yes     Parent/sibling w/ CABG, MI or angioplasty before 65F 55M? No   Social History Narrative     Not on file       MEDS:  See EMR, reviewed  ALL:  See EMR, reviewed    REVIEW OF SYSTEMS:  CONSTITUTIONAL:NEGATIVE for fever, chills, change in weight  INTEGUMENTARY/SKIN: NEGATIVE for worrisome rashes, moles or lesions  EYES: NEGATIVE for vision changes or irritation  ENT/MOUTH: NEGATIVE for ear, mouth and throat problems  RESP:NEGATIVE for significant cough or SOB  BREAST: NEGATIVE for masses, tenderness or discharge  CV: NEGATIVE for chest pain, palpitations or peripheral edema  GI: NEGATIVE for nausea, abdominal pain, heartburn, or change in bowel habits  :NEGATIVE for frequency, dysuria, or hematuria  :NEGATIVE for frequency, dysuria, or hematuria  NEURO: NEGATIVE for weakness, dizziness or paresthesias  ENDOCRINE: NEGATIVE for temperature intolerance, skin/hair changes  HEME/ALLERGY/IMMUNE: NEGATIVE for bleeding problems  PSYCHIATRIC: NEGATIVE for changes in mood or affect      RIGHT KNEE THREE VIEWS   11/18/2019 10:48 AM     HISTORY: Chronic pain of right knee.     COMPARISON: 12/17/2012                                                                      IMPRESSION: No fracture or acute abnormality is seen. There has been  progression of mild to moderate joint space loss in the medial  compartment and mild degenerative change of the patellofemoral  articulation. The lateral compartment space remains well preserved. No  other abnormality or change is noted.      JOSHUA BAIRES MD          The patient has been notified of following:      \"This video visit will be conducted via a call between you and your physician/provider. We have found that certain health care needs can be provided without the need for an in-person physical exam.  This service " "lets us provide the care you need with a video conversation.  If a prescription is necessary we can send it directly to your pharmacy.  If lab work is needed we can place an order for that and you can then stop by our lab to have the test done at a later time.     Video visits are billed at different rates depending on your insurance coverage.  Please reach out to your insurance provider with any questions.     If during the course of the call the physician/provider feels a video visit is not appropriate, you will not be charged for this service.\"     Patient has given verbal consent for Video visit? Yes  How would you like to obtain your AVS? MyChart  If you are dropped from the video visit, the video invite should be resent to:   Will anyone else be joining your video visit? certified athletic trainer        Video-Visit Details    Type of service:  Video Visit    Video Start Time: 9:52a  Video End Time: 10:19    Originating Location (pt. Location): Home    Distant Location (provider location):  [facility]    Platform used for Video Visit: Anny      S:  F/u right knee djd.  Continued weightbearing joint pain, adama with grocery shopping, with getting out of car.  Synvisc-one injection Dr. Fischer 9/23/20 was not helpful enough in improving pain.  Previous single attempt at anatomy-guided cortisone injection with Dr. Stoll 2019 deemed not helpful, providing less than 3 months' worth of pain relief.  Moderate medial joint space and ptf djd on xrays 2019, xrays discussed today.  Pt has been working on wt loss through  therapist at [place] binge eating dsr pgm.  H/o BMI >40, HTN, LORI, prediabetes, elevated cholesterol.  BMI 43, weight 280 # on 2/8/2021, which was increased from a BMI of 38 one year ago..  Has seen PT at [place] for knee exercises.  Using cane for ambulation.  On Norvasc, hydrochlorothiazide, gabapentin, lipitor, effexor, wellbutrin, nexium.  Recently started seeing psychiatry. Is in " the process of stopping gabapentin and effexor.  Feels her mood has recently improved with Wellbutrin and therapist support.  Has used health club in the past, for exercise bike or treadmill, not during COVID  Is looking forward to going back to  once she gets the vaccine. Is on vaccine waiting list.  Has used Ibuprofen in the past, with some relief.  Does not want to add any more medicines at this time, including a different NSAID, although we discussed it.   One month ago BUN 19, cr 0.98, hgba1c 6.3%.  tsh wnl one year ago.  Has not used medial  brace, in setting of ptf djd on xray.  Pt prefers to avoid brace at this time.    Nonsmoker.  NKDA.       GENERAL: healthy, alert, without distress  EYES: Eyes grossly normal to inspection.   HENT: normal cephalic/atraumatic  NECK: No asymmetry, visible masses or scars  RESP: No audible wheeze, cough, or visible cyanosis.  No visible retractions or increased work of breathing.    SKIN: Visible skin clear. No visible rash, abnormal pigmentation or lesions.  NEURO: Cranial nerves grossly intact.  Mentation and speech appropriate for age.  PSYCH: mentation appears normal, concentration appears appropriate, judgement and insight intact and appearance well groomed  MSK:  No obvious rt knee joint, no erythema noted.  Can flex and extend rt knee through adequate ROM.  Medial and posterior rt knee wtbearing pain, localized, with no radicular component.  No numbness.    A:  Rt knee djd.  H/o BMI >40, HTN, LORI, prediabetes, elevated cholesterol    P:  Pt would like to try xray guided rt knee cortisone injection, with repeat weightbearing right knee xrays at that time.  F/u video visit 4 weeks later, to see if helpful.        Shelia is a 70 year old who is being evaluated via a billable video visit.      How would you like to obtain your AVS? [email or web address]  If the video visit is dropped, the invitation should be resent by: Send to e-mail at: [email or web address]  Will anyone  else be joining your video visit? No        Again, thank you for allowing me to participate in the care of your patient.      Sincerely,    Nura Castellon MD

## 2021-03-01 NOTE — LETTER
3/1/2021      RE: Shelia Sanchez  [address]  [place] [address]         Shelia is a 70 year old who is being evaluated via a billable video visit.      How would you like to obtain your AVS? MyChart  If the video visit is dropped, the invitation should be resent by: Send to e-mail at: [email or web address]  Will anyone else be joining your video visit? No      PMH:  Past Medical History:   Diagnosis Date     Arthritis 1/28/14    Bursitis in left hip     Asteatotic eczema      Cholelithiasis with obstruction 5/10/2010     Depressive disorder      Diabetes (H) Summer 2019    Pre-diabetes     Gallstone pancreatitis 5/11/2010     Hypertension      Right knee pain 12/27/2012       Active problem list:  Patient Active Problem List   Diagnosis     BMI > 40     CARDIOVASCULAR SCREENING; LDL GOAL LESS THAN 130     Hypertension goal BP (blood pressure) < 140/90     Advanced directives, counseling/discussion     Left shoulder pain     Cataracts, both eyes     Urinary incontinence     Atypical nevus     Cervical pain     Left-sided headache     Menopausal syndrome (hot flashes)     Heartburn     Mild major depression (H)     LORI (obstructive sleep apnea)- severe ()     Binge eating disorder     Prediabetes       FH:  Family History   Problem Relation Age of Onset     Arthritis Mother      Congenital Anomalies Mother      Eye Disorder Mother      Glaucoma Mother      Hypertension Mother      Depression Mother      Obesity Mother      Hyperlipidemia Mother      Congenital Anomalies Father      Alzheimer Disease Father      Arthritis Father      Diabetes Father      Hypertension Father      Cerebrovascular Disease Father      Prostate Cancer Father      Obesity Father      Coronary Artery Disease Father      Hyperlipidemia Father      Heart Disease Maternal Grandmother      Heart Disease Maternal Grandfather      Hypertension Paternal Grandmother      Heart Disease Paternal Grandfather      Depression  Sister      Hypertension Sister      Anxiety Disorder Sister      Macular Degeneration Maternal Uncle        SH:  Social History     Socioeconomic History     Marital status: Single     Spouse name: Not on file     Number of children: Not on file     Years of education: Not on file     Highest education level: Not on file   Occupational History     Not on file   Social Needs     Financial resource strain: Not on file     Food insecurity     Worry: Not on file     Inability: Not on file     Transportation needs     Medical: Not on file     Non-medical: Not on file   Tobacco Use     Smoking status: Never Smoker     Smokeless tobacco: Never Used   Substance and Sexual Activity     Alcohol use: Yes     Alcohol/week: 0.0 standard drinks     Comment: 4 DRINKS PER WEEK     Drug use: No     Sexual activity: Not Currently     Partners: Male     Birth control/protection: Abstinence   Lifestyle     Physical activity     Days per week: Not on file     Minutes per session: Not on file     Stress: Not on file   Relationships     Social connections     Talks on phone: Not on file     Gets together: Not on file     Attends Catholic service: Not on file     Active member of club or organization: Not on file     Attends meetings of clubs or organizations: Not on file     Relationship status: Not on file     Intimate partner violence     Fear of current or ex partner: Not on file     Emotionally abused: Not on file     Physically abused: Not on file     Forced sexual activity: Not on file   Other Topics Concern      Service No     Blood Transfusions Yes     Comment: 1974     Caffeine Concern No     Occupational Exposure No     Hobby Hazards No     Sleep Concern No     Stress Concern No     Weight Concern Yes     Comment: OVERWEIGHT     Special Diet No     Back Care No     Exercise No     Bike Helmet Not Asked     Seat Belt Yes     Self-Exams Yes     Parent/sibling w/ CABG, MI or angioplasty before 65F 55M? No   Social History  "Narrative     Not on file       MEDS:  See EMR, reviewed  ALL:  See EMR, reviewed    REVIEW OF SYSTEMS:  CONSTITUTIONAL:NEGATIVE for fever, chills, change in weight  INTEGUMENTARY/SKIN: NEGATIVE for worrisome rashes, moles or lesions  EYES: NEGATIVE for vision changes or irritation  ENT/MOUTH: NEGATIVE for ear, mouth and throat problems  RESP:NEGATIVE for significant cough or SOB  BREAST: NEGATIVE for masses, tenderness or discharge  CV: NEGATIVE for chest pain, palpitations or peripheral edema  GI: NEGATIVE for nausea, abdominal pain, heartburn, or change in bowel habits  :NEGATIVE for frequency, dysuria, or hematuria  :NEGATIVE for frequency, dysuria, or hematuria  NEURO: NEGATIVE for weakness, dizziness or paresthesias  ENDOCRINE: NEGATIVE for temperature intolerance, skin/hair changes  HEME/ALLERGY/IMMUNE: NEGATIVE for bleeding problems  PSYCHIATRIC: NEGATIVE for changes in mood or affect      RIGHT KNEE THREE VIEWS   11/18/2019 10:48 AM     HISTORY: Chronic pain of right knee.     COMPARISON: 12/17/2012                                                                      IMPRESSION: No fracture or acute abnormality is seen. There has been  progression of mild to moderate joint space loss in the medial  compartment and mild degenerative change of the patellofemoral  articulation. The lateral compartment space remains well preserved. No  other abnormality or change is noted.      JOSHUA BAIRES MD          The patient has been notified of following:      \"This video visit will be conducted via a call between you and your physician/provider. We have found that certain health care needs can be provided without the need for an in-person physical exam.  This service lets us provide the care you need with a video conversation.  If a prescription is necessary we can send it directly to your pharmacy.  If lab work is needed we can place an order for that and you can then stop by our lab to have the test done at " "a later time.     Video visits are billed at different rates depending on your insurance coverage.  Please reach out to your insurance provider with any questions.     If during the course of the call the physician/provider feels a video visit is not appropriate, you will not be charged for this service.\"     Patient has given verbal consent for Video visit? Yes  How would you like to obtain your AVS? MyChart  If you are dropped from the video visit, the video invite should be resent to:   Will anyone else be joining your video visit? certified athletic trainer        Video-Visit Details    Type of service:  Video Visit    Video Start Time: 9:52a  Video End Time: 10:19    Originating Location (pt. Location): Home    Distant Location (provider location):  [facility]    Platform used for Video Visit: Myndnet      S:  F/u right knee djd.  Continued weightbearing joint pain, adama with grocery shopping, with getting out of car.  Synvisc-one injection Dr. Fischer 9/23/20 was not helpful enough in improving pain.  Previous single attempt at anatomy-guided cortisone injection with Dr. Stoll 2019 deemed not helpful, providing less than 3 months' worth of pain relief.  Moderate medial joint space and ptf djd on xrays 2019, xrays discussed today.  Pt has been working on wt loss through  therapist at [place] binge eating dsr pgm.  H/o BMI >40, HTN, LORI, prediabetes, elevated cholesterol.  BMI 43, weight 280 # on 2/8/2021, which was increased from a BMI of 38 one year ago..  Has seen PT at [place] for knee exercises.  Using cane for ambulation.  On Norvasc, hydrochlorothiazide, gabapentin, lipitor, effexor, wellbutrin, nexium.  Recently started seeing psychiatry. Is in the process of stopping gabapentin and effexor.  Feels her mood has recently improved with Wellbutrin and therapist support.  Has used health club in the past, for exercise bike or treadmill, not during COVID  Is looking forward to going back to [facility] " once she gets the vaccine. Is on vaccine waiting list.  Has used Ibuprofen in the past, with some relief.  Does not want to add any more medicines at this time, including a different NSAID, although we discussed it.   One month ago BUN 19, cr 0.98, hgba1c 6.3%.  tsh wnl one year ago.  Has not used medial  brace, in setting of ptf djd on xray.  Pt prefers to avoid brace at this time.    Nonsmoker.  NKDA.       GENERAL: healthy, alert, without distress  EYES: Eyes grossly normal to inspection.   HENT: normal cephalic/atraumatic  NECK: No asymmetry, visible masses or scars  RESP: No audible wheeze, cough, or visible cyanosis.  No visible retractions or increased work of breathing.    SKIN: Visible skin clear. No visible rash, abnormal pigmentation or lesions.  NEURO: Cranial nerves grossly intact.  Mentation and speech appropriate for age.  PSYCH: mentation appears normal, concentration appears appropriate, judgement and insight intact and appearance well groomed  MSK:  No obvious rt knee joint, no erythema noted.  Can flex and extend rt knee through adequate ROM.  Medial and posterior rt knee wtbearing pain, localized, with no radicular component.  No numbness.    A:  Rt knee djd.  H/o BMI >40, HTN, LORI, prediabetes, elevated cholesterol    P:  Pt would like to try xray guided rt knee cortisone injection, with repeat weightbearing right knee xrays at that time.  F/u video visit 4 weeks later, to see if helpful.                    Shelia is a 70 year old who is being evaluated via a billable video visit.      How would you like to obtain your AVS? MyChart  If the video visit is dropped, the invitation should be resent by: Send to e-mail at: [email or web address]  Will anyone else be joining your video visit? No        Nura Castellon MD

## 2021-03-01 NOTE — PROGRESS NOTES
Shelia is a 70 year old who is being evaluated via a billable video visit.      How would you like to obtain your AVS? MyChart  If the video visit is dropped, the invitation should be resent by: Send to e-mail at: [email or web address]  Will anyone else be joining your video visit? No

## 2021-03-01 NOTE — PROGRESS NOTES
Shelia is a 70 year old who is being evaluated via a billable video visit.      How would you like to obtain your AVS? MyChart  If the video visit is dropped, the invitation should be resent by: Send to e-mail at: [email or web address]  Will anyone else be joining your video visit? No      PMH:  Past Medical History:   Diagnosis Date     Arthritis 1/28/14    Bursitis in left hip     Asteatotic eczema      Cholelithiasis with obstruction 5/10/2010     Depressive disorder      Diabetes (H) Summer 2019    Pre-diabetes     Gallstone pancreatitis 5/11/2010     Hypertension      Right knee pain 12/27/2012       Active problem list:  Patient Active Problem List   Diagnosis     BMI > 40     CARDIOVASCULAR SCREENING; LDL GOAL LESS THAN 130     Hypertension goal BP (blood pressure) < 140/90     Advanced directives, counseling/discussion     Left shoulder pain     Cataracts, both eyes     Urinary incontinence     Atypical nevus     Cervical pain     Left-sided headache     Menopausal syndrome (hot flashes)     Heartburn     Mild major depression (H)     LORI (obstructive sleep apnea)- severe ()     Binge eating disorder     Prediabetes       FH:  Family History   Problem Relation Age of Onset     Arthritis Mother      Congenital Anomalies Mother      Eye Disorder Mother      Glaucoma Mother      Hypertension Mother      Depression Mother      Obesity Mother      Hyperlipidemia Mother      Congenital Anomalies Father      Alzheimer Disease Father      Arthritis Father      Diabetes Father      Hypertension Father      Cerebrovascular Disease Father      Prostate Cancer Father      Obesity Father      Coronary Artery Disease Father      Hyperlipidemia Father      Heart Disease Maternal Grandmother      Heart Disease Maternal Grandfather      Hypertension Paternal Grandmother      Heart Disease Paternal Grandfather      Depression Sister      Hypertension Sister      Anxiety Disorder Sister      Macular Degeneration  Maternal Uncle        SH:  Social History     Socioeconomic History     Marital status: Single     Spouse name: Not on file     Number of children: Not on file     Years of education: Not on file     Highest education level: Not on file   Occupational History     Not on file   Social Needs     Financial resource strain: Not on file     Food insecurity     Worry: Not on file     Inability: Not on file     Transportation needs     Medical: Not on file     Non-medical: Not on file   Tobacco Use     Smoking status: Never Smoker     Smokeless tobacco: Never Used   Substance and Sexual Activity     Alcohol use: Yes     Alcohol/week: 0.0 standard drinks     Comment: 4 DRINKS PER WEEK     Drug use: No     Sexual activity: Not Currently     Partners: Male     Birth control/protection: Abstinence   Lifestyle     Physical activity     Days per week: Not on file     Minutes per session: Not on file     Stress: Not on file   Relationships     Social connections     Talks on phone: Not on file     Gets together: Not on file     Attends Sabianism service: Not on file     Active member of club or organization: Not on file     Attends meetings of clubs or organizations: Not on file     Relationship status: Not on file     Intimate partner violence     Fear of current or ex partner: Not on file     Emotionally abused: Not on file     Physically abused: Not on file     Forced sexual activity: Not on file   Other Topics Concern      Service No     Blood Transfusions Yes     Comment: 1976     Caffeine Concern No     Occupational Exposure No     Hobby Hazards No     Sleep Concern No     Stress Concern No     Weight Concern Yes     Comment: OVERWEIGHT     Special Diet No     Back Care No     Exercise No     Bike Helmet Not Asked     Seat Belt Yes     Self-Exams Yes     Parent/sibling w/ CABG, MI or angioplasty before 65F 55M? No   Social History Narrative     Not on file       MEDS:  See EMR, reviewed  ALL:  See EMR,  "reviewed    REVIEW OF SYSTEMS:  CONSTITUTIONAL:NEGATIVE for fever, chills, change in weight  INTEGUMENTARY/SKIN: NEGATIVE for worrisome rashes, moles or lesions  EYES: NEGATIVE for vision changes or irritation  ENT/MOUTH: NEGATIVE for ear, mouth and throat problems  RESP:NEGATIVE for significant cough or SOB  BREAST: NEGATIVE for masses, tenderness or discharge  CV: NEGATIVE for chest pain, palpitations or peripheral edema  GI: NEGATIVE for nausea, abdominal pain, heartburn, or change in bowel habits  :NEGATIVE for frequency, dysuria, or hematuria  :NEGATIVE for frequency, dysuria, or hematuria  NEURO: NEGATIVE for weakness, dizziness or paresthesias  ENDOCRINE: NEGATIVE for temperature intolerance, skin/hair changes  HEME/ALLERGY/IMMUNE: NEGATIVE for bleeding problems  PSYCHIATRIC: NEGATIVE for changes in mood or affect      RIGHT KNEE THREE VIEWS   11/18/2019 10:48 AM     HISTORY: Chronic pain of right knee.     COMPARISON: 12/17/2012                                                                      IMPRESSION: No fracture or acute abnormality is seen. There has been  progression of mild to moderate joint space loss in the medial  compartment and mild degenerative change of the patellofemoral  articulation. The lateral compartment space remains well preserved. No  other abnormality or change is noted.      JOSHUA BAIRES MD          The patient has been notified of following:      \"This video visit will be conducted via a call between you and your physician/provider. We have found that certain health care needs can be provided without the need for an in-person physical exam.  This service lets us provide the care you need with a video conversation.  If a prescription is necessary we can send it directly to your pharmacy.  If lab work is needed we can place an order for that and you can then stop by our lab to have the test done at a later time.     Video visits are billed at different rates depending " "on your insurance coverage.  Please reach out to your insurance provider with any questions.     If during the course of the call the physician/provider feels a video visit is not appropriate, you will not be charged for this service.\"     Patient has given verbal consent for Video visit? Yes  How would you like to obtain your AVS? MyChart  If you are dropped from the video visit, the video invite should be resent to:   Will anyone else be joining your video visit? certified athletic trainer        Video-Visit Details    Type of service:  Video Visit    Video Start Time: 9:52a  Video End Time: 10:19    Originating Location (pt. Location): Home    Distant Location (provider location):  [facility]    Platform used for Video Visit: [email or web address]      S:  F/u right knee djd.  Continued weightbearing joint pain, adama with grocery shopping, with getting out of car.  Synvisc-one injection Dr. Fischer 9/23/20 was not helpful enough in improving pain.  Previous single attempt at anatomy-guided cortisone injection with Dr. Stoll 2019 deemed not helpful, providing less than 3 months' worth of pain relief.  Moderate medial joint space and ptf djd on xrays 2019, xrays discussed today.  Pt has been working on wt loss through  therapist at [place] [facility]inge [facility].  H/o BMI >40, HTN, LORI, prediabetes, elevated cholesterol.  BMI 43, weight 280 # on 2/8/2021, which was increased from a BMI of 38 one year ago..  Has seen PT at [place] for knee exercises.  Using cane for ambulation.  On Norvasc, hydrochlorothiazide, gabapentin, lipitor, effexor, wellbutrin, nexium.  Recently started seeing psychiatry. Is in the process of stopping gabapentin and effexor.  Feels her mood has recently improved with Wellbutrin and therapist support.  Has used health club in the past, for exercise bike or treadmill, not during COVID  Is looking forward to going back to  once she gets the vaccine. Is on vaccine waiting list.  Has used " Ibuprofen in the past, with some relief.  Does not want to add any more medicines at this time, including a different NSAID, although we discussed it.   One month ago BUN 19, cr 0.98, hgba1c 6.3%.  tsh wnl one year ago.  Has not used medial  brace, in setting of ptf djd on xray.  Pt prefers to avoid brace at this time.    Nonsmoker.  NKDA.       GENERAL: healthy, alert, without distress  EYES: Eyes grossly normal to inspection.   HENT: normal cephalic/atraumatic  NECK: No asymmetry, visible masses or scars  RESP: No audible wheeze, cough, or visible cyanosis.  No visible retractions or increased work of breathing.    SKIN: Visible skin clear. No visible rash, abnormal pigmentation or lesions.  NEURO: Cranial nerves grossly intact.  Mentation and speech appropriate for age.  PSYCH: mentation appears normal, concentration appears appropriate, judgement and insight intact and appearance well groomed  MSK:  No obvious rt knee joint, no erythema noted.  Can flex and extend rt knee through adequate ROM.  Medial and posterior rt knee wtbearing pain, localized, with no radicular component.  No numbness.    A:  Rt knee djd.  H/o BMI >40, HTN, LORI, prediabetes, elevated cholesterol    P:  Pt would like to try xray guided rt knee cortisone injection, with repeat weightbearing right knee xrays at that time.  F/u video visit 4 weeks later, to see if helpful.

## 2021-03-01 NOTE — TELEPHONE ENCOUNTER
LVM with imaging scheduling and sports scheduling number.    Encouraged her to call and get them scheduled and to call back to schedule byron f/up.     - Senthil Saez, ATC

## 2021-03-03 ENCOUNTER — IMMUNIZATION (OUTPATIENT)
Dept: NURSING | Facility: CLINIC | Age: 71
End: 2021-03-03
Payer: COMMERCIAL

## 2021-03-03 PROCEDURE — 91300 PR COVID VAC PFIZER DIL RECON 30 MCG/0.3 ML IM: CPT

## 2021-03-03 PROCEDURE — 0001A PR COVID VAC PFIZER DIL RECON 30 MCG/0.3 ML IM: CPT

## 2021-03-23 ENCOUNTER — TELEPHONE (OUTPATIENT)
Dept: ORTHOPEDICS | Facility: CLINIC | Age: 71
End: 2021-03-23

## 2021-03-23 DIAGNOSIS — Z11.59 ENCOUNTER FOR SCREENING FOR OTHER VIRAL DISEASES: ICD-10-CM

## 2021-03-23 NOTE — TELEPHONE ENCOUNTER
[facility] Call Center    Phone Message    May a detailed message be left on voicemail: yes     Reason for Call: Other: Patient would like a c/b to discuss what she can do for her pain until she gets her injection.      Action Taken: Message routed to:  [facility]    Travel Screening: Not Applicable

## 2021-03-24 ENCOUNTER — IMMUNIZATION (OUTPATIENT)
Dept: NURSING | Facility: CLINIC | Age: 71
End: 2021-03-24
Attending: FAMILY MEDICINE
Payer: COMMERCIAL

## 2021-03-24 PROCEDURE — 91300 PR COVID VAC PFIZER DIL RECON 30 MCG/0.3 ML IM: CPT

## 2021-03-24 PROCEDURE — 0002A PR COVID VAC PFIZER DIL RECON 30 MCG/0.3 ML IM: CPT

## 2021-03-24 NOTE — TELEPHONE ENCOUNTER
Shelia has actually noticed significant improvement in her knee pain since she had called.    Mentions that driving was what flared it up and caused the increase in pain.    We discussed pain management techniques:  She is taking advil about 3 times a day (morning, afternoon, night).  Ices occasionally. I encouraged her to use an ACE wrap for comfort.    She was told a brace would be OK, but not super effective for the type of arthritis she is experiencing.    I let her know in case her knee pain flares up again, she can call us back and we can help her see another physician in between the time for her XR guided knee injection.    She thanked me for the call.     - Senthil Saez, ATC

## 2021-04-05 DIAGNOSIS — Z11.59 ENCOUNTER FOR SCREENING FOR OTHER VIRAL DISEASES: ICD-10-CM

## 2021-04-05 LAB
LABORATORY COMMENT REPORT: NORMAL
SARS-COV-2 RNA RESP QL NAA+PROBE: NEGATIVE
SARS-COV-2 RNA RESP QL NAA+PROBE: NORMAL
SPECIMEN SOURCE: NORMAL
SPECIMEN SOURCE: NORMAL

## 2021-04-05 PROCEDURE — U0003 INFECTIOUS AGENT DETECTION BY NUCLEIC ACID (DNA OR RNA); SEVERE ACUTE RESPIRATORY SYNDROME CORONAVIRUS 2 (SARS-COV-2) (CORONAVIRUS DISEASE [COVID-19]), AMPLIFIED PROBE TECHNIQUE, MAKING USE OF HIGH THROUGHPUT TECHNOLOGIES AS DESCRIBED BY CMS-2020-01-R: HCPCS | Mod: 90 | Performed by: PATHOLOGY

## 2021-04-05 PROCEDURE — U0005 INFEC AGEN DETEC AMPLI PROBE: HCPCS | Mod: 90 | Performed by: PATHOLOGY

## 2021-04-08 ENCOUNTER — ANCILLARY PROCEDURE (OUTPATIENT)
Dept: GENERAL RADIOLOGY | Facility: CLINIC | Age: 71
End: 2021-04-08
Attending: FAMILY MEDICINE
Payer: COMMERCIAL

## 2021-04-08 DIAGNOSIS — M17.11 PRIMARY LOCALIZED OSTEOARTHRITIS OF RIGHT KNEE: ICD-10-CM

## 2021-04-08 PROCEDURE — 77002 NEEDLE LOCALIZATION BY XRAY: CPT | Mod: GC | Performed by: RADIOLOGY

## 2021-04-08 PROCEDURE — 20610 DRAIN/INJ JOINT/BURSA W/O US: CPT | Mod: RT | Performed by: RADIOLOGY

## 2021-04-08 RX ORDER — BUPIVACAINE HYDROCHLORIDE 2.5 MG/ML
10 INJECTION, SOLUTION EPIDURAL; INFILTRATION; INTRACAUDAL ONCE
Status: COMPLETED | OUTPATIENT
Start: 2021-04-08 | End: 2021-04-08

## 2021-04-08 RX ORDER — TRIAMCINOLONE ACETONIDE 40 MG/ML
40 INJECTION, SUSPENSION INTRA-ARTICULAR; INTRAMUSCULAR ONCE
Status: COMPLETED | OUTPATIENT
Start: 2021-04-08 | End: 2021-04-08

## 2021-04-08 RX ORDER — IOPAMIDOL 408 MG/ML
10 INJECTION, SOLUTION INTRATHECAL ONCE
Status: COMPLETED | OUTPATIENT
Start: 2021-04-08 | End: 2021-04-08

## 2021-04-08 RX ORDER — LIDOCAINE HYDROCHLORIDE 10 MG/ML
30 INJECTION, SOLUTION EPIDURAL; INFILTRATION; INTRACAUDAL; PERINEURAL ONCE
Status: COMPLETED | OUTPATIENT
Start: 2021-04-08 | End: 2021-04-08

## 2021-04-08 RX ADMIN — BUPIVACAINE HYDROCHLORIDE 5 MG: 2.5 INJECTION, SOLUTION EPIDURAL; INFILTRATION; INTRACAUDAL at 10:32

## 2021-04-08 RX ADMIN — IOPAMIDOL 2 ML: 408 INJECTION, SOLUTION INTRATHECAL at 10:32

## 2021-04-08 RX ADMIN — LIDOCAINE HYDROCHLORIDE 5 ML: 10 INJECTION, SOLUTION EPIDURAL; INFILTRATION; INTRACAUDAL; PERINEURAL at 10:32

## 2021-04-08 RX ADMIN — TRIAMCINOLONE ACETONIDE 40 MG: 40 INJECTION, SUSPENSION INTRA-ARTICULAR; INTRAMUSCULAR at 10:32

## 2021-04-25 DIAGNOSIS — Z13.6 CARDIOVASCULAR SCREENING; LDL GOAL LESS THAN 130: Chronic | ICD-10-CM

## 2021-04-26 RX ORDER — ATORVASTATIN CALCIUM 10 MG/1
TABLET, FILM COATED ORAL
Qty: 30 TABLET | Refills: 0 | Status: SHIPPED | OUTPATIENT
Start: 2021-04-26 | End: 2021-05-06

## 2021-05-03 ENCOUNTER — VIRTUAL VISIT (OUTPATIENT)
Dept: ORTHOPEDICS | Facility: CLINIC | Age: 71
End: 2021-05-03
Payer: COMMERCIAL

## 2021-05-03 DIAGNOSIS — M17.11 PRIMARY OSTEOARTHRITIS OF RIGHT KNEE: ICD-10-CM

## 2021-05-03 DIAGNOSIS — E66.01 MORBID OBESITY (H): ICD-10-CM

## 2021-05-03 DIAGNOSIS — M25.561 RIGHT KNEE PAIN, UNSPECIFIED CHRONICITY: Primary | ICD-10-CM

## 2021-05-03 DIAGNOSIS — R73.03 PREDIABETES: ICD-10-CM

## 2021-05-03 DIAGNOSIS — F50.819 BINGE EATING DISORDER: ICD-10-CM

## 2021-05-03 PROCEDURE — 99213 OFFICE O/P EST LOW 20 MIN: CPT | Mod: 95 | Performed by: FAMILY MEDICINE

## 2021-05-03 NOTE — LETTER
5/3/2021       RE: Shelia Sanchez  [address]  [place] [address]     Dear Colleague,    Thank you for referring your patient, Shelia Sanchez, to the [facility] [place] [facility]. Please see a copy of my visit note below.    PMH:  Past Medical History:   Diagnosis Date     Arthritis 1/28/14    Bursitis in left hip     Asteatotic eczema      Cholelithiasis with obstruction 5/10/2010     Depressive disorder      Diabetes (H) Summer 2019    Pre-diabetes     Gallstone pancreatitis 5/11/2010     Hypertension      Right knee pain 12/27/2012       Active problem list:  Patient Active Problem List   Diagnosis     BMI > 40     CARDIOVASCULAR SCREENING; LDL GOAL LESS THAN 130     Hypertension goal BP (blood pressure) < 140/90     Advanced directives, counseling/discussion     Left shoulder pain     Cataracts, both eyes     Urinary incontinence     Atypical nevus     Cervical pain     Left-sided headache     Menopausal syndrome (hot flashes)     Heartburn     Mild major depression (H)     LORI (obstructive sleep apnea)- severe ()     Binge eating disorder     Prediabetes       FH:  Family History   Problem Relation Age of Onset     Arthritis Mother      Congenital Anomalies Mother      Eye Disorder Mother      Glaucoma Mother      Hypertension Mother      Depression Mother      Obesity Mother      Hyperlipidemia Mother      Congenital Anomalies Father      Alzheimer Disease Father      Arthritis Father      Diabetes Father      Hypertension Father      Cerebrovascular Disease Father      Prostate Cancer Father      Obesity Father      Coronary Artery Disease Father      Hyperlipidemia Father      Heart Disease Maternal Grandmother      Heart Disease Maternal Grandfather      Hypertension Paternal Grandmother      Heart Disease Paternal Grandfather      Depression Sister      Hypertension Sister      Anxiety Disorder  Sister      Macular Degeneration Maternal Uncle        SH:  Social History     Socioeconomic History     Marital status: Single     Spouse name: Not on file     Number of children: Not on file     Years of education: Not on file     Highest education level: Not on file   Occupational History     Not on file   Social Needs     Financial resource strain: Not on file     Food insecurity     Worry: Not on file     Inability: Not on file     Transportation needs     Medical: Not on file     Non-medical: Not on file   Tobacco Use     Smoking status: Never Smoker     Smokeless tobacco: Never Used   Substance and Sexual Activity     Alcohol use: Yes     Alcohol/week: 0.0 standard drinks     Comment: 4 DRINKS PER WEEK     Drug use: No     Sexual activity: Not Currently     Partners: Male     Birth control/protection: Abstinence   Lifestyle     Physical activity     Days per week: Not on file     Minutes per session: Not on file     Stress: Not on file   Relationships     Social connections     Talks on phone: Not on file     Gets together: Not on file     Attends Mandaeism service: Not on file     Active member of club or organization: Not on file     Attends meetings of clubs or organizations: Not on file     Relationship status: Not on file     Intimate partner violence     Fear of current or ex partner: Not on file     Emotionally abused: Not on file     Physically abused: Not on file     Forced sexual activity: Not on file   Other Topics Concern      Service No     Blood Transfusions Yes     Comment: 1976     Caffeine Concern No     Occupational Exposure No     Hobby Hazards No     Sleep Concern No     Stress Concern No     Weight Concern Yes     Comment: OVERWEIGHT     Special Diet No     Back Care No     Exercise No     Bike Helmet Not Asked     Seat Belt Yes     Self-Exams Yes     Parent/sibling w/ CABG, MI or angioplasty before 65F 55M? No   Social History Narrative     Not on file       MEDS:  See EMR,  reviewed  ALL:  See EMR, reviewed    REVIEW OF SYSTEMS:  CONSTITUTIONAL:NEGATIVE for fever, chills, change in weight  INTEGUMENTARY/SKIN: NEGATIVE for worrisome rashes, moles or lesions  EYES: NEGATIVE for vision changes or irritation  ENT/MOUTH: NEGATIVE for ear, mouth and throat problems  RESP:NEGATIVE for significant cough or SOB  BREAST: NEGATIVE for masses, tenderness or discharge  CV: NEGATIVE for chest pain, palpitations or peripheral edema  GI: NEGATIVE for nausea, abdominal pain, heartburn, or change in bowel habits  :NEGATIVE for frequency, dysuria, or hematuria  :NEGATIVE for frequency, dysuria, or hematuria  NEURO: NEGATIVE for weakness, dizziness or paresthesias  ENDOCRINE: NEGATIVE for temperature intolerance, skin/hair changes  HEME/ALLERGY/IMMUNE: NEGATIVE for bleeding problems  PSYCHIATRIC: NEGATIVE for changes in mood or affect      Fluoroscopy guided right knee steroid injection      History: Steroid injection right knee; Primary localized  osteoarthritis of right knee     Comparison: Radiograph 11/18/2018     Fluoroscopy time: Less than 6 seconds.     Technique/findings:     The benefits and risks of the procedure were discussed with the  patient including the risks of bleeding, infection, avascular necrosis  and potential nerve damage. A written informed consent was obtained.  Standard timeout was performed.     The patient's area over the right knee was prepped and draped using  standard sterile fashion. Under the fluoroscopic guidance, medial area  of the knee at patellofemoral joint space was marked with patient in  supine position. Approximately 1-3 cc of 1% lidocaine was used for the  purpose of local anesthesia.     Under the fluoroscopic guidance, a 22-gauge 3.5 inch  needle was  advanced from the medial approach. Approximately 1 cc of Isovue 200  was injected to confirm the intra-articular location of the needle  tip. Subsequently, 1 cc steroid (40mg/cc Kenalog) was  injected.  Followed by which, 4 cc of 0.25% bupivacaine was injected. The needle  was then removed.     The pressure was held approximately 1-2 minutes. Sterile dressing was  applied. There was no immediate post procedural complication.     The patient rated preprocedure pain as 9/10.  After procedure this was  rated as 4/10.                                                                      Impression:   1. Uncomplicated right knee intra-articular steroid injection.  2. The patient rated preprocedure pain as 9/10.  After procedure this  was rated as 4/10.     I, SHAVONNE BRUSH, attest that I was present in the procedure room  for the entire procedure.     I have personally reviewed the examination and initial interpretation  and I agree with the findings.     SHAVONNE BRUSH          RIGHT KNEE THREE VIEWS   11/18/2019 10:48 AM     HISTORY: Chronic pain of right knee.     COMPARISON: 12/17/2012                                                                      IMPRESSION: No fracture or acute abnormality is seen. There has been  progression of mild to moderate joint space loss in the medial  compartment and mild degenerative change of the patellofemoral  articulation. The lateral compartment space remains well preserved. No  other abnormality or change is noted.      JOSHUA BAIRES MD      Phone start: 1:07  Phone stop: 1:18 pm        S:  F/u rt knee djd.  S/p xray guided rt knee cortisone injection one month ago.  No repeat knee xray was obtained at that visit, last knee xray 2019 showed moderate medial jt space djd.  She tells me that the x-ray guided cortisone injection was helpful and that she can now walk without a cane.  But it did not take away all of her pain.  She still has medial and posterior right-sided knee discomfort that bothers her with long periods of walking, for example in a grocery store.      H/O weightbearing joint pain, adama with grocery shopping, with getting out of car.   Synvisc-one injection Dr. Fischer 9/23/20 was not helpful enough in improving pain.  Previous single attempt at anatomy-guided cortisone injection with Dr. Stoll 2019 deemed not helpful, providing less than 3 months' worth of pain relief.  Moderate medial joint space and ptf djd on xrays 2019, xrays discussed today.  Pt has been working on wt loss through  therapist at [place] binge eating [facility].  H/o BMI >40, HTN, LORI, prediabetes, elevated cholesterol.  BMI 43, weight 280 # on 2/8/2021, which was increased from a BMI of 38 one year ago..  Has seen PT at [place] for knee exercises.  Using cane for ambulation.  On Norvasc, hydrochlorothiazide, gabapentin, lipitor, effexor, wellbutrin, nexium.  Recently started seeing psychiatry. Is in the process of stopping gabapentin and effexor.  Feels her mood has recently improved with Wellbutrin and therapist support.  Has used [employer] in the past, for exercise bike or treadmill, not during COVID  Is looking forward to going back to  once she gets the vaccine.  Has used Ibuprofen in the past, with some relief.  Does not want to add any more medicines at this time, including a different NSAID, although we discussed it.   One month ago BUN 19, cr 0.98, hgba1c 6.3%.  tsh wnl one year ago.  Has not used medial  brace, in setting of ptf djd on xray.  Pt prefers to avoid brace at this time.      O:  GENERAL: healthy, alert, without distress  RESP: No audible wheeze, cough.  No increased work of breathing.    NEURO:  Mentation and speech appropriate for age.  PSYCH: mentation appears normal, concentration appears appropriate, judgement and insight intact.  MSK: She indicates medial and posterior right knee discomfort without obvious effusion.  She has a large size thigh in the setting of her obesity and does not believe that she would be a candidate for a medial  brace.    Assessment right-sided knee DJD    Plan: She has exhausted conservative cares.  She has  been recently vaccinated and is hoping to return to her exercise program which is helped the knee in the past.  She is currently above the BMI limits to be an ideal candidate for knee replacement.  She understands the utility of losing weight in the situation to end up with successful knee replacement.  She has exhausted other conservative cares.  She knows if the image guided cortisone shot was helpful enough that could be repeated up to 4 times a year.  She would like repeated standing x-rays of the need to compared to 2019.  This will be arranged as well as a follow-up virtual visit to go over the results.        Again, thank you for allowing me to participate in the care of your patient.      Sincerely,    Nura Castellon MD

## 2021-05-03 NOTE — PROGRESS NOTES
PMH:  Past Medical History:   Diagnosis Date     Arthritis 1/28/14    Bursitis in left hip     Asteatotic eczema      Cholelithiasis with obstruction 5/10/2010     Depressive disorder      Diabetes (H) Summer 2019    Pre-diabetes     Gallstone pancreatitis 5/11/2010     Hypertension      Right knee pain 12/27/2012       Active problem list:  Patient Active Problem List   Diagnosis     BMI > 40     CARDIOVASCULAR SCREENING; LDL GOAL LESS THAN 130     Hypertension goal BP (blood pressure) < 140/90     Advanced directives, counseling/discussion     Left shoulder pain     Cataracts, both eyes     Urinary incontinence     Atypical nevus     Cervical pain     Left-sided headache     Menopausal syndrome (hot flashes)     Heartburn     Mild major depression (H)     LORI (obstructive sleep apnea)- severe ()     Binge eating disorder     Prediabetes       FH:  Family History   Problem Relation Age of Onset     Arthritis Mother      Congenital Anomalies Mother      Eye Disorder Mother      Glaucoma Mother      Hypertension Mother      Depression Mother      Obesity Mother      Hyperlipidemia Mother      Congenital Anomalies Father      Alzheimer Disease Father      Arthritis Father      Diabetes Father      Hypertension Father      Cerebrovascular Disease Father      Prostate Cancer Father      Obesity Father      Coronary Artery Disease Father      Hyperlipidemia Father      Heart Disease Maternal Grandmother      Heart Disease Maternal Grandfather      Hypertension Paternal Grandmother      Heart Disease Paternal Grandfather      Depression Sister      Hypertension Sister      Anxiety Disorder Sister      Macular Degeneration Maternal Uncle        SH:  Social History     Socioeconomic History     Marital status: Single     Spouse name: Not on file     Number of children: Not on file     Years of education: Not on file     Highest education level: Not on file   Occupational History     Not on file   Social Needs      Financial resource strain: Not on file     Food insecurity     Worry: Not on file     Inability: Not on file     Transportation needs     Medical: Not on file     Non-medical: Not on file   Tobacco Use     Smoking status: Never Smoker     Smokeless tobacco: Never Used   Substance and Sexual Activity     Alcohol use: Yes     Alcohol/week: 0.0 standard drinks     Comment: 4 DRINKS PER WEEK     Drug use: No     Sexual activity: Not Currently     Partners: Male     Birth control/protection: Abstinence   Lifestyle     Physical activity     Days per week: Not on file     Minutes per session: Not on file     Stress: Not on file   Relationships     Social connections     Talks on phone: Not on file     Gets together: Not on file     Attends [employer] service: Not on file     Active member of club or organization: Not on file     Attends meetings of clubs or organizations: Not on file     Relationship status: Not on file     Intimate partner violence     Fear of current or ex partner: Not on file     Emotionally abused: Not on file     Physically abused: Not on file     Forced sexual activity: Not on file   Other Topics Concern      Service No     Blood Transfusions Yes     Comment: 1976     Caffeine Concern No     Occupational Exposure No     Hobby Hazards No     Sleep Concern No     Stress Concern No     Weight Concern Yes     Comment: OVERWEIGHT     Special Diet No     Back Care No     Exercise No     Bike Helmet Not Asked     Seat Belt Yes     Self-Exams Yes     Parent/sibling w/ CABG, MI or angioplasty before 65F 55M? No   Social History Narrative     Not on file       MEDS:  See EMR, reviewed  ALL:  See EMR, reviewed    REVIEW OF SYSTEMS:  CONSTITUTIONAL:NEGATIVE for fever, chills, change in weight  INTEGUMENTARY/SKIN: NEGATIVE for worrisome rashes, moles or lesions  EYES: NEGATIVE for vision changes or irritation  ENT/MOUTH: NEGATIVE for ear, mouth and throat problems  RESP:NEGATIVE for significant cough or  SOB  BREAST: NEGATIVE for masses, tenderness or discharge  CV: NEGATIVE for chest pain, palpitations or peripheral edema  GI: NEGATIVE for nausea, abdominal pain, heartburn, or change in bowel habits  :NEGATIVE for frequency, dysuria, or hematuria  :NEGATIVE for frequency, dysuria, or hematuria  NEURO: NEGATIVE for weakness, dizziness or paresthesias  ENDOCRINE: NEGATIVE for temperature intolerance, skin/hair changes  HEME/ALLERGY/IMMUNE: NEGATIVE for bleeding problems  PSYCHIATRIC: NEGATIVE for changes in mood or affect      Fluoroscopy guided right knee steroid injection      History: Steroid injection right knee; Primary localized  osteoarthritis of right knee     Comparison: Radiograph 11/18/2018     Fluoroscopy time: Less than 6 seconds.     Technique/findings:     The benefits and risks of the procedure were discussed with the  patient including the risks of bleeding, infection, avascular necrosis  and potential nerve damage. A written informed consent was obtained.  Standard timeout was performed.     The patient's area over the right knee was prepped and draped using  standard sterile fashion. Under the fluoroscopic guidance, medial area  of the knee at patellofemoral joint space was marked with patient in  supine position. Approximately 1-3 cc of 1% lidocaine was used for the  purpose of local anesthesia.     Under the fluoroscopic guidance, a 22-gauge 3.5 inch  needle was  advanced from the medial approach. Approximately 1 cc of Isovue 200  was injected to confirm the intra-articular location of the needle  tip. Subsequently, 1 cc steroid (40mg/cc Kenalog) was injected.  Followed by which, 4 cc of 0.25% bupivacaine was injected. The needle  was then removed.     The pressure was held approximately 1-2 minutes. Sterile dressing was  applied. There was no immediate post procedural complication.     The patient rated preprocedure pain as 9/10.  After procedure this was  rated as 4/10.                                                                       Impression:   1. Uncomplicated right knee intra-articular steroid injection.  2. The patient rated preprocedure pain as 9/10.  After procedure this  was rated as 4/10.     I, SHAVONNE BRUSH, attest that I was present in the procedure room  for the entire procedure.     I have personally reviewed the examination and initial interpretation  and I agree with the findings.     SHAVONNE BRUSH          RIGHT KNEE THREE VIEWS   11/18/2019 10:48 AM     HISTORY: Chronic pain of right knee.     COMPARISON: 12/17/2012                                                                      IMPRESSION: No fracture or acute abnormality is seen. There has been  progression of mild to moderate joint space loss in the medial  compartment and mild degenerative change of the patellofemoral  articulation. The lateral compartment space remains well preserved. No  other abnormality or change is noted.      JOSHUA BAIRES MD      Phone start: 1:07  Phone stop: 1:18 pm        S:  F/u rt knee djd.  S/p xray guided rt knee cortisone injection one month ago.  No repeat knee xray was obtained at that visit, last knee xray 2019 showed moderate medial jt space djd.  She tells me that the x-ray guided cortisone injection was helpful and that she can now walk without a cane.  But it did not take away all of her pain.  She still has medial and posterior right-sided knee discomfort that bothers her with long periods of walking, for example in a grocery store.      H/O weightbearing joint pain, adama with grocery shopping, with getting out of car.  Synvisc-one injection Dr. Fischer 9/23/20 was not helpful enough in improving pain.  Previous single attempt at anatomy-guided cortisone injection with Dr. Stoll 2019 deemed not helpful, providing less than 3 months' worth of pain relief.  Moderate medial joint space and ptf djd on xrays 2019, xrays discussed today.  Pt has been working on wt loss  through  therapist at [place] binge eating [facility].  H/o BMI >40, HTN, LORI, prediabetes, elevated cholesterol.  BMI 43, weight 280 # on 2/8/2021, which was increased from a BMI of 38 one year ago..  Has seen PT at [facility] for knee exercises.  Using cane for ambulation.  On Norvasc, hydrochlorothiazide, gabapentin, lipitor, effexor, wellbutrin, nexium.  Recently started seeing psychiatry. Is in the process of stopping gabapentin and effexor.  Feels her mood has recently improved with Wellbutrin and therapist support.  Has used [employer] in the past, for exercise bike or treadmill, not during COVID  Is looking forward to going back to  once she gets the vaccine.  Has used Ibuprofen in the past, with some relief.  Does not want to add any more medicines at this time, including a different NSAID, although we discussed it.   One month ago BUN 19, cr 0.98, hgba1c 6.3%.  tsh wnl one year ago.  Has not used medial  brace, in setting of ptf djd on xray.  Pt prefers to avoid brace at this time.      O:  GENERAL: healthy, alert, without distress  RESP: No audible wheeze, cough.  No increased work of breathing.    NEURO:  Mentation and speech appropriate for age.  PSYCH: mentation appears normal, concentration appears appropriate, judgement and insight intact.  MSK: She indicates medial and posterior right knee discomfort without obvious effusion.  She has a large size thigh in the setting of her obesity and does not believe that she would be a candidate for a medial  brace.    Assessment right-sided knee DJD    Plan: She has exhausted conservative cares.  She has been recently vaccinated and is hoping to return to her exercise program which is helped the knee in the past.  She is currently above the BMI limits to be an ideal candidate for knee replacement.  She understands the utility of losing weight in the situation to end up with successful knee replacement.  She has exhausted other conservative cares.   She knows if the image guided cortisone shot was helpful enough that could be repeated up to 4 times a year.  She would like repeated standing x-rays of the need to compared to 2019.  This will be arranged as well as a follow-up virtual visit to go over the results.

## 2021-05-03 NOTE — LETTER
5/3/2021      RE: Shelia Sanchez  [address]  [place] [id number]       PMH:  Past Medical History:   Diagnosis Date     Arthritis 1/28/14    Bursitis in left hip     Asteatotic eczema      Cholelithiasis with obstruction 5/10/2010     Depressive disorder      Diabetes (H) Summer 2019    Pre-diabetes     Gallstone pancreatitis 5/11/2010     Hypertension      Right knee pain 12/27/2012       Active problem list:  Patient Active Problem List   Diagnosis     BMI > 40     CARDIOVASCULAR SCREENING; LDL GOAL LESS THAN 130     Hypertension goal BP (blood pressure) < 140/90     Advanced directives, counseling/discussion     Left shoulder pain     Cataracts, both eyes     Urinary incontinence     Atypical nevus     Cervical pain     Left-sided headache     Menopausal syndrome (hot flashes)     Heartburn     Mild major depression (H)     LORI (obstructive sleep apnea)- severe ()     Binge eating disorder     Prediabetes       FH:  Family History   Problem Relation Age of Onset     Arthritis Mother      Congenital Anomalies Mother      Eye Disorder Mother      Glaucoma Mother      Hypertension Mother      Depression Mother      Obesity Mother      Hyperlipidemia Mother      Congenital Anomalies Father      Alzheimer Disease Father      Arthritis Father      Diabetes Father      Hypertension Father      Cerebrovascular Disease Father      Prostate Cancer Father      Obesity Father      Coronary Artery Disease Father      Hyperlipidemia Father      Heart Disease Maternal Grandmother      Heart Disease Maternal Grandfather      Hypertension Paternal Grandmother      Heart Disease Paternal Grandfather      Depression Sister      Hypertension Sister      Anxiety Disorder Sister      Macular Degeneration Maternal Uncle        SH:  Social History     Socioeconomic History     Marital status: Single     Spouse name: Not on file     Number of children: Not on file     Years of education: Not on file      Highest education level: Not on file   Occupational History     Not on file   Social Needs     Financial resource strain: Not on file     Food insecurity     Worry: Not on file     Inability: Not on file     Transportation needs     Medical: Not on file     Non-medical: Not on file   Tobacco Use     Smoking status: Never Smoker     Smokeless tobacco: Never Used   Substance and Sexual Activity     Alcohol use: Yes     Alcohol/week: 0.0 standard drinks     Comment: 4 DRINKS PER WEEK     Drug use: No     Sexual activity: Not Currently     Partners: Male     Birth control/protection: Abstinence   Lifestyle     Physical activity     Days per week: Not on file     Minutes per session: Not on file     Stress: Not on file   Relationships     Social connections     Talks on phone: Not on file     Gets together: Not on file     Attends Orthodoxy service: Not on file     Active member of club or organization: Not on file     Attends meetings of clubs or organizations: Not on file     Relationship status: Not on file     Intimate partner violence     Fear of current or ex partner: Not on file     Emotionally abused: Not on file     Physically abused: Not on file     Forced sexual activity: Not on file   Other Topics Concern      Service No     Blood Transfusions Yes     Comment: 1974     Caffeine Concern No     Occupational Exposure No     Hobby Hazards No     Sleep Concern No     Stress Concern No     Weight Concern Yes     Comment: OVERWEIGHT     Special Diet No     Back Care No     Exercise No     Bike Helmet Not Asked     Seat Belt Yes     Self-Exams Yes     Parent/sibling w/ CABG, MI or angioplasty before 65F 55M? No   Social History Narrative     Not on file       MEDS:  See EMR, reviewed  ALL:  See EMR, reviewed    REVIEW OF SYSTEMS:  CONSTITUTIONAL:NEGATIVE for fever, chills, change in weight  INTEGUMENTARY/SKIN: NEGATIVE for worrisome rashes, moles or lesions  EYES: NEGATIVE for vision changes or  irritation  ENT/MOUTH: NEGATIVE for ear, mouth and throat problems  RESP:NEGATIVE for significant cough or SOB  BREAST: NEGATIVE for masses, tenderness or discharge  CV: NEGATIVE for chest pain, palpitations or peripheral edema  GI: NEGATIVE for nausea, abdominal pain, heartburn, or change in bowel habits  :NEGATIVE for frequency, dysuria, or hematuria  :NEGATIVE for frequency, dysuria, or hematuria  NEURO: NEGATIVE for weakness, dizziness or paresthesias  ENDOCRINE: NEGATIVE for temperature intolerance, skin/hair changes  HEME/ALLERGY/IMMUNE: NEGATIVE for bleeding problems  PSYCHIATRIC: NEGATIVE for changes in mood or affect      Fluoroscopy guided right knee steroid injection      History: Steroid injection right knee; Primary localized  osteoarthritis of right knee     Comparison: Radiograph 11/18/2018     Fluoroscopy time: Less than 6 seconds.     Technique/findings:     The benefits and risks of the procedure were discussed with the  patient including the risks of bleeding, infection, avascular necrosis  and potential nerve damage. A written informed consent was obtained.  Standard timeout was performed.     The patient's area over the right knee was prepped and draped using  standard sterile fashion. Under the fluoroscopic guidance, medial area  of the knee at patellofemoral joint space was marked with patient in  supine position. Approximately 1-3 cc of 1% lidocaine was used for the  purpose of local anesthesia.     Under the fluoroscopic guidance, a 22-gauge 3.5 inch  needle was  advanced from the medial approach. Approximately 1 cc of Isovue 200  was injected to confirm the intra-articular location of the needle  tip. Subsequently, 1 cc steroid (40mg/cc Kenalog) was injected.  Followed by which, 4 cc of 0.25% bupivacaine was injected. The needle  was then removed.     The pressure was held approximately 1-2 minutes. Sterile dressing was  applied. There was no immediate post procedural  complication.     The patient rated preprocedure pain as 9/10.  After procedure this was  rated as 4/10.                                                                      Impression:   1. Uncomplicated right knee intra-articular steroid injection.  2. The patient rated preprocedure pain as 9/10.  After procedure this  was rated as 4/10.     I, SHAVONNE BRUSH, attest that I was present in the procedure room  for the entire procedure.     I have personally reviewed the examination and initial interpretation  and I agree with the findings.     SHAVONNE BRUSH          RIGHT KNEE THREE VIEWS   11/18/2019 10:48 AM     HISTORY: Chronic pain of right knee.     COMPARISON: 12/17/2012                                                                      IMPRESSION: No fracture or acute abnormality is seen. There has been  progression of mild to moderate joint space loss in the medial  compartment and mild degenerative change of the patellofemoral  articulation. The lateral compartment space remains well preserved. No  other abnormality or change is noted.      JOSHUA BAIRES MD      Phone start: 1:07  Phone stop: 1:18 pm        S:  F/u rt knee djd.  S/p xray guided rt knee cortisone injection one month ago.  No repeat knee xray was obtained at that visit, last knee xray 2019 showed moderate medial jt space djd.  She tells me that the x-ray guided cortisone injection was helpful and that she can now walk without a cane.  But it did not take away all of her pain.  She still has medial and posterior right-sided knee discomfort that bothers her with long periods of walking, for example in a grocery store.      H/O weightbearing joint pain, adama with grocery shopping, with getting out of car.  Synvisc-one injection Dr. Fischer 9/23/20 was not helpful enough in improving pain.  Previous single attempt at anatomy-guided cortisone injection with Dr. Stoll 2019 deemed not helpful, providing less than 3 months' worth of  pain relief.  Moderate medial joint space and ptf djd on xrays 2019, xrays discussed today.  Pt has been working on wt loss through  therapist at [place] binge eating [facility] pgm.  H/o BMI >40, HTN, LORI, prediabetes, elevated cholesterol.  BMI 43, weight 280 # on 2/8/2021, which was increased from a BMI of 38 one year ago..  Has seen PT at [place] for knee exercises.  Using cane for ambulation.  On Norvasc, hydrochlorothiazide, gabapentin, lipitor, effexor, wellbutrin, nexium.  Recently started seeing psychiatry. Is in the process of stopping gabapentin and effexor.  Feels her mood has recently improved with Wellbutrin and therapist support.  Has used health club in the past, for exercise bike or treadmill, not during COVID  Is looking forward to going back to  once she gets the vaccine.  Has used Ibuprofen in the past, with some relief.  Does not want to add any more medicines at this time, including a different NSAID, although we discussed it.   One month ago BUN 19, cr 0.98, hgba1c 6.3%.  tsh wnl one year ago.  Has not used medial  brace, in setting of ptf djd on xray.  Pt prefers to avoid brace at this time.      O:  GENERAL: healthy, alert, without distress  RESP: No audible wheeze, cough.  No increased work of breathing.    NEURO:  Mentation and speech appropriate for age.  PSYCH: mentation appears normal, concentration appears appropriate, judgement and insight intact.  MSK: She indicates medial and posterior right knee discomfort without obvious effusion.  She has a large size thigh in the setting of her obesity and does not believe that she would be a candidate for a medial  brace.    Assessment right-sided knee DJD    Plan: She has exhausted conservative cares.  She has been recently vaccinated and is hoping to return to her exercise program which is helped the knee in the past.  She is currently above the BMI limits to be an ideal candidate for knee replacement.  She understands the utility  of losing weight in the situation to end up with successful knee replacement.  She has exhausted other conservative cares.  She knows if the image guided cortisone shot was helpful enough that could be repeated up to 4 times a year.  She would like repeated standing x-rays of the need to compared to 2019.  This will be arranged as well as a follow-up virtual visit to go over the results.        Nura Castellon MD

## 2021-05-05 ENCOUNTER — ANCILLARY PROCEDURE (OUTPATIENT)
Dept: GENERAL RADIOLOGY | Facility: CLINIC | Age: 71
End: 2021-05-05
Attending: FAMILY MEDICINE
Payer: COMMERCIAL

## 2021-05-05 DIAGNOSIS — M25.561 RIGHT KNEE PAIN, UNSPECIFIED CHRONICITY: ICD-10-CM

## 2021-05-05 PROCEDURE — 73562 X-RAY EXAM OF KNEE 3: CPT | Mod: RT | Performed by: RADIOLOGY

## 2021-05-06 NOTE — PROGRESS NOTES
ICD-10-CM    1. High cholesterol  E78.00 Lipid panel reflex to direct LDL Fasting     Comprehensive metabolic panel   2. Myofascial pain  M79.18 gabapentin (NEURONTIN) 100 MG capsule   3. Hypertension goal BP (blood pressure) < 140/90  I10    4. LORI (obstructive sleep apnea)- severe ()  G47.33    5. Prediabetes  R73.03 Hemoglobin A1c   6. Cervical pain  M54.2    7. BMI > 40  E66.01    8. CARDIOVASCULAR SCREENING; LDL GOAL LESS THAN 130  Z13.6 atorvastatin (LIPITOR) 10 MG tablet   9. Mild major depression (H)  F32.0 Vitamin D Deficiency   10. Vitamin D deficiency  E55.9 Vitamin D Deficiency     High cholesterol-recently started lipitor low dose, doing ok, wants to stay on it at least 6 months , no changes for now, check labs, she is taking it daily    [id number] much better now, continue current modifications    htn-stable,cont meds    Binge eatting, depression/anxiety-working with [employer], [employer] and psych, she has lots of stressors so working on her diet and exercise minimally    Knee pains-working with ortho    Neck pain, headaches, Neurontin 100mg at bedtime working well    Vit D-takes supplements, check labs      Shingles shot as advised at the pharmacy    Mammogram advised  Subjective   Shelia is a 70 year old who presents for the following health issues  accompanied by her self:    HPI     Hyperlipidemia Follow-Up      Are you regularly taking any medication or supplement to lower your cholesterol?   Yes- statin therapy    Are you having muscle aches or other side effects that you think could be caused by your cholesterol lowering medication?  No    Hypertension Follow-up      Do you check your blood pressure regularly outside of the clinic? No     Are you following a low salt diet? No    Are your blood pressures ever more than 140 on the top number (systolic) OR more   than 90 on the bottom number (diastolic), for example 140/90? No      How many servings of fruits and  "vegetables do you eat daily?  4 or more    On average, how many sweetened beverages do you drink each day (Examples: soda, juice, sweet tea, etc.  Do NOT count diet or artificially sweetened beverages)?   0    How many days per week do you exercise enough to make your heart beat faster?none    How many minutes a day do you exercise enough to make your heart beat faster? none    How many days per week do you miss taking your medication? 0      crestor initially every 3 days and she did not tolerate it   Switched to lipitor daily and it is doing ok, not working on renea diet as much  2/22/21-she started her lipitor, started taking it right away      Working on her knee pains , seeing a specialist for that    Hydrochlorothiazide and Norvasc, she has been on it for years and is tolerating it well      Psych gives her Wellbutrin and Effexor    neuronin-1 pill at night for pain and it helps-headaches     covid shot mach 24        Review of Systems   Constitutional, HEENT, cardiovascular, pulmonary, GI, , musculoskeletal, neuro, skin, endocrine and psych systems are negative, except as otherwise noted.      Objective    /74   Pulse 89   Resp 18   Ht 1.702 m (5' 7\")   Wt 125.6 kg (277 lb)   SpO2 98%   BMI 43.38 kg/m    Body mass index is 43.38 kg/m .  Physical Exam   GENERAL: healthy, alert and no distress  NECK: no adenopathy, no asymmetry, masses, or scars and thyroid normal to palpation  RESP: lungs clear to auscultation - no rales, rhonchi or wheezes  CV: regular rate and rhythm, normal S1 S2, no S3 or S4, no murmur, click or rub, no peripheral edema and peripheral pulses strong  ABDOMEN: soft, nontender, no hepatosplenomegaly, no masses and bowel sounds normal  MS: no gross musculoskeletal defects noted, no edema            "

## 2021-05-10 ENCOUNTER — OFFICE VISIT (OUTPATIENT)
Dept: FAMILY MEDICINE | Facility: CLINIC | Age: 71
End: 2021-05-10
Payer: COMMERCIAL

## 2021-05-10 VITALS
BODY MASS INDEX: 43.47 KG/M2 | SYSTOLIC BLOOD PRESSURE: 132 MMHG | HEIGHT: 67 IN | HEART RATE: 89 BPM | OXYGEN SATURATION: 98 % | RESPIRATION RATE: 18 BRPM | DIASTOLIC BLOOD PRESSURE: 74 MMHG | WEIGHT: 277 LBS

## 2021-05-10 DIAGNOSIS — E66.01 MORBID OBESITY (H): Chronic | ICD-10-CM

## 2021-05-10 DIAGNOSIS — F32.0 MILD MAJOR DEPRESSION (H): Chronic | ICD-10-CM

## 2021-05-10 DIAGNOSIS — Z13.6 CARDIOVASCULAR SCREENING; LDL GOAL LESS THAN 130: Chronic | ICD-10-CM

## 2021-05-10 DIAGNOSIS — M79.18 MYOFASCIAL PAIN: ICD-10-CM

## 2021-05-10 DIAGNOSIS — G47.33 OSA (OBSTRUCTIVE SLEEP APNEA): Chronic | ICD-10-CM

## 2021-05-10 DIAGNOSIS — E55.9 VITAMIN D DEFICIENCY: ICD-10-CM

## 2021-05-10 DIAGNOSIS — I10 HYPERTENSION GOAL BP (BLOOD PRESSURE) < 140/90: Chronic | ICD-10-CM

## 2021-05-10 DIAGNOSIS — M54.2 CERVICAL PAIN: Chronic | ICD-10-CM

## 2021-05-10 DIAGNOSIS — E78.00 HIGH CHOLESTEROL: Primary | ICD-10-CM

## 2021-05-10 DIAGNOSIS — R73.03 PREDIABETES: ICD-10-CM

## 2021-05-10 LAB
ALBUMIN SERPL-MCNC: 3.7 G/DL (ref 3.4–5)
ALP SERPL-CCNC: 97 U/L (ref 40–150)
ALT SERPL W P-5'-P-CCNC: 33 U/L (ref 0–50)
ANION GAP SERPL CALCULATED.3IONS-SCNC: 4 MMOL/L (ref 3–14)
AST SERPL W P-5'-P-CCNC: 19 U/L (ref 0–45)
BILIRUB SERPL-MCNC: 0.5 MG/DL (ref 0.2–1.3)
BUN SERPL-MCNC: 25 MG/DL (ref 7–30)
CALCIUM SERPL-MCNC: 9.4 MG/DL (ref 8.5–10.1)
CHLORIDE SERPL-SCNC: 109 MMOL/L (ref 94–109)
CHOLEST SERPL-MCNC: 160 MG/DL
CO2 SERPL-SCNC: 27 MMOL/L (ref 20–32)
CREAT SERPL-MCNC: 0.82 MG/DL (ref 0.52–1.04)
DEPRECATED CALCIDIOL+CALCIFEROL SERPL-MC: 58 UG/L (ref 20–75)
GFR SERPL CREATININE-BSD FRML MDRD: 71 ML/MIN/{1.73_M2}
GLUCOSE SERPL-MCNC: 116 MG/DL (ref 70–99)
HBA1C MFR BLD: 6 % (ref 0–5.6)
HDLC SERPL-MCNC: 70 MG/DL
LDLC SERPL CALC-MCNC: 78 MG/DL
NONHDLC SERPL-MCNC: 90 MG/DL
POTASSIUM SERPL-SCNC: 3.9 MMOL/L (ref 3.4–5.3)
PROT SERPL-MCNC: 7 G/DL (ref 6.8–8.8)
SODIUM SERPL-SCNC: 140 MMOL/L (ref 133–144)
TRIGL SERPL-MCNC: 58 MG/DL

## 2021-05-10 PROCEDURE — 36415 COLL VENOUS BLD VENIPUNCTURE: CPT | Performed by: FAMILY MEDICINE

## 2021-05-10 PROCEDURE — 82306 VITAMIN D 25 HYDROXY: CPT | Performed by: FAMILY MEDICINE

## 2021-05-10 PROCEDURE — 80053 COMPREHEN METABOLIC PANEL: CPT | Performed by: FAMILY MEDICINE

## 2021-05-10 PROCEDURE — 99214 OFFICE O/P EST MOD 30 MIN: CPT | Performed by: FAMILY MEDICINE

## 2021-05-10 PROCEDURE — 80061 LIPID PANEL: CPT | Performed by: FAMILY MEDICINE

## 2021-05-10 PROCEDURE — 83036 HEMOGLOBIN GLYCOSYLATED A1C: CPT | Performed by: FAMILY MEDICINE

## 2021-05-10 RX ORDER — GABAPENTIN 100 MG/1
CAPSULE ORAL
Qty: 90 CAPSULE | Refills: 1 | Status: SHIPPED | OUTPATIENT
Start: 2021-05-10 | End: 2021-11-22

## 2021-05-10 RX ORDER — ATORVASTATIN CALCIUM 10 MG/1
10 TABLET, FILM COATED ORAL DAILY
Qty: 90 TABLET | Refills: 1 | Status: SHIPPED | OUTPATIENT
Start: 2021-05-10 | End: 2021-11-15

## 2021-05-10 ASSESSMENT — MIFFLIN-ST. JEOR: SCORE: 1799.09

## 2021-05-10 NOTE — PATIENT INSTRUCTIONS
Mammogram Scheduling  [facility] [phone] or [phone]  [facility] [phone]    [id number] much better now, continue current modifications  Awaiting for other labs,   Continue all your meds      Shingles shot as advised at the pharmacy      Patient Education

## 2021-05-11 NOTE — RESULT ENCOUNTER NOTE
Your cholesterol is great, please continue on current med.  Otherwise normal labs.     Take care,  Mariana Benitez D.O.

## 2021-05-12 ENCOUNTER — VIRTUAL VISIT (OUTPATIENT)
Dept: ORTHOPEDICS | Facility: CLINIC | Age: 71
End: 2021-05-12
Payer: COMMERCIAL

## 2021-05-12 DIAGNOSIS — M17.11 PRIMARY OSTEOARTHRITIS OF RIGHT KNEE: Primary | ICD-10-CM

## 2021-05-12 PROCEDURE — 99213 OFFICE O/P EST LOW 20 MIN: CPT | Mod: TEL | Performed by: FAMILY MEDICINE

## 2021-05-12 NOTE — LETTER
5/12/2021      RE: Shelia Sanchez  [address]  [facility] [address]       S:  F/u repeat knee xray.  We discussed that c/t last wb rt knee xray 2019, it shows increased medial jt space narrowing, with severe djd.    S/p xray guided rt knee cortisone injection one month ago.  She tells me that the x-ray guided cortisone injection was helpful in  that she can now walk without a cane.  But it did not take away all of her pain.  She still has medial and posterior right-sided knee discomfort that bothers her with long periods of walking, for example in a grocery store.     H/O weightbearing joint pain, adama with grocery shopping, with getting out of car.  Synvisc-one injection Dr. Fischer 9/23/20 was not helpful enough in improving pain.  Previous single attempt at anatomy-guided cortisone injection with Dr. Stoll 2019 deemed not helpful, providing less than 3 months' worth of pain relief.  Moderate medial joint space and ptf djd on xrays 2019, xrays discussed today.  Pt has been working on wt loss through  therapist at [facility]inge eating [facility].  H/o BMI >40, HTN, LORI, prediabetes, elevated cholesterol.  BMI 43, weight 280 # on 2/8/2021, which was increased from a BMI of 38 one year ago..  Has seen PT at [facility] for knee exercises.  Using cane for ambulation.  On Norvasc, hydrochlorothiazide, gabapentin, lipitor, effexor, wellbutrin, nexium.  Recently started seeing psychiatry. Is in the process of stopping gabapentin and effexor.  Feels her mood has recently improved with Wellbutrin and therapist support.  Has used health club in the past, for exercise bike or treadmill, not during COVID  Is looking forward to going back to  once she gets the vaccine.  Has used Ibuprofen in the past, with some relief.  Does not want to add any more medicines at this time, including a different NSAID, although we discussed it.   One month ago BUN 19, cr 0.98, hgba1c 6.3%.  tsh wnl one year ago.  Has not used  medial  brace, in setting of ptf djd on xray.  Pt prefers to avoid brace at this time.     O:  GENERAL: healthy, alert, without distress  RESP: No audible wheeze, cough.  No increased work of breathing.    NEURO:  Mentation and speech appropriate for age.  PSYCH: mentation appears normal, concentration appears appropriate, judgement and insight intact.  MSK:  right knee medial weightbearing pain    A:  Right knee djd    P: She has exahausted conservative cares. She has a committed plan to weight loss.  She has been successful in the past with weight loss.  She has reduced her Hgb A1C recently.  She has had a BMI near 38.  Her knee djd affects her ability to complete daily activities.  Shots have provided only marginal relief.  She requests a consultation with Dr. Phillips in [facility] to discuss parameters that might lead to a knee replacement, so she can define her goals for the next year.    PMH:  Past Medical History:   Diagnosis Date     Arthritis 1/28/14    Bursitis in left hip     Asteatotic eczema      Cholelithiasis with obstruction 5/10/2010     Depressive disorder      Diabetes (H) Summer 2019    Pre-diabetes     Gallstone pancreatitis 5/11/2010     Hypertension      Right knee pain 12/27/2012       Active problem list:  Patient Active Problem List   Diagnosis     BMI > 40     CARDIOVASCULAR SCREENING; LDL GOAL LESS THAN 130     Hypertension goal BP (blood pressure) < 140/90     Advanced directives, counseling/discussion     Left shoulder pain     Cataracts, both eyes     Urinary incontinence     Atypical nevus     Cervical pain     Left-sided headache     Menopausal syndrome (hot flashes)     Heartburn     Mild major depression (H)     LORI (obstructive sleep apnea)- severe ()     Binge eating disorder     Prediabetes       FH:  Family History   Problem Relation Age of Onset     Arthritis Mother      Congenital Anomalies Mother      Eye Disorder Mother      Glaucoma Mother      Hypertension  Mother      Depression Mother      Obesity Mother      Hyperlipidemia Mother      Congenital Anomalies Father      Alzheimer Disease Father      Arthritis Father      Diabetes Father      Hypertension Father      Cerebrovascular Disease Father      Prostate Cancer Father      Obesity Father      Coronary Artery Disease Father      Hyperlipidemia Father      Heart Disease Maternal Grandmother      Heart Disease Maternal Grandfather      Hypertension Paternal Grandmother      Heart Disease Paternal Grandfather      Depression Sister      Hypertension Sister      Anxiety Disorder Sister      Macular Degeneration Maternal Uncle        SH:  Social History     Socioeconomic History     Marital status: Single     Spouse name: Not on file     Number of children: Not on file     Years of education: Not on file     Highest education level: Not on file   Occupational History     Not on file   Social Needs     Financial resource strain: Not on file     Food insecurity     Worry: Not on file     Inability: Not on file     Transportation needs     Medical: Not on file     Non-medical: Not on file   Tobacco Use     Smoking status: Never Smoker     Smokeless tobacco: Never Used   Substance and Sexual Activity     Alcohol use: Yes     Alcohol/week: 0.0 standard drinks     Comment: 4 DRINKS PER WEEK     Drug use: No     Sexual activity: Not Currently     Partners: Male     Birth control/protection: Abstinence   Lifestyle     Physical activity     Days per week: Not on file     Minutes per session: Not on file     Stress: Not on file   Relationships     Social connections     Talks on phone: Not on file     Gets together: Not on file     Attends Adventism service: Not on file     Active member of club or organization: Not on file     Attends meetings of clubs or organizations: Not on file     Relationship status: Not on file     Intimate partner violence     Fear of current or ex partner: Not on file     Emotionally abused: Not on  file     Physically abused: Not on file     Forced sexual activity: Not on file   Other Topics Concern      Service No     Blood Transfusions Yes     Comment: 1974     Caffeine Concern No     Occupational Exposure No     Hobby Hazards No     Sleep Concern No     Stress Concern No     Weight Concern Yes     Comment: OVERWEIGHT     Special Diet No     Back Care No     Exercise No     Bike Helmet Not Asked     Seat Belt Yes     Self-Exams Yes     Parent/sibling w/ CABG, MI or angioplasty before 65F 55M? No   Social History Narrative     Not on file       MEDS:  See EMR, reviewed  ALL:  See EMR, reviewed    REVIEW OF SYSTEMS:  CONSTITUTIONAL:NEGATIVE for fever, chills, change in weight  INTEGUMENTARY/SKIN: NEGATIVE for worrisome rashes, moles or lesions  EYES: NEGATIVE for vision changes or irritation  ENT/MOUTH: NEGATIVE for ear, mouth and throat problems  RESP:NEGATIVE for significant cough or SOB  BREAST: NEGATIVE for masses, tenderness or discharge  CV: NEGATIVE for chest pain, palpitations or peripheral edema  GI: NEGATIVE for nausea, abdominal pain, heartburn, or change in bowel habits  :NEGATIVE for frequency, dysuria, or hematuria  :NEGATIVE for frequency, dysuria, or hematuria  NEURO: NEGATIVE for weakness, dizziness or paresthesias  ENDOCRINE: NEGATIVE for temperature intolerance, skin/hair changes  HEME/ALLERGY/IMMUNE: NEGATIVE for bleeding problems  PSYCHIATRIC: NEGATIVE for changes in mood or affect      Nura Castellon MD

## 2021-05-12 NOTE — PROGRESS NOTES
Phone start:12:59 p  Phone stop: 1:12 p                S:  F/u repeat knee xray.  We discussed that c/t last wb rt knee xray 2019, it shows increased medial jt space narrowing, with severe djd.    S/p xray guided rt knee cortisone injection one month ago.  She tells me that the x-ray guided cortisone injection was helpful in  that she can now walk without a cane.  But it did not take away all of her pain.  She still has medial and posterior right-sided knee discomfort that bothers her with long periods of walking, for example in a grocery store.     H/O weightbearing joint pain, adama with grocery shopping, with getting out of car.  Synvisc-one injection Dr. Fischer 9/23/20 was not helpful enough in improving pain.  Previous single attempt at anatomy-guided cortisone injection with Dr. Stoll 2019 deemed not helpful, providing less than 3 months' worth of pain relief.  Moderate medial joint space and ptf djd on xrays 2019, xrays discussed today.  Pt has been working on wt loss through  therapist at [place] binge eating [facility].  H/o BMI >40, HTN, LORI, prediabetes, elevated cholesterol.  BMI 43, weight 280 # on 2/8/2021, which was increased from a BMI of 38 one year ago..  Has seen PT at [facility] for knee exercises.  Using cane for ambulation.  On Norvasc, hydrochlorothiazide, gabapentin, lipitor, effexor, wellbutrin, nexium.  Recently started seeing psychiatry. Is in the process of stopping gabapentin and effexor.  Feels her mood has recently improved with Wellbutrin and therapist support.  Has used health [employer] in the past, for exercise bike or treadmill, not during COVID  Is looking forward to going back to  once she gets the vaccine.  Has used Ibuprofen in the past, with some relief.  Does not want to add any more medicines at this time, including a different NSAID, although we discussed it.   One month ago BUN 19, cr 0.98, hgba1c 6.3%.  tsh wnl one year ago.  Has not used medial  brace, in setting of  ptf djd on xray.  Pt prefers to avoid brace at this time.     O:  GENERAL: healthy, alert, without distress  RESP: No audible wheeze, cough.  No increased work of breathing.    NEURO:  Mentation and speech appropriate for age.  PSYCH: mentation appears normal, concentration appears appropriate, judgement and insight intact.  MSK:  right knee medial weightbearing pain    A:  Right knee djd    P: She has exahausted conservative cares. She has a committed plan to weight loss.  She has been successful in the past with weight loss.  She has reduced her Hgb A1C recently.  She has had a BMI near 38.  Her knee djd affects her ability to complete daily activities.  Shots have provided only marginal relief.  She requests a consultation with Dr. Phillips in [facility] to discuss parameters that might lead to a knee replacement, so she can define her goals for the next year.    PMH:  Past Medical History:   Diagnosis Date     Arthritis 1/28/14    Bursitis in left hip     Asteatotic eczema      Cholelithiasis with obstruction 5/10/2010     Depressive disorder      Diabetes (H) Summer 2019    Pre-diabetes     Gallstone pancreatitis 5/11/2010     Hypertension      Right knee pain 12/27/2012       Active problem list:  Patient Active Problem List   Diagnosis     BMI > 40     CARDIOVASCULAR SCREENING; LDL GOAL LESS THAN 130     Hypertension goal BP (blood pressure) < 140/90     Advanced directives, counseling/discussion     Left shoulder pain     Cataracts, both eyes     Urinary incontinence     Atypical nevus     Cervical pain     Left-sided headache     Menopausal syndrome (hot flashes)     Heartburn     Mild major depression (H)     LORI (obstructive sleep apnea)- severe ()     Binge eating disorder     Prediabetes       FH:  Family History   Problem Relation Age of Onset     Arthritis Mother      Congenital Anomalies Mother      Eye Disorder Mother      Glaucoma Mother      Hypertension Mother      Depression Mother       Obesity Mother      Hyperlipidemia Mother      Congenital Anomalies Father      Alzheimer Disease Father      Arthritis Father      Diabetes Father      Hypertension Father      Cerebrovascular Disease Father      Prostate Cancer Father      Obesity Father      Coronary Artery Disease Father      Hyperlipidemia Father      Heart Disease Maternal Grandmother      Heart Disease Maternal Grandfather      Hypertension Paternal Grandmother      Heart Disease Paternal Grandfather      Depression Sister      Hypertension Sister      Anxiety Disorder Sister      Macular Degeneration Maternal Uncle        SH:  Social History     Socioeconomic History     Marital status: Single     Spouse name: Not on file     Number of children: Not on file     Years of education: Not on file     Highest education level: Not on file   Occupational History     Not on file   Social Needs     Financial resource strain: Not on file     Food insecurity     Worry: Not on file     Inability: Not on file     Transportation needs     Medical: Not on file     Non-medical: Not on file   Tobacco Use     Smoking status: Never Smoker     Smokeless tobacco: Never Used   Substance and Sexual Activity     Alcohol use: Yes     Alcohol/week: 0.0 standard drinks     Comment: 4 DRINKS PER WEEK     Drug use: No     Sexual activity: Not Currently     Partners: Male     Birth control/protection: Abstinence   Lifestyle     Physical activity     Days per week: Not on file     Minutes per session: Not on file     Stress: Not on file   Relationships     Social connections     Talks on phone: Not on file     Gets together: Not on file     Attends Yarsanism service: Not on file     Active member of club or organization: Not on file     Attends meetings of clubs or organizations: Not on file     Relationship status: Not on file     Intimate partner violence     Fear of current or ex partner: Not on file     Emotionally abused: Not on file     Physically abused: Not on  file     Forced sexual activity: Not on file   Other Topics Concern      Service No     Blood Transfusions Yes     Comment: 1974     Caffeine Concern No     Occupational Exposure No     Hobby Hazards No     Sleep Concern No     Stress Concern No     Weight Concern Yes     Comment: OVERWEIGHT     Special Diet No     Back Care No     Exercise No     Bike Helmet Not Asked     Seat Belt Yes     Self-Exams Yes     Parent/sibling w/ CABG, MI or angioplasty before 65F 55M? No   Social History Narrative     Not on file       MEDS:  See EMR, reviewed  ALL:  See EMR, reviewed    REVIEW OF SYSTEMS:  CONSTITUTIONAL:NEGATIVE for fever, chills, change in weight  INTEGUMENTARY/SKIN: NEGATIVE for worrisome rashes, moles or lesions  EYES: NEGATIVE for vision changes or irritation  ENT/MOUTH: NEGATIVE for ear, mouth and throat problems  RESP:NEGATIVE for significant cough or SOB  BREAST: NEGATIVE for masses, tenderness or discharge  CV: NEGATIVE for chest pain, palpitations or peripheral edema  GI: NEGATIVE for nausea, abdominal pain, heartburn, or change in bowel habits  :NEGATIVE for frequency, dysuria, or hematuria  :NEGATIVE for frequency, dysuria, or hematuria  NEURO: NEGATIVE for weakness, dizziness or paresthesias  ENDOCRINE: NEGATIVE for temperature intolerance, skin/hair changes  HEME/ALLERGY/IMMUNE: NEGATIVE for bleeding problems  PSYCHIATRIC: NEGATIVE for changes in mood or affect

## 2021-05-12 NOTE — LETTER
5/12/2021       RE: Shelia Sanchez  [address]  [facility] [address]     Dear Colleague,    Thank you for referring your patient, Shelia Sanchez, to the [facility] [place] at [facility]. Please see a copy of my visit note below.    S:  F/u repeat knee xray.  We discussed that c/t last wb rt knee xray 2019, it shows increased medial jt space narrowing, with severe djd.    S/p xray guided rt knee cortisone injection one month ago.  She tells me that the x-ray guided cortisone injection was helpful in  that she can now walk without a cane.  But it did not take away all of her pain.  She still has medial and posterior right-sided knee discomfort that bothers her with long periods of walking, for example in a grocery store.     H/O weightbearing joint pain, adama with grocery shopping, with getting out of car.  Synvisc-one injection Dr. Fischer 9/23/20 was not helpful enough in improving pain.  Previous single attempt at anatomy-guided cortisone injection with Dr. Stoll 2019 deemed not helpful, providing less than 3 months' worth of pain relief.  Moderate medial joint space and ptf djd on xrays 2019, xrays discussed today.  Pt has been working on wt loss through  therapist at [place] binge eating dsr [facility].  H/o BMI >40, HTN, LORI, prediabetes, elevated cholesterol.  BMI 43, weight 280 # on 2/8/2021, which was increased from a BMI of 38 one year ago..  Has seen PT at [facility] for knee exercises.  Using cane for ambulation.  On Norvasc, hydrochlorothiazide, gabapentin, lipitor, effexor, wellbutrin, nexium.  Recently started seeing psychiatry. Is in the process of stopping gabapentin and effexor.  Feels her mood has recently improved with Wellbutrin and therapist support.  Has used health club in the past, for exercise bike or treadmill, not during COVID  Is looking forward to going back to  once she gets the vaccine.  Has used  Ibuprofen in the past, with some relief.  Does not want to add any more medicines at this time, including a different NSAID, although we discussed it.   One month ago BUN 19, cr 0.98, hgba1c 6.3%.  tsh wnl one year ago.  Has not used medial  brace, in setting of ptf djd on xray.  Pt prefers to avoid brace at this time.     O:  GENERAL: healthy, alert, without distress  RESP: No audible wheeze, cough.  No increased work of breathing.    NEURO:  Mentation and speech appropriate for age.  PSYCH: mentation appears normal, concentration appears appropriate, judgement and insight intact.  MSK:  right knee medial weightbearing pain    A:  Right knee djd    P: She has exahausted conservative cares. She has a committed plan to weight loss.  She has been successful in the past with weight loss.  She has reduced her Hgb A1C recently.  She has had a BMI near 38.  Her knee djd affects her ability to complete daily activities.  Shots have provided only marginal relief.  She requests a consultation with Dr. Phillips in [facility] to discuss parameters that might lead to a knee replacement, so she can define her goals for the next year.    PMH:  Past Medical History:   Diagnosis Date     Arthritis 1/28/14    Bursitis in left hip     Asteatotic eczema      Cholelithiasis with obstruction 5/10/2010     Depressive disorder      Diabetes (H) Summer 2019    Pre-diabetes     Gallstone pancreatitis 5/11/2010     Hypertension      Right knee pain 12/27/2012       Active problem list:  Patient Active Problem List   Diagnosis     BMI > 40     CARDIOVASCULAR SCREENING; LDL GOAL LESS THAN 130     Hypertension goal BP (blood pressure) < 140/90     Advanced directives, counseling/discussion     Left shoulder pain     Cataracts, both eyes     Urinary incontinence     Atypical nevus     Cervical pain     Left-sided headache     Menopausal syndrome (hot flashes)     Heartburn     Mild major depression (H)     LORI (obstructive sleep apnea)-  severe ()     Binge eating disorder     Prediabetes       FH:  Family History   Problem Relation Age of Onset     Arthritis Mother      Congenital Anomalies Mother      Eye Disorder Mother      Glaucoma Mother      Hypertension Mother      Depression Mother      Obesity Mother      Hyperlipidemia Mother      Congenital Anomalies Father      Alzheimer Disease Father      Arthritis Father      Diabetes Father      Hypertension Father      Cerebrovascular Disease Father      Prostate Cancer Father      Obesity Father      Coronary Artery Disease Father      Hyperlipidemia Father      Heart Disease Maternal Grandmother      Heart Disease Maternal Grandfather      Hypertension Paternal Grandmother      Heart Disease Paternal Grandfather      Depression Sister      Hypertension Sister      Anxiety Disorder Sister      Macular Degeneration Maternal Uncle        SH:  Social History     Socioeconomic History     Marital status: Single     Spouse name: Not on file     Number of children: Not on file     Years of education: Not on file     Highest education level: Not on file   Occupational History     Not on file   Social Needs     Financial resource strain: Not on file     Food insecurity     Worry: Not on file     Inability: Not on file     Transportation needs     Medical: Not on file     Non-medical: Not on file   Tobacco Use     Smoking status: Never Smoker     Smokeless tobacco: Never Used   Substance and Sexual Activity     Alcohol use: Yes     Alcohol/week: 0.0 standard drinks     Comment: 4 DRINKS PER WEEK     Drug use: No     Sexual activity: Not Currently     Partners: Male     Birth control/protection: Abstinence   Lifestyle     Physical activity     Days per week: Not on file     Minutes per session: Not on file     Stress: Not on file   Relationships     Social connections     Talks on phone: Not on file     Gets together: Not on file     Attends Mosque service: Not on file     Active member of club or  organization: Not on file     Attends meetings of clubs or organizations: Not on file     Relationship status: Not on file     Intimate partner violence     Fear of current or ex partner: Not on file     Emotionally abused: Not on file     Physically abused: Not on file     Forced sexual activity: Not on file   Other Topics Concern      Service No     Blood Transfusions Yes     Comment: 1974     Caffeine Concern No     Occupational Exposure No     Hobby Hazards No     Sleep Concern No     Stress Concern No     Weight Concern Yes     Comment: OVERWEIGHT     Special Diet No     Back Care No     Exercise No     Bike Helmet Not Asked     Seat Belt Yes     Self-Exams Yes     Parent/sibling w/ CABG, MI or angioplasty before 65F 55M? No   Social History Narrative     Not on file       MEDS:  See EMR, reviewed  ALL:  See EMR, reviewed    REVIEW OF SYSTEMS:  CONSTITUTIONAL:NEGATIVE for fever, chills, change in weight  INTEGUMENTARY/SKIN: NEGATIVE for worrisome rashes, moles or lesions  EYES: NEGATIVE for vision changes or irritation  ENT/MOUTH: NEGATIVE for ear, mouth and throat problems  RESP:NEGATIVE for significant cough or SOB  BREAST: NEGATIVE for masses, tenderness or discharge  CV: NEGATIVE for chest pain, palpitations or peripheral edema  GI: NEGATIVE for nausea, abdominal pain, heartburn, or change in bowel habits  :NEGATIVE for frequency, dysuria, or hematuria  :NEGATIVE for frequency, dysuria, or hematuria  NEURO: NEGATIVE for weakness, dizziness or paresthesias  ENDOCRINE: NEGATIVE for temperature intolerance, skin/hair changes  HEME/ALLERGY/IMMUNE: NEGATIVE for bleeding problems  PSYCHIATRIC: NEGATIVE for changes in mood or affect      Sincerely,    Nura Castellon MD

## 2021-05-12 NOTE — PROGRESS NOTES
Shelia is a 70 year old who is being evaluated via a billable video visit.      How would you like to obtain your AVS? MyChart  Will anyone else be joining your video visit? No

## 2021-08-05 ENCOUNTER — TELEPHONE (OUTPATIENT)
Dept: ORTHOPEDICS | Facility: CLINIC | Age: 71
End: 2021-08-05

## 2021-08-05 NOTE — TELEPHONE ENCOUNTER
[facility] Call Center    Phone Message    May a detailed message be left on voicemail: yes     Reason for Call: Other: Please call pt to schedule for USG injection in right knee with Dr. Castellon.     Action Taken: Message routed to:  [facility] sports    Travel Screening: Not Applicable

## 2021-08-05 NOTE — TELEPHONE ENCOUNTER
Pt was very pleasant and would like to try guided steroid injection again. She looks forward to seeing Dr. Fischer in the near future. Helped set up procedure appt.     - Senthil Saez ATC

## 2021-08-13 ENCOUNTER — OFFICE VISIT (OUTPATIENT)
Dept: ORTHOPEDICS | Facility: CLINIC | Age: 71
End: 2021-08-13
Payer: COMMERCIAL

## 2021-08-13 VITALS — HEIGHT: 67 IN | WEIGHT: 277 LBS | BODY MASS INDEX: 43.47 KG/M2

## 2021-08-13 DIAGNOSIS — M17.11 PRIMARY OSTEOARTHRITIS OF RIGHT KNEE: Primary | ICD-10-CM

## 2021-08-13 PROCEDURE — 20610 DRAIN/INJ JOINT/BURSA W/O US: CPT | Mod: RT | Performed by: FAMILY MEDICINE

## 2021-08-13 PROCEDURE — 99207 PR DROP WITH A PROCEDURE: CPT | Performed by: FAMILY MEDICINE

## 2021-08-13 ASSESSMENT — MIFFLIN-ST. JEOR: SCORE: 1799.09

## 2021-08-13 NOTE — NURSING NOTE
[address] [address]  Dept: [phone]  ______________________________________________________________________________    Patient: Shelia Sanchez   : 1950   MRN: [id number]   2021    INVASIVE PROCEDURE SAFETY CHECKLIST    Date: 21   Procedure: Right knee SynviscOne injection  Patient Name: Shelia Sanchez  MRN: [id number]  YOB: 1950    Action: Complete sections as appropriate. Any discrepancy results in a HARD COPY until resolved.     PRE PROCEDURE:  Patient ID verified with 2 identifiers (name and  or MRN): Yes  Procedure and site verified with patient/designee (when able): Yes  Accurate consent documentation in medical record: Yes  H&P (or appropriate assessment) documented in medical record: Yes  H&P must be up to 20 days prior to procedure and updates within 24 hours of procedure as applicable: NA  Relevant diagnostic and radiology test results appropriately labeled and displayed as applicable: Yes  Procedure site(s) marked with provider initials: NA    TIMEOUT:  Time-Out performed immediately prior to starting procedure, including verbal and active participation of all team members addressing the following:Yes  * Correct patient identify  * Confirmed that the correct side and site are marked  * An accurate procedure consent form  * Agreement on the procedure to be done  * Correct patient position  * Relevant images and results are properly labeled and appropriately displayed  * The need to administer antibiotics or fluids for irrigation purposes during the procedure as applicable   * Safety precautions based on patient history or medication use    DURING PROCEDURE: Verification of correct person, site, and procedures any time the responsibility for care of the patient is transferred to another member of the care team.       Prior to injection, verified patient identity using patient's name and date  of birth.  Due to injection administration, patient instructed to remain in clinic for 15 minutes  afterwards, and to report any adverse reaction to me immediately.    Joint injection was performed.      Drug Amount Wasted:  None.  Vial/Syringe: Single dose vial  Expiration Date:  02/1/2024      Ron Mullins, [facility]  August 13, 2021

## 2021-08-13 NOTE — PROGRESS NOTES
"    Assessment & Plan     Primary osteoarthritis of right knee    - PRIOR AUTH REQUEST ORTHO INJECTION  - Comprehensive Weight Management; Future  - Physical Therapy Referral; Future  Patient would like to be a candidate for a total knee but understands her increased risk with BMI over 40  She is interested in pursuing comprehensive weight management here at the [facility] and is also interested in pursuing pool therapy  Orders have been given  Patient can follow-up in 2 months and we can see how she is doing  BMI:   Estimated body mass index is 43.38 kg/m  as calculated from the following:    Height as of this encounter: 1.702 m (5' 7\").    Weight as of this encounter: 125.6 kg (277 lb).   Weight management plan: Discussed healthy diet and exercise guidelines    Work on weight loss  Regular exercise  See Patient Instructions    No follow-ups on file.    Moni Fischer MD  [facility] [place]    Bryon Bass is a 70 year old who presents for the following health issues     HPI     Is a 70-year-old female she thought she was here for cortisone injection today and is uncertain if Synvisc 1 would be covered.  She did have a Synvisc 1 injection almost a year ago and did get relief with this.  We have put through a prior Auth to determine if Synvisc 1 would be covered.  Patient reports that she knows she needs to have weight loss prior to any knee surgery.  She struggles with eating disorder and seen at [facility].  She states that they work on trying to change habits rather than placing people on specific diets.  She has tried weight watchers 3 times and according to her has failed.  Review of Systems   Constitutional, HEENT, cardiovascular, pulmonary, gi and gu systems are negative, except as otherwise noted.      Objective    Ht 1.702 m (5' 7\")   Wt 125.6 kg (277 lb)   BMI 43.38 kg/m    Body mass index is 43.38 kg/m .  Physical Exam   Worsening medial joint line " tenderness    Xray - Reviewed and interpreted by me.  OA, worse loss of medial compartment        Large Joint Injection/Arthocentesis: R knee joint    Date/Time: 8/13/2021 9:12 AM  Performed by: Moni Fischer MD  Authorized by: Moni Fischer MD     Indications:  Osteoarthritis and pain  Needle Size:  22 G  Guidance: landmark guided    Approach:  Anterolateral  Location:  Knee      Medications:  48 mg hylan 48 MG/6ML  Outcome:  Tolerated well, no immediate complications  Consent Given by:  Patient  Timeout: timeout called immediately prior to procedure    Prep: patient was prepped and draped in usual sterile fashion     Ron Mullins ATC on 8/13/2021 at 9:23 AM        I agree with the following injection documentation.    VIGNESH Fischer MD

## 2021-08-13 NOTE — LETTER
"  8/13/2021      RE: Shelia Sanchez  [address]  [place] [address]           Assessment & Plan     Primary osteoarthritis of right knee    - PRIOR AUTH REQUEST ORTHO INJECTION  - Comprehensive Weight Management; Future  - Physical Therapy Referral; Future  Patient would like to be a candidate for a total knee but understands her increased risk with BMI over 40  She is interested in pursuing comprehensive weight management here at the [facility] and is also interested in pursuing pool therapy  Orders have been given  Patient can follow-up in 2 months and we can see how she is doing  BMI:   Estimated body mass index is 43.38 kg/m  as calculated from the following:    Height as of this encounter: 1.702 m (5' 7\").    Weight as of this encounter: 125.6 kg (277 lb).   Weight management plan: Discussed healthy diet and exercise guidelines    Work on weight loss  Regular exercise  See Patient Instructions    No follow-ups on file.    Moni Fischer MD  [facility]IC[facility] [place]    Bryon Bass is a 70 year old who presents for the following health issues     HPI     Is a 70-year-old female she thought she was here for cortisone injection today and is uncertain if Synvisc 1 would be covered.  She did have a Synvisc 1 injection almost a year ago and did get relief with this.  We have put through a prior Auth to determine if Synvisc 1 would be covered.  Patient reports that she knows she needs to have weight loss prior to any knee surgery.  She struggles with eating disorder and seen at [facility].  She states that they work on trying to change habits rather than placing people on specific diets.  She has tried weight watchers 3 times and according to her has failed.  Review of Systems   Constitutional, HEENT, cardiovascular, pulmonary, gi and gu systems are negative, except as otherwise noted.      Objective    Ht 1.702 m (5' 7\")   Wt 125.6 kg (277 lb)   BMI " 43.38 kg/m    Body mass index is 43.38 kg/m .  Physical Exam   Worsening medial joint line tenderness    Xray - Reviewed and interpreted by me.  OA, worse loss of medial compartment        Large Joint Injection/Arthocentesis: R knee joint    Date/Time: 8/13/2021 9:12 AM  Performed by: Moni Fischer MD  Authorized by: Moni Fischer MD     Indications:  Osteoarthritis and pain  Needle Size:  22 G  Guidance: landmark guided    Approach:  Anterolateral  Location:  Knee      Medications:  48 mg hylan 48 MG/6ML  Outcome:  Tolerated well, no immediate complications  Consent Given by:  Patient  Timeout: timeout called immediately prior to procedure    Prep: patient was prepped and draped in usual sterile fashion     Ron Mullins ATC on 8/13/2021 at 9:23 AM        I agree with the following injection documentation.    MD Moni Tong MD

## 2021-09-03 ENCOUNTER — TELEPHONE (OUTPATIENT)
Dept: NURSING | Facility: CLINIC | Age: 71
End: 2021-09-03

## 2021-09-03 ENCOUNTER — E-VISIT (OUTPATIENT)
Dept: FAMILY MEDICINE | Facility: CLINIC | Age: 71
End: 2021-09-03
Payer: COMMERCIAL

## 2021-09-03 DIAGNOSIS — Z11.52 ENCOUNTER FOR SCREENING FOR COVID-19: Primary | ICD-10-CM

## 2021-09-03 DIAGNOSIS — Z20.822 ENCOUNTER FOR LABORATORY TESTING FOR COVID-19 VIRUS: ICD-10-CM

## 2021-09-03 PROCEDURE — 99421 OL DIG E/M SVC 5-10 MIN: CPT | Performed by: FAMILY MEDICINE

## 2021-09-03 NOTE — TELEPHONE ENCOUNTER
Call:    Pt calling about getting a COVID-19 test because she [place] the state fair on [place] and went to a concert around others. She wore a mask intermittently.   Pt is vaccinated and is not having any symptoms. Testing is a precaution.     Pt given advice to make an E-visit within my chart and she agrees with this plan.    Katey Monae RN  [facility] Nurse Advisor 10:54 AM 9/3/2021        COVID 19 Nurse Triage Plan/Patient Instructions    Please be aware that novel coronavirus (COVID-19) may be circulating in the community. If you develop symptoms such as fever, cough, or SOB or if you have concerns about the presence of another infection including coronavirus (COVID-19), please contact your health care provider or visit [email or web address].     Disposition/Instructions    Home care recommended. Follow home care protocol based instructions.  Virtual Visit with provider recommended. Reference Visit Selection Guide.    Thank you for taking steps to prevent the spread of this virus.  o Limit your contact with others.  o Wear a simple mask to cover your cough.  o Wash your hands well and often.    Resources    [facility]: About COVID-19: [email or web address]    CDC: What to Do If You're Sick: www.cdc.gov/coronavirus/2019-ncov/about/steps-when-sick.html    CDC: Ending Home Isolation: www.cdc.gov/coronavirus/2019-ncov/hcp/disposition-in-home-patients.html     CDC: Caring for Someone: www.cdc.gov/coronavirus/2019-ncov/if-you-are-sick/care-for-someone.html     [facility]: Interim Guidance for Hospital Discharge to Home: www.health.Counts include 234 beds at the [facility].[place]/diseases/coronavirus/hcp/hospdischarge.pdf    [facility] clinical trials (COVID-19 research studies): clinicalaffairs.[facility]/umn-clinical-trials     Below are the COVID-19 hotlines at the [place] Department of Health ([facility]). Interpreters are available.   o For health questions: Call 766-889-7943 or 1-923.998.9604 (7 a.m. to 7  p.m.)  o For questions about schools and childcare: Call 741-066-2279 or 1-586.609.8106 (7 a.m. to 7 p.m.)

## 2021-09-04 ENCOUNTER — LAB (OUTPATIENT)
Dept: URGENT CARE | Facility: URGENT CARE | Age: 71
End: 2021-09-04
Attending: FAMILY MEDICINE
Payer: COMMERCIAL

## 2021-09-04 DIAGNOSIS — Z20.822 ENCOUNTER FOR LABORATORY TESTING FOR COVID-19 VIRUS: ICD-10-CM

## 2021-09-04 DIAGNOSIS — Z11.52 ENCOUNTER FOR SCREENING FOR COVID-19: ICD-10-CM

## 2021-09-04 PROCEDURE — U0005 INFEC AGEN DETEC AMPLI PROBE: HCPCS

## 2021-09-04 PROCEDURE — U0003 INFECTIOUS AGENT DETECTION BY NUCLEIC ACID (DNA OR RNA); SEVERE ACUTE RESPIRATORY SYNDROME CORONAVIRUS 2 (SARS-COV-2) (CORONAVIRUS DISEASE [COVID-19]), AMPLIFIED PROBE TECHNIQUE, MAKING USE OF HIGH THROUGHPUT TECHNOLOGIES AS DESCRIBED BY CMS-2020-01-R: HCPCS

## 2021-09-04 NOTE — PATIENT INSTRUCTIONS
Dear Shelia Sanchez,    Based on your responses, we have ordered COVID-19 (coronavirus) testing for you. Testing is recommended for people without symptoms in a number of different situations. These reasons include testing prior to air travel, testing after international travel, periodic testing for people who are attending in-person school, and testing related to participation in activities such as sports.     How to schedule:  Go to your [employer] home page and scroll down to the section that says  You have an appointment that needs to be scheduled  and click the large green button that says  Schedule Now  and follow the steps to find the next available opening.     If you are unable to complete these [employer] scheduling steps, please call [phone] to schedule your testing.      Know the test result takes usually 1-2 days to return but occasionally takes longer (over 95% are done within 72 hours).The results are available on [employer] right when the lab is completed. We will also call all people who have positive results.    What are the symptoms of COVID-19?  The most common symptoms are cough, fever and trouble breathing. Less common symptoms include headache, body aches, fatigue (feeling very tired), chills, sore throat, stuffy or runny nose, diarrhea (loose poop), loss of taste or smell, belly pain, and nausea or vomiting (feeling sick to your stomach or throwing up).    If you develop symptoms of COVID-19, you should be re-evaluated to see if retesting would be recommended.     Where can I get more information?  [facility] - About COVID-19: [email or web address]  CDC - What to Do If You're Sick: www.cdc.gov/coronavirus/2019-ncov/about/steps-when-sick.html  CDC - Ending Home Isolation: www.cdc.gov/coronavirus/2019-ncov/hcp/disposition-in-home-patients.html  CDC - Caring for Someone: www.cdc.gov/coronavirus/2019-ncov/if-you-are-sick/care-for-someone.html  [facility]  trials (COVID-19 research studies): clinicalaffairs.[facility]/n-clinical-trials  Below are the COVID-19 hotlines at the [place] Department of Health ([facility]). Interpreters are available.  For health questions: Call 723-667-3749 or 1-781.494.2663 (7 a.m. to 7 p.m.)  For questions about schools and childcare: Call 296-362-7820 or 1-964.657.4117 (7 a.m. to 7 p.m.)

## 2021-09-06 LAB — SARS-COV-2 RNA RESP QL NAA+PROBE: NEGATIVE

## 2021-09-09 ENCOUNTER — TRANSFERRED RECORDS (OUTPATIENT)
Dept: HEALTH INFORMATION MANAGEMENT | Facility: CLINIC | Age: 71
End: 2021-09-09

## 2021-09-17 ENCOUNTER — VIRTUAL VISIT (OUTPATIENT)
Dept: ENDOCRINOLOGY | Facility: CLINIC | Age: 71
End: 2021-09-17
Payer: COMMERCIAL

## 2021-09-17 VITALS — WEIGHT: 277 LBS | HEIGHT: 67 IN | BODY MASS INDEX: 43.47 KG/M2

## 2021-09-17 DIAGNOSIS — E66.9 OBESITY: ICD-10-CM

## 2021-09-17 DIAGNOSIS — R73.03 PREDIABETES: ICD-10-CM

## 2021-09-17 DIAGNOSIS — E66.01 CLASS 3 SEVERE OBESITY DUE TO EXCESS CALORIES WITH SERIOUS COMORBIDITY AND BODY MASS INDEX (BMI) OF 40.0 TO 44.9 IN ADULT (H): Primary | ICD-10-CM

## 2021-09-17 DIAGNOSIS — E66.813 CLASS 3 SEVERE OBESITY DUE TO EXCESS CALORIES WITH SERIOUS COMORBIDITY AND BODY MASS INDEX (BMI) OF 40.0 TO 44.9 IN ADULT (H): Primary | ICD-10-CM

## 2021-09-17 DIAGNOSIS — Z71.3 NUTRITIONAL COUNSELING: Primary | ICD-10-CM

## 2021-09-17 PROCEDURE — 97802 MEDICAL NUTRITION INDIV IN: CPT | Mod: 95 | Performed by: DIETITIAN, REGISTERED

## 2021-09-17 PROCEDURE — 99204 OFFICE O/P NEW MOD 45 MIN: CPT | Mod: 95 | Performed by: INTERNAL MEDICINE

## 2021-09-17 RX ORDER — NALTREXONE HYDROCHLORIDE 50 MG/1
TABLET, FILM COATED ORAL
Qty: 30 TABLET | Refills: 5 | Status: SHIPPED | OUTPATIENT
Start: 2021-09-17 | End: 2021-11-19

## 2021-09-17 ASSESSMENT — PAIN SCALES - GENERAL: PAINLEVEL: NO PAIN (0)

## 2021-09-17 ASSESSMENT — MIFFLIN-ST. JEOR: SCORE: 1794.09

## 2021-09-17 NOTE — PATIENT INSTRUCTIONS
Thank you for allowing us the privilege of caring for you. We hope we provided you with the excellent service you deserve.    Please let us know if there is anything else we can do for you so that we can be sure you are completely satisfied with your care experience.    Your visit was with Dr. Greenfield today.    Instructions per today's visit:  --Return in about  in 4-6 wks with Lauren Bloch (PharmD), with Dr. Greenfield in 2-3 mo  --food goals (more to be developed with nutrition today) --cut supper portions and avoid eating between supper and bed  --pharm support--we will start with naltrexone; you are already taking bupropion so we will be adding in the other component of Contrave (brand name wt loss medication) with and naltrexone addition.    Please call our contact center at [phone] to schedule your next appointments.    Meal Replacement Products:    Here is the link to our new e-store where you can purchase our meal replacement products    [email or web address]/store    The one week starter kit is a great way to sample a variety of products and see what works for you.    If you want more information about the product go to: [email or web address]    Free Shipping for orders over $75    Benefits of meal replacements products:    Portion and calorie control  Improved nutrition  Structured eating  Simplified food choices  Avoid contact with trigger foods    Interested in working with a health ?  Health coaches work with you to improve your overall health and wellbeing.  They look at the whole person, and may involve discussion of different areas of life, including, but not limited to the four pillars of health (sleep, exercise, nutrition, and stress management). Discuss with your care team if you would like to start working a health .    Health Coaching-3 Pack: Schedule by calling [phone]    $99 for three health coaching visits    Visits may be done in person or  via phone    Coaching is a partnership between the  and the client; Coaches do not prescribe or diagnose    Coaching helps inspire the client to reach his/her personal goals    Bluetooth Scale:    We hope to provide you with high quality telephone and virtual healthcare visits while social distancing for COVID-19 is necessary, as well as in the future when virtual visits may be more convenient for you.    Our technology team made it possible for Bluetooth scales to send weight measurements to our electronic medical record. This allows weights from you weighing at home to securely flow into the medical record, which will improve telephone and virtual visits.  Additionally, studies have shown that adults actually lose more weight when their weights are automatically sent to someone else, and also that this process is not stressful for those adults.    Below is a link for purchasing the scale, with a discount code for our patients. You may call your insurance company to see if they will reimburse you for the cost of the scale, as a piece of durable medical equipment. The scales only go up to a weight of 400 pounds. This is an issue and we are working with the developer on increasing this. We found no scales that go over 400lb that have blue-tooth for connecting to [employer].    Scale to purchase: the ezCater  Body  Scale: https://[email or web address]/us/en/body/shop?[email or web address]=aw.ds    Discount Code: We have a discount code for our patients to bring the cost down to $50, the code is:  withmed     Steps to link the scale to [employer] via an Android Phone (you can always disconnect at any point in the future):  1. The order must be placed first before the patient can access Track My Health within [employer].  2. Download Google Fit christa from the Google Play Store  a. Log in or register using your Google account  3. Download the [employer] christa from Google Play Store  a.  Select add organization  b. Search for [employer] and select it  c. Log into [employer]  d. Select Track [employer]Cloud.nl Health  e. Select the green connect my account button  f. When prompted log into your Google account  g. Select okay to confirm the account  4. Download the One Season Health Mate christiano from Google Play Store  a. [employer] for Withings  b. Go to profile  c. Tap google fit under the Apps section  d. Select the option to activate Google Fit integration  e. Select the same Google account  f. Select okay to confirm the account  g.  Steps to link the scale to [employer] via an iPhone (you can always disconnect at any point in the future):  **Note [employer] is not available for download on an iPad**  1. The order must be placed first before the patient can access Kextil within [employer].  2. Locate the Health christiano on your iPhone.  a. Set up your Apple Health account as prompted  b. The Sources page will show Apps that communicate with your Health christiano. Once all steps are completed, you should see [employer] and Zinghart listed under the Apps section and your iPhone under the devices section.  i. Select Health Mate  1. Under 'ALLOW  HEALTH MATE  TO WRITE DATA ensure the toggle is on for Weight.  2. This will allow the scale to add your weight to the Apple Health  ii. Select MyChart  1. Under 'ALLOW  UnirisxHART  TO READ DATA ensure the toggle is on for Weight.  2. This allows [employer] to grab the weight from [employer] so your provider can see your weights.  3. Download the Zinghart christiano from the [employer] Store  a. Select add organization                                                  b. Search for [employer] and select it  c. Log into [employer]  d. Select Track TradeCloud.nl Health  e. Select the green connect my account button  f. Follow prompts to link your device to [employer].  4. Download the One Season health mate christiano in the [employer] Store  a. [employer] for Withings  b. Go to profile  c. Tap Health under the Apps section  d. If prompted to allow  access with the Health Christiano, toggle weight on for read and write access.      For any questions/concerns contact Juanita Whitaker LPN at [phone]    To schedule appointments with our team, please call [phone]    Please call during clinic hours Monday through Friday 8:00a - 4:00p if you have questions or you can contact us via [employer] at anytime.      Lab results will be communicated through My Chart or letter (if My Chart not used). Please call the clinic if you have not received communication after 1 week or if you have any questions.    Clinic Fax: [phone]    Thank you,  Medical Weight Management Team      MEDICATION STARTED AT THIS APPOINTMENT  We are starting Naltrexone. Start with 1/2 tab 1-2 hours prior to supper for one week. If you are doing well you may switch to a whole tablet taken at the same time period.     WARNING: This medication blocks the action of opioid type pain medications. If you routinely take any medication like Codeine, Oxycontin,Percocet,Morphine,Dilaudid or Methodone, do not take this until you have talked with weight management staff. If you are planning surgery you should stop Naltrexone 4 days prior to the surgery. If you have an injury that requires pain medication, make sure the health care staff knows you take Naltrexone.     Call the nurse if you have any questions or concerns. (Do not stop taking it if you don't think it's working. For some people it works without them knowing it.)    Naltrexone is a medication that is used most often to help people who are troubled by dependence on prescription pain killers or alcohol. It has also been found to help with weight loss. Although it's not currently FDA approved for weight loss, it has been used safely for a number of years to help people who are carrying extra weight.     Just how Naltrexone helps with weight loss has not been exactly determined.  It seems to work by quieting down brain signals related to strong food  "cravings. Many of our patients use the word \"addiction\" to describe their feelings and constant thoughts about food. It makes sense then to treat the feeling of dependence on food, outside of real hunger, with a medication designed to help with other sorts of dependence.     Our patients on Naltrexone find that they:    >feel less interest in food   >think less about food and eating and have more time to think of other things   >find it easier to push the plate away   >have an easier time eating less    For some of our patients, these feelings are very immediate. Other patients, don't feel much of a change but find they've lost weight. Like all weight loss medications, Naltrexone works best when you help it work. This means:  1. Having less tempting high calorie (fattening) food around the house or office. (For people with strong cravings this is very important.)   2. Staying away from situations or people that may trigger your cravings .   3. Eating out only one time or less each week.  4. Eating your meals at a table with the TV or computer off.    Side-effects. Naltrexone is generally well tolerated. The main side-effect we see is  nausea or a woozy feeling. A small number of people feel quite ill. Most people have a mild reaction and some people have no reaction at all.  The good news is that this feeling does go away.     In order to avoid nausea, please start the medication with half a pill for the first few days. Go on to a full pill if you are feeling well.      If you  are nauseated on 1/2 a pill it is okay to cut back to 1/4 pill ( a very small amount). Take this for a couple of days and work your way back up to a 1/2 pill and then a whole pill. Taking the medication at night or with food  to start also may help prevent the feeling of nausea.         Please refer to the pharmacy insert for more information on side-effects. Since many pharmacists are not familiar with the use of naltrexone in weight loss, " calling the nurse will get you the most accurate information.  In order to get refills of this or any medication we prescribe you must be seen in the medical weight mgmt clinic every 2-3 months. Please have your pharmacy fax a refill request.

## 2021-09-17 NOTE — PATIENT INSTRUCTIONS
"Jalen Bass    Follow-up with RD in 1 month    Thank you,    Diana Marquis, RD, LD  If you would like to schedule or reschedule an appointment with the RD, please call [phone]    Nutrition Goals    1)  Eat slowly (20-30 minutes per meal), chewing foods well (25 chews per bite/applesauce consistency)   - practice mindfulness, being present with meals   - taking conscious pauses throughout meal/snack to check in with hunger cues - stop when satisfied    2). Follow 9\" Plate method with meals (1/2 plate non-starchy vegetables/fruit, 1/4 plate lean protein, 1/4 plate whole grain starch - no more than 1/2 cup carb/meal)    3). Exploring non-food stress-relieving, self-care/comforting activities   - have a plan to decompress after a stressful day at work (calling a loved one, going out side, taking walk, listening/playing music, draw/paint/color, games/puzzles)   - cultivating the pause between emotional and eating   - try alternate activities (work with therapist to make list of healthy coping mechanisms to try)    4). Consume 64 ounces of fluid per day    The Plate Method  [email or web address]    Protein Sources   [email or web address]     Carbohydrates  [email or web address]     Mindful Eating  [email or web address]     Summary of Volumetrics Eating Plan  [email or web address]     Diabetes Recipe Resources:  Https://www.diabetesfoodhub.org/  https://www.cdc.gov/diabetes/library/spotlights/hack-your-snack.html  https://www.eatright.org/food/nutrition/dietary-guidelines-and-myplate/how-to-add-whole-grains-to-your-diet  https://diabeticgourmet.com/diabetic-recipe/turkey-veggie-snacks    Carbohydrates  [email or web address]     Guide To Carbohydrate Counting  [email or web address]        Interested in working with a health ?  Health coaches work with you to improve your overall health and wellbeing.  They look at the whole person, and may involve " discussion of different areas of life, including, but not limited to the four pillars of health (sleep, exercise, nutrition, and stress management). Discuss with your care team if you would like to start working a health .    Health Coaching-3 Pack:    $99 for three health coaching visits    Visits may be done in person or via phone    Coaching is a partnership between the  and the client; Coaches do not prescribe or diagnose    Coaching helps inspire the client to reach his/her personal goals      Virtual Support Groups are Available             Healthy Lifestyle Support Group      All are welcome!     Facilitator: Kimberly Skelton, Certified Health     - Meets once a month on a Friday from 12:30pm to 1:30pm.  - Due to Covid-19 she is doing this Support Group virtually using Microsoft Teams.  - 60 minutes of small group guided discussion, support and resources.  - Please email Kimberly directly at [email or web address] to receive monthly invitations.  - If you opt to participate in individual health coaching sessions or for general questions, contact Kimberly via email.  - Kimberly will send out invites for each session, so the phone number and the conference ID may change for each session.

## 2021-09-17 NOTE — LETTER
"9/17/2021       RE: Shelia Sanchez  [address]  [place] [address]     Dear Colleague,    Thank you for referring your patient, Shelia Sanchez, to the [facility] [place] at [facility]. Please see a copy of my visit note below.    Shelia Sanchez is a 71 year old female who is being evaluated via a billable video visit.      The patient has been notified of following:     \"This video visit will be conducted via a call between you and your physician/provider. We have found that certain health care needs can be provided without the need for an in-person physical exam.  This service lets us provide the care you need with a video conversation.  If a prescription is necessary we can send it directly to your pharmacy.  If lab work is needed we can place an order for that and you can then stop by our lab to have the test done at a later time.    Video visits are billed at different rates depending on your insurance coverage.  Please reach out to your insurance provider with any questions.    If during the course of the call the physician/provider feels a video visit is not appropriate, you will not be charged for this service.\"    Patient has given verbal consent for Video visit? Yes  How would you like to obtain your AVS? MyChart  If you are dropped from the video visit, the video invite should be resent to: Text to cell phone: [phone]  Will anyone else be joining your video visit? No  {If patient encounters technical issues they should call [phone]      Video-Visit Details    Type of service:  Video Visit    Video Start Time: 10:03 AM  Video End Time: 10:37 AM    Originating Location (pt. Location): Home    Distant Location (provider location):  [facility] [place]     Platform used for Video Visit: [facility]    During this virtual visit the patient is located in [place], patient " "verifies this as the location during the entirety of this visit.     New Weight Management Nutrition Consultation    Shelia Sanchez is a 71 year old female presents today for new weight management nutrition consultation.  Patient referred by Dr Greenfield on September 17, 2021.    Patient with Co-morbidities of obesity including:  Type II DM no (pre-diabetes)  Renal Failure no  Sleep apnea yes  Hypertension yes   Dyslipidemia yes  Joint pain yes (OA - knee pain)  Back pain yes  GERD yes     Anthropometrics:  Estimated body mass index is 43.38 kg/m  as calculated from the following:    Height as of an earlier encounter on 9/17/21: 1.702 m (5' 7\").    Weight as of an earlier encounter on 9/17/21: 125.6 kg (277 lb).    Medications for Weight Loss:  Starting Naltrexone, on Wellbutrin    NUTRITION HISTORY  See MD Greenfield note for details.    Motivated to get (right) knee replacement     Pt reports following diets for 50 years, adequate nutrition education at baseline, challenges are with emotional eating and binge eating in secret (ie. 1/2 cherry pie). Currently working with therapist at [facility] on redirecting thoughts before going to food and checking in with hunger cues. (Diagnosed with binge eating disorder and depression)    Recent food recall:  Breakfast 5am: boiled egg; 2 slices whole wheat toast (with butter), banana, coffee (black)  Am snack 10am: banana  Lunch 3: Turkey sandwich (shay) with dill pickles; pretzels  No dinner/grazed:  potato salad, apple with peanut butter, 2% cottage cheese with pineapple and walnuts    Beverages: water, bubbly water (no pop); skim milk rarely  Alcohol: 2-3 x weekly   Dining out: (every Friday - sit down, pizza and beer sometimes, [place] with younger sister)    Physical Activity:  limited d/t knee pain  PT 2 days/week in pool/1 day dry land to strengthen legs/knee    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  Volumetrics/Plate Method    Nutrition Diagnosis  Obesity r/t long history of self-monitoring deficit and excessive energy intake aeb BMI >30.    Nutrition Intervention  Materials/education provided on Volumetric eating to help satiety level on fewer calories; portion control and healthy food choices (Plate Method and Volumetrics handouts), 100 calorie snack choices, meal and snack planning and websites, sample meal plans   Discussed emotional eating strategies  Provided encouragement and support  Answered all pt questions at this time.     Patient demonstrates understanding.  AVS via [employer]    Expected Engagement: good    Nutrition Goals  1)  Eat slowly (20-30 minutes per meal), chewing foods well (25 chews per bite/applesauce consistency)   - practice mindfulness, being present with meals   - taking conscious pauses throughout meal/snack to check in with hunger cues - stop when satisfied    2). Follow 9\" Plate method with meals (1/2 plate non-starchy vegetables/fruit, 1/4 plate lean protein, 1/4 plate whole grain starch - no more than 1/2 cup carb/meal)    3). Exploring non-food stress-relieving, self-care/comforting activities   - have a plan to decompress after a stressful day at work (calling a loved one, going out side, taking walk, listening/playing music, draw/paint/color, games/puzzles)   - cultivating the pause between emotional and eating   - try alternate activities (work with therapist to make list of healthy coping mechanisms to try)    4). Consume 64 ounces of fluid per day    The Plate Method  [email or web address]    Protein Sources   [email or web address]     Carbohydrates  [email or web address]     Mindful Eating  [email or web address]     Summary of Volumetrics Eating Plan  [email or web address]     Diabetes Recipe " Resources:  Https://www.diabetesfoodhub.org/  https://www.cdc.gov/diabetes/library/spotlights/hack-your-snack.html  https://www.eatright.org/food/nutrition/dietary-guidelines-and-myplate/how-to-add-whole-grains-to-your-diet  https://diabeticgoInnovative Biosensors.com/diabetic-recipe/turkey-veggie-snacks    Carbohydrates  [email or web address]     Guide To Carbohydrate Counting  [email or web address]://[email or web address]    Follow-Up:  1 month or PRN    Time spent with patient: 34 minutes.  Diana Marquis RD, LD

## 2021-09-17 NOTE — NURSING NOTE
"Chief Complaint   Patient presents with     Consult     Consultation Weight Management       Vitals:    09/17/21 0853   Weight: 125.6 kg (277 lb)   Height: 1.702 m (5' 7\")       Body mass index is 43.38 kg/m .                            "

## 2021-09-17 NOTE — PROGRESS NOTES
Shelia is a 71 year old who is being evaluated via a billable visit; video was attempted and we converted to phone because pt was unable to connect for video.        During this virtual visit the patient is located in [place], patient verifies this as the location during the entirety of this visit.  Pt agreed to video visit, and then phone visit when video was unsuccessful      Phone duration--34 min          New Medical Weight Management Consult    PATIENT:  Shelia Sanchez  MRN:         [id number]  :         1950  JOSEPH:         2021    Dear Dr. Fischer,    I had the pleasure of seeing your patient, Shelia Sanchez. Full intake/assessment was done to determine barriers to weight loss success and develop a treatment plan. Shelia Sanchez is a 71 year old female interested in treatment of medical problems associated with excess weight. See vitals below.    ASSESSMENT/PLAN:  Shelia is a patient with early onset morbid obesity with significant element of familial/genetic influence and with current health consequences. Shelia Sanchez endorses binging, eats a high carb diet, eats a high fat diet, uses food as a reward, uses food as mood management and eats to obtain specific degree of fullness.    Her problem is complicated by a hunger disorder, strong craving/reward pathways, a binge eating component and mental health/psychopharmacological barriers    Her ability to lose weight is impacted by physical impairment.    PLAN:    Decrease portion sizes  Purge house of food triggers  Dietician visit of education  Volumetrics eating plan  Calorie/low fat diet  Meal planning    Craving/Reward   Ancillary testing:  N/A.  Food Plan:  Volumetrics and High protein/low carbohydrate.   Activity Plan:  PT/Pool Therapy (ongoing)  Supplementary:  N/A.   Medication:  The patient will begin medication in pursuit of improved medical status as influenced by body weight. She will start naltrexone. Patient was made  "aware that naltrexone is not approved for the treatment of obesity.  There is a mutual understanding of the goals and risks of this therapy. The patient is in agreement. She is educated on dosage regimen and possible side effects.    Additionally--  --RTC in about  in 4-6 wks with Lauren Bloch (PharmD), me in 2-3 mo  --food goals (more to be developed with nutrition today) --cut supper portions and avoid eating between supper and bed  --pharm support--plan a naltrexone, b, topiramate, c semaglutide (initially Ozempic if covered).  Discussed potential risks/benefits and possible side effects. Compared/contrasted these agents. Pt is already taking bupropion so we will be adding in the other component of Contrave with and naltrexone addition.    She has the following co-morbidities:       9/15/2021   I have the following health issues associated with obesity: Pre-Diabetes, High Blood Pressure, High Cholesterol, Sleep Apnea, GERD (Reflux), Stress Incontinence, Osteoarthritis (joint disease)   I have the following symptoms associated with obesity: Knee Pain, Depression, Lower Extremity Swelling, Back Pain, Fatigue   --working with therapist at [facility] (binge eating and depression dx); \"food has been my crutch since day 1.\"  Endorses emotional eating. Working on redirecting/mindful eating  --needing knee surgery but ortho wants pt to lose wt for surgery; ambulating with cane; BMI below 40  --getting PT (2 wks in the pool and one 1 wk on land)    --pt notes no problem with starting med to support wt loss short term    --HbA1c 5/21: 6.0    Patient Goals 9/15/2021   I am interested in having a healthier weight to diminish current health problems: Yes   I am interested in having a healthier weight in order to prevent future health problems: Yes   I am interested in having a healthier weight in order to have a future surgery: Yes   If yes, please indicate which surgery? Knee replacement       Referring Provider 9/15/2021 " "  Please name the provider who referred you to Medical Weight Management.  If you do not know, please answer: \"I Don't Know\". Dr Monsalve       Weight History 9/15/2021   How concerned are you about your weight? Very Concerned   Would you describe your weight gain as gradual? Yes   I became overweight: As a Child   The following factors have contributed to my weight gain:  Eating Wrong Types of Food, Eating Too Much, Lack of Exercise, Genetic (Runs in the Family), Stress   I have tried the following methods to lose weight: Watching Portions or Calories, Exercise, Weight Watchers, Medications, Meal Replacements   Please list the medications you have tried.  I don t know   My lowest weight since age 16 was: 150   My highest weight since age 16 was: 277   The most weight I have ever lost was: (lbs) 50   I have the following family history of obesity/being overweight:  My mother is overweight, My father is overweight   Has anyone in your family had weight loss surgery? No   How has your weight changed over the last year?  Gained   How many pounds? 45       Diet Recall Review with Patient 9/15/2021   Do you typically eat breakfast? Yes   If you do eat breakfast, what types of food do you eat? Boiled egg, 2 slices toast, banana or other fruit   Do you typically eat lunch? No   Do you typically eat supper? Yes   If you do eat supper, what types of food do you typically eat? Chicken, carb   Do you typically eat snacks? Yes   If you do snack, what types of food do you typically eat? Kind bar, cottage cheese with pineapple bits and walnuts, eki crackers,crave baked goods like cherry pie   Do you like vegetables?  No   Do you drink water? Yes   How many glasses of juice do you drink in a typical day? 0   How many of glasses of milk do you drink in a typical day? 0   If you do drink milk, what type? Skim   How many 8oz glasses of sugar containing drinks such as Farhad-Aid/sweet tea do you drink in a day? 0   How many " cans/bottles of sugar pop/soda/tea/sports drinks do you drink in a day? 0   How many cans/bottles of diet pop/soda/tea or sports drink do you drink in a day? 0   How often do you have a drink of alcohol? 2-3 TImes a Week   If you do drink, how many drinks might you have in a day? 1 or 2       Eating Habits 9/15/2021   Generally, my meals include foods like these: bread, pasta, rice, potatoes, corn, crackers, sweet dessert, pop, or juice. Almost Everyday   Generally, my meals include foods like these: fried meats, brats, burgers, french fries, pizza, cheese, chips, or ice cream. Less Than Weekly   Eat fast food (like McDonalds, [employer], Taco Bell). Never   Eat at a buffet or sit-down restaurant. Never   Eat most of my meals in front of the TV or computer. Everyday   Often skip meals, eat at random times, have no regular eating times. Everyday   Rarely sit down for a meal but snack or graze throughout.  Almost Everyday   Eat extra snacks between meals. Almost Everyday   Eat most of my food at the end of the day. Everyday   Eat in the middle of the night or wake up at night to eat. Less Than Weekly   Eat extra snacks to prevent or correct low blood sugar. Never   Eat to prevent acid reflux or stomach pain. Never   Worry about not having enough food to eat. Never   Have you been to the food shelf at least a few times this year? No   I eat when I am depressed. Everyday   I eat when I am stressed. Everyday   I eat when I am bored. Everyday   I eat when I am anxious. Everyday   I eat when I am happy or as a reward. Everyday   I feel hungry all the time even if I just have eaten. Almost Everyday   Feeling full is important to me. Everyday   I finish all the food on my plate even if I am already full. Everyday   I can't resist eating delicious food or walk past the good food/smell. Everyday   I eat/snack without noticing that I am eating. Everyday   I eat when I am preparing the meal. Never   I eat more than usual when I  see others eating. Less Than Weekly   I have trouble not eating sweets, ice cream, cookies, or chips if they are around the house. Everyday   I think about food all day. Everyday   What foods, if any, do you crave? Sweets/Candy/Chocolate   Please list any other foods you crave? Baked goods, pasta       Amount of Food 9/15/2021   I make myself vomit what I have eaten or use laxatives to get rid of food. Never   I eat a large amount of food, like a loaf of bread, a box of cookies, a pint/quart of ice cream, all at once. Everyday   I eat a large amount of food even when I am not hungry. Everyday   I eat rapidly. Everyday   I eat alone because I feel embarrassed and do not want others to see how much I have eaten. Everyday   I eat until I am uncomfortably full. Everyday   I feel bad, disgusted, or guilty after I overeat. Everyday   I make myself vomit what I have eaten or use laxatives to get rid of food. Never       Activity/Exercise History 9/15/2021   How much of a typical 12 hour day do you spend sitting? Most of the Day   How much of a typical day do you spend walking/standing? Less Than Half the Day   How many hours (not including work) do you spend on the TV/Video Games/Computer/Tablet/Phone? 6 Hours or More   How many times a week are you active for the purpose of exercise? Never   What keeps you from being more active? Pain   How many total minutes do you spend doing some activity for the purpose of exercising when you exercise? Less Than 15 Minutes       PAST MEDICAL HISTORY:  Past Medical History:   Diagnosis Date     Arthritis 1/28/14    Bursitis in left hip     Asteatotic eczema      Cholelithiasis with obstruction 5/10/2010     Depressive disorder      Diabetes (H) Summer 2019    Pre-diabetes     Gallstone pancreatitis 5/11/2010     Hypertension      Right knee pain 12/27/2012       Work/Social History Reviewed With Patient 9/15/2021   My employment status is: Retired   What is your marital status? Single    If in a relationship, is your significant other overweight? N/A   Do you have children? No   If you have children, are they overweight? N/A   Who do you live with?  Alone   Who does the food shopping?  Me       Mental Health History Reviewed With Patient 9/15/2021   Have you ever been physically or sexually abused? No   If yes, do you feel that the abuse is affecting your weight? N/A   If yes, would you like to talk to a counselor about the abuse? N/A   How often in the past 2 weeks have you felt little interest or pleasure in doing things? For Several Days   Over the past 2 weeks how often have you felt down, depressed, or hopeless? For Several Days       Sleep History Reviewed With Patient 9/15/2021   How many hours do you sleep at night? 6   Do you think that you snore loudly or has anybody ever heard you snore loudly (louder than talking or so loud it can be heard behind a shut door)? Yes   Has anyone seen or heard you stop breathing during your sleep? Yes   Do you often feel tired, fatigued, or sleepy during the day? Yes   Do you have a TV/Computer in your bedroom? Yes       MEDICATIONS:   Current Outpatient Medications   Medication Sig Dispense Refill     amLODIPine (NORVASC) 5 MG tablet Take dailly 90 tablet 3     aspirin (ASA) 81 MG chewable tablet TAKE 1 TABLET BY MOUTH DAILY 90 tablet 0     atorvastatin (LIPITOR) 10 MG tablet Take 1 tablet (10 mg) by mouth daily 90 tablet 1     buPROPion (WELLBUTRIN XL) 150 MG 24 hr tablet Take 150 mg by mouth every morning       Cholecalciferol (VITAMIN D PO) Take 5,000 Units by mouth       Esomeprazole Magnesium (NEXIUM 24HR PO) Take 1 tablet by mouth daily       gabapentin (NEURONTIN) 100 MG capsule Take 1 tab nightly 90 capsule 1     hydrochlorothiazide (HYDRODIURIL) 25 MG tablet Take 1 tablet (25 mg) by mouth daily 90 tablet 3     Ibuprofen (ADVIL PO) Taking as needed for pain       venlafaxine (EFFEXOR-XR) 37.5 MG 24 hr capsule Take 1 capsule (37.5 mg) by mouth  "daily 90 capsule 0     venlafaxine (EFFEXOR-XR) 75 MG 24 hr capsule TAKE 1 CAPSULE(75 MG) BY MOUTH DAILY 90 capsule 0     venlafaxine (EFFEXOR-XR) 75 MG 24 hr capsule Take 1 capsule (75 mg) by mouth daily 90 capsule 0       ALLERGIES:   No Known Allergies    PHYSICAL EXAM:  Estimated body mass index is 43.38 kg/m  as calculated from the following:    Height as of an earlier encounter on 9/17/21: 1.702 m (5' 7\").    Weight as of an earlier encounter on 9/17/21: 125.6 kg (277 lb).          TIME: approx 50 min spent on evaluation, management, counseling, education, & motivational interviewing (initial attempt at video visit but converted to phone as noted above) combined with previsit prep and post visit follow up care.    Sincerely,    Mable Greenfield MD        "

## 2021-09-17 NOTE — LETTER
2021       RE: Shelia Sanchez  [address]  Located within [facility] [address]     Dear Colleague,    Thank you for referring your patient, Shelia Sanchez, to the [facility]NAGEMENT [facility]IN[facility] [place] at [facility]. Please see a copy of my visit note below.    Shelia is a 71 year old who is being evaluated via a billable visit; video was attempted and we converted to phone because pt was unable to connect for video.      During this virtual visit the patient is located in [place], patient verifies this as the location during the entirety of this visit.  Pt agreed to video visit, and then phone visit when video was unsuccessful      Phone duration--34 min          New Medical Weight Management Consult    PATIENT:  Shelia Sanchez  MRN:         [id number]  :         1950  JOSEPH:         2021    Dear Dr. Fischer,    I had the pleasure of seeing your patient, Shelia Sanchez. Full intake/assessment was done to determine barriers to weight loss success and develop a treatment plan. Shelia Sanchez is a 71 year old female interested in treatment of medical problems associated with excess weight. See vitals below.    ASSESSMENT/PLAN:  Shelia is a patient with early onset morbid obesity with significant element of familial/genetic influence and with current health consequences. Shelia Sanchez endorses binging, eats a high carb diet, eats a high fat diet, uses food as a reward, uses food as mood management and eats to obtain specific degree of fullness.    Her problem is complicated by a hunger disorder, strong craving/reward pathways, a binge eating component and mental health/psychopharmacological barriers    Her ability to lose weight is impacted by physical impairment.    PLAN:    Decrease portion sizes  Purge house of food triggers  Dietician visit of education  Volumetrics eating plan  Calorie/low fat diet  Meal  "planning    Craving/Reward   Ancillary testing:  N/A.  Food Plan:  Volumetrics and High protein/low carbohydrate.   Activity Plan:  PT/Pool Therapy (ongoing)  Supplementary:  N/A.   Medication:  The patient will begin medication in pursuit of improved medical status as influenced by body weight. She will start naltrexone. Patient was made aware that naltrexone is not approved for the treatment of obesity.  There is a mutual understanding of the goals and risks of this therapy. The patient is in agreement. She is educated on dosage regimen and possible side effects.    Additionally--  --RTC in about  in 4-6 wks with Lauren Bloch (PharmD), me in 2-3 mo  --food goals (more to be developed with nutrition today) --cut supper portions and avoid eating between supper and bed  --pharm support--plan a naltrexone, b, topiramate, c semaglutide (initially Ozempic if covered).  Discussed potential risks/benefits and possible side effects. Compared/contrasted these agents. Pt is already taking bupropion so we will be adding in the other component of Contrave with and naltrexone addition.    She has the following co-morbidities:       9/15/2021   I have the following health issues associated with obesity: Pre-Diabetes, High Blood Pressure, High Cholesterol, Sleep Apnea, GERD (Reflux), Stress Incontinence, Osteoarthritis (joint disease)   I have the following symptoms associated with obesity: Knee Pain, Depression, Lower Extremity Swelling, Back Pain, Fatigue   --working with therapist at [facility] (binge eating and depression dx); \"food has been my crutch since day 1.\"  Endorses emotional eating. Working on redirecting/mindful eating  --needing knee surgery but ortho wants pt to lose wt for surgery; ambulating with cane; BMI below 40  --getting PT (2 wks in the pool and one 1 wk on land)    --pt notes no problem with starting med to support wt loss short term    --HbA1c 5/21: 6.0    Patient Goals 9/15/2021   I am interested in " "having a healthier weight to diminish current health problems: Yes   I am interested in having a healthier weight in order to prevent future health problems: Yes   I am interested in having a healthier weight in order to have a future surgery: Yes   If yes, please indicate which surgery? Knee replacement       Referring Provider 9/15/2021   Please name the provider who referred you to Medical Weight Management.  If you do not know, please answer: \"I Don't Know\". Dr Monsalve       Weight History 9/15/2021   How concerned are you about your weight? Very Concerned   Would you describe your weight gain as gradual? Yes   I became overweight: As a Child   The following factors have contributed to my weight gain:  Eating Wrong Types of Food, Eating Too Much, Lack of Exercise, Genetic (Runs in the Family), Stress   I have tried the following methods to lose weight: Watching Portions or Calories, Exercise, Weight Watchers, Medications, Meal Replacements   Please list the medications you have tried.  I don t know   My lowest weight since age 16 was: 150   My highest weight since age 16 was: 277   The most weight I have ever lost was: (lbs) 50   I have the following family history of obesity/being overweight:  My mother is overweight, My father is overweight   Has anyone in your family had weight loss surgery? No   How has your weight changed over the last year?  Gained   How many pounds? 45       Diet Recall Review with Patient 9/15/2021   Do you typically eat breakfast? Yes   If you do eat breakfast, what types of food do you eat? Boiled egg, 2 slices toast, banana or other fruit   Do you typically eat lunch? No   Do you typically eat supper? Yes   If you do eat supper, what types of food do you typically eat? Chicken, carb   Do you typically eat snacks? Yes   If you do snack, what types of food do you typically eat? Kind bar, cottage cheese with pineapple bits and walnuts, kei crackers,crave baked goods like cherry pie "   Do you like vegetables?  No   Do you drink water? Yes   How many glasses of juice do you drink in a typical day? 0   How many of glasses of milk do you drink in a typical day? 0   If you do drink milk, what type? Skim   How many 8oz glasses of sugar containing drinks such as Farhad-Aid/sweet tea do you drink in a day? 0   How many cans/bottles of sugar pop/soda/tea/sports drinks do you drink in a day? 0   How many cans/bottles of diet pop/soda/tea or sports drink do you drink in a day? 0   How often do you have a drink of alcohol? 2-3 TImes a Week   If you do drink, how many drinks might you have in a day? 1 or 2       Eating Habits 9/15/2021   Generally, my meals include foods like these: bread, pasta, rice, potatoes, corn, crackers, sweet dessert, pop, or juice. Almost Everyday   Generally, my meals include foods like these: fried meats, brats, burgers, french fries, pizza, cheese, chips, or ice cream. Less Than Weekly   Eat fast food (like McDonal[employer], [employer], Taco Bell). Never   Eat at a buffet or sit-down restaurant. Never   Eat most of my meals in front of the TV or computer. Everyday   Often skip meals, eat at random times, have no regular eating times. Everyday   Rarely sit down for a meal but snack or graze throughout.  Almost Everyday   Eat extra snacks between meals. Almost Everyday   Eat most of my food at the end of the day. Everyday   Eat in the middle of the night or wake up at night to eat. Less Than Weekly   Eat extra snacks to prevent or correct low blood sugar. Never   Eat to prevent acid reflux or stomach pain. Never   Worry about not having enough food to eat. Never   Have you been to the food shelf at least a few times this year? No   I eat when I am depressed. Everyday   I eat when I am stressed. Everyday   I eat when I am bored. Everyday   I eat when I am anxious. Everyday   I eat when I am happy or as a reward. Everyday   I feel hungry all the time even if I just have eaten. Almost  Everyday   Feeling full is important to me. Everyday   I finish all the food on my plate even if I am already full. Everyday   I can't resist eating delicious food or walk past the good food/smell. Everyday   I eat/snack without noticing that I am eating. Everyday   I eat when I am preparing the meal. Never   I eat more than usual when I see others eating. Less Than Weekly   I have trouble not eating sweets, ice cream, cookies, or chips if they are around the house. Everyday   I think about food all day. Everyday   What foods, if any, do you crave? Sweets/Candy/Chocolate   Please list any other foods you crave? Baked goods, pasta       Amount of Food 9/15/2021   I make myself vomit what I have eaten or use laxatives to get rid of food. Never   I eat a large amount of food, like a loaf of bread, a box of cookies, a pint/quart of ice cream, all at once. Everyday   I eat a large amount of food even when I am not hungry. Everyday   I eat rapidly. Everyday   I eat alone because I feel embarrassed and do not want others to see how much I have eaten. Everyday   I eat until I am uncomfortably full. Everyday   I feel bad, disgusted, or guilty after I overeat. Everyday   I make myself vomit what I have eaten or use laxatives to get rid of food. Never       Activity/Exercise History 9/15/2021   How much of a typical 12 hour day do you spend sitting? Most of the Day   How much of a typical day do you spend walking/standing? Less Than Half the Day   How many hours (not including work) do you spend on the TV/Video Games/Computer/Tablet/Phone? 6 Hours or More   How many times a week are you active for the purpose of exercise? Never   What keeps you from being more active? Pain   How many total minutes do you spend doing some activity for the purpose of exercising when you exercise? Less Than 15 Minutes       PAST MEDICAL HISTORY:  Past Medical History:   Diagnosis Date     Arthritis 1/28/14    Bursitis in left hip     Asteatotic  eczema      Cholelithiasis with obstruction 5/10/2010     Depressive disorder      Diabetes (H) Summer 2019    Pre-diabetes     Gallstone pancreatitis 5/11/2010     Hypertension      Right knee pain 12/27/2012       Work/Social History Reviewed With Patient 9/15/2021   My employment status is: Retired   What is your marital status? Single   If in a relationship, is your significant other overweight? N/A   Do you have children? No   If you have children, are they overweight? N/A   Who do you live with?  Alone   Who does the food shopping?  Me       Mental Health History Reviewed With Patient 9/15/2021   Have you ever been physically or sexually abused? No   If yes, do you feel that the abuse is affecting your weight? N/A   If yes, would you like to talk to a counselor about the abuse? N/A   How often in the past 2 weeks have you felt little interest or pleasure in doing things? For Several Days   Over the past 2 weeks how often have you felt down, depressed, or hopeless? For Several Days       Sleep History Reviewed With Patient 9/15/2021   How many hours do you sleep at night? 6   Do you think that you snore loudly or has anybody ever heard you snore loudly (louder than talking or so loud it can be heard behind a shut door)? Yes   Has anyone seen or heard you stop breathing during your sleep? Yes   Do you often feel tired, fatigued, or sleepy during the day? Yes   Do you have a TV/Computer in your bedroom? Yes       MEDICATIONS:   Current Outpatient Medications   Medication Sig Dispense Refill     amLODIPine (NORVASC) 5 MG tablet Take dailly 90 tablet 3     aspirin (ASA) 81 MG chewable tablet TAKE 1 TABLET BY MOUTH DAILY 90 tablet 0     atorvastatin (LIPITOR) 10 MG tablet Take 1 tablet (10 mg) by mouth daily 90 tablet 1     buPROPion (WELLBUTRIN XL) 150 MG 24 hr tablet Take 150 mg by mouth every morning       Cholecalciferol (VITAMIN D PO) Take 5,000 Units by mouth       Esomeprazole Magnesium (NEXIUM 24HR PO) Take  "1 tablet by mouth daily       gabapentin (NEURONTIN) 100 MG capsule Take 1 tab nightly 90 capsule 1     hydrochlorothiazide (HYDRODIURIL) 25 MG tablet Take 1 tablet (25 mg) by mouth daily 90 tablet 3     Ibuprofen (ADVIL PO) Taking as needed for pain       venlafaxine (EFFEXOR-XR) 37.5 MG 24 hr capsule Take 1 capsule (37.5 mg) by mouth daily 90 capsule 0     venlafaxine (EFFEXOR-XR) 75 MG 24 hr capsule TAKE 1 CAPSULE(75 MG) BY MOUTH DAILY 90 capsule 0     venlafaxine (EFFEXOR-XR) 75 MG 24 hr capsule Take 1 capsule (75 mg) by mouth daily 90 capsule 0       ALLERGIES:   No Known Allergies    PHYSICAL EXAM:  Estimated body mass index is 43.38 kg/m  as calculated from the following:    Height as of an earlier encounter on 9/17/21: 1.702 m (5' 7\").    Weight as of an earlier encounter on 9/17/21: 125.6 kg (277 lb).          TIME: approx 50 min spent on evaluation, management, counseling, education, & motivational interviewing (initial attempt at video visit but converted to phone as noted above) combined with previsit prep and post visit follow up care.    Sincerely,    Mable Greenfield MD        "

## 2021-09-17 NOTE — PROGRESS NOTES
"Shelia Sanchez is a 71 year old female who is being evaluated via a billable video visit.      The patient has been notified of following:     \"This video visit will be conducted via a call between you and your physician/provider. We have found that certain health care needs can be provided without the need for an in-person physical exam.  This service lets us provide the care you need with a video conversation.  If a prescription is necessary we can send it directly to your pharmacy.  If lab work is needed we can place an order for that and you can then stop by our lab to have the test done at a later time.    Video visits are billed at different rates depending on your insurance coverage.  Please reach out to your insurance provider with any questions.    If during the course of the call the physician/provider feels a video visit is not appropriate, you will not be charged for this service.\"    Patient has given verbal consent for Video visit? Yes  How would you like to obtain your AVS? MyChart  If you are dropped from the video visit, the video invite should be resent to: Text to cell phone: [phone]  Will anyone else be joining your video visit? No  {If patient encounters technical issues they should call [phone]      Video-Visit Details    Type of service:  Video Visit    Video Start Time: 10:03 AM  Video End Time: 10:37 AM    Originating Location (pt. Location): Home    Distant Location (provider location):  [facility] WE[facility]NAGEMENT CLIN[facility] [place]     Platform used for Video Visit: [employer]    During this virtual visit the patient is located in [place], patient verifies this as the location during the entirety of this visit.     New Weight Management Nutrition Consultation    Shelia Sanchez is a 71 year old female presents today for new weight management nutrition consultation.  Patient referred by Dr Greenfield on September 17, 2021.    Patient with Co-morbidities of obesity " "including:  Type II DM no (pre-diabetes)  Renal Failure no  Sleep apnea yes  Hypertension yes   Dyslipidemia yes  Joint pain yes (OA - knee pain)  Back pain yes  GERD yes     Anthropometrics:  Estimated body mass index is 43.38 kg/m  as calculated from the following:    Height as of an earlier encounter on 9/17/21: 1.702 m (5' 7\").    Weight as of an earlier encounter on 9/17/21: 125.6 kg (277 lb).    Medications for Weight Loss:  Starting Naltrexone, on Wellbutrin    NUTRITION HISTORY  See MD Greenfield note for details.    Motivated to get (right) knee replacement     Pt reports following diets for 50 years, adequate nutrition education at baseline, challenges are with emotional eating and binge eating in secret (ie. 1/2 cherry pie). Currently working with therapist at [facility] on redirecting thoughts before going to food and checking in with hunger cues. (Diagnosed with binge eating disorder and depression)    Recent food recall:  Breakfast 5am: boiled egg; 2 slices whole wheat toast (with butter), banana, coffee (black)  Am snack 10am: banana  Lunch 3: Turkey sandwich (shay) with dill pickles; pretzels  No dinner/grazed:  potato salad, apple with peanut butter, 2% cottage cheese with pineapple and walnuts    Beverages: water, bubbly water (no pop); skim milk rarely  Alcohol: 2-3 x weekly   Dining out: (every Friday - sit down, pizza and beer sometimes, [place] with younger sister)    Physical Activity:  limited d/t knee pain  PT 2 days/week in pool/1 day dry land to strengthen legs/knee    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  Volumetrics/Plate Method    Nutrition Diagnosis  Obesity r/t long history of self-monitoring deficit and excessive energy intake aeb BMI >30.    Nutrition Intervention  Materials/education provided on Volumetric eating to help satiety level on fewer calories; portion control and healthy food choices " "(Plate Method and Volumetrics handouts), 100 calorie snack choices, meal and snack planning and websites, sample meal plans   Discussed emotional eating strategies  Provided encouragement and support  Answered all pt questions at this time.     Patient demonstrates understanding.  AVS via [employer]    Expected Engagement: good    Nutrition Goals  1)  Eat slowly (20-30 minutes per meal), chewing foods well (25 chews per bite/applesauce consistency)   - practice mindfulness, being present with meals   - taking conscious pauses throughout meal/snack to check in with hunger cues - stop when satisfied    2). Follow 9\" Plate method with meals (1/2 plate non-starchy vegetables/fruit, 1/4 plate lean protein, 1/4 plate whole grain starch - no more than 1/2 cup carb/meal)    3). Exploring non-food stress-relieving, self-care/comforting activities   - have a plan to decompress after a stressful day at work (calling a loved one, going out side, taking walk, listening/playing music, draw/paint/color, games/puzzles)   - cultivating the pause between emotional and eating   - try alternate activities (work with therapist to make list of healthy coping mechanisms to try)    4). Consume 64 ounces of fluid per day    The Plate Method  [email or web address]    Protein Sources   [email or web address]     Carbohydrates  [email or web address]     Mindful Eating  [email or web address]     Summary of Volumetrics Eating Plan  [email or web address]     Diabetes Recipe Resources:  Https://www.diabetesfoodhub.org/  https://www.cdc.gov/diabetes/library/spotlights/hack-your-snack.html  https://www.eatright.org/food/nutrition/dietary-guidelines-and-myplate/how-to-add-whole-grains-to-your-diet  https://diabeticgourmet.com/diabetic-recipe/turkey-veggie-snacks    Carbohydrates  [email or web address]     Guide To Carbohydrate Counting  [email or web address]    Follow-Up:  1 month or " PRN    Time spent with patient: 34 minutes.  Diana Marquis RD, LD

## 2021-09-19 ENCOUNTER — TELEPHONE (OUTPATIENT)
Dept: ENDOCRINOLOGY | Facility: CLINIC | Age: 71
End: 2021-09-19

## 2021-09-26 ENCOUNTER — HEALTH MAINTENANCE LETTER (OUTPATIENT)
Age: 71
End: 2021-09-26

## 2021-10-19 ENCOUNTER — OFFICE VISIT (OUTPATIENT)
Dept: FAMILY MEDICINE | Facility: CLINIC | Age: 71
End: 2021-10-19
Payer: COMMERCIAL

## 2021-10-19 VITALS
RESPIRATION RATE: 16 BRPM | SYSTOLIC BLOOD PRESSURE: 145 MMHG | DIASTOLIC BLOOD PRESSURE: 85 MMHG | HEART RATE: 92 BPM | TEMPERATURE: 98 F | OXYGEN SATURATION: 100 %

## 2021-10-19 DIAGNOSIS — R35.0 URINARY FREQUENCY: ICD-10-CM

## 2021-10-19 DIAGNOSIS — N30.00 ACUTE CYSTITIS WITHOUT HEMATURIA: Primary | ICD-10-CM

## 2021-10-19 PROBLEM — F32.9 MAJOR DEPRESSION: Chronic | Status: ACTIVE | Noted: 2018-01-10

## 2021-10-19 LAB
ALBUMIN UR-MCNC: NEGATIVE MG/DL
APPEARANCE UR: CLEAR
BACTERIA #/AREA URNS HPF: ABNORMAL /HPF
BILIRUB UR QL STRIP: NEGATIVE
COLOR UR AUTO: YELLOW
GLUCOSE UR STRIP-MCNC: NEGATIVE MG/DL
HGB UR QL STRIP: ABNORMAL
KETONES UR STRIP-MCNC: NEGATIVE MG/DL
LEUKOCYTE ESTERASE UR QL STRIP: ABNORMAL
NITRATE UR QL: NEGATIVE
PH UR STRIP: 6 [PH] (ref 5–8)
RBC #/AREA URNS AUTO: ABNORMAL /HPF
SP GR UR STRIP: 1.02 (ref 1–1.03)
SQUAMOUS #/AREA URNS AUTO: ABNORMAL /LPF
UROBILINOGEN UR STRIP-ACNC: 0.2 E.U./DL
WBC #/AREA URNS AUTO: ABNORMAL /HPF

## 2021-10-19 PROCEDURE — 99214 OFFICE O/P EST MOD 30 MIN: CPT | Performed by: PHYSICIAN ASSISTANT

## 2021-10-19 PROCEDURE — 81001 URINALYSIS AUTO W/SCOPE: CPT | Performed by: PHYSICIAN ASSISTANT

## 2021-10-19 PROCEDURE — 87086 URINE CULTURE/COLONY COUNT: CPT | Performed by: PHYSICIAN ASSISTANT

## 2021-10-19 PROCEDURE — 87088 URINE BACTERIA CULTURE: CPT | Performed by: PHYSICIAN ASSISTANT

## 2021-10-19 PROCEDURE — 87186 SC STD MICRODIL/AGAR DIL: CPT | Performed by: PHYSICIAN ASSISTANT

## 2021-10-19 RX ORDER — NITROFURANTOIN 25; 75 MG/1; MG/1
100 CAPSULE ORAL 2 TIMES DAILY
Qty: 10 CAPSULE | Refills: 0 | Status: SHIPPED | OUTPATIENT
Start: 2021-10-19 | End: 2021-10-22

## 2021-10-19 ASSESSMENT — ENCOUNTER SYMPTOMS
FEVER: 0
FREQUENCY: 1
NAUSEA: 0
CHILLS: 0
VOMITING: 0
FLANK PAIN: 0
DYSURIA: 1
ABDOMINAL PAIN: 0
HEMATURIA: 0

## 2021-10-19 NOTE — PROGRESS NOTES
Patient presents with:  Urinary Frequency: x10d, urinary urgency, tingling in hands when urinating, unsure if it's related to the new medication she is taking      Clinical Decision Making: Patient experiencing urinary frequency and enuresis.  UA is mildly indicative of UTI.  Patient was started on Macrobid for treatment.  Urine culture is pending.  If her urine culture comes back negative I recommend following up with her dietitian to discuss if her hand tingling secondary to naltrexone.  Paresthesias is not listed as common side effect of medication per up-to-date.      ICD-10-CM    1. Acute cystitis without hematuria  N30.00 nitroFURantoin macrocrystal-monohydrate (MACROBID) 100 MG capsule   2. Urinary frequency  R35.0 UA macro with reflex to Microscopic and Culture - Clinc Collect     Urine Microscopic     Urine Culture       Patient Instructions   1. Increase fluid intake  2. Complete antibiotic regimen as prescribed. You will be notified if the treatment plan needs to be changed based on the urine culture results.   3. Follow if you have a fever of 100.4 F (38 C) or higher, no improvement after three days of treatment, trouble urinating because of pain,new or increased discharge from the vagina, rash or joint pain, Increased back or abdominal pain.        HPI:  Shelia Sanchez is a 71 year old female who presents today complaining of urinary frequency and urgency for the past 10 days. Patient also experiences tingling in the hands when she urinates. Patient was started on naltrexone on 9/17/2021. She is unsure if any of the symptoms are related to the new medication. Medication was prescribed to her by her dietitian.  She denies any fevers, chills, abdominal pain, nausea, vomiting, flank pain, hematuria, vaginal discharge, or vaginal pain.  Patient is previously had a hysterectomy.  She does suffer from urinary incontinence and her bladder control has been worsening.    History obtained from the  patient.    Problem List:  2020-10: Chronic pain of right knee  2020-02: Chronic pain of right knee  2019-08: Binge eating disorder  2019-08: Prediabetes  2018-08: LORI (obstructive sleep apnea)- severe ()  2018-01: Mild major depression (H)  2016-11: Heartburn  2016-09: Cervicalgia  2016-09: Chronic right shoulder pain  2016-03: Menopausal syndrome (hot flashes)  2016-02: Right shoulder pain  2016-02: Hip pain, left  2015-11: Cervical pain  2015-11: Left-sided headache  2015-02: Atypical nevus  2015-02: Urinary incontinence  2014-05: Cataracts, both eyes  2014-02: Hip pain  2012-12: Right knee pain  2012-12: Left shoulder pain  2012-02: Advanced directives, counseling/discussion  2011-07: Hypertension goal BP (blood pressure) < 140/90  2010-06: CARDIOVASCULAR SCREENING; LDL GOAL LESS THAN 130  2010-05: Gallstone pancreatitis  2010-05: Cholelithiasis with obstruction  Asteatotic eczema  BMI > 40      Past Medical History:   Diagnosis Date     Arthritis 1/28/14    Bursitis in left hip     Asteatotic eczema      Cholelithiasis with obstruction 5/10/2010     Depressive disorder      Diabetes (H) Summer 2019    Pre-diabetes     Gallstone pancreatitis 5/11/2010     Hypertension      Right knee pain 12/27/2012       Social History     Tobacco Use     Smoking status: Never Smoker     Smokeless tobacco: Never Used   Substance Use Topics     Alcohol use: Yes     Alcohol/week: 0.0 standard drinks     Comment: 4 DRINKS PER WEEK       Review of Systems   Constitutional: Negative for chills and fever.   Gastrointestinal: Negative for abdominal pain, nausea and vomiting.   Genitourinary: Positive for dysuria, enuresis and frequency. Negative for flank pain, hematuria, vaginal discharge and vaginal pain.       Vitals:    10/19/21 1324   BP: (!) 145/85   Pulse: 92   Resp: 16   Temp: 98  F (36.7  C)   TempSrc: Oral   SpO2: 100%       Physical Exam  Vitals and nursing note reviewed.   Constitutional:       General: She is not  in acute distress.     Appearance: She is not ill-appearing.   HENT:      Head: Normocephalic and atraumatic.      Right Ear: External ear normal.      Left Ear: External ear normal.   Eyes:      Conjunctiva/sclera: Conjunctivae normal.   Abdominal:      General: Abdomen is flat. There is no distension.      Tenderness: There is no abdominal tenderness. There is no right CVA tenderness, left CVA tenderness or guarding.   Neurological:      Mental Status: She is alert.   Psychiatric:         Mood and Affect: Mood normal.         Behavior: Behavior normal.         Thought Content: Thought content normal.         Judgment: Judgment normal.         Labs:  Results for orders placed or performed in visit on 10/19/21   UA macro with reflex to Microscopic and Culture - Clinc Collect     Status: Abnormal    Specimen: Urine, Clean Catch   Result Value Ref Range    Color Urine Yellow Colorless, Straw, Light Yellow, Yellow    Appearance Urine Clear Clear    Glucose Urine Negative Negative mg/dL    Bilirubin Urine Negative Negative    Ketones Urine Negative Negative mg/dL    Specific Gravity Urine 1.020 1.005 - 1.030    Blood Urine Trace (A) Negative    pH Urine 6.0 5.0 - 8.0    Protein Albumin Urine Negative Negative mg/dL    Urobilinogen Urine 0.2 0.2, 1.0 E.U./dL    Nitrite Urine Negative Negative    Leukocyte Esterase Urine Trace (A) Negative   Urine Microscopic     Status: Abnormal   Result Value Ref Range    Bacteria Urine Few (A) None Seen /HPF    RBC Urine 0-2 0-2 /HPF /HPF    WBC Urine 10-25 (A) 0-5 /HPF /HPF    Squamous Epithelials Urine Few (A) None Seen /LPF         At the end of the encounter, I discussed results, diagnosis, medications. Discussed red flags for immediate return to clinic/ER, as well as indications for follow up if no improvement. Patient understood and agreed to plan. Patient was stable for discharge.

## 2021-10-21 ENCOUNTER — NURSE TRIAGE (OUTPATIENT)
Dept: NURSING | Facility: CLINIC | Age: 71
End: 2021-10-21

## 2021-10-21 ENCOUNTER — TELEPHONE (OUTPATIENT)
Dept: FAMILY MEDICINE | Facility: CLINIC | Age: 71
End: 2021-10-21

## 2021-10-21 DIAGNOSIS — N39.0 URINARY TRACT INFECTION: Primary | ICD-10-CM

## 2021-10-21 LAB — BACTERIA UR CULT: ABNORMAL

## 2021-10-21 RX ORDER — CIPROFLOXACIN 250 MG/1
250 TABLET, FILM COATED ORAL 2 TIMES DAILY
Qty: 14 TABLET | Refills: 0 | Status: SHIPPED | OUTPATIENT
Start: 2021-10-21 | End: 2021-10-28

## 2021-10-21 NOTE — TELEPHONE ENCOUNTER
Called patient to discuss urine culture results.  Unable to reach patient.  No identifying information on patient's voicemail.  Request that patient contact the clinic to discuss results at [phone].    Mily Nelson M.D. 10/21/2021 6:13 PM

## 2021-10-22 ENCOUNTER — OFFICE VISIT (OUTPATIENT)
Dept: FAMILY MEDICINE | Facility: CLINIC | Age: 71
End: 2021-10-22
Payer: COMMERCIAL

## 2021-10-22 ENCOUNTER — OFFICE VISIT (OUTPATIENT)
Dept: ORTHOPEDICS | Facility: CLINIC | Age: 71
End: 2021-10-22
Payer: COMMERCIAL

## 2021-10-22 VITALS
SYSTOLIC BLOOD PRESSURE: 144 MMHG | WEIGHT: 293 LBS | TEMPERATURE: 97.4 F | HEART RATE: 95 BPM | OXYGEN SATURATION: 98 % | HEIGHT: 67 IN | DIASTOLIC BLOOD PRESSURE: 72 MMHG | BODY MASS INDEX: 45.99 KG/M2

## 2021-10-22 VITALS — BODY MASS INDEX: 43.47 KG/M2 | HEIGHT: 67 IN | WEIGHT: 277 LBS

## 2021-10-22 DIAGNOSIS — Z23 NEED FOR PROPHYLACTIC VACCINATION AND INOCULATION AGAINST INFLUENZA: ICD-10-CM

## 2021-10-22 DIAGNOSIS — M17.11 PRIMARY LOCALIZED OSTEOARTHRITIS OF RIGHT KNEE: Primary | ICD-10-CM

## 2021-10-22 DIAGNOSIS — N30.00 ACUTE CYSTITIS WITHOUT HEMATURIA: Primary | ICD-10-CM

## 2021-10-22 DIAGNOSIS — R10.84 ABDOMINAL PAIN, GENERALIZED: ICD-10-CM

## 2021-10-22 PROCEDURE — 20610 DRAIN/INJ JOINT/BURSA W/O US: CPT | Mod: RT | Performed by: FAMILY MEDICINE

## 2021-10-22 PROCEDURE — G0008 ADMIN INFLUENZA VIRUS VAC: HCPCS | Performed by: STUDENT IN AN ORGANIZED HEALTH CARE EDUCATION/TRAINING PROGRAM

## 2021-10-22 PROCEDURE — 99207 PR DROP WITH A PROCEDURE: CPT | Performed by: FAMILY MEDICINE

## 2021-10-22 PROCEDURE — 99213 OFFICE O/P EST LOW 20 MIN: CPT | Mod: 25 | Performed by: STUDENT IN AN ORGANIZED HEALTH CARE EDUCATION/TRAINING PROGRAM

## 2021-10-22 PROCEDURE — 90662 IIV NO PRSV INCREASED AG IM: CPT | Performed by: STUDENT IN AN ORGANIZED HEALTH CARE EDUCATION/TRAINING PROGRAM

## 2021-10-22 RX ADMIN — TRIAMCINOLONE ACETONIDE 40 MG: 40 INJECTION, SUSPENSION INTRA-ARTICULAR; INTRAMUSCULAR at 09:32

## 2021-10-22 RX ADMIN — LIDOCAINE HYDROCHLORIDE 4 ML: 10 INJECTION, SOLUTION EPIDURAL; INFILTRATION; INTRACAUDAL; PERINEURAL at 09:32

## 2021-10-22 ASSESSMENT — MIFFLIN-ST. JEOR
SCORE: 1794.09
SCORE: 1866.67

## 2021-10-22 ASSESSMENT — PAIN SCALES - GENERAL: PAINLEVEL: NO PAIN (0)

## 2021-10-22 NOTE — PROGRESS NOTES
Assessment & Plan     Acute cystitis without hematuria  Urine culture + Proteus, sensitivities reviewed. New antibiotic that is sensitive was called in last night, started Cipro this morning. Discussed that she can stop taking it after 5 days if her symptoms resolve, but if not continue for total of 7 days. Increase fluids.     Abdominal pain, generalized  Resolved with discontinuation of macrobid. Likely side effect. No concerns on exam today. Instructed to call clinic if her abdominal pain returns.     Need for prophylactic vaccination and inoculation against influenza  - INFLUENZA, QUAD, HIGH DOSE, PF, 65YR + (FLUZONE HD)  - ADMIN INFLUENZA (For MEDICARE Patients ONLY) []      Return in about 5 days (around 10/27/2021), or if symptoms worsen or fail to improve.    Bailee Vick DO  [facility]    Bryon Bass is a 71 year old who presents for the following health issues       HPI     Abdominal/Flank Pain  Onset/Duration: 10/20    Description:  Abdominal pain due to antibiotic macrobid  Character: constant stomach ache   Location: lower abdomen.  Radiation: None  Intensity: 0/10  Progression of Symptoms:  improving  Accompanying Signs & Symptoms:  Fever/Chills: no  Gas/Bloating: YES has never been this gassy in her life.    Nausea: at times   Vomitting: no  Diarrhea: no  Constipation: YES heading towards the ends  Dysuria or Hematuria: YES frequency/urgency better. Last time she had a UTI was many years   Felt pressure. Like she had to go to the bathroom   History:   Trauma: no  Previous similar pain: no  Previous tests done: none  Precipitating factors:   Does the pain change with:     Food: YES was getting better with food. The a while later would come back    Bowel Movement: no    Urination: no   Other factors:  Try to avoid certain activities/movements   Therapies tried and outcome: was switched to  A new antibiotic.    No LMP recorded. Patient has had a  "hysterectomy.    Feeling much better this afternoon. Took a nap and her first dose of the new antibiotic this morning and she is now feeling much better.     Her last dose the macrobid was yesterday morning, today not having any further abdominal pain. No nausea. No changes in bowels. No hematochezia or melena.   Last night took a tums but only helped for a few minutes. She did not have a PPI or H2 blocker at home.    Dysuria is also improving this afternoon. Denies hematuria.       Review of Systems   Constitutional, HEENT, cardiovascular, pulmonary, gi and gu systems are negative, except as otherwise noted.      Objective    BP (!) 144/72 (BP Location: Right arm, Patient Position: Sitting, Cuff Size: Adult Large)   Pulse 95   Temp 97.4  F (36.3  C) (Oral)   Ht 1.702 m (5' 7\")   Wt 132.9 kg (293 lb)   SpO2 98%   BMI 45.89 kg/m    Body mass index is 45.89 kg/m .  Physical Exam   GENERAL: healthy, alert and no distress  NECK: no adenopathy, no asymmetry, masses, or scars and thyroid normal to palpation  RESP: lungs clear to auscultation - no rales, rhonchi or wheezes  CV: regular rate and rhythm, normal S1 S2, no S3 or S4, no murmur, click or rub, no peripheral edema and peripheral pulses strong  ABDOMEN: soft, nontender, no hepatosplenomegaly, no masses and bowel sounds normal  MS: no gross musculoskeletal defects noted, no edema    "

## 2021-10-22 NOTE — TELEPHONE ENCOUNTER
Triage Call: Patient missed a call from Dr Nelson in regards to her test results. Patient is also having lower abdominal pain started after taking Macrobid, constant pain, eating helped some, eating with the medication, 6/10. No black or blood in stool. Nauseated. Makes her feel like she needs to go to the bathroom. Temperature 32.6 celsius, temporal. No back pain. Pain is now 4/10.    Paging on call Dr Mariana Benitez. Per MD - Stop the Macrobid.  Start Cipro tomorrow - Take medication with food.  -Cipro 250 bid 7 days can stop after 5 days if UTI symptoms gone.    Try omeprazole and or pepcid for abdominal pain - If abdominal pain continues or symptoms worsen go to urgent care.   Follow up next week.     Nurse called the patient back and the patient stated she has a trip planned next week, unable to be seen next week. Pain is lower abdomin.     Paging on call Dr Benitez again - Per MD Patient needs to be seen in clinic tomorrow or urgent care tomorrow. If patient has worsening pain then she needs to be seen in ER tonight. Have her try tylenol and avoid ibuprofen for now.     Patient was called back and given on call Dr Rivas advisement. Patient stated understanding and was connected with scheduling.     COVID 19 Nurse Triage Plan/Patient Instructions    Please be aware that novel coronavirus (COVID-19) may be circulating in the community. If you develop symptoms such as fever, cough, or SOB or if you have concerns about the presence of another infection including coronavirus (COVID-19), please contact your health care provider or visit [email or web address].     Disposition/Instructions    In-Person Visit with provider recommended. Reference Visit Selection Guide.    Thank you for taking steps to prevent the spread of this virus.  o Limit your contact with others.  o Wear a simple mask to cover your cough.  o Wash your hands well and often.    Resources    [facility]: About COVID-19:  [email or web address]    CDC: What to Do If You're Sick: www.cdc.gov/coronavirus/2019-ncov/about/steps-when-sick.html    CDC: Ending Home Isolation: www.cdc.gov/coronavirus/2019-ncov/hcp/disposition-in-home-patients.html     CDC: Caring for Someone: www.cdc.gov/coronavirus/2019-ncov/if-you-are-sick/care-for-someone.html     [facility]: Interim Guidance for Hospital Discharge to Home: [email or web address]/[email or web address].pdf    [facility] clinical trials (COVID-19 research studies): [email or web address]     Below are the COVID-19 hotlines at the [place] Department of [facility] ([facility]). Interpreters are available.   o For health questions: Call 167-592-1285 or 1-635.961.4234 (7 a.m. to 7 p.m.)  o For questions about schools and childcare: Call 447-032-5038 or 1-604.859.1802 (7 a.m. to 7 p.m.)     Allison Navarrete RN Nursing Advisor 10/21/2021 8:38 PM     Reason for Disposition    [1] Follow-up call from patient regarding patient's clinical status AND [2] information urgent    Protocols used: PCP CALL - NO TRIAGE-A-

## 2021-10-22 NOTE — PATIENT INSTRUCTIONS
Continue Cipro antibiotic twice daily. You can stop this after 5 days if your symptoms are gone, otherwise continue for a total of 7 days. Make sure you are drinking plenty of water.     If your abdominal pain returns, please call the office.     Bailee Vick, DO      Patient Education     Bladder Infection, Female (Adult)     Urine normally doesn't have any germs (bacteria) in it. But bacteria can get into the urinary tract from the skin around the rectum. Or they can travel in the blood from other parts of the body. Once they are in your urinary tract, they can cause infection in these areas:    The urethra (urethritis)    The bladder (cystitis)    The kidneys (pyelonephritis)  The most common place for an infection is in the bladder. This is called a bladder infection. This is one of the most common infections in women. Most bladder infections are easily treated. They are not serious unless the infection spreads to the kidney.  The terms bladder infection, UTI, and cystitis are often used to describe the same thing. But they are not always the same. Cystitis is an inflammation of the bladder. The most common cause of cystitis is an infection.  Symptoms  The infection causes inflammation in the urethra and bladder. This causes many of the symptoms. The most common symptoms of a bladder infection are:    Pain or burning when urinating    Having to urinate more often than normal    Urgent need to urinate    Only a small amount of urine comes out    Blood in urine    Belly (abdominal) discomfort. This is often in the lower belly above the pubic bone.    Cloudy urine    Strong- or bad-smelling urine    Unable to urinate (urinary retention)    Unable to hold urine in (urinary incontinence)    Fever    Loss of appetite    Confusion (in older adults)  Causes  Bladder infections are not contagious. You can't get one from someone else, from a toilet seat, or from sharing a bath.  The most common cause of bladder infections  is bacteria from the bowels. The bacteria get onto the skin around the opening of the urethra. From there, they can get into the urine. Then they travel up to the bladder, causing inflammation and infection. This often happens because of:    Wiping incorrectly after urinating. Always wipe from front to back.    Bowel incontinence    Pregnancy    Procedures such as having a catheter put in    Older age    Not emptying your bladder. This can give bacteria a chance to grow in your urine.    Fluid loss (dehydration)    Constipation    Having sex    Using a diaphragm for birth control   Treatment  Bladder infections are diagnosed by a urine test and urine culture. They are treated with antibiotics. They often clear up quickly without problems. Treatment helps prevent a more serious kidney infection.  Medicines  Medicines can help in the treatment of a bladder infection:    Take antibiotics until they are used up, even if you feel better. It's important to finish them to make sure the infection has cleared.    You can use acetaminophen or ibuprofen for pain, fever, or discomfort, unless another medicine was prescribed. If you have long-term (chronic) liver or kidney disease, talk with your healthcare provider before using these medicines. Also talk with your provider if you've ever had a stomach ulcer or GI (gastrointestinal) bleeding, or are taking blood-thinner medicines.    If you are given phenazopydridine to reduce burning with urination, it will make your urine a bright orange color. This can stain clothing.  Care and prevention  These self-care steps can help prevent future infections:    Drink plenty of fluids. This helps to prevent dehydration and flush out your bladder. Do this unless you must restrict fluids for other health reasons, or your healthcare provider told you not to.    Clean yourself correctly after going to the bathroom. Wipe from front to back after using the toilet. This helps prevent the spread  of bacteria.    Urinate more often. Don't try to hold urine in for a long time.    Wear loose-fitting clothes and cotton underwear. Don't wear tight-fitting pants.    Improve your diet and prevent constipation. Eat more fresh fruits and vegetables, and fiber. Eat less junk foods and fatty foods.    Don't have sex until your symptoms are gone.    Don't have caffeine, alcohol, and spicy foods. These can irritate your bladder.    Urinate right after you have sex to flush out your bladder.    If you use birth control pills and have frequent bladder infections, discuss it with your healthcare provider.  Follow-up care  Call your healthcare provider if all symptoms are not gone after 3 days of treatment. This is especially important if you have repeat infections.  If a culture was done, you will be told if your treatment needs to be changed. If directed, you can call to find out the results.  If X-rays were done, you will be told if the results will affect your treatment.  Call 911  Call 911 if any of the following occur:    Trouble breathing    Hard to wake up or confusion    Fainting (loss of consciousness)    Fast heart rate  When to get medical advice  Call your healthcare provider right away if any of these occur:    Fever of 100.4 F (38.0 C) or higher, or as directed by your healthcare provider    Symptoms are not better after 3 days of treatment    Back or belly pain that gets worse    Repeated vomiting, or unable to keep medicine down    Weakness or dizziness    Vaginal discharge    Pain, redness, or swelling in the outer vaginal area (labia)  [email or web address] last reviewed this educational content on 11/1/2019 2000-2021 [employer]. All rights reserved. This information is not intended as a substitute for professional medical care. Always follow your healthcare professional's instructions.

## 2021-10-22 NOTE — PROGRESS NOTES
Subjective:   Shelia Sanchez is a 71 year old female who presents in clinic for following of right knee after Synvisc injection on 8/13/21. Patient reports injection did help mildly for 3 weeks, since then her pain has returned.     Patient is inquiring if she can have a CSI injection of right knee today.  Able to do more for 3-4 weeks and then went back to before the injection.  Bad reaction to new med, seeing PCP. Taking Tylenol, no ibuprofen  PT is helpful, once a week, sometimes in the pool  On meds for weight loss, works with Dr. Greenfield    Date of injury: NA  Date last seen: 8/13/2021  Following Therapeutic Plan: Yes, she is attending PT at [facility] (1x per week) and she is having pool therapy as well. She does feel that PT is helping her knee pain.   Pain: Unchanged  Function: Worsening    PAST MEDICAL, SOCIAL, SURGICAL AND FAMILY HISTORY: She  has a past medical history of Arthritis (1/28/14), Asteatotic eczema, Cholelithiasis with obstruction (5/10/2010), Depressive disorder, Diabetes (H) (Summer 2019), Gallstone pancreatitis (5/11/2010), Hypertension, and Right knee pain (12/27/2012). She also has no past medical history of Cancer (H), Cerebral infarction (H), Congestive heart failure (H), Congestive heart failure, unspecified, COPD (chronic obstructive pulmonary disease) (H), Coronary artery disease, Heart disease, History of blood transfusion, Thyroid disease, or Uncomplicated asthma.  She  has a past surgical history that includes VAG HYST,RMV TUBE/OVARY (2004); cholecystectomy, laporoscopic; and ERCP W STENT PLACEMENT BILE/PANCREATIC DUCT.  Her family history includes Alzheimer Disease in her father; Anxiety Disorder in her sister; Arthritis in her father and mother; Cerebrovascular Disease in her father; Congenital Anomalies in her father and mother; Coronary Artery Disease in her father; Depression in her mother and sister; Diabetes in her father; Eye Disorder in her mother; Glaucoma in her mother;  "Heart Disease in her maternal grandfather, maternal grandmother, and paternal grandfather; Hyperlipidemia in her father and mother; Hypertension in her father, mother, paternal grandmother, and sister; Macular Degeneration in her maternal uncle; Obesity in her father and mother; Prostate Cancer in her father.  She reports that she has never smoked. She has never used smokeless tobacco. She reports current alcohol use. She reports that she does not use drugs.    ALLERGIES: She has No Known Allergies.    CURRENT MEDICATIONS: She has a current medication list which includes the following prescription(s): amlodipine, aspirin, atorvastatin, bupropion, vitamin d, ciprofloxacin, esomeprazole magnesium, gabapentin, hydrochlorothiazide, ibuprofen, naltrexone, nitrofurantoin macrocrystal-monohydrate, venlafaxine, venlafaxine, venlafaxine, and vitamin b complex with vitamin c, and the following Facility-Administered Medications: hylan.     REVIEW OF SYSTEMS: 10 point review of systems is negative except as noted above.     Exam:   Ht 1.702 m (5' 7\")   Wt 125.6 kg (277 lb)   BMI 43.38 kg/m             CONSTITUTIONAL: alert, cooperative and obese  HEAD: Normocephalic. No masses, lesions, tenderness or abnormalities  SKIN: no suspicious lesions or rashes  GAIT: normal  NEUROLOGIC: non-focal  PSYCHIATRIC: affect normal/bright and mentation appears normal.    MUSCULOSKELETAL: right knee OA      Inspection:       AP/lateral alignment normal  Tender: medial joint line      Non-tender: patellar facets, MCL, LCL, lateral joint line, IT band, posterior knee   Active Range of Motion: all normal  Strength: quad  5/5, Hamstrings  5/5  Special tests: normal Valgus stress test, normal Varus, negative Lachman's test, negative William's, no apprehension with lateral stress of the patella.         Assessment/Plan:   Pt is a 72 yo white female with Pmhx of prediabetes, heartburn, cervical pain presenting with right knee pain    1. Right knee " OA- medial compartment  Minimal relief with Synvisc  Pt reports requesting cortisone injection    RTC 3-6 months  X-RAY INTERPRETATION:   Past imaging reveals medial compartment right knee OA

## 2021-10-22 NOTE — PROGRESS NOTES
Large Joint Injection: R knee joint    Date/Time: 10/22/2021 9:32 AM  Performed by: Moni Fischer MD  Authorized by: Moni Fischer MD     Indications:  Pain and osteoarthritis  Needle Size:  22 G  Guidance: landmark guided    Approach:  Anterolateral  Location:  Knee      Medications:  40 mg triamcinolone 40 MG/ML; 4 mL lidocaine (PF) 1 %  Outcome:  Tolerated well, no immediate complications  Procedure discussed: discussed risks, benefits, and alternatives    Consent Given by:  Patient  Timeout: timeout called immediately prior to procedure    Prep: patient was prepped and draped in usual sterile fashion        I agree with the following injection documentation.    VIGNESH Fischer MD

## 2021-10-22 NOTE — LETTER
10/22/2021      RE: Shelia Sanchez  [address]  [place] [address]        Subjective:   Shelia Sanchez is a 71 year old female who presents in clinic for following of right knee after Synvisc injection on 8/13/21. Patient reports injection did help mildly for 3 weeks, since then her pain has returned.     Patient is inquiring if she can have a CSI injection of right knee today.  Able to do more for 3-4 weeks and then went back to before the injection.  Bad reaction to new med, seeing PCP. Taking Tylenol, no ibuprofen  PT is helpful, once a week, sometimes in the pool  On meds for weight loss, works with Dr. Greenfield    Date of injury: NA  Date last seen: 8/13/2021  Following Therapeutic Plan: Yes, she is attending PT at [facility] (1x per week) and she is having pool therapy as well. She does feel that PT is helping her knee pain.   Pain: Unchanged  Function: Worsening    PAST MEDICAL, SOCIAL, SURGICAL AND FAMILY HISTORY: She  has a past medical history of Arthritis (1/28/14), Asteatotic eczema, Cholelithiasis with obstruction (5/10/2010), Depressive disorder, Diabetes (H) (Summer 2019), Gallstone pancreatitis (5/11/2010), Hypertension, and Right knee pain (12/27/2012). She also has no past medical history of Cancer (H), Cerebral infarction (H), Congestive heart failure (H), Congestive heart failure, unspecified, COPD (chronic obstructive pulmonary disease) (H), Coronary artery disease, Heart disease, History of blood transfusion, Thyroid disease, or Uncomplicated asthma.  She  has a past surgical history that includes VAG HYST,RMV TUBE/OVARY (2004); cholecystectomy, laporoscopic; and ERCP W STENT PLACEMENT BILE/PANCREATIC DUCT.  Her family history includes Alzheimer Disease in her father; Anxiety Disorder in her sister; Arthritis in her father and mother; Cerebrovascular Disease in her father; Congenital Anomalies in her father and mother; Coronary Artery Disease in her father; Depression in her  "mother and sister; Diabetes in her father; Eye Disorder in her mother; Glaucoma in her mother; Heart Disease in her maternal grandfather, maternal grandmother, and paternal grandfather; Hyperlipidemia in her father and mother; Hypertension in her father, mother, paternal grandmother, and sister; Macular Degeneration in her maternal uncle; Obesity in her father and mother; Prostate Cancer in her father.  She reports that she has never smoked. She has never used smokeless tobacco. She reports current alcohol use. She reports that she does not use drugs.    ALLERGIES: She has No Known Allergies.    CURRENT MEDICATIONS: She has a current medication list which includes the following prescription(s): amlodipine, aspirin, atorvastatin, bupropion, vitamin d, ciprofloxacin, esomeprazole magnesium, gabapentin, hydrochlorothiazide, ibuprofen, naltrexone, nitrofurantoin macrocrystal-monohydrate, venlafaxine, venlafaxine, venlafaxine, and vitamin b complex with vitamin c, and the following Facility-Administered Medications: hylan.     REVIEW OF SYSTEMS: 10 point review of systems is negative except as noted above.     Exam:   Ht 1.702 m (5' 7\")   Wt 125.6 kg (277 lb)   BMI 43.38 kg/m             CONSTITUTIONAL: alert, cooperative and obese  HEAD: Normocephalic. No masses, lesions, tenderness or abnormalities  SKIN: no suspicious lesions or rashes  GAIT: normal  NEUROLOGIC: non-focal  PSYCHIATRIC: affect normal/bright and mentation appears normal.    MUSCULOSKELETAL: right knee OA      Inspection:       AP/lateral alignment normal  Tender: medial joint line      Non-tender: patellar facets, MCL, LCL, lateral joint line, IT band, posterior knee   Active Range of Motion: all normal  Strength: quad  5/5, Hamstrings  5/5  Special tests: normal Valgus stress test, normal Varus, negative Lachman's test, negative William's, no apprehension with lateral stress of the patella.         Assessment/Plan:   Pt is a 72 yo white female with " Pmhx of prediabetes, heartburn, cervical pain presenting with right knee pain    1. Right knee OA- medial compartment  Minimal relief with Synvisc  Pt reports requesting cortisone injection    RTC 3-6 months  X-RAY INTERPRETATION:   Past imaging reveals medial compartment right knee OA    Large Joint Injection: R knee joint    Date/Time: 10/22/2021 9:32 AM  Performed by: Moni Fischer MD  Authorized by: Moni Fischer MD     Indications:  Pain and osteoarthritis  Needle Size:  22 G  Guidance: landmark guided    Approach:  Anterolateral  Location:  Knee      Medications:  40 mg triamcinolone 40 MG/ML; 4 mL lidocaine (PF) 1 %  Outcome:  Tolerated well, no immediate complications  Procedure discussed: discussed risks, benefits, and alternatives    Consent Given by:  Patient  Timeout: timeout called immediately prior to procedure    Prep: patient was prepped and draped in usual sterile fashion        I agree with the following injection documentation.    MD Moni Tong MD

## 2021-10-22 NOTE — NURSING NOTE
[address] [address]  Dept: [phone]  ______________________________________________________________________________    Patient: Shelia Sanchez   : 1950   MRN: [id number]   2021    INVASIVE PROCEDURE SAFETY CHECKLIST    Date: 2021   Procedure: Right knee kenalog injection  Patient Name: Shelia Sanchez  MRN: [id number]  YOB: 1950    Action: Complete sections as appropriate. Any discrepancy results in a HARD COPY until resolved.     PRE PROCEDURE:  Patient ID verified with 2 identifiers (name and  or MRN): Yes  Procedure and site verified with patient/designee (when able): Yes  Accurate consent documentation in medical record: Yes  H&P (or appropriate assessment) documented in medical record: Yes  H&P must be up to 20 days prior to procedure and updates within 24 hours of procedure as applicable: NA  Relevant diagnostic and radiology test results appropriately labeled and displayed as applicable: NA  Procedure site(s) marked with provider initials: NA    TIMEOUT:  Time-Out performed immediately prior to starting procedure, including verbal and active participation of all team members addressing the following:Yes  * Correct patient identify  * Confirmed that the correct side and site are marked  * An accurate procedure consent form  * Agreement on the procedure to be done  * Correct patient position  * Relevant images and results are properly labeled and appropriately displayed  * The need to administer antibiotics or fluids for irrigation purposes during the procedure as applicable   * Safety precautions based on patient history or medication use    DURING PROCEDURE: Verification of correct person, site, and procedures any time the responsibility for care of the patient is transferred to another member of the care team.       Prior to injection, verified patient identity using patient's name  and date of birth.  Due to injection administration, patient instructed to remain in clinic for 15 minutes  afterwards, and to report any adverse reaction to me immediately.    Joint injection was performed.      Lido  Drug Amount Wasted:  Yes: 1 mg/ml   Vial/Syringe: Single dose vial  Expiration Date:  12/01/2024    Kenalog  Drug Amount Wasted: No  Vial/Syringe: Single dose vial  Expiration Date: 04/01/2023    Jannette Oneal, ATC  October 22, 2021

## 2021-10-23 RX ORDER — LIDOCAINE HYDROCHLORIDE 10 MG/ML
4 INJECTION, SOLUTION EPIDURAL; INFILTRATION; INTRACAUDAL; PERINEURAL
Status: DISCONTINUED | OUTPATIENT
Start: 2021-10-22 | End: 2021-11-15

## 2021-10-23 RX ORDER — TRIAMCINOLONE ACETONIDE 40 MG/ML
40 INJECTION, SUSPENSION INTRA-ARTICULAR; INTRAMUSCULAR
Status: DISCONTINUED | OUTPATIENT
Start: 2021-10-22 | End: 2021-11-15

## 2021-10-28 ENCOUNTER — TELEPHONE (OUTPATIENT)
Dept: SLEEP MEDICINE | Facility: CLINIC | Age: 71
End: 2021-10-28

## 2021-10-28 NOTE — TELEPHONE ENCOUNTER
Reason for call:  Other   Patient called regarding (reason for call): call back  Additional comments: Patient recently registered her cpap with [facility] and it was one that has beenr ecalled. She is requesting a callback to discuss her options for continued use    Phone number to reach patient:  Cell number on file:    Telephone Information:   Mobile [phone]       Best Time:  any    Can we leave a detailed message on this number?  YES    Travel screening: Negative

## 2021-11-02 NOTE — TELEPHONE ENCOUNTER
Patient call regarding [employer] recall for their pap device.    Device type:: Auto CPAP    Supplemental oxygen: No , if yes moved to advanced device workflow.     Current durable medical equipment provider: [facility]     Age of your current device:  less than 5 years old    History review:     Does the patient have the following?      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 No       Discontinuation of PAP therapy would lead to substantial deterioration of functional status or quality of life. No      If no to all questions:     Advised patient to discontinue use of the device.     Has patient registered device? Yes Advised patient to register for repair or replacement on the [employer] website.  Patient can call [employer] at [phone] for additional support.    Get another device that is not impacted by recall if possible. Please contact your DME to see if you are eligible to receive a new device if it is over 5 years old.     Discuss alternative treatments, including positional therapy, oral appliance therapy, and surgery.       Discussed behavioral strategies such as weight loss, exercise, and avoidance of alcohol and sedatives before bedtime.  Patient is currently sleeping in a recliner    Does the patient have additional questions or concerns to be sent to the provider at this time?  No    Plan :     Patient chooses to discontinue therapy at this time

## 2021-11-15 ENCOUNTER — OFFICE VISIT (OUTPATIENT)
Dept: FAMILY MEDICINE | Facility: CLINIC | Age: 71
End: 2021-11-15
Payer: COMMERCIAL

## 2021-11-15 VITALS
BODY MASS INDEX: 44.89 KG/M2 | RESPIRATION RATE: 16 BRPM | SYSTOLIC BLOOD PRESSURE: 138 MMHG | TEMPERATURE: 98 F | HEIGHT: 67 IN | WEIGHT: 286 LBS | OXYGEN SATURATION: 99 % | HEART RATE: 94 BPM | DIASTOLIC BLOOD PRESSURE: 78 MMHG

## 2021-11-15 DIAGNOSIS — I10 ESSENTIAL HYPERTENSION WITH GOAL BLOOD PRESSURE LESS THAN 140/90: ICD-10-CM

## 2021-11-15 DIAGNOSIS — R73.03 PREDIABETES: ICD-10-CM

## 2021-11-15 DIAGNOSIS — Z12.11 SCREEN FOR COLON CANCER: Primary | ICD-10-CM

## 2021-11-15 DIAGNOSIS — Z13.6 CARDIOVASCULAR SCREENING; LDL GOAL LESS THAN 130: Chronic | ICD-10-CM

## 2021-11-15 DIAGNOSIS — E66.01 MORBID OBESITY (H): Chronic | ICD-10-CM

## 2021-11-15 DIAGNOSIS — G47.33 OSA (OBSTRUCTIVE SLEEP APNEA): Chronic | ICD-10-CM

## 2021-11-15 DIAGNOSIS — H25.13 AGE-RELATED NUCLEAR CATARACT OF BOTH EYES: ICD-10-CM

## 2021-11-15 PROCEDURE — 99214 OFFICE O/P EST MOD 30 MIN: CPT | Performed by: FAMILY MEDICINE

## 2021-11-15 RX ORDER — ATORVASTATIN CALCIUM 10 MG/1
10 TABLET, FILM COATED ORAL DAILY
Qty: 90 TABLET | Refills: 1 | Status: SHIPPED | OUTPATIENT
Start: 2021-11-15 | End: 2021-11-15

## 2021-11-15 RX ORDER — ATORVASTATIN CALCIUM 10 MG/1
10 TABLET, FILM COATED ORAL DAILY
Qty: 90 TABLET | Refills: 1 | Status: SHIPPED | OUTPATIENT
Start: 2021-11-15 | End: 2022-02-17 | Stop reason: SINTOL

## 2021-11-15 ASSESSMENT — ANXIETY QUESTIONNAIRES
GAD7 TOTAL SCORE: 14
1. FEELING NERVOUS, ANXIOUS, OR ON EDGE: MORE THAN HALF THE DAYS
IF YOU CHECKED OFF ANY PROBLEMS ON THIS QUESTIONNAIRE, HOW DIFFICULT HAVE THESE PROBLEMS MADE IT FOR YOU TO DO YOUR WORK, TAKE CARE OF THINGS AT HOME, OR GET ALONG WITH OTHER PEOPLE: SOMEWHAT DIFFICULT
3. WORRYING TOO MUCH ABOUT DIFFERENT THINGS: NEARLY EVERY DAY
7. FEELING AFRAID AS IF SOMETHING AWFUL MIGHT HAPPEN: MORE THAN HALF THE DAYS
2. NOT BEING ABLE TO STOP OR CONTROL WORRYING: MORE THAN HALF THE DAYS
5. BEING SO RESTLESS THAT IT IS HARD TO SIT STILL: NOT AT ALL
6. BECOMING EASILY ANNOYED OR IRRITABLE: NEARLY EVERY DAY

## 2021-11-15 ASSESSMENT — PATIENT HEALTH QUESTIONNAIRE - PHQ9: 5. POOR APPETITE OR OVEREATING: MORE THAN HALF THE DAYS

## 2021-11-15 ASSESSMENT — PAIN SCALES - GENERAL: PAINLEVEL: NO PAIN (0)

## 2021-11-15 ASSESSMENT — MIFFLIN-ST. JEOR: SCORE: 1834.92

## 2021-11-15 NOTE — PATIENT INSTRUCTIONS
Start increasing lipitor to 2 tabs  Can start doing every other day alternating to 1 tab    Advised stool check for cancer  Contact sleep study for cpap    No labs today  Make sure you are seeing us in 1-2 months fasting as well    Follow up with eye care  Shingles shot as advised  Mariana Benitez D.O.      Mammogram Scheduling  [facility] [phone] or [phone]  [facility] [phone]      Patient Education

## 2021-11-15 NOTE — PROGRESS NOTES
ICD-10-CM    1. Screen for colon cancer  Z12.11 COLOGUARD([employer])   2. Essential hypertension with goal blood pressure less than 140/90  I10    3. CARDIOVASCULAR SCREENING; LDL GOAL LESS THAN 130  Z13.6 atorvastatin (LIPITOR) 10 MG tablet     DISCONTINUED: atorvastatin (LIPITOR) 10 MG tablet   4. BMI > 40  E66.01    5. LORI (obstructive sleep apnea)- severe ()  G47.33    6. Prediabetes  R73.03    7. 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  She is on meds and seeing psych and therapist, close monitoring    High cholesterol one tab for a while, Start increasing lipitor to 2 tabs  Can start doing every other day alternating to 1 tab    hypertension-stable,cont meds    lori-not using machine as there was a recall, she will work with sleep clinic to get a new one    Obesity-working with endocrine, weight management, doing well on med, lost some weight    Advised stool check for cancer      Make sure you are seeing us in 1-2 months fasting as well    Follow up with eye care  Shingles shot as advised    Subjective   Shelia is a 71 year old who presents for the following health issues  accompanied by her self.    HPI       Has been taking Statin without side effects.     Hypertension Follow-up       Do you check your blood pressure regularly outside of the clinic? No     Are you following a low salt diet? No    Are your blood pressures ever more than 140 on the top number (systolic) OR more   than 90 on the bottom number (diastolic), for example 140/90? Yes    Depression and Anxiety Follow-Up    How are you doing with your depression since your last visit? No change, still struggling     How are you doing with your anxiety since your last visit?  Same see above     Are you having other symptoms that might be associated with depression or anxiety? Yes:   depressed    Have you had a significant life event? No     Do you have any concerns with your use of alcohol or other drugs? No    Social History     Tobacco Use     Smoking status: Never Smoker     Smokeless tobacco: Never Used   Vaping Use     Vaping Use: Never used   Substance Use Topics     Alcohol use: Yes     Alcohol/week: 0.0 standard drinks     Comment: Weekends mostly.     Drug use: No     PHQ 11/18/2019 8/4/2020 2/8/2021   PHQ-9 Total Score 8 11 15   Q9: Thoughts of better off dead/self-harm past 2 weeks Not at all Not at all Not at all     OSCAR-7 SCORE 11/18/2019 8/4/2020 2/8/2021   Total Score - 8 (mild anxiety) -   Total Score 7 8 11     Last PHQ-9 2/8/2021   1.  Little interest or pleasure in doing things 2   2.  Feeling down, depressed, or hopeless 2   3.  Trouble falling or staying asleep, or sleeping too much 2   4.  Feeling tired or having little energy 3   5.  Poor appetite or overeating 3   6.  Feeling bad about yourself 2   7.  Trouble concentrating 1   8.  Moving slowly or restless 0   Q9: Thoughts of better off dead/self-harm past 2 weeks 0   PHQ-9 Total Score 15   Difficulty at work, home, or with people Very difficult     OSCAR-7  2/8/2021   1. Feeling nervous, anxious, or on edge 2   2. Not being able to stop or control worrying 2   3. Worrying too much about different things 2   4. Trouble relaxing 1   5. Being so restless that it is hard to sit still 1   6. Becoming easily annoyed or irritable 2   7. Feeling afraid, as if something awful might happen 1   OSCAR-7 Total Score 11   If you checked any problems, how difficult have they made it for you to do your work, take care of things at home, or get along with other people? -     Last visit 6 months ago    High cholesterol-recently started lipitor low dose, doing ok, wants to stay on it at least 6 months , no changes for now, check labs, she is taking it daily     [id number] much better now, continue current  "modifications     htn-stable,cont meds     Binge eatting, depression/anxiety-working with [employer], therapy and psych, she has lots of stressors so working on her diet and exercise minimally     Knee pains-working with ortho     Neck pain, headaches, Neurontin 100mg at bedtime working well     Vit D-takes supplements, check labs        Shingles shot as advised at the pharmacy     Mammogram advised    Suicide Assessment Five-step Evaluation and Treatment (SAFE-T)          Review of Systems   Constitutional, HEENT, cardiovascular, pulmonary, GI, , musculoskeletal, neuro, skin, endocrine and psych systems are negative, except as otherwise noted.      Objective    /78   Pulse 94   Temp 98  F (36.7  C) (Oral)   Resp 16   Ht 1.702 m (5' 7\")   Wt 129.7 kg (286 lb)   SpO2 99%   BMI 44.79 kg/m    Body mass index is 44.79 kg/m .  Physical Exam   GENERAL: healthy, alert and no distress  EYES: Eyes grossly normal to inspection, PERRL and conjunctivae and sclerae normal  RESP: lungs clear to auscultation - no rales, rhonchi or wheezes  CV: regular rate and rhythm, normal S1 S2, no S3 or S4, no murmur, click or rub, no peripheral edema and peripheral pulses strong  ABDOMEN: soft, nontender, no hepatosplenomegaly, no masses and bowel sounds normal  MS: no gross musculoskeletal defects noted, no edema  NEURO: Normal strength and tone, mentation intact and speech normal  PSYCH: mentation appears normal, affect normal/bright                "

## 2021-11-16 ASSESSMENT — ANXIETY QUESTIONNAIRES: GAD7 TOTAL SCORE: 14

## 2021-11-16 ASSESSMENT — PATIENT HEALTH QUESTIONNAIRE - PHQ9: SUM OF ALL RESPONSES TO PHQ QUESTIONS 1-9: 18

## 2021-11-19 ENCOUNTER — VIRTUAL VISIT (OUTPATIENT)
Dept: ENDOCRINOLOGY | Facility: CLINIC | Age: 71
End: 2021-11-19
Payer: COMMERCIAL

## 2021-11-19 VITALS — WEIGHT: 286 LBS | HEIGHT: 67 IN | BODY MASS INDEX: 44.89 KG/M2

## 2021-11-19 DIAGNOSIS — E66.01 CLASS 3 SEVERE OBESITY DUE TO EXCESS CALORIES WITH SERIOUS COMORBIDITY AND BODY MASS INDEX (BMI) OF 40.0 TO 44.9 IN ADULT (H): Primary | ICD-10-CM

## 2021-11-19 DIAGNOSIS — M79.18 MYOFASCIAL PAIN: ICD-10-CM

## 2021-11-19 DIAGNOSIS — E66.813 CLASS 3 SEVERE OBESITY DUE TO EXCESS CALORIES WITH SERIOUS COMORBIDITY AND BODY MASS INDEX (BMI) OF 40.0 TO 44.9 IN ADULT (H): Primary | ICD-10-CM

## 2021-11-19 PROCEDURE — 99214 OFFICE O/P EST MOD 30 MIN: CPT | Mod: 95 | Performed by: INTERNAL MEDICINE

## 2021-11-19 RX ORDER — NALTREXONE HYDROCHLORIDE 50 MG/1
TABLET, FILM COATED ORAL
Qty: 135 TABLET | Refills: 1 | Status: SHIPPED | OUTPATIENT
Start: 2021-11-19 | End: 2022-01-03

## 2021-11-19 ASSESSMENT — MIFFLIN-ST. JEOR: SCORE: 1834.92

## 2021-11-19 ASSESSMENT — PAIN SCALES - GENERAL: PAINLEVEL: MILD PAIN (3)

## 2021-11-19 NOTE — NURSING NOTE
"(   Chief Complaint   Patient presents with     RECHECK     Return MW    )    ( Weight: 129.7 kg (286 lb) (Patient reported) )  ( Height: 170.2 cm (5' 7\") )  ( BMI (Calculated): 44.79 )  (   )  (   )  (   )  (   )  (   )  (   )    (   )  (   )  (   )  (   )  (   )  (   )  (   )    (   Patient Active Problem List   Diagnosis     BMI > 40     CARDIOVASCULAR SCREENING; LDL GOAL LESS THAN 130     Hypertension goal BP (blood pressure) < 140/90     Advanced directives, counseling/discussion     Left shoulder pain     Cataracts, both eyes     Urinary incontinence     Atypical nevus     Cervical pain     Left-sided headache     Menopausal syndrome (hot flashes)     Heartburn     Mild major depression (H)     LORI (obstructive sleep apnea)- severe ()     Binge eating disorder     Prediabetes    )  (   Current Outpatient Medications   Medication Sig Dispense Refill     amLODIPine (NORVASC) 5 MG tablet Take dailly 90 tablet 3     aspirin (ASA) 81 MG chewable tablet TAKE 1 TABLET BY MOUTH DAILY 90 tablet 0     atorvastatin (LIPITOR) 10 MG tablet Take 1 tablet (10 mg) by mouth daily 90 tablet 1     buPROPion (WELLBUTRIN XL) 150 MG 24 hr tablet Take 150 mg by mouth every morning       Cholecalciferol (VITAMIN D PO) Take 5,000 Units by mouth       Esomeprazole Magnesium (NEXIUM 24HR PO) Take 1 tablet by mouth daily       gabapentin (NEURONTIN) 100 MG capsule Take 1 tab nightly 90 capsule 1     hydrochlorothiazide (HYDRODIURIL) 25 MG tablet Take 1 tablet (25 mg) by mouth daily 90 tablet 3     Ibuprofen (ADVIL PO) Taking as needed for pain       naltrexone (DEPADE/REVIA) 50 MG tablet Take 1/2 tablet before supper for first week.  Then increase to one full tablet as instructed. 30 tablet 5     venlafaxine (EFFEXOR-XR) 37.5 MG 24 hr capsule Take 1 capsule (37.5 mg) by mouth daily 90 capsule 0     venlafaxine (EFFEXOR-XR) 75 MG 24 hr capsule TAKE 1 CAPSULE(75 MG) BY MOUTH DAILY 90 capsule 0     venlafaxine (EFFEXOR-XR) 75 MG 24 " hr capsule Take 1 capsule (75 mg) by mouth daily 90 capsule 0     vitamin B complex with vitamin C (VITAMIN  B COMPLEX) tablet Take 1 tablet by mouth daily      )  ( Diabetes Eval:    )    ( Pain Eval:  Mild Pain (3) )    ( Wound Eval:       )    (   History   Smoking Status     Never Smoker   Smokeless Tobacco     Never Used    )    ( Signed By:  Rica Cueva, EMT; November 19, 2021; 9:20 AM )

## 2021-11-19 NOTE — LETTER
2021       RE: Shelia Sanchez  [address]  [place] [address]     Dear Colleague,    Thank you for referring your patient, Shelia Sanchez, to the [facility] [place] at [facility]. Please see a copy of my visit note below.      Return Medical Weight Management Note     Shelia Sanchez  MRN:  [id number]  :  1950  JOSEPH:  2021    Dear Mariana Benitez DO,    I had the pleasure of seeing your patient Shelia Sanchez. She is a 71 year old female who I am continuing to see for treatment of obesity related to:       9/15/2021   I have the following health issues associated with obesity: Pre-Diabetes, High Blood Pressure, High Cholesterol, Sleep Apnea, GERD (Reflux), Stress Incontinence, Osteoarthritis (joint disease)   I have the following symptoms associated with obesity: Knee Pain, Depression, Lower Extremity Swelling, Back Pain, Fatigue       INTERVAL HISTORY:  She returns and in the interim she ntoes the following--  --working with therapist at [facility]/working on binge eating  --therapist at [facility] thought that the advice from the nutritionist was not helpful; instead of advise from nutrition pt is eating 3 meals and snacks between to help with avoiding binging  --meals: a protein/a carb/healthy fat/nonstarchy veggies  --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 Planning to join [employer]. PT and therapist want pt to use recumbent bike (strengthen legs for surgery)    CURRENT WEIGHT:   286 lbs 0 oz   Body mass index is 44.79 kg/m .    Initial Weight (lbs): 277 lbs  Last Visits Weight: 125.6 kg (277 lb)  Cumulative weight loss (lbs): -9  Weight Loss  "Percentage: -3.25%    Changes and Difficulties 11/17/2021   I have made the following changes to my diet since my last visit: Trying to listen to my body to see if I am really hungry. Staying away from sweets.   With regards to my diet, I am still struggling with: Emotional binge eating.  If one is good- more is better.   I have made the following changes to my activity/exercise since my last visit: Very little.   With regards to my activity/exercise, I am still struggling with: I am having a hard time getting to my physical therapy let alone getting to the gym.       VITALS:  Ht 1.702 m (5' 7\")   Wt 129.7 kg (286 lb)   BMI 44.79 kg/m      MEDICATIONS:   Current Outpatient Medications   Medication Sig Dispense Refill     amLODIPine (NORVASC) 5 MG tablet Take dailly 90 tablet 3     aspirin (ASA) 81 MG chewable tablet TAKE 1 TABLET BY MOUTH DAILY 90 tablet 0     atorvastatin (LIPITOR) 10 MG tablet Take 1 tablet (10 mg) by mouth daily 90 tablet 1     buPROPion (WELLBUTRIN XL) 150 MG 24 hr tablet Take 150 mg by mouth every morning       Cholecalciferol (VITAMIN D PO) Take 5,000 Units by mouth       Esomeprazole Magnesium (NEXIUM 24HR PO) Take 1 tablet by mouth daily       gabapentin (NEURONTIN) 100 MG capsule Take 1 tab nightly 90 capsule 1     hydrochlorothiazide (HYDRODIURIL) 25 MG tablet Take 1 tablet (25 mg) by mouth daily 90 tablet 3     Ibuprofen (ADVIL PO) Taking as needed for pain       naltrexone (DEPADE/REVIA) 50 MG tablet Take 1/2 tablet before supper for first week.  Then increase to one full tablet as instructed. 30 tablet 5     venlafaxine (EFFEXOR-XR) 37.5 MG 24 hr capsule Take 1 capsule (37.5 mg) by mouth daily 90 capsule 0     venlafaxine (EFFEXOR-XR) 75 MG 24 hr capsule TAKE 1 CAPSULE(75 MG) BY MOUTH DAILY 90 capsule 0     venlafaxine (EFFEXOR-XR) 75 MG 24 hr capsule Take 1 capsule (75 mg) by mouth daily 90 capsule 0     vitamin B complex with vitamin C (VITAMIN  B COMPLEX) tablet Take 1 tablet by " mouth daily         Weight Loss Medication History Reviewed With Patient 11/17/2021   Which weight loss medications are you currently taking on a regular basis?  Naltrexone   Are you having any side effects from the weight loss medication that we have prescribed you? No         ASSESSMENT/PLAN:  Shelia is a patient with early onset morbid obesity with significant element of familial/genetic influence and with current health consequences. Shelia Sanchez endorses binging, eats a high carb diet, eats a high fat diet, uses food as a reward, uses food as mood management and eats to obtain specific degree of fullness.     Her problem is complicated by a hunger disorder, strong craving/reward pathways, a binge eating component and mental health/psychopharmacological barriers     Her ability to lose weight is impacted by physical impairment.     PLAN:    Decrease portion sizes  Purge house of food triggers  Dietician visit of education  Volumetrics eating plan  Calorie/low fat diet  Meal planning     Craving/Reward   Ancillary testing:  N/A.  Food Plan:  Volumetrics and High protein/low carbohydrate.   Activity Plan:  PT/Pool Therapy (ongoing)  Supplementary:  N/A.   Medication:  The patient is on medication to assist in lifestyle changes    Additionally--  --lauren Bloch; 4-6 wks; me in 2-3 mo  --increase naltrexone to 1.5 tablets (pt is still taking bupropion)  --food goals (more to be developed with nutrition today) --cut supper portions and avoid eating between supper and bed  --pharm support we have discussed--plan a naltrexone, b, topiramate, c semaglutide (initially Ozempic if covered).  Discussed potential risks/benefits and possible side effects.       Time: about 34 min spent on evaluation, management, counseling, education, & motivational interviewing (video) combined with previsit prep and post visit charting/follow up care same day    Sincerely,    Mable Greenfield MD

## 2021-11-19 NOTE — PROGRESS NOTES
Shelia is a 71 year old who is being evaluated via a billable video visit.      How would you like to obtain your AVS? MyChart  If the video visit is dropped, the invitation should be resent by: Send to e-mail at: [email or web address]  Will anyone else be joining your video visit? No  During this virtual visit the patient is located in [place], patient verifies this as the location during the entirety of this visit.   Video Start Time: 10:16  Video-Visit Details    Type of service:  Video Visit    Video End Time:10:35    Originating Location (pt. Location): Home    Distant Location (provider location):  [facility] [place]     Platform used for Video Visit: Healthcare Interactive         [facility] Medical Weight Management Note     Shelia Sanchez  MRN:  [id number]  :  1950  JOSEPH:  2021    Dear Mariana Benitez DO,    I had the pleasure of seeing your patient Shelia Sanchez. She is a 71 year old female who I am continuing to see for treatment of obesity related to:       9/15/2021   I have the following health issues associated with obesity: Pre-Diabetes, High Blood Pressure, High Cholesterol, Sleep Apnea, GERD (Reflux), Stress Incontinence, Osteoarthritis (joint disease)   I have the following symptoms associated with obesity: Knee Pain, Depression, Lower Extremity Swelling, Back Pain, Fatigue       INTERVAL HISTORY:  She returns and in the interim she ntoes the following--  --working with therapist at [place]/working on binge eating  --therapist at [place] thought that the advice from the nutritionist was not helpful; instead of advise from nutrition pt is eating 3 meals and snacks between to help with avoiding binging  --meals: a protein/a carb/healthy fat/nonstarchy veggies  --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 [facility] to [facility] [employer] PT and therapist want pt to use recumbent bike (strengthen legs for surgery)    CURRENT WEIGHT:   286 lbs 0 oz   Body mass index is 44.79 kg/m .    Initial Weight (lbs): 277 lbs  Last Visits Weight: 125.6 kg (277 lb)  Cumulative weight loss (lbs): -9  Weight Loss Percentage: -3.25%    Changes and Difficulties 11/17/2021   I have made the following changes to my diet since my last visit: Trying to listen to my body to see if I am really hungry. Staying away from sweets.   With regards to my diet, I am still struggling with: Emotional binge eating.  If one is good- more is better.   I have made the following changes to my activity/exercise since my last visit: Very little.   With regards to my activity/exercise, I am still struggling with: I am having a hard time getting to my physical therapy let alone getting to the gym.       VITALS:  Ht 1.702 m (5' 7\")   Wt 129.7 kg (286 lb)   BMI 44.79 kg/m      MEDICATIONS:   Current Outpatient Medications   Medication Sig Dispense Refill     amLODIPine (NORVASC) 5 MG tablet Take dailly 90 tablet 3     aspirin (ASA) 81 MG chewable tablet TAKE 1 TABLET BY MOUTH DAILY 90 tablet 0     atorvastatin (LIPITOR) 10 MG tablet Take 1 tablet (10 mg) by mouth daily 90 tablet 1     buPROPion (WELLBUTRIN XL) 150 MG 24 hr tablet Take 150 mg by mouth every morning       Cholecalciferol (VITAMIN D PO) Take 5,000 Units by mouth       Esomeprazole Magnesium (NEXIUM 24HR PO) Take 1 tablet by mouth daily       gabapentin (NEURONTIN) 100 MG capsule Take 1 tab nightly 90 capsule 1     hydrochlorothiazide (HYDRODIURIL) 25 MG tablet Take 1 tablet (25 mg) by mouth daily 90 tablet 3     Ibuprofen (ADVIL PO) Taking as needed for pain       naltrexone (DEPADE/REVIA) 50 MG tablet Take 1/2 tablet before supper for first week.  Then increase to one full tablet as instructed. 30 tablet 5     " venlafaxine (EFFEXOR-XR) 37.5 MG 24 hr capsule Take 1 capsule (37.5 mg) by mouth daily 90 capsule 0     venlafaxine (EFFEXOR-XR) 75 MG 24 hr capsule TAKE 1 CAPSULE(75 MG) BY MOUTH DAILY 90 capsule 0     venlafaxine (EFFEXOR-XR) 75 MG 24 hr capsule Take 1 capsule (75 mg) by mouth daily 90 capsule 0     vitamin B complex with vitamin C (VITAMIN  B COMPLEX) tablet Take 1 tablet by mouth daily         Weight Loss Medication History Reviewed With Patient 11/17/2021   Which weight loss medications are you currently taking on a regular basis?  Naltrexone   Are you having any side effects from the weight loss medication that we have prescribed you? No         ASSESSMENT/PLAN:  Shelia is a patient with early onset morbid obesity with significant element of familial/genetic influence and with current health consequences. Shelia Sanchez endorses binging, eats a high carb diet, eats a high fat diet, uses food as a reward, uses food as mood management and eats to obtain specific degree of fullness.     Her problem is complicated by a hunger disorder, strong craving/reward pathways, a binge eating component and mental health/psychopharmacological barriers     Her ability to lose weight is impacted by physical impairment.     PLAN:    Decrease portion sizes  Purge house of food triggers  Dietician visit of education  Volumetrics eating plan  Calorie/low fat diet  Meal planning     Craving/Reward   Ancillary testing:  N/A.  Food Plan:  Volumetrics and High protein/low carbohydrate.   Activity Plan:  PT/Pool Therapy (ongoing)  Supplementary:  N/A.   Medication:  The patient is on medication to assist in lifestyle changes    Additionally--  --lauren Bloch; 4-6 wks; me in 2-3 mo  --increase naltrexone to 1.5 tablets (pt is still taking bupropion)  --food goals (more to be developed with nutrition today) --cut supper portions and avoid eating between supper and bed  --pharm support we have discussed--plan a naltrexone, b,  topiramate, c semaglutide (initially Ozempic if covered).  Discussed potential risks/benefits and possible side effects.       Time: about 34 min spent on evaluation, management, counseling, education, & motivational interviewing (video) combined with previsit prep and post visit charting/follow up care same day    Sincerely,    Mable Greenfield MD

## 2021-11-19 NOTE — TELEPHONE ENCOUNTER
Routing refill request to provider for review/approval because:  Drug not on the [facility] refill protocol     Pending Prescriptions:                       Disp   Refills    gabapentin (NEURONTIN) 100 MG capsule [Pha*90 cap*1        Sig: TAKE 1 CAPSULE BY MOUTH EVERY NIGHT      Catrachita Kearney RN

## 2021-11-20 NOTE — PATIENT INSTRUCTIONS
Thank you for allowing us the privilege of caring for you. We hope we provided you with the excellent service you deserve.    Please let us know if there is anything else we can do for you so that we can be sure you are completely satisfied with your care experience.    Your visit was with Dr. Rahman today.    Instructions per today's visit:  --follow ups--Lauren Bloch (medication pharmacist); 4-6 wks; with Dr. Rahman again in 2-3 mo  --increase naltrexone to 1.5 tablets (this medication works well with the bupropion you are currently taking)  --food goals for healthy eating    Please call our contact center at [phone] to schedule your next appointments.    Meal Replacement Products:    Here is the link to our new e-store where you can purchase our meal replacement products    [email or web address]    The one week starter kit is a great way to sample a variety of products and see what works for you.    If you want more information about the product go to: [employer]    Free Shipping for orders over $75    Benefits of meal replacements products:    Portion and calorie control  Improved nutrition  Structured eating  Simplified food choices  Avoid contact with trigger foods    Interested in working with a health ?  Health coaches work with you to improve your overall health and wellbeing.  They look at the whole person, and may involve discussion of different areas of life, including, but not limited to the four pillars of health (sleep, exercise, nutrition, and stress management). Discuss with your care team if you would like to start working a health .    Health Coaching-3 Pack: Schedule by calling [phone]    $99 for three health coaching visits    Visits may be done in person or via phone    Coaching is a partnership between the  and the client; Coaches do not prescribe or diagnose    Coaching helps inspire the client to reach his/her  personal goals    Bluetooth Scale:    We hope to provide you with high quality telephone and virtual healthcare visits while social distancing for COVID-19 is necessary, as well as in the future when virtual visits may be more convenient for you.    Our technology team made it possible for Bluetooth scales to send weight measurements to our electronic medical record. This allows weights from you weighing at home to securely flow into the medical record, which will improve telephone and virtual visits.  Additionally, studies have shown that adults actually lose more weight when their weights are automatically sent to someone else, and also that this process is not stressful for those adults.    Below is a link for purchasing the scale, with a discount code for our patients. You may call your insurance company to see if they will reimburse you for the cost of the scale, as a piece of durable medical equipment. The scales only go up to a weight of 400 pounds. This is an issue and we are working with the developer on increasing this. We found no scales that go over 400lb that have blue-tooth for connecting to [employer].    Scale to purchase: the [employer]  Body  Scale: [email or web address]=aw.ds    Discount Code: We have a discount code for our patients to bring the cost down to $50, the code is:  withmed     Steps to link the scale to [employer] via an Android Phone (you can always disconnect at any point in the future):  1. The order must be placed first before the patient can access Track My Health within [employer].  2. Download Google Fit christa from the Google Play Store  a. Log in or register using your Google account  3. Download the [employer] christa from Google Play Store  a. Select add organization  b. Search for [employer] and select it  c. Log into [employer]  d. Select Track My Health  e. Select the green connect my account button  f. When  prompted log into your Google account  g. Select okay to confirm the account  4. Download the [employer] Health Mate christiano from Google Play Store  a. [place] for [employer]  b. Go to profile  c. Tap google fit under the Apps section  d. Select the option to activate Google Fit integration  e. Select the same Google account  f. Select okay to confirm the account  g.  Steps to link the scale to [employer] via an iPhone (you can always disconnect at any point in the future):  **Note [employer] is not available for download on an iPad**  1. The order must be placed first before the patient can access Track My Health within [employer].  2. Locate the Health christiano on your iPhone.  a. Set up your Apple Health account as prompted  b. The Sources page will show Apps that communicate with your Health christiano. Once all steps are completed, you should see [employer] and [employer] listed under the Apps section and your iPhone under the devices section.  i. Select Health Open Lending  1. Under 'ALLOW  HEALTH [employer]  TO WRITE DATA ensure the toggle is on for Weight.  2. This will allow the scale to add your weight to the Apple Health  ii. Select [employer]  1. Under 'ALLOW  [employer]  TO READ DATA ensure the toggle is on for Weight.  2. This allows [employer] to grab the weight from [employer] so your provider can see your weights.  3. Download the [employer] christiano from the [employer] Store  a. Select add organization                                                  b. Search for [employer] and select it  c. Log into [employer]  d. Select Track My Health  e. Select the green connect my account button  f. Follow prompts to link your device to [employer].  4. Download the Withings health mate christiano in the [employer] Store  a. [place]on for [employer]  b. Go to profile  c. Tap Health under the Apps section  d. If prompted to allow access with the Health Christiano, toggle weight on for read and write access.      For any questions/concerns contact Juanita Whitaker LPN at [phone]    To schedule  appointments with our team, please call [phone]    Please call during clinic hours Monday through Friday 8:00a - 4:00p if you have questions or you can contact us via appAttach at anytime.      Lab results will be communicated through My Chart or letter (if My Chart not used). Please call the clinic if you have not received communication after 1 week or if you have any questions.    Clinic Fax: [phone]    Thank you,  Medical Weight Management Team

## 2021-11-22 RX ORDER — GABAPENTIN 100 MG/1
CAPSULE ORAL
Qty: 90 CAPSULE | Refills: 1 | Status: SHIPPED | OUTPATIENT
Start: 2021-11-22 | End: 2022-02-17

## 2022-01-03 ENCOUNTER — VIRTUAL VISIT (OUTPATIENT)
Dept: PHARMACY | Facility: CLINIC | Age: 72
End: 2022-01-03
Payer: COMMERCIAL

## 2022-01-03 DIAGNOSIS — M54.2 CERVICAL PAIN: ICD-10-CM

## 2022-01-03 DIAGNOSIS — E55.9 VITAMIN D DEFICIENCY: ICD-10-CM

## 2022-01-03 DIAGNOSIS — F50.819 BINGE EATING DISORDER: Primary | ICD-10-CM

## 2022-01-03 DIAGNOSIS — E78.5 HYPERLIPIDEMIA LDL GOAL <100: ICD-10-CM

## 2022-01-03 DIAGNOSIS — R12 HEARTBURN: ICD-10-CM

## 2022-01-03 DIAGNOSIS — E66.01 MORBID OBESITY (H): ICD-10-CM

## 2022-01-03 DIAGNOSIS — F33.9 RECURRENT MAJOR DEPRESSIVE DISORDER, REMISSION STATUS UNSPECIFIED (H): ICD-10-CM

## 2022-01-03 DIAGNOSIS — Z78.9 TAKES DIETARY SUPPLEMENTS: ICD-10-CM

## 2022-01-03 DIAGNOSIS — I10 HYPERTENSION GOAL BP (BLOOD PRESSURE) < 140/90: ICD-10-CM

## 2022-01-03 PROCEDURE — 99607 MTMS BY PHARM ADDL 15 MIN: CPT | Performed by: PHARMACIST

## 2022-01-03 PROCEDURE — 99605 MTMS BY PHARM NP 15 MIN: CPT | Performed by: PHARMACIST

## 2022-01-03 RX ORDER — ACETAMINOPHEN 500 MG
500-1000 TABLET ORAL EVERY 6 HOURS PRN
COMMUNITY
End: 2023-04-26

## 2022-01-03 NOTE — LETTER
_  Medication List        Prepared on: 1/3/2022     Bring your Medication List when you go to the doctor, hospital, or   emergency room. And, share it with your family or caregivers.     Note any changes to how you take your medications.  Cross out medications when you no longer use them.    Medication How I take it Why I use it Prescriber   acetaminophen (TYLENOL) 500 MG tablet Take 500-1,000 mg by mouth every 6 hours as needed for mild pain Myofacial Pain Patient Reported   amLODIPine (NORVASC) 5 MG tablet Take 5 mg by mouth once daily  Essential hypertension with goal blood pressure less than 140/90 Hellina Tegagne Emmanuel, DO   aspirin (ASA) 81 MG chewable tablet TAKE 1 TABLET BY MOUTH DAILY Essential hypertension with goal blood pressure less than 140/90 Hellina Tegagne Emmanuel, DO   atorvastatin (LIPITOR) 10 MG tablet Take 1 tablet (10 mg) by mouth daily CARDIOVASCULAR SCREENING; LDL GOAL LESS THAN 130 Hellina Tegagne Emmanuel, DO   buPROPion (WELLBUTRIN XL) 150 MG 24 hr tablet Take 150 mg by mouth every morning Depression ALVARADO SUN   esomeprazole (NEXIUM 24HR) 20 MG DR capsule Take 1 tablet by mouth daily  Heartburn  Patient Reported   gabapentin (NEURONTIN) 100 MG capsule TAKE 1 CAPSULE BY MOUTH EVERY NIGHT Myofascial Pain Hellina Tegagne Emmanuel, DO   hydrochlorothiazide (HYDRODIURIL) 25 MG tablet Take 1 tablet (25 mg) by mouth daily Essential hypertension with goal blood pressure less than 140/90 Hellina Tegagne Emmanuel, DO   venlafaxine (EFFEXOR-XR) 37.5 MG 24 hr capsule Take 1 capsule (37.5 mg) by mouth daily Mild Major Depression (H) ALVARADO SUN   venlafaxine (EFFEXOR-XR) 75 MG 24 hr capsule TAKE 1 CAPSULE(75 MG) BY MOUTH DAILY Mild Major Depression (H) ALVARADO SUN   vitamin B complex with vitamin C (VITAMIN  B COMPLEX) tablet Take 1 tablet by mouth daily General Health  Patient Reported   Vitamin D3 (CHOLECALCIFEROL) 125 MCG (5000 UT) tablet Take 1 tablet by mouth daily General Health  Patient Reported         Add new medications, over-the-counter drugs, herbals, vitamins, or  minerals in the blank rows below.    Medication How I take it Why I use it Prescriber                          Allergies:      nitrofurantoin        Side effects I have had:              Other Information:             My notes and questions:

## 2022-01-03 NOTE — PROGRESS NOTES
Medication Therapy Management (MTM) Encounter    ASSESSMENT:                            Medication Adherence/Access: No issues identified    Obesity/Binge Eating Disorder: Needs improvement.  Patient cannot use the following medications due to: Phentermine: likely unsafe due to age, HTN   Qsymia (Phentermine/Topiramate): likely unsafe due to age, HTN   Contrave: Naltrexone ineffective and already taking bupropion  Naltrexone: ineffective  Ozempic (semaglutide): could be of consideration. However, due to no diagnosis of T2DM and [employer], likely won't have GLP1 agonist use coverage for T2DM and GLP1 agonist used for weight loss are not covered.   Therefore, topiramate could be considered at low dose as option. Patient is aware this would be used off label for weight loss and risk of dose dependent effects on cognition.     Hypertension:  Last blood pressure at goal <140/90 mmHg.     Hyperlipidemia: Stable. Current ACC/AHA Guidelines typically recommend against routine low dose aspirin use for primary prevention of ASCVD in patients > 70 years old. Could consider discussing in greater detail at next visit, did not get the chance to discuss today.     Depression:  Unimproved. To discuss current status with psychiatry at appointment this week. Discussed if venlafaxine was going to be continued at current dose could consider switching venlafaxine to 150 mg capsule rather than two 75 mg capsules to decrease # pills.     Vitamin D Deficiency/Supplements: Stable.     GERD: Stable.     Myofacial Pain: Stable.     PLAN:                            1. Pharmacist reaching out to Dr. Greenfield regarding thoughts on low dose topiramate start - 25 mg daily for 1-2 weeks then increase to 50 mg daily.     2. Follow up with psychiatrist as planned.     Follow-up: Lauren Bloch [facility] pharmacist in 3 months, Schedule follow up with Dr. Mable Greenfield  in 4-6 weeks. Call [phone] to schedule.     SUBJECTIVE/OBJECTIVE:                 "          Shelia Sanchez is a 71 year old female called for an initial visit. She was referred to me from Dr. Greenfield.      Reason for visit: Naltrexone side effects.    Allergies/ADRs: Reviewed in chart  Past Medical History: Reviewed in chart  Tobacco: She reports that she has never smoked. She has never used smokeless tobacco.  Alcohol: 1-3 beverages / week usually on weekends when goes out to have a drink with sisters  Caffeine: 2 cups coffee or tea daily, makes sure not to drink past noon    Medication Adherence/Access:   Patient uses pill box(es). Sets up every 2 weeks. She does herself.   Patient takes medications 2 time(s) per day, AM and PM.   Per patient, misses medication 0 times per week.     Obesity/Binge Eating Disorder:    - not taking currently   Bupropion  mg daily     Followed by Dr. Mable Greenfield , seen 11/19/2021 for [facility]. Increased naltrexone to 1.5 tablets once daily. Patient is experiencing the follow side effects: nausea, abdominal cramping, headache. Not taking currently. Those side side effects did resolve. She reports that she was unsure if the medication was helpful. Reports this time of year is difficult, especially around Jessa time. Working on alternative behavior options with therapist. She reports she is binging more. No history of kidney stones. No glaucoma.   Weight History: Works with [facility] for binge eating disorder. Works with therapist and dietitian there. She reports she was obese as a child, adult and in senior care.   Diet/Eating Habits: Patient reports \"things go off the rails\" starting at lunch. Larger hunger throughout the day at that point. Working on having more snacks in the car if off schedule.  Therapist schedule: protein + carb + fat for breakfast lunch and dinner, snack between meals and after supper. Reports emotional eating plays a role as well.  Sips water \"all the time\". Water with lunch and dinner.   Exercise/Activity: " "Patient reports going to PT with knee, needs knee replacement.      Wt Readings from Last 4 Encounters:   11/19/21 286 lb (129.7 kg)   11/15/21 286 lb (129.7 kg)   10/22/21 293 lb (132.9 kg)   10/22/21 277 lb (125.6 kg)     Estimated body mass index is 44.79 kg/m  as calculated from the following:    Height as of 11/19/21: 5' 7\" (1.702 m).    Weight as of 11/19/21: 286 lb (129.7 kg).      Hypertension:    Amlodipine 5 mg once daily in AM   Hydrochlorothiazide 25 mg daily in AM     Patient does not self-monitor blood pressure.  Patient reports no current medication side effects. Minor orthostatic hypotension. Does denote some lower leg swelling. PT suggested compression stockings. She is elevating legs during the day which is helpful. Does denote pitting.   BP Readings from Last 3 Encounters:   11/15/21 138/78   10/22/21 (!) 144/72   10/19/21 (!) 145/85     Creatinine   Date Value Ref Range Status   05/10/2021 0.82 0.52 - 1.04 mg/dL Final     GFR Estimate   Date Value Ref Range Status   05/10/2021 71 >60 mL/min/[1.73_m2] Final     Comment:     Non  GFR Calc  Starting 12/18/2018, serum creatinine based estimated GFR (eGFR) will be   calculated using the Chronic Kidney Disease Epidemiology Collaboration   (CKD-EPI) equation.       GFR Estimate If Black   Date Value Ref Range Status   05/10/2021 82 >60 mL/min/[1.73_m2] Final     Comment:      GFR Calc  Starting 12/18/2018, serum creatinine based estimated GFR (eGFR) will be   calculated using the Chronic Kidney Disease Epidemiology Collaboration   (CKD-EPI) equation.       Potassium   Date Value Ref Range Status   05/10/2021 3.9 3.4 - 5.3 mmol/L Final     Hyperlipidemia:   Atorvastatin 10mg daily at night     Aspirin 81 mg daily     No major issues of bleeding or bruising. No personal history of heart attack or stroke. Family history of this. Patient reports no significant myalgias or other side effects.    Recent Labs   Lab Test " 05/10/21  0906 02/08/21  0900 02/05/16  0816 01/23/15  0902 01/21/14  0911   CHOL 160 226*   < > 207* 199   HDL 70 59   < > 75 63   LDL 78 140*   < > 119 112   TRIG 58 136   < > 63 118   CHOLHDLRATIO  --   --   --  2.8 3.1    < > = values in this interval not displayed.     The 10-year ASCVD risk score (Josep MCINTOSH Jr., et al., 2013) is: 18.7%    Values used to calculate the score:      Age: 71 years      Sex: Female      Is Non- : No      Diabetic: No      Tobacco smoker: No      Systolic Blood Pressure: 138 mmHg      Is BP treated: Yes      HDL Cholesterol: 70 mg/dL      Total Cholesterol: 160 mg/dL    Depression:    Bupropion XL 150mg once daily   Venlafaxine XL 150mg + 37.5 mg daily     She feels that this is a tough time of year. She speaks with prescriber on Thursday. She has tried other medication(s) before because not working as well. Venlafaxine was previously put on board to help with hot flashes. Doses were increased to help with depression. Still sometimes has issues of hotflashes but not as before.   PHQ-9 SCORE 8/4/2020 2/8/2021 11/15/2021   PHQ-9 Total Score MyChart 11 (Moderate depression) - -   PHQ-9 Total Score 11 15 18     Vitamin D Deficiency/Supplements:  Vitamin D 5,000 international unit(s) daily   B Complex 1 tablet daily     No concerns. Started vitamin D a while ago. B complex was suggested by family member to help with mood.   Lab Results   Component Value Date    VITDT 58 05/10/2021    VITDT 42 08/05/2019    VITDT 11 (L) 05/06/2019     GERD:   Nexium (esomeprazole) 20 mg once daily.     Pt reports no current symptoms. Breakthrough symptoms intermittently, some weeks not at all. Does find that when on a regimented schedule with food that GERD symptoms are less. The patient does have history of GI bleed many years ago. Patient feels that current regimen is effective. Unsure of food triggers.     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 Has had nerve blocking procedure. Medication side effects: none?. Helpful for spine or neck pain. Was told to steer clear of ibuprofen so now taking acetaiminophen. Was referred to Weight Management from Orthopedics to lower weight for potential knee replacement surgery. Goal is to try to achieve a BMI of less than 40.     ----------------    I spent 60 minutes with this patient today.  (an extra 15 minutes was spent creating the Medication Action Plan).  I offer these suggestions for consideration by Dr. Greenfield. A copy of the visit note was provided to the patient's referring provider.    The patient was sent via [employer] a summary of these recommendations.     Lauren Bloch, PharmD  Medication Therapy Management Pharmacist   [facility]    Telemedicine Visit Details  Type of service:  Telephone visit  Start Time: 10:18 AM   End Time: 11:18 AM  Originating Location (patient location): Home  Distant Location (provider location):  [facility]     Medication Therapy Recommendations  Morbid obesity (H)    Current Medication: naltrexone (DEPADE/REVIA) 50 MG tablet (Discontinued)   Rationale: Undesirable effect - Adverse medication event - Safety   Recommendation: Change Medication - topiramate 25 MG tablet   Status: Contact Provider - Awaiting Response

## 2022-01-03 NOTE — LETTER
"Recommended To-Do List      Prepared on: 1/3/2022     You can get the best results from your medications by completing the items on this \"To-Do List.\"      Bring your To-Do List when you go to your doctor. And, share it with your family or caregivers.    My To-Do List:  What we talked about: What I should do:   An issue with your medication    Pharmacist is reaching out to Dr. Greenfield about changing the medication you are taking from naltrexone (DEPADE/REVIA) to topiramate (TOPAMAX).               "

## 2022-01-03 NOTE — LETTER
January 3, 2022  Shelia S Laura  [address]  [place] [address]    Dear Ms. Sanchez [facility] WEIGHT MANAGEMENT [place]        Thank you for talking with me on Jose 3, 2022 about your health and medications. As a follow-up to our conversation, I have included two documents:      1. Your Recommended To-Do List has steps you should take to get the best results from your medications.  2. Your Medication List will help you keep track of your medications and how to take them.    If you want to talk about these documents, please call Lauren T. Bloch, RPH at phone: [phone], Monday-Friday 8-4:30pm.    I look forward to working with you and your doctors to make sure your medications work well for you.    Sincerely,    Lauren T. Bloch, RPH

## 2022-01-04 NOTE — PATIENT INSTRUCTIONS
Recommendations from today's MTM visit:                                                    MTM (medication therapy management) is a service provided by a clinical pharmacist designed to help you get the most of out of your medicines.   Today we reviewed what your medicines are for, how to know if they are working, that your medicines are safe and how to make your medicine regimen as easy as possible.      1. Pharmacist reaching out to Dr. Greenfield regarding thoughts on low dose topiramate start - 25 mg daily for 1-2 weeks then increase to 50 mg daily.     Follow-up: Lauren Bloch MTM pharmacist in 3 months, Schedule follow up with Dr. Mable Greenfield  in 4-6 weeks. Call [phone] to schedule    It was great to speak with you today.  I value your experience and would be very thankful for your time with providing feedback on our clinic survey. You may receive a survey via email or text message in the next few days.       My Clinical Pharmacist's contact information:                                                      Please feel free to contact me with any questions or concerns you have.      Lauren Bloch, PharmD  Medication Therapy Management Pharmacist   [facility] Weight Management [place]

## 2022-01-06 ASSESSMENT — SLEEP AND FATIGUE QUESTIONNAIRES
HOW LIKELY ARE YOU TO NOD OFF OR FALL ASLEEP WHILE SITTING INACTIVE IN A PUBLIC PLACE: WOULD NEVER DOZE
HOW LIKELY ARE YOU TO NOD OFF OR FALL ASLEEP WHILE SITTING QUIETLY AFTER LUNCH WITHOUT ALCOHOL: SLIGHT CHANCE OF DOZING
HOW LIKELY ARE YOU TO NOD OFF OR FALL ASLEEP WHILE SITTING AND READING: SLIGHT CHANCE OF DOZING
HOW LIKELY ARE YOU TO NOD OFF OR FALL ASLEEP IN A CAR, WHILE STOPPED FOR A FEW MINUTES IN TRAFFIC: WOULD NEVER DOZE
HOW LIKELY ARE YOU TO NOD OFF OR FALL ASLEEP WHILE LYING DOWN TO REST IN THE AFTERNOON WHEN CIRCUMSTANCES PERMIT: MODERATE CHANCE OF DOZING
HOW LIKELY ARE YOU TO NOD OFF OR FALL ASLEEP WHEN YOU ARE A PASSENGER IN A CAR FOR AN HOUR WITHOUT A BREAK: SLIGHT CHANCE OF DOZING
HOW LIKELY ARE YOU TO NOD OFF OR FALL ASLEEP WHILE SITTING AND TALKING TO SOMEONE: WOULD NEVER DOZE
HOW LIKELY ARE YOU TO NOD OFF OR FALL ASLEEP WHILE WATCHING TV: MODERATE CHANCE OF DOZING

## 2022-01-11 ENCOUNTER — VIRTUAL VISIT (OUTPATIENT)
Dept: SLEEP MEDICINE | Facility: CLINIC | Age: 72
End: 2022-01-11
Payer: COMMERCIAL

## 2022-01-11 VITALS — HEIGHT: 67 IN | WEIGHT: 286 LBS | BODY MASS INDEX: 44.89 KG/M2

## 2022-01-11 DIAGNOSIS — G47.33 OSA (OBSTRUCTIVE SLEEP APNEA): Primary | ICD-10-CM

## 2022-01-11 PROCEDURE — 99214 OFFICE O/P EST MOD 30 MIN: CPT | Mod: 95 | Performed by: PHYSICIAN ASSISTANT

## 2022-01-11 ASSESSMENT — PAIN SCALES - GENERAL: PAINLEVEL: SEVERE PAIN (6)

## 2022-01-11 ASSESSMENT — MIFFLIN-ST. JEOR: SCORE: 1834.92

## 2022-01-11 NOTE — PROGRESS NOTES
"Shelia is a 71 year old who is being evaluated via a billable video visit.      How would you like to obtain your AVS? MyChart  If the video visit is dropped, the invitation should be resent by: Send to e-mail at: [email or web address]  Will anyone else be joining your video visit? No    Video Start Time: 9:05 AM  Video-Visit Details    Type of service:  Video Visit    Video End Time:9:24 AM    Originating Location (pt. Location): Home    Distant Location (provider location):  [facility]     Platform used for Video Visit: [employer]     Medication and allergies have been reviewed.   VF Jasiel Pedraza     Obstructive Sleep Apnea - PAP Follow-Up Visit:    Chief Complaint   Patient presents with     Video Visit     CPAP Follow up, pt states she stopped using the device in October due to the recall.        Shelia Sanchez comes in today for follow-up of their very severe sleep apnea, managed with CPAP.     Shelia Sanchez initially presented with high probability of obstructive sleep apnea with modest possibility of coexisting hypoventilation based on BMI of 43, loud snoring, witnessed apnea, non-refreshing sleep, fatigue/excessive daytime sleepiness(ESS 9), crowded oropharynx, morning headaches, neck circumference of 16\" and co-morbid HTN. STOP-BANG score is 7/8.    8/8/2018 [facility] [facility] Sleep Study (275.0 lbs) - .3, .7, Supine .3, REM AHI n/a, Low O2% 81.3%, Time Spent =88% 2.6, Time Spent =89% 5.2. Treatment was titrated to a pressure of CPAP 8 with an AHI 28.9. Time spent in REM at this pressure was 0 minutes.    Last visit was on 10/16/2020. She was doing well on CPAP. She stopped using CPAP in October 2021 because her machine is subject to recall. She spoke with [employer] representative and given options to continue using of her current CPAP with a filter and or stopping. She registered her machine.  She also communicated with her insurance and was told that " insurance will not pay for a new machine.  She has been sleeping in a recliner.     Her PAP interface is Nasal Mask.    She does not feel rested in the morning.    Total score - [place]: 7 (1/6/2022  1:27 PM)      Past medical/surgical history, family history, social history, medications and allergies were reviewed.      Problem List:  Patient Active Problem List    Diagnosis Date Noted     Binge eating disorder 08/08/2019     Priority: Medium     Prediabetes 08/08/2019     Priority: Medium     LORI (obstructive sleep apnea)- severe () 08/09/2018     Priority: Medium     8/8/2018 [facility] Sleep Study (275.0 lbs) - .3, .7, Supine .3, REM AHI n/a, Low O2% 81.3%, Time Spent ?88% 2.6, Time Spent ?89% 5.2. Treatment was titrated to a pressure of CPAP 8 with an AHI 28.9. Time spent in REM at this pressure was 0 minutes.       Mild major depression (H) 01/10/2018     Priority: Medium     Heartburn 11/16/2016     Priority: Medium     Menopausal syndrome (hot flashes) 03/30/2016     Priority: Medium     Cervical pain 11/19/2015     Priority: Medium     Left-sided headache 11/19/2015     Priority: Medium     Atypical nevus 02/13/2015     Priority: Medium     MILD, risk factor for melanoma. Needs yearly skin exam.   The entire mole was not removed, so if it grows, needs to be completely excised.   Do you wish to do the replacement in the background? yes         Urinary incontinence 02/02/2015     Priority: Medium     Cataracts, both eyes 05/07/2014     Priority: Medium     Left shoulder pain 12/27/2012     Priority: Medium     Advanced directives, counseling/discussion 02/09/2012     Priority: Medium     Advance Care Planning:   ACP Review and Resources Provided:  Reviewed chart for advance care plan.  Shelia Sanchez has no plan or code status on file. Discussed available resources and provided with information. Confirmed code status reflects current choices pending further ACP discussions.  " Confirmed/documented designated decision maker(s). See permanent comments section of demographics in clinical tab.   Added by Brisa Hernandez on 3/6/2015  Discussed advance care planning with patient; information given to patient to review. 2/9/2012          Hypertension goal BP (blood pressure) < 140/90 07/02/2011     Priority: Medium     CARDIOVASCULAR SCREENING; LDL GOAL LESS THAN 130 06/11/2010     Priority: Medium     BMI > 40      Priority: Medium        Ht 1.702 m (5' 7\")   Wt 129.7 kg (286 lb)   BMI 44.79 kg/m      Impression/Plan:    Severe obstructive sleep apnea-  Stopped using CPAP due to machine recall.   We reviewed options including alternative therapies. She does not want to use a recalled machine.   She will look to buy a machine online.   I have placed an order and will email her the order.     Shelia Sanchez will follow up in about 6 months.     Sunita Sanches PA-C    "

## 2022-01-11 NOTE — PATIENT INSTRUCTIONS
Your BMI is Body mass index is 44.79 kg/m .  Weight management is a personal decision.  If you are interested in exploring weight loss strategies, the following discussion covers the approaches that may be successful. Body mass index (BMI) is one way to tell whether you are at a healthy weight, overweight, or obese. It measures your weight in relation to your height.  A BMI of 18.5 to 24.9 is in the healthy range. A person with a BMI of 25 to 29.9 is considered overweight, and someone with a BMI of 30 or greater is considered obese. More than two-thirds of American adults are considered overweight or obese.  Being overweight or obese increases the risk for further weight gain. Excess weight may lead to heart disease and diabetes.  Creating and following plans for healthy eating and physical activity may help you improve your health.  Weight control is part of healthy lifestyle and includes exercise, emotional health, and healthy eating habits. Careful eating habits lifelong are the mainstay of weight control. Though there are significant health benefits from weight loss, long-term weight loss with diet alone may be very difficult to achieve- studies show long-term success with dietary management in less than 10% of people. Attaining a healthy weight may be especially difficult to achieve in those with severe obesity. In some cases, medications, devices and surgical management might be considered.  What can you do?  If you are overweight or obese and are interested in methods for weight loss, you should discuss this with your provider.     Consider reducing daily calorie intake by 500 calories.     Keep a food journal.     Avoiding skipping meals, consider cutting portions instead.    Diet combined with exercise helps maintain muscle while optimizing fat loss. Strength training is particularly important for building and maintaining muscle mass. Exercise helps reduce stress, increase energy, and improves fitness.  Increasing exercise without diet control, however, may not burn enough calories to loose weight.       Start walking three days a week 10-20 minutes at a time    Work towards walking thirty minutes five days a week     Eventually, increase the speed of your walking for 1-2 minutes at time    And look into health and wellness programs that may be available through your health insurance provider, employer, local community center, or gregory club.    Weight management plan: Patient was referred to their PCP to discuss a diet and exercise plan.

## 2022-02-15 ASSESSMENT — ENCOUNTER SYMPTOMS
COUGH: 1
ARTHRALGIAS: 1
BREAST MASS: 0
HEADACHES: 1
NERVOUS/ANXIOUS: 1
HEARTBURN: 1

## 2022-02-15 ASSESSMENT — ACTIVITIES OF DAILY LIVING (ADL): CURRENT_FUNCTION: NO ASSISTANCE NEEDED

## 2022-02-16 ASSESSMENT — ENCOUNTER SYMPTOMS
NERVOUS/ANXIOUS: 1
ARTHRALGIAS: 1
COUGH: 1
HEARTBURN: 1
HEADACHES: 1
BREAST MASS: 0

## 2022-02-16 ASSESSMENT — ACTIVITIES OF DAILY LIVING (ADL): CURRENT_FUNCTION: NO ASSISTANCE NEEDED

## 2022-02-16 NOTE — PROGRESS NOTES
"SUBJECTIVE:   Shelia Sanchez is a 71 year old female who presents for Preventive Visit.      Patient has been advised of split billing requirements and indicates understanding: Yes  Are you in the first 12 months of your Medicare coverage?  No    Healthy Habits:     In general, how would you rate your overall health?  Fair    Frequency of exercise:  None    Do you usually eat at least 4 servings of fruit and vegetables a day, include whole grains    & fiber and avoid regularly eating high fat or \"junk\" foods?  No    Taking medications regularly:  Yes    Medication side effects:  Not applicable    Ability to successfully perform activities of daily living:  No assistance needed    Home Safety:  No safety concerns identified    Hearing Impairment:  No hearing concerns    In the past 6 months, have you been bothered by leaking of urine? Yes    In general, how would you rate your overall mental or emotional health?  Fair      PHQ-2 Total Score: 2    Additional concerns today:  No    Do you feel safe in your environment? Yes    Have you ever done Advance Care Planning? (For example, a Health Directive, POLST, or a discussion with a medical provider or your loved ones about your wishes): No, advance care planning information given to patient to review.  Patient plans to discuss their wishes with loved ones or provider.         Fall risk  Fallen 2 or more times in the past year?: No  Any fall with injury in the past year?: No    Cognitive Screening   1) Repeat 3 items (Leader, Season, Table)    2) Clock draw: NORMAL  3) 3 item recall: Recalls 3 objects  Results: 3 items recalled: COGNITIVE IMPAIRMENT LESS LIKELY    Mini-CogTM Copyright MAIA Tucker. Licensed by the author for use in [facility]; reprinted with permission ([email or web address]). All rights reserved.      Do you have sleep apnea, excessive snoring or daytime drowsiness?: yes    Reviewed and updated as needed this visit by clinical staff   Tobacco  " Allergies  Meds              Reviewed and updated as needed this visit by Provider                 Social History     Tobacco Use     Smoking status: Never Smoker     Smokeless tobacco: Never Used   Substance Use Topics     Alcohol use: Yes     Alcohol/week: 0.0 standard drinks     Comment: Weekends mostly.         Alcohol Use 2/15/2022   Prescreen: >3 drinks/day or >7 drinks/week? No   Prescreen: >3 drinks/day or >7 drinks/week? -       She is tolerating lipirot 20mg    Pain in the neck she is taking gabapentin 1 BID, no sedation    Recently psych increased Wellbutrin , Effexor,     hypertension-hydrochlorithiazide and Norvasc    Hyperlipidemia Follow-Up      Are you regularly taking any medication or supplement to lower your cholesterol?   Yes- statin therapy, 20 mG     Are you having muscle aches or other side effects that you think could be caused by your cholesterol lowering medication?  No    Hypertension Follow-up      Do you check your blood pressure regularly outside of the clinic? Yes     Are you following a low salt diet? Yes    Are your blood pressures ever more than 140 on the top number (systolic) OR more   than 90 on the bottom number (diastolic), for example 140/90? No    Depression and Anxiety Follow-Up    How are you doing with your depression since your last visit? No change    How are you doing with your anxiety since your last visit?  No change    Are you having other symptoms that might be associated with depression or anxiety? Yes:  insomnia, didnt sleep last night    Have you had a significant life event? No     Do you have any concerns with your use of alcohol or other drugs? No    Social History     Tobacco Use     Smoking status: Never Smoker     Smokeless tobacco: Never Used   Vaping Use     Vaping Use: Never used   Substance Use Topics     Alcohol use: Yes     Alcohol/week: 0.0 standard drinks     Comment: Weekends mostly.     Drug use: No     PHQ 8/4/2020 2/8/2021 11/15/2021   PHQ-9  Total Score 11 15 18   Q9: Thoughts of better off dead/self-harm past 2 weeks Not at all Not at all Not at all     OSCAR-7 SCORE 8/4/2020 2/8/2021 11/15/2021   Total Score 8 (mild anxiety) - -   Total Score 8 11 14     Last PHQ-9 11/15/2021   1.  Little interest or pleasure in doing things 2   2.  Feeling down, depressed, or hopeless 2   3.  Trouble falling or staying asleep, or sleeping too much 2   4.  Feeling tired or having little energy 3   5.  Poor appetite or overeating 3   6.  Feeling bad about yourself 2   7.  Trouble concentrating 3   8.  Moving slowly or restless 1   Q9: Thoughts of better off dead/self-harm past 2 weeks 0   PHQ-9 Total Score 18   Difficulty at work, home, or with people Somewhat difficult     OSCAR-7  11/15/2021   1. Feeling nervous, anxious, or on edge 2   2. Not being able to stop or control worrying 2   3. Worrying too much about different things 3   4. Trouble relaxing 2   5. Being so restless that it is hard to sit still 0   6. Becoming easily annoyed or irritable 3   7. Feeling afraid, as if something awful might happen 2   OSCAR-7 Total Score 14   If you checked any problems, how difficult have they made it for you to do your work, take care of things at home, or get along with other people? Somewhat difficult       Suicide Assessment Five-step Evaluation and Treatment (SAFE-T)      Current providers sharing in care for this patient include:   Patient Care Team:  Mariana Benitez DO as PCP - General (Family Practice)  Caitlin Reeves, RN as   Mariana Benitez DO as Assigned PCP  Nura Castellon MD as Assigned Musculoskeletal Provider  Mable Greenfield MD as Assigned Endocrinology Provider  Chip Shoemaker OD as Assigned Surgical Provider  Bloch, Lauren Turner, [facility] as Pharmacist (Pharmacist)    The following health maintenance items are reviewed in Epic and correct as of today:  Health Maintenance Due   Topic Date Due     DEPRESSION ACTION PLAN  " Never done     COLORECTAL CANCER SCREENING  Never done     ZOSTER IMMUNIZATION (2 of 3) 04/18/2016     MAMMO SCREENING  01/24/2020     EYE EXAM  09/24/2021     BMP  11/10/2021     FALL RISK ASSESSMENT  02/08/2022     BP Readings from Last 3 Encounters:   02/17/22 130/72   11/15/21 138/78   10/22/21 (!) 144/72    Wt Readings from Last 3 Encounters:   02/17/22 132.7 kg (292 lb 8 oz)   01/11/22 129.7 kg (286 lb)   11/19/21 129.7 kg (286 lb)                  Pneumonia Vaccine:Adults age 65+ who received Pneumovax (PPSV23) at 65 years or older: Should be given PCV13 > 1 year after their most recent PPSV23  Mammogram Screening: Mammogram Screening - Patient over age 75, has elected to discontinue screenings.        Review of Systems   HENT: Positive for ear pain.    Eyes: Positive for visual disturbance.   Respiratory: Positive for cough.    Cardiovascular: Positive for peripheral edema.   Gastrointestinal: Positive for heartburn.   Breasts:  Negative for tenderness, breast mass and discharge.   Genitourinary: Positive for urgency. Negative for pelvic pain, vaginal bleeding and vaginal discharge.   Musculoskeletal: Positive for arthralgias.   Neurological: Positive for headaches.   Psychiatric/Behavioral: Positive for mood changes. The patient is nervous/anxious.      Constitutional, HEENT, cardiovascular, pulmonary, GI, , musculoskeletal, neuro, skin, endocrine and psych systems are negative, except as otherwise noted.    OBJECTIVE:   /72   Pulse 98   Temp 98  F (36.7  C) (Oral)   Resp 16   Ht 1.702 m (5' 7\")   Wt 132.7 kg (292 lb 8 oz)   SpO2 100%   BMI 45.81 kg/m   Estimated body mass index is 45.81 kg/m  as calculated from the following:    Height as of this encounter: 1.702 m (5' 7\").    Weight as of this encounter: 132.7 kg (292 lb 8 oz).  Physical Exam  GENERAL: healthy, alert and no distress  EYES: Eyes grossly normal to inspection, PERRL and conjunctivae and sclerae normal  HENT: ear canals and " TM's normal, nose and mouth without ulcers or lesions  NECK: no adenopathy, no asymmetry, masses, or scars and thyroid normal to palpation  RESP: lungs clear to auscultation - no rales, rhonchi or wheezes  BREAST: normal without masses, tenderness or nipple discharge and no palpable axillary masses or adenopathy  CV: regular rate and rhythm, normal S1 S2, no S3 or S4, no murmur, click or rub, no peripheral edema and peripheral pulses strong  ABDOMEN: soft, nontender, no hepatosplenomegaly, no masses and bowel sounds normal   declined  MS: no gross musculoskeletal defects noted, no edema  SKIN: no suspicious lesions or rashes  NEURO: Normal strength and tone, mentation intact and speech normal  PSYCH: mentation appears normal, affect normal/bright    Diagnostic Test Results:  Labs reviewed in Epic    ASSESSMENT / PLAN:       ICD-10-CM    1. Encounter for Medicare annual wellness exam  Z00.00    2. Essential hypertension with goal blood pressure less than 140/90  I10 amLODIPine (NORVASC) 5 MG tablet     hydrochlorothiazide (HYDRODIURIL) 25 MG tablet     Comprehensive metabolic panel (BMP + Alb, Alk Phos, ALT, AST, Total. Bili, TP)     CBC with platelets and differential     TSH with free T4 reflex     TSH with free T4 reflex     CBC with platelets and differential     Comprehensive metabolic panel (BMP + Alb, Alk Phos, ALT, AST, Total. Bili, TP)   3. High cholesterol  E78.00 atorvastatin (LIPITOR) 20 MG tablet     Lipid panel reflex to direct LDL Fasting     Comprehensive metabolic panel (BMP + Alb, Alk Phos, ALT, AST, Total. Bili, TP)     CBC with platelets and differential     CBC with platelets and differential     Comprehensive metabolic panel (BMP + Alb, Alk Phos, ALT, AST, Total. Bili, TP)     Lipid panel reflex to direct LDL Fasting   4. LORI (obstructive sleep apnea)- severe ()  G47.33    5. Prediabetes  R73.03 Hemoglobin A1c (aka HBA1C)     TSH with free T4 reflex     TSH with free T4 reflex      "Hemoglobin A1c (aka HBA1C)   6. Myofascial pain  M79.18 gabapentin (NEURONTIN) 100 MG capsule   7. Cervical pain  M54.2    8. Bilateral leg edema  R60.0    9. Visit for screening mammogram  Z12.31 MA SCREENING DIGITAL BILAT - Future  (s+30)   10. Screening for thyroid disorder  Z13.29 TSH with free T4 reflex     TSH with free T4 reflex   11. 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    Weight as of this encounter: 132.7 kg (292 lb 8 oz).    Weight management plan: Discussed healthy diet and exercise guidelines    She reports that she has never smoked. She has never used smokeless tobacco.      Appropriate preventive services were discussed with this patient, including applicable screening as appropriate for cardiovascular disease, diabetes, osteopenia/osteoporosis, and glaucoma.  As appropriate for age/gender, discussed screening for colorectal cancer, prostate cancer, breast cancer, and cervical cancer. Checklist reviewing preventive services available has been given to the patient.    Reviewed patients plan of care and provided an AVS. The Intermediate Care Plan ( asthma action plan, low back pain action plan, and migraine action plan) for Shelia meets the Care Plan requirement. This Care Plan has been established and " reviewed with the Patient.    Counseling Resources:  ATP IV Guidelines  Pooled Cohorts Equation Calculator  Breast Cancer Risk Calculator  Breast Cancer: Medication to Reduce Risk  FRAX Risk Assessment  ICSI Preventive Guidelines  Dietary Guidelines for Americans, 2010  USDA's MyPlate  ASA Prophylaxis  Lung CA Screening    DO KAROLYN Parekh [facility]    Identified Health Risks:    The patient was provided with suggestions to help her develop a healthy physical lifestyle.  She is at risk for lack of exercise and has been provided with information to increase physical activity for the benefit of her well-being.  The patient was counseled and encouraged to consider modifying their diet and eating habits. She was provided with information on recommended healthy diet options.  Information on urinary incontinence and treatment options given to patient.  The patient was provided with suggestions to help her develop a healthy emotional lifestyle.

## 2022-02-16 NOTE — PATIENT INSTRUCTIONS
6.2% [id number] prediabetes    Eye exam  cologaurd stool sample     Shingles shotPredm-stable,    Follow up with psych as planned    Nice to see you again, follow up in 6 months , sooner if needed  Continue all meds  Compression stockings  Follow up in 3-6 months    Mariana Benitez D.O.      Mammogram Scheduling  [facility] [phone] or [phone]  [facility] [phone]  Patient Education   Personalized Prevention Plan  You are due for the preventive services outlined below.  Your care team is available to assist you in scheduling these services.  If you have already completed any of these items, please share that information with your care team to update in your medical record.  Health Maintenance Due   Topic Date Due     Depression Action Plan  Never done     Colorectal Cancer Screening  Never done     Zoster (Shingles) Vaccine (2 of 3) 04/18/2016     Mammogram  01/24/2020     Discuss Advance Care Planning  03/06/2020     Eye Exam  09/24/2021     Basic Metabolic Panel  11/10/2021     FALL RISK ASSESSMENT  02/08/2022     Your Health Risk Assessment indicates you feel you are not in good health    A healthy lifestyle helps keep the body fit and the mind alert. It helps protect you from disease, helps you fight disease, and helps prevent chronic disease (disease that doesn't go away) from getting worse. This is important as you get older and begin to notice twinges in muscles and joints and a decline in the strength and stamina you once took for granted. A healthy lifestyle includes good healthcare, good nutrition, weight control, recreation, and regular exercise. Avoid harmful substances and do what you can to keep safe. Another part of a healthy lifestyle is stay mentally active and socially involved.    Good healthcare     Have a wellness visit every year.     If you have new symptoms, let us know right away. Don't wait until the next checkup.     Take medicines exactly as prescribed and keep your  medicines in a safe place. Tell us if your medicine causes problems.   Healthy diet and weight control     Eat 3 or 4 small, nutritious, low-fat, high-fiber meals a day. Include a variety of fruits, vegetables, and whole-grain foods.     Make sure you get enough calcium in your diet. Calcium, vitamin D, and exercise help prevent osteoporosis (bone thinning).     If you live alone, try eating with others when you can. That way you get a good meal and have company while you eat it.     Try to keep a healthy weight. If you eat more calories than your body uses for energy, it will be stored as fat and you will gain weight.     Recreation   Recreation is not limited to sports and team events. It includes any activity that provides relaxation, interest, enjoyment, and exercise. Recreation provides an outlet for physical, mental, and social energy. It can give a sense of worth and achievement. It can help you stay healthy.    Mental Exercise and Social Involvement  Mental and emotional health is as important as physical health. Keep in touch with friends and family. Stay as active as possible. Continue to learn and challenge yourself.   Things you can do to stay mentally active are:    Learn something new, like a foreign language or musical instrument.     Play SCRABBLE or do crossword puzzles. If you cannot find people to play these games with you at home, you can play them with others on your computer through the Internet.     Join a games club--anything from card games to chess or checkers or lawn bowling.     Start a new hobby.     Go back to school.     Volunteer.     Read.   Keep up with world events.    Exercise for a Healthier Heart  You may wonder how you can improve the health of your heart. If you re thinking about exercise, you re on the right track. You don t need to become an athlete. But you do need a certain amount of brisk exercise to help strengthen your heart. If you have been diagnosed with a heart  condition, your healthcare provider may advise exercise to help stabilize your condition. To help make exercise a habit, choose safe, fun activities.      Exercise with a friend. When activity is fun, you're more likely to stick with it.   Before you start  Check with your healthcare provider before starting an exercise program. This is especially important if you have not been active for a while. It's also important if you have a long-term (chronic) health problem such as heart disease, diabetes, or obesity. Or if you are at high risk for having these problems.   Why exercise?  Exercising regularly offers many healthy rewards. It can help you do all of the following:     Improve your blood cholesterol level to help prevent further heart trouble    Lower your blood pressure to help prevent a stroke or heart attack    Control diabetes, or reduce your risk of getting this disease    Improve your heart and lung function    Reach and stay at a healthy weight    Make your muscles stronger so you can stay active    Prevent falls and fractures by slowing the loss of bone mass (osteoporosis)    Manage stress better    Reduce your blood pressure    Improve your sense of self and your body image  Exercise tips      Ease into your routine. Set small goals. Then build on them. If you are not sure what your activity level should be, talk with your healthcare provider first before starting an exercise routine.    Exercise on most days. Aim for a total of 150 minutes (2 hours and 30 minutes) or more of moderate-intensity aerobic activity each week. Or 75 minutes (1 hour and 15 minutes) or more of vigorous-intensity aerobic activity each week. Or try for a combination of both. Moderate activity means that you breathe heavier and your heart rate increases but you can still talk. Think about doing 40 minutes of moderate exercise, 3 to 4 times a week. For best results, activity should last for about 40 minutes to lower blood pressure  and cholesterol. It's OK to work up to the 40-minute period over time. Examples of moderate-intensity activity are walking 1 mile in 15 minutes. Or doing 30 to 45 minutes of yard work.    Step up your daily activity level.  Along with your exercise program, try being more active the whole day. Walk instead of drive. Or park further away so that you take more steps each day. Do more household tasks or yard work. You may not be able to meet the advised mount of physical activity. But doing some moderate- or vigorous-intensity aerobic activity can help reduce your risk for heart disease. Your healthcare provider can help you figure out what is best for you.    Choose 1 or more activities you enjoy.  Walking is one of the easiest things you can do. You can also try swimming, riding a bike, dancing, or taking an exercise class.    When to call your healthcare provider  Call your healthcare provider if you have any of these:     Chest pain or feel dizzy or lightheaded    Burning, tightness, pressure, or heaviness in your chest, neck, shoulders, back, or arms    Abnormal shortness of breath    More joint or muscle pain    A very fast or irregular heartbeat (palpitations)  "[employer] last reviewed this educational content on 7/1/2019 2000-2021 [employer]. All rights reserved. This information is not intended as a substitute for professional medical care. Always follow your healthcare professional's instructions.          Understanding USDA MyPlate  The USDA has guidelines to help you make healthy food choices. These are called MyPlate. MyPlate shows the food groups that make up healthy meals using the image of a place setting. Before you eat, think about the healthiest choices for what to put on your plate or in your cup or bowl. To learn more about building a healthy plate, visit www.choosemyplate.gov.    The food groups    Fruits. Any fruit or 100% fruit juice counts as part of the Fruit Group. Fruits may  be fresh, canned, frozen, or dried, and may be whole, cut-up, or pureed. Make 1/2 of your plate fruits and vegetables.    Vegetables. Any vegetable or 100% vegetable juice counts as a member of the Vegetable Group. Vegetables may be fresh, frozen, canned, or dried. They can be served raw or cooked and may be whole, cut-up, or mashed. Make 1/2 of your plate fruits and vegetables.    Grains. All foods made from grains are part of the Grains Group. These include wheat, rice, oats, cornmeal, and barley. Grains are often used to make foods such as bread, pasta, oatmeal, cereal, tortillas, and grits. Grains should be no more than 1/4 of your plate. At least half of your grains should be whole grains.    Protein. This group includes meat, poultry, seafood, beans and peas, eggs, processed soy products (such as tofu), nuts (including nut butters), and seeds. Make protein choices no more than 1/4 of your plate. Meat and poultry choices should be lean or low fat.    Dairy. The Dairy Group includes all fluid milk products and foods made from milk that contain calcium, such as yogurt and cheese. (Foods that have little calcium, such as cream, butter, and cream cheese, are not part of this group.) Most dairy choices should be low-fat or fat-free.    Oils. Oils aren't a food group, but they do contain essential nutrients. However it's important to watch your intake of oils. These are fats that are liquid at room temperature. They include canola, corn, olive, soybean, vegetable, and sunflower oil. Foods that are mainly oil include mayonnaise, certain salad dressings, and soft margarines. You likely already get your daily oil allowance from the foods you eat.  Things to limit  Eating healthy also means limiting these things in your diet:       Salt (sodium). Many processed foods have a lot of sodium. To keep sodium intake down, eat fresh vegetables, meats, poultry, and seafood when possible. Purchase low-sodium, reduced-sodium, or  no-salt-added food products at the store. And don't add salt to your meals at home. Instead, season them with herbs and spices such as dill, oregano, cumin, and paprika. Or try adding flavor with lemon or lime zest and juice.    Saturated fat. Saturated fats are most often found in animal products such as beef, pork, and chicken. They are often solid at room temperature, such as butter. To reduce your saturated fat intake, choose leaner cuts of meat and poultry. And try healthier cooking methods such as grilling, broiling, roasting, or baking. For a simple lower-fat swap, use plain nonfat yogurt instead of mayonnaise when making potato salad or macaroni salad.    Added sugars. These are sugars added to foods. They are in foods such as ice cream, candy, soda, fruit drinks, sports drinks, energy drinks, cookies, pastries, jams, and syrups. Cut down on added sugars by sharing sweet treats with a family member or friend. You can also choose fruit for dessert, and drink water or other unsweetened beverages.     [employer] last reviewed this educational content on 6/1/2020 2000-2021 [employer]. All rights reserved. This information is not intended as a substitute for professional medical care. Always follow your healthcare professional's instructions.          Urinary Incontinence, Female (Adult)   Urinary incontinence means loss of bladder control. This problem affects many women, especially as they get older. If you have incontinence, you may be embarrassed to ask for help. But know that this problem can be treated.   Types of Incontinence  There are different types of incontinence. Two of the main types are described here. You can have more than one type.     Stress incontinence. With this type, urine leaks when pressure (stress) is put on the bladder. This may happen when you cough, sneeze, or laugh. Stress incontinence most often occurs because the pelvic floor muscles that support the bladder and  urethra are weak. This can happen after pregnancy and vaginal childbirth or a hysterectomy. It can also be due to excess body weight or hormone changes.    Urge incontinence (also called overactive bladder). With this type, a sudden urge to urinate is felt often. This may happen even though there may not be much urine in the bladder. The need to urinate often during the night is common. Urge incontinence most often occurs because of bladder spasms. This may be due to bladder irritation or infection. Damage to bladder nerves or pelvic muscles, constipation, and certain medicines can also lead to urge incontinence.  Treatment depends on the cause. Further evaluation is needed to find the type you have. This will likely include an exam and certain tests. Based on the results, you and your healthcare provider can then plan treatment. Until a diagnosis is made, the home care tips below can help ease symptoms.   Home care    Do pelvic floor muscle exercises, if they are prescribed. The pelvic floor muscles help support the bladder and urethra. Many women find that their symptoms improve when doing special exercises that strengthen these muscles. To do the exercises, contract the muscles you would use to stop your stream of urine. But do this when you re not urinating. Hold for 10 seconds, then relax. Repeat 10 to 20 times in a row, at least 3 times a day. Your healthcare provider may give you other instructions for how to do the exercises and how often.    Keep a bladder diary. This helps track how often and how much you urinate over a set period of time. Bring this diary with you to your next visit with the provider. The information can help your provider learn more about your bladder problem.    Lose weight, if advised to by your provider. Extra weight puts pressure on the bladder. Your provider can help you create a weight-loss plan that s right for you. This may include exercising more and making certain diet  changes.    Don't have foods and drinks that may irritate the bladder. These can include alcohol and caffeinated drinks.    Quit smoking. Smoking and other tobacco use can lead to a long-term (chronic) cough that strains the pelvic floor muscles. Smoking may also damage the bladder and urethra. Talk with your provider about treatments or methods you can use to quit smoking.    If drinking large amounts of fluid makes you have symptoms, you may be advised to limit your fluid intake. You may also be advised to drink most of your fluids during the day and to limit fluids at night.    If you re worried about urine leakage or accidents, you may wear absorbent pads to catch urine. Change the pads often. This helps reduce discomfort. It may also reduce the risk of skin or bladder infections.    Follow-up care  Follow up with your healthcare provider, or as directed. It may take some to find the right treatment for your problem. But healthy lifestyle changes can be made right away. These include such things as exercising on a regular basis, eating a healthy diet, losing weight (if needed), and quitting smoking. Your treatment plan may include special therapies or medicines. Certain procedures or surgery may also be options. Talk about any questions you have with your provider.   When to seek medical advice  Call the healthcare provider right away if any of these occur:    Fever of 100.4 F (38 C) or higher, or as directed by your provider    Bladder pain or fullness    Belly swelling    Nausea or vomiting    Back pain    Weakness, dizziness, or fainting  Karina last reviewed this educational content on 1/1/2020 2000-2021 [employer]. All rights reserved. This information is not intended as a substitute for professional medical care. Always follow your healthcare professional's instructions.        Your Health Risk Assessment indicates you feel you are not in good emotional health.    Recreation    Recreation is not limited to sports and team events. It includes any activity that provides relaxation, interest, enjoyment, and exercise. Recreation provides an outlet for physical, mental, and social energy. It can give a sense of worth and achievement. It can help you stay healthy.    Mental Exercise and Social Involvement  Mental and emotional health is as important as physical health. Keep in touch with friends and family. Stay as active as possible. Continue to learn and challenge yourself.   Things you can do to stay mentally active are:    Learn something new, like a foreign language or musical instrument.     Play SCRABBLE or do crossword puzzles. If you cannot find people to play these games with you at home, you can play them with others on your computer through the Internet.     Join a games club--anything from card games to chess or checkers or lawn bowling.     Start a new hobby.     Go back to school.     Volunteer.     Read.   Keep up with world events.

## 2022-02-17 ENCOUNTER — OFFICE VISIT (OUTPATIENT)
Dept: FAMILY MEDICINE | Facility: CLINIC | Age: 72
End: 2022-02-17
Payer: COMMERCIAL

## 2022-02-17 VITALS
HEIGHT: 67 IN | WEIGHT: 292.5 LBS | BODY MASS INDEX: 45.91 KG/M2 | DIASTOLIC BLOOD PRESSURE: 72 MMHG | SYSTOLIC BLOOD PRESSURE: 130 MMHG | HEART RATE: 98 BPM | OXYGEN SATURATION: 100 % | TEMPERATURE: 98 F | RESPIRATION RATE: 16 BRPM

## 2022-02-17 DIAGNOSIS — M54.2 CERVICAL PAIN: ICD-10-CM

## 2022-02-17 DIAGNOSIS — I10 ESSENTIAL HYPERTENSION WITH GOAL BLOOD PRESSURE LESS THAN 140/90: ICD-10-CM

## 2022-02-17 DIAGNOSIS — E78.00 HIGH CHOLESTEROL: ICD-10-CM

## 2022-02-17 DIAGNOSIS — R60.0 BILATERAL LEG EDEMA: ICD-10-CM

## 2022-02-17 DIAGNOSIS — Z12.11 SCREEN FOR COLON CANCER: ICD-10-CM

## 2022-02-17 DIAGNOSIS — Z12.31 VISIT FOR SCREENING MAMMOGRAM: ICD-10-CM

## 2022-02-17 DIAGNOSIS — M79.18 MYOFASCIAL PAIN: ICD-10-CM

## 2022-02-17 DIAGNOSIS — R73.03 PREDIABETES: ICD-10-CM

## 2022-02-17 DIAGNOSIS — Z00.00 ENCOUNTER FOR MEDICARE ANNUAL WELLNESS EXAM: Primary | ICD-10-CM

## 2022-02-17 DIAGNOSIS — Z13.29 SCREENING FOR THYROID DISORDER: ICD-10-CM

## 2022-02-17 DIAGNOSIS — G47.33 OSA (OBSTRUCTIVE SLEEP APNEA): ICD-10-CM

## 2022-02-17 LAB
BASOPHILS # BLD AUTO: 0 10E3/UL (ref 0–0.2)
BASOPHILS NFR BLD AUTO: 0 %
EOSINOPHIL # BLD AUTO: 0.2 10E3/UL (ref 0–0.7)
EOSINOPHIL NFR BLD AUTO: 2 %
ERYTHROCYTE [DISTWIDTH] IN BLOOD BY AUTOMATED COUNT: 14.3 % (ref 10–15)
HBA1C MFR BLD: 6.2 % (ref 0–5.6)
HCT VFR BLD AUTO: 40.6 % (ref 35–47)
HGB BLD-MCNC: 13 G/DL (ref 11.7–15.7)
LYMPHOCYTES # BLD AUTO: 2 10E3/UL (ref 0.8–5.3)
LYMPHOCYTES NFR BLD AUTO: 20 %
MCH RBC QN AUTO: 28.1 PG (ref 26.5–33)
MCHC RBC AUTO-ENTMCNC: 32 G/DL (ref 31.5–36.5)
MCV RBC AUTO: 88 FL (ref 78–100)
MONOCYTES # BLD AUTO: 0.7 10E3/UL (ref 0–1.3)
MONOCYTES NFR BLD AUTO: 7 %
NEUTROPHILS # BLD AUTO: 7.1 10E3/UL (ref 1.6–8.3)
NEUTROPHILS NFR BLD AUTO: 71 %
PLATELET # BLD AUTO: 276 10E3/UL (ref 150–450)
RBC # BLD AUTO: 4.63 10E6/UL (ref 3.8–5.2)
WBC # BLD AUTO: 10 10E3/UL (ref 4–11)

## 2022-02-17 PROCEDURE — 80061 LIPID PANEL: CPT | Performed by: FAMILY MEDICINE

## 2022-02-17 PROCEDURE — 85025 COMPLETE CBC W/AUTO DIFF WBC: CPT | Performed by: FAMILY MEDICINE

## 2022-02-17 PROCEDURE — 99397 PER PM REEVAL EST PAT 65+ YR: CPT | Performed by: FAMILY MEDICINE

## 2022-02-17 PROCEDURE — 80053 COMPREHEN METABOLIC PANEL: CPT | Performed by: FAMILY MEDICINE

## 2022-02-17 PROCEDURE — 84443 ASSAY THYROID STIM HORMONE: CPT | Performed by: FAMILY MEDICINE

## 2022-02-17 PROCEDURE — 36415 COLL VENOUS BLD VENIPUNCTURE: CPT | Performed by: FAMILY MEDICINE

## 2022-02-17 PROCEDURE — 83036 HEMOGLOBIN GLYCOSYLATED A1C: CPT | Performed by: FAMILY MEDICINE

## 2022-02-17 PROCEDURE — 99214 OFFICE O/P EST MOD 30 MIN: CPT | Mod: 25 | Performed by: FAMILY MEDICINE

## 2022-02-17 RX ORDER — HYDROCHLOROTHIAZIDE 25 MG/1
25 TABLET ORAL DAILY
Qty: 90 TABLET | Refills: 3 | Status: SHIPPED | OUTPATIENT
Start: 2022-02-17 | End: 2022-03-14

## 2022-02-17 RX ORDER — GABAPENTIN 100 MG/1
CAPSULE ORAL
Qty: 180 CAPSULE | Refills: 1 | Status: SHIPPED | OUTPATIENT
Start: 2022-02-17 | End: 2022-11-10

## 2022-02-17 RX ORDER — AMLODIPINE BESYLATE 5 MG/1
TABLET ORAL
Qty: 90 TABLET | Refills: 3 | Status: SHIPPED | OUTPATIENT
Start: 2022-02-17 | End: 2023-01-29

## 2022-02-17 RX ORDER — ATORVASTATIN CALCIUM 20 MG/1
20 TABLET, FILM COATED ORAL DAILY
Qty: 90 TABLET | Refills: 2 | Status: SHIPPED | OUTPATIENT
Start: 2022-02-17 | End: 2023-04-10

## 2022-02-17 ASSESSMENT — PAIN SCALES - GENERAL: PAINLEVEL: NO PAIN (0)

## 2022-02-17 NOTE — LETTER
February 22, 2022      Shelia Sanchez  [address]   [place] [id number]            Dear Shelia,       Your lipids are much better, continue all meds as discussed.      Take Care,        Mariana Benitez, DO    Results for orders placed or performed in visit on 02/17/22   TSH with free T4 reflex     Status: Normal   Result Value Ref Range    TSH 1.62 0.40 - 4.00 mU/L   Comprehensive metabolic panel (BMP + Alb, Alk Phos, ALT, AST, Total. Bili, TP)     Status: Abnormal   Result Value Ref Range    Sodium 140 133 - 144 mmol/L    Potassium 3.5 3.4 - 5.3 mmol/L    Chloride 109 94 - 109 mmol/L    Carbon Dioxide (CO2) 23 20 - 32 mmol/L    Anion Gap 8 3 - 14 mmol/L    Urea Nitrogen 24 7 - 30 mg/dL    Creatinine 0.88 0.52 - 1.04 mg/dL    Calcium 9.6 8.5 - 10.1 mg/dL    Glucose 134 (H) 70 - 99 mg/dL    Alkaline Phosphatase 97 40 - 150 U/L    AST 16 0 - 45 U/L    ALT 32 0 - 50 U/L    Protein Total 6.7 (L) 6.8 - 8.8 g/dL    Albumin 3.5 3.4 - 5.0 g/dL    Bilirubin Total 0.5 0.2 - 1.3 mg/dL    GFR Estimate 69 >60 mL/min/1.73m2   Hemoglobin A1c (aka HBA1C)     Status: Abnormal   Result Value Ref Range    Hemoglobin A1C 6.2 (H) 0.0 - 5.6 %   Lipid panel reflex to direct LDL Fasting     Status: None   Result Value Ref Range    Cholesterol 155 <200 mg/dL    Triglycerides 79 <150 mg/dL    Direct Measure HDL 65 >=50 mg/dL    LDL Cholesterol Calculated 74 <=100 mg/dL    Non HDL Cholesterol 90 <130 mg/dL    Patient Fasting > 8hrs? Yes     Narrative    Cholesterol  Desirable:  <200 mg/dL    Triglycerides  Normal:  Less than 150 mg/dL  Borderline High:  150-199 mg/dL  High:  200-499 mg/dL  Very High:  Greater than or equal to 500 mg/dL    Direct Measure HDL  Female:  Greater than or equal to 50 mg/dL   Male:  Greater than or equal to 40 mg/dL    LDL Cholesterol  Desirable:  <100mg/dL  Above Desirable:  100-129 mg/dL   Borderline High:  130-159 mg/dL   High:  160-189 mg/dL   Very High:  >= 190 mg/dL    Non HDL  Cholesterol  Desirable:  130 mg/dL  Above Desirable:  130-159 mg/dL  Borderline High:  160-189 mg/dL  High:  190-219 mg/dL  Very High:  Greater than or equal to 220 mg/dL   CBC with platelets and differential     Status: None   Result Value Ref Range    WBC Count 10.0 4.0 - 11.0 10e3/uL    RBC Count 4.63 3.80 - 5.20 10e6/uL    Hemoglobin 13.0 11.7 - 15.7 g/dL    Hematocrit 40.6 35.0 - 47.0 %    MCV 88 78 - 100 fL    MCH 28.1 26.5 - 33.0 pg    MCHC 32.0 31.5 - 36.5 g/dL    RDW 14.3 10.0 - 15.0 %    Platelet Count 276 150 - 450 10e3/uL    % Neutrophils 71 %    % Lymphocytes 20 %    % Monocytes 7 %    % Eosinophils 2 %    % Basophils 0 %    Absolute Neutrophils 7.1 1.6 - 8.3 10e3/uL    Absolute Lymphocytes 2.0 0.8 - 5.3 10e3/uL    Absolute Monocytes 0.7 0.0 - 1.3 10e3/uL    Absolute Eosinophils 0.2 0.0 - 0.7 10e3/uL    Absolute Basophils 0.0 0.0 - 0.2 10e3/uL   CBC with platelets and differential     Status: None    Narrative    The following orders were created for panel order CBC with platelets and differential.  Procedure                               Abnormality         Status                     ---------                               -----------         ------                     CBC with platelets and d...[[id number]]                      Final result                 Please view results for these tests on the individual orders.

## 2022-02-18 ENCOUNTER — VIRTUAL VISIT (OUTPATIENT)
Dept: ENDOCRINOLOGY | Facility: CLINIC | Age: 72
End: 2022-02-18
Payer: COMMERCIAL

## 2022-02-18 VITALS — BODY MASS INDEX: 45.83 KG/M2 | WEIGHT: 292 LBS | HEIGHT: 67 IN

## 2022-02-18 DIAGNOSIS — R73.03 PREDIABETES: ICD-10-CM

## 2022-02-18 DIAGNOSIS — E66.01 CLASS 3 SEVERE OBESITY DUE TO EXCESS CALORIES WITH SERIOUS COMORBIDITY AND BODY MASS INDEX (BMI) OF 40.0 TO 44.9 IN ADULT (H): Primary | ICD-10-CM

## 2022-02-18 DIAGNOSIS — E66.813 CLASS 3 SEVERE OBESITY DUE TO EXCESS CALORIES WITH SERIOUS COMORBIDITY AND BODY MASS INDEX (BMI) OF 40.0 TO 44.9 IN ADULT (H): Primary | ICD-10-CM

## 2022-02-18 LAB
ALBUMIN SERPL-MCNC: 3.5 G/DL (ref 3.4–5)
ALP SERPL-CCNC: 97 U/L (ref 40–150)
ALT SERPL W P-5'-P-CCNC: 32 U/L (ref 0–50)
ANION GAP SERPL CALCULATED.3IONS-SCNC: 8 MMOL/L (ref 3–14)
AST SERPL W P-5'-P-CCNC: 16 U/L (ref 0–45)
BILIRUB SERPL-MCNC: 0.5 MG/DL (ref 0.2–1.3)
BUN SERPL-MCNC: 24 MG/DL (ref 7–30)
CALCIUM SERPL-MCNC: 9.6 MG/DL (ref 8.5–10.1)
CHLORIDE BLD-SCNC: 109 MMOL/L (ref 94–109)
CHOLEST SERPL-MCNC: 155 MG/DL
CO2 SERPL-SCNC: 23 MMOL/L (ref 20–32)
CREAT SERPL-MCNC: 0.88 MG/DL (ref 0.52–1.04)
FASTING STATUS PATIENT QL REPORTED: YES
GFR SERPL CREATININE-BSD FRML MDRD: 69 ML/MIN/1.73M2
GLUCOSE BLD-MCNC: 134 MG/DL (ref 70–99)
HDLC SERPL-MCNC: 65 MG/DL
LDLC SERPL CALC-MCNC: 74 MG/DL
NONHDLC SERPL-MCNC: 90 MG/DL
POTASSIUM BLD-SCNC: 3.5 MMOL/L (ref 3.4–5.3)
PROT SERPL-MCNC: 6.7 G/DL (ref 6.8–8.8)
SODIUM SERPL-SCNC: 140 MMOL/L (ref 133–144)
TRIGL SERPL-MCNC: 79 MG/DL
TSH SERPL DL<=0.005 MIU/L-ACNC: 1.62 MU/L (ref 0.4–4)

## 2022-02-18 PROCEDURE — 99215 OFFICE O/P EST HI 40 MIN: CPT | Mod: 95 | Performed by: INTERNAL MEDICINE

## 2022-02-18 RX ORDER — TOPIRAMATE 25 MG/1
TABLET, FILM COATED ORAL
Qty: 90 TABLET | Refills: 5 | Status: SHIPPED | OUTPATIENT
Start: 2022-02-18 | End: 2022-04-29

## 2022-02-18 ASSESSMENT — PAIN SCALES - GENERAL: PAINLEVEL: NO PAIN (0)

## 2022-02-18 NOTE — PATIENT INSTRUCTIONS
Thank you for allowing us the privilege of caring for you. We hope we provided you with the excellent service you deserve.    Please let us know if there is anything else we can do for you so that we can be sure you are completely satisfied with your care experience.    Your visit was with Dr. Rahman today.    Instructions per today's visit:  --will start topiramate before supper (see information below)  --let's plan a follow up visit with nutrition for goal setting  --we can plan a follow up visit with our medication management pharmacist in about 4-6 wks and with Dr. Rahman again in about 2-3 mo    Please call our contact center at [phone] to schedule your next appointments.    Meal Replacement Products:    Here is the link to our new e-store where you can purchase our meal replacement products    [email or web address]    The one week starter kit is a great way to sample a variety of products and see what works for you.    If you want more information about the product go to: [email or web address]    Free Shipping for orders over $75    Benefits of meal replacements products:    Portion and calorie control  Improved nutrition  Structured eating  Simplified food choices  Avoid contact with trigger foods    Interested in working with a health ?  Health coaches work with you to improve your overall health and wellbeing.  They look at the whole person, and may involve discussion of different areas of life, including, but not limited to the four pillars of health (sleep, exercise, nutrition, and stress management). Discuss with your care team if you would like to start working a health .    Health Coaching-3 Pack: Schedule by calling [phone]    $99 for three health coaching visits    Visits may be done in person or via phone    Coaching is a partnership between the  and the client; Coaches do not prescribe or diagnose    Coaching helps inspire the  client to reach his/her personal goals    Bluetooth Scale:    We hope to provide you with high quality telephone and virtual healthcare visits while social distancing for COVID-19 is necessary, as well as in the future when virtual visits may be more convenient for you.    Our technology team made it possible for Bluetooth scales to send weight measurements to our electronic medical record. This allows weights from you weighing at home to securely flow into the medical record, which will improve telephone and virtual visits.  Additionally, studies have shown that adults actually lose more weight when their weights are automatically sent to someone else, and also that this process is not stressful for those adults.    Below is a link for purchasing the scale, with a discount code for our patients. You may call your insurance company to see if they will reimburse you for the cost of the scale, as a piece of durable medical equipment. The scales only go up to a weight of 400 pounds. This is an issue and we are working with the developer on increasing this. We found no scales that go over 400lb that have blue-tooth for connecting to [employer].    Scale to purchase: the PureHistory  Body  Scale: [email or web address]=aw.ds    Discount Code: We have a discount code for our patients to bring the cost down to $50, the code is:  withmed     Steps to link the scale to [employer] via an Android Phone (you can always disconnect at any point in the future):  1. The order must be placed first before the patient can access Track My Health within [employer].  2. Download Google Fit christa from the Google Play Store  a. Log in or register using your Google account  3. Download the [employer] christa from Google Play Store  a. Select add organization  b. Search for Pixelapse and select it  c. Log into [employer]  d. Select Track My Health  e. Select the green connect my  account button  f. When prompted log into your Google account  g. Select okay to confirm the account  4. Download the Withings Health Mate christiano from Google Play Store  a. [place] for Mapp  b. Go to profile  c. Tap google fit under the Apps section  d. Select the option to activate Google Fit integration  e. Select the same Google account  f. Select okay to confirm the account  g.  Steps to link the scale to [employer] via an iPhone (you can always disconnect at any point in the future):  **Note Cardiio is not available for download on an iPad**  1. The order must be placed first before the patient can access Track My Health within [employer].  2. Locate the Health christiano on your iPhone.  a. Set up your Apple Health account as prompted  b. The Sources page will show Apps that communicate with your Health christiano. Once all steps are completed, you should see Shippo and Zeph[employer]hart listed under the Apps section and your iPhone under the devices section.  i. Select Health TapSurge  1. Under 'ALLOW  Mercatus  TO WRITE DATA ensure the toggle is on for Weight.  2. This will allow the scale to add your weight to the Apple Health  ii. Select Ze[employer] Solutionshart  1. Under 'ALLOW  ConjuGonT  TO READ DATA ensure the toggle is on for Weight.  2. This allows [employer] to grab the weight from Cardiio so your provider can see your weights.  3. Download the spotdockt christiano from the [employer] Store  a. Select add organization                                                  b. Search for [place] and select it  c. Log into [employer]  d. Select Track My Health  e. Select the green connect my account button  f. Follow prompts to link your device to [employer].  4. Download the Withings health mate christiano in the [employer] Store  a. [place] for [employer]  b. Go to profile  c. Tap Health under the Apps section  d. If prompted to allow access with the Health Christiano, toggle weight on for read and write access.      For any questions/concerns contact Juanita Whitaker LPN at  [phone]    To schedule appointments with our team, please call [phone]    Please call during clinic hours Monday through Friday 8:00a - 4:00p if you have questions or you can contact us via Pure life renal at anytime.      Lab results will be communicated through My Chart or letter (if My Chart not used). Please call the clinic if you have not received communication after 1 week or if you have any questions.    Clinic Fax: [phone]    Thank you,  Medical Weight Management Team      MEDICATION STARTED AT THIS APPOINTMENT  We are starting topiramate before supper.  Start one tab, 25 mg, for a week. Go up to 50 mg (2 tabs) for the next week. At the third week, take   3 tabs (75 mg).  Stay at 3 tabs until you are seen again. Call the nurse if you have any questions or concerns. (Do not stop taking it if you don't think it's working. For some people it works even though they do not feel much different.)    Topiramate (Topamax) is a medication that is used most often to treat migraine headaches or for seizures. It has also been found to help with weight loss. Although it's not currently FDA approved for weight loss, it has been used safely for a number of years to help people who are carrying extra weight.     Just how topiramate helps with weight loss has not been exactly determined. However it seems to work on areas of the brain to quiet down signals related to eating.      Topiramate may make you:    >feel less interest in eating in between meals   >think less about food and eating   >find it easier to push the plate away   >find giving up pop easier    >have an easier time eating less    For some of our patients, the pills work right away. They feel and think quite differently about food. Other patients don't feel much of a change but find in fact they have lost weight! Like all weight loss medications, topiramate works best when you help it work.  This means:    1) Have less tempting high calorie (fattening)  food around the house or office    2) Have lower calorie food (fruits, vegetables,low fat meats and dairy) for snacks    3) Eat out only one time or less each week.   4) Eat your meals at a table with the TV or computer off.    Side-effects. Topiramate is generally well tolerated. The main side-effects we see are:   Tingling in hands,feet, or face (usually not very troublesome)   Mental confusion and word finding trouble (about 10% of patients have this.)     Feeling sleepy or a bit dopey- this goes away very soon after starting.    One of the dangers of topiramate is the possibility of birth defects--if you get pregnant when you are on it, there is the risk that your baby will be born with a cleft lip or palate.  If you are on topiramate and of child bearing age, you need to be on a reliable form of birth control or refrain from sexual intercourse.     Please refer to the pharmacy insert for more information on side-effects. Since many pharmacists are not familiar with the use of topiramate in weight loss, calling the clinic will get you the most accurate information on the use of this medication for weight loss.     In order to get refills of this or any medication we prescribe you must be seen in the medical weight mgmt clinic every 2-3 months. Please have your pharmacy fax a refill request.

## 2022-02-18 NOTE — LETTER
2022       RE: Shelia Sanchez  [address]  [place] [address]     Dear Colleague,    Thank you for referring your patient, Shelia Sanchez, to the [facility] [place] at [facility]. Please see a copy of my visit note below.    Shelia is a 71 year old who is being evaluated via a billable video visit.      How would you like to obtain your AVS? MyChart  If the video visit is dropped, the invitation should be resent by: Text to cell phone: [phone]  Will anyone else be joining your video visit? No    Video-Visit Details      Video not working for pt--converted to phone (19 min duration phone call)    Return Medical Weight Management Note     Shelia Sanchez  MRN:  [id number]  :  1950  JOSEPH:  2022    Dear Mariana Benitez DO,    I had the pleasure of seeing your patient Shelia Sanchez. She is a 71 year old female who I am continuing to see for treatment of obesity related to:       9/15/2021   I have the following health issues associated with obesity: Pre-Diabetes, High Blood Pressure, High Cholesterol, Sleep Apnea, GERD (Reflux), Stress Incontinence, Osteoarthritis (joint disease)   I have the following symptoms associated with obesity: Knee Pain, Depression, Lower Extremity Swelling, Back Pain, Fatigue       INTERVAL HISTORY:      She returns and in the interim she notes the following--  --working with therapist at [place]/working on binge eating  --therapist at [place] thought that the advice from the nutritionist was not helpful; instead of advise from nutrition pt is eating 3 meals and snacks between to help with avoiding binging  --meals: a protein/a carb/healthy fat/nonstarchy veggies  --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 Planning to join the [employer]. PT and therapist want pt to use recumbent bike (strengthen legs for surgery)     LAST WEIGHT:   286 lbs 0 oz   Body mass index is 44.79 kg/m .    Since last seen--  --depressed over last few mo and PA working with psychiatrist has increased the bupropion, pt is feeling better  --pt has binge eating DO and is seen at [place]; notes the strategy she was given there is at odds with the advice she is getting from our nutritonist ([place] wants 3 good mreals  --still planning knee replacement  --will get pt back to nutritionist  --machine at  for work outs (PT is guiding pt's exercise towards lower impact); 2-3 times per wk initially; pt also has home exercises    CURRENT WEIGHT:   292 lbs 0 oz    Initial Weight (lbs): 277 lbs  Last Visits Weight: 125.6 kg (277 lb)  Cumulative weight loss (lbs): -15  Weight Loss Percentage: -5.42%    Changes and Difficulties 2/17/2022   I have made the following changes to my diet since my last visit: No fast food. Keeping candy out of the house.   With regards to my diet, I am still struggling with: Still working on 3 meals a day with 3 snacks as recommended by therapist. My binging has been the same fir awhile. Having anxiety.   I have made the following changes to my activity/exercise since my last visit: Daily OT exercise. Just got new card for the [employer] from [employer] thru [facility]. Taking that first step very hard.   With regards to my activity/exercise, I am still struggling with: Getting them done each day on a schedule.       VITALS:  Ht 1.702 m (5' 7\")   Wt 132.5 kg (292 lb)   BMI 45.73 kg/m      MEDICATIONS:   Current Outpatient Medications   Medication Sig Dispense Refill     acetaminophen (TYLENOL) 500 MG tablet Take 500-1,000 mg by mouth every 6 hours as needed for mild pain       amLODIPine (NORVASC) 5 MG tablet Take dailly 90 tablet 3     aspirin " (ASA) 81 MG chewable tablet TAKE 1 TABLET BY MOUTH DAILY 90 tablet 0     atorvastatin (LIPITOR) 20 MG tablet Take 1 tablet (20 mg) by mouth daily 90 tablet 2     buPROPion (WELLBUTRIN XL) 150 MG 24 hr tablet Take 300 mg by mouth every morning        esomeprazole (NEXIUM 24HR) 20 MG DR capsule Take 1 tablet by mouth daily        gabapentin (NEURONTIN) 100 MG capsule TAKE 1 CAPSULE BY MOUTH  capsule 1     hydrochlorothiazide (HYDRODIURIL) 25 MG tablet Take 1 tablet (25 mg) by mouth daily 90 tablet 3     venlafaxine (EFFEXOR-XR) 37.5 MG 24 hr capsule Take 1 capsule (37.5 mg) by mouth daily (Patient taking differently: Take 37.5 mg by mouth daily 75 mg + 75 mg + 37.5 mg = 187.5 mg daily) 90 capsule 0     venlafaxine (EFFEXOR-XR) 75 MG 24 hr capsule TAKE 1 CAPSULE(75 MG) BY MOUTH DAILY (Patient taking differently: Take 150 mg by mouth daily 75 mg + 75 mg + 37.5 mg = 187.5 mg daily) 90 capsule 0     vitamin B complex with vitamin C (VITAMIN  B COMPLEX) tablet Take 1 tablet by mouth daily       Vitamin D3 (CHOLECALCIFEROL) 125 MCG (5000 UT) tablet Take 1 tablet by mouth daily         Weight Loss Medication History Reviewed With Patient 2/17/2022   Which weight loss medications are you currently taking on a regular basis?  None   If you are not taking a weight loss medication that was prescribed to you, please indicate why: I did not like the side effects   Are you having any side effects from the weight loss medication that we have prescribed you? Yes   If you are having side effects please describe: Stomach pains, no longer taking. Nothing new predcribed since stopping. Did have phone call with phamacist. No new information since.         ASSESSMENT/PLAN:  Shelia is a patient with early onset morbid obesity with significant element of familial/genetic influence and with current health consequences. Shelia Sanchez endorses binging, eats a high carb diet, eats a high fat diet, uses food as a reward, uses food  as mood management and eats to obtain specific degree of fullness.     Her problem is complicated by a hunger disorder, strong craving/reward pathways, a binge eating component and mental health/psychopharmacological barriers     Her ability to lose weight is impacted by physical impairment.     PLAN:    Decrease portion sizes  Purge house of food triggers  Dietician visit of education  Volumetrics eating plan  Calorie/low fat diet  Meal planning     Craving/Reward   Ancillary testing:  N/A.  Food Plan:  Volumetrics and High protein/low carbohydrate.   Activity Plan:  PT ongoing  Supplementary:  N/A.   Medication:  The patient starting medication to assist in lifestyle changes     Additionally--  --before planned pharm support we discussed included--plan a naltrexone, b, topiramate, c semaglutide (initially Ozempic if covered).  Discussed potential risks/benefits and possible side effects.   --Given N with the naltrexone will now trial topiramate before supper    Follow ups--  --nutrition for meal planning/snack planning to meet hypocaloric needs for wt loss (about 500 calories below REE can be a goal at present with pharm support to help meet this goal)  --alfaro wt loss med mgmt in about 1 mo  --myself in about 2-3 mo        Time: about 40 min spent on evaluation, management, counseling, education, & motivational interviewing (video) combined with previsit prep and post visit charting/follow up care same day        Sincerely,    Mable Greenfield MD

## 2022-02-18 NOTE — PROGRESS NOTES
Shelia is a 71 year old who is being evaluated via a billable video visit.      How would you like to obtain your AVS? MyChart  If the video visit is dropped, the invitation should be resent by: Text to cell phone: [phone]  Will anyone else be joining your video visit? No    Video-Visit Details      Video not working for pt--converted to phone (19 min duration phone call)    Return Medical Weight Management Note     Shelia Sanchez  MRN:  [id number]  :  1950  JOSEPH:  2022    Dear Mariana Benitez DO,    I had the pleasure of seeing your patient Shelia Sanchez. She is a 71 year old female who I am continuing to see for treatment of obesity related to:       9/15/2021   I have the following health issues associated with obesity: Pre-Diabetes, High Blood Pressure, High Cholesterol, Sleep Apnea, GERD (Reflux), Stress Incontinence, Osteoarthritis (joint disease)   I have the following symptoms associated with obesity: Knee Pain, Depression, Lower Extremity Swelling, Back Pain, Fatigue       INTERVAL HISTORY:      She returns and in the interim she notes the following--  --working with therapist at [place]/working on binge eating  --therapist at [place] thought that the advice from the nutritionist was not helpful; instead of advise from nutrition pt is eating 3 meals and snacks between to help with avoiding binging  --meals: a protein/a carb/healthy fat/nonstarchy veggies  --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 Planning to join the [employer]. PT and therapist want pt to use recumbent bike (strengthen legs for surgery)     LAST WEIGHT:   286 lbs 0 oz   Body mass index is 44.79 kg/m .    Since last seen--  --depressed over last few mo and PA working  "with psychiatrist has increased the bupropion, pt is feeling better  --pt has binge eating DO and is seen at [place]; notes the strategy she was given there is at odds with the advice she is getting from our nutritonist ([place] wants 3 good mreals  --still planning knee replacement  --will get pt back to nutritionist  --[facility] (PT is guiding pt's exercise towards lower impact); 2-3 times per wk initially; pt also has home exercises    CURRENT WEIGHT:   292 lbs 0 oz    Initial Weight (lbs): 277 lbs  Last Visits Weight: 125.6 kg (277 lb)  Cumulative weight loss (lbs): -15  Weight Loss Percentage: -5.42%    Changes and Difficulties 2/17/2022   I have made the following changes to my diet since my last visit: No fast food. Keeping candy out of the house.   With regards to my diet, I am still struggling with: Still working on 3 meals a day with 3 snacks as recommended by therapist. My binging has been the same fir awhile. Having anxiety.   I have made the following changes to my activity/exercise since my last visit: Daily OT exercise. Just got new card for the [employer] from [employer] thru [facility]. Taking that first step very hard.   With regards to my activity/exercise, I am still struggling with: Getting them done each day on a schedule.       VITALS:  Ht 1.702 m (5' 7\")   Wt 132.5 kg (292 lb)   BMI 45.73 kg/m      MEDICATIONS:   Current Outpatient Medications   Medication Sig Dispense Refill     acetaminophen (TYLENOL) 500 MG tablet Take 500-1,000 mg by mouth every 6 hours as needed for mild pain       amLODIPine (NORVASC) 5 MG tablet Take dailly 90 tablet 3     aspirin (ASA) 81 MG chewable tablet TAKE 1 TABLET BY MOUTH DAILY 90 tablet 0     atorvastatin (LIPITOR) 20 MG tablet Take 1 tablet (20 mg) by mouth daily 90 tablet 2     buPROPion (WELLBUTRIN XL) 150 MG 24 hr tablet Take 300 mg by mouth every morning        esomeprazole (NEXIUM 24HR) 20 MG DR capsule Take 1 tablet by mouth daily        " gabapentin (NEURONTIN) 100 MG capsule TAKE 1 CAPSULE BY MOUTH  capsule 1     hydrochlorothiazide (HYDRODIURIL) 25 MG tablet Take 1 tablet (25 mg) by mouth daily 90 tablet 3     venlafaxine (EFFEXOR-XR) 37.5 MG 24 hr capsule Take 1 capsule (37.5 mg) by mouth daily (Patient taking differently: Take 37.5 mg by mouth daily 75 mg + 75 mg + 37.5 mg = 187.5 mg daily) 90 capsule 0     venlafaxine (EFFEXOR-XR) 75 MG 24 hr capsule TAKE 1 CAPSULE(75 MG) BY MOUTH DAILY (Patient taking differently: Take 150 mg by mouth daily 75 mg + 75 mg + 37.5 mg = 187.5 mg daily) 90 capsule 0     vitamin B complex with vitamin C (VITAMIN  B COMPLEX) tablet Take 1 tablet by mouth daily       Vitamin D3 (CHOLECALCIFEROL) 125 MCG (5000 UT) tablet Take 1 tablet by mouth daily         Weight Loss Medication History Reviewed With Patient 2/17/2022   Which weight loss medications are you currently taking on a regular basis?  None   If you are not taking a weight loss medication that was prescribed to you, please indicate why: I did not like the side effects   Are you having any side effects from the weight loss medication that we have prescribed you? Yes   If you are having side effects please describe: Stomach pains, no longer taking. Nothing new predcribed since stopping. Did have phone call with phamacist. No new information since.         ASSESSMENT/PLAN:  Shelia is a patient with early onset morbid obesity with significant element of familial/genetic influence and with current health consequences. Shelia Sanchez endorses binging, eats a high carb diet, eats a high fat diet, uses food as a reward, uses food as mood management and eats to obtain specific degree of fullness.     Her problem is complicated by a hunger disorder, strong craving/reward pathways, a binge eating component and mental health/psychopharmacological barriers     Her ability to lose weight is impacted by physical impairment.     PLAN:    Decrease portion  sizes  Purge house of food triggers  Dietician visit of education  Volumetrics eating plan  Calorie/low fat diet  Meal planning     Craving/Reward   Ancillary testing:  N/A.  Food Plan:  Volumetrics and High protein/low carbohydrate.   Activity Plan:  PT ongoing  Supplementary:  N/A.   Medication:  The patient starting medication to assist in lifestyle changes     Additionally--  --before planned pharm support we discussed included--plan a naltrexone, b, topiramate, c semaglutide (initially Ozempic if covered).  Discussed potential risks/benefits and possible side effects.   --Given N with the naltrexone will now trial topiramate before supper    Follow ups--  --nutrition for meal planning/snack planning to meet hypocaloric needs for wt loss (about 500 calories below REE can be a goal at present with pharm support to help meet this goal)  --alfaro wt loss med mgmt in about 1 mo  --myself in about 2-3 mo        Time: about 40 min spent on evaluation, management, counseling, education, & motivational interviewing (video) combined with previsit prep and post visit charting/follow up care same day        Sincerely,    Mable Greenfield MD

## 2022-02-18 NOTE — NURSING NOTE
"Chief Complaint   Patient presents with     RECHECK     Follow-up Weight Management       Vitals:    02/18/22 0922   Weight: 132.5 kg (292 lb)   Height: 1.702 m (5' 7\")       Body mass index is 45.73 kg/m .                          "

## 2022-02-21 ENCOUNTER — TELEPHONE (OUTPATIENT)
Dept: ENDOCRINOLOGY | Facility: CLINIC | Age: 72
End: 2022-02-21

## 2022-02-21 NOTE — TELEPHONE ENCOUNTER
Left VM 2/21  Schedulers, please contact patient for an appointment with on of the RD's. Thanks!

## 2022-02-24 ENCOUNTER — TELEPHONE (OUTPATIENT)
Dept: ENDOCRINOLOGY | Facility: CLINIC | Age: 72
End: 2022-02-24
Payer: COMMERCIAL

## 2022-04-04 ENCOUNTER — VIRTUAL VISIT (OUTPATIENT)
Dept: PHARMACY | Facility: CLINIC | Age: 72
End: 2022-04-04
Payer: COMMERCIAL

## 2022-04-04 DIAGNOSIS — E66.01 MORBID OBESITY (H): ICD-10-CM

## 2022-04-04 DIAGNOSIS — F50.819 BINGE EATING DISORDER: Primary | ICD-10-CM

## 2022-04-04 PROCEDURE — 99606 MTMS BY PHARM EST 15 MIN: CPT | Performed by: PHARMACIST

## 2022-04-04 NOTE — PATIENT INSTRUCTIONS
Recommendations from today's MTM visit:                                                         1. Work on nutrition/eating behavior changes - getting back to basics learned at [facility] and mindful eating.     2. Continue topiramate at current dose for now, can consider increasing in future.      Follow-up: Dr. Greenfield in 1 month as planned and as needed with Lauren Bloch, PharmD, [facility]   Medication Therapy Management Pharmacist       It was great to speak with you today.  I value your experience and would be very thankful for your time with providing feedback on our clinic survey. You may receive a survey via email or text message in the next few days.       My Clinical Pharmacist's contact information:                                                      Please feel free to contact me with any questions or concerns you have.      Lauren Bloch, Ad  Medication Therapy Management Pharmacist   [facility] [place]

## 2022-04-04 NOTE — PROGRESS NOTES
"Medication Therapy Management (MTM) Encounter    ASSESSMENT:                            Medication Adherence/Access: No issues identified    Obesity/Binge Eating Disorder: Would benefit from working on nutrition/eating behaviors she discussed below. Will continue topiramate at current dose for now with consideration to increase topiramate dose in future.     PLAN:                            1. Work on nutrition/eating behavior changes - getting back to basics learned at [facility] and mindful eating.     2. Continue topiramate at current dose for now, can consider increasing in future.      Follow-up: Dr. Greenfield in 1 month as planned and as needed with Lauren Bloch, PharmD, BCACP   Medication Therapy Management Pharmacist     SUBJECTIVE/OBJECTIVE:                          Shelia Sanchez is a 71 year old female called for a follow-up visit.  Today's visit is a follow-up MTM visit from 1/3/2022.      Reason for visit: Topiramate start follow up. No other medication concerns, wishes to discuss topiramate start and next steps.     Allergies/ADRs: Reviewed in chart  Past Medical History: Reviewed in chart  Tobacco: She reports that she has never smoked. She has never used smokeless tobacco.  Alcohol: not currently using    Medication Adherence/Access: no issues reported    Obesity/Binge Eating Disorder:   Topiramate 75 mg daily 5 pm  Bupropion  mg daily     Followed by Dr. Mable Greenfield, last seen 2/28/2022 for Return Medical Weight Management, was prescribed topiramate last MTM visit. No medication side effects. She feels topiramate is helping with somewhat less appetite, thinking about food less. She has had issues with CPAP machine and recalled products and so not sleeping well for the last several months. Has been working with [employer] and has been very slow. Has been sleeping in recliner since last October due to sleep issues but now back to sleeping in bed as \"too uncomfortable\". She " "is sleeping ~3 hours of sleep at a time, napping often throughout the day. Feels tired. She is also facing more knee pain which is bothersome. She did talk to primary care provider about lap band but declined this because of eating disorder.  Continues to work with therapist at [place], next follow up in 2 weeks. She is frustrated that the sleep apnea issues have caused her to lack motivation, be tired and not want to do much throughout the day. Unsure of weight.   Diet/Eating Habits: Reports that she hasn't done a lot of binging recently.  She tries to use a smaller plate. She wants to try to focus on what she eats as she feels like this is the only thing she can focus on at this time. She feels like she could be making better food choices and eating rich wholesome foods. Previously worked with [facility] and found very helpful, plans to get back to what she learned there and listening to podcasts.   Exercise/Activity: Patient reports going to PT with knee, needs knee replacement. Patient needs to lose weight in order to get knee replacement surgery.   Failed medications: Naltrexone: lack of efficacy and side effects?    Wt Readings from Last 4 Encounters:   02/18/22 292 lb (132.5 kg)   02/17/22 292 lb 8 oz (132.7 kg)   01/11/22 286 lb (129.7 kg)   11/19/21 286 lb (129.7 kg)     Estimated body mass index is 45.73 kg/m  as calculated from the following:    Height as of 2/18/22: 5' 7\" (1.702 m).    Weight as of 2/18/22: 292 lb (132.5 kg).  ----------------      I spent 20 minutes with this patient today. All changes were made via collaborative practice agreement with Dr. Greenfield. A copy of the visit note was provided to the patient's provider(s).    The patient was sent via [employer] a summary of these recommendations.     Lauren Bloch, PharmD, BCACP   Medication Therapy Management Pharmacist   [facility]    Telemedicine Visit Details  Type of service:  Telephone " visit  Start Time: 1:15 PM  End Time: 1:35 PM  Originating Location (patient location): Home  Distant Location (provider location):  [facility]     Medication Therapy Recommendations  No medication therapy recommendations to display

## 2022-04-28 ENCOUNTER — TELEPHONE (OUTPATIENT)
Dept: FAMILY MEDICINE | Facility: CLINIC | Age: 72
End: 2022-04-28
Payer: COMMERCIAL

## 2022-04-28 NOTE — TELEPHONE ENCOUNTER
Patient Quality Outreach    Patient is due for the following:   Depression  -  PHQ-9 Needed    NEXT STEPS:   visit scheduled in august, sending PHQ9 via [employer]     Type of outreach:    Sent [employer] message.      Questions for provider review:    None     Paty Gandara CMA

## 2022-04-29 ENCOUNTER — VIRTUAL VISIT (OUTPATIENT)
Dept: ENDOCRINOLOGY | Facility: CLINIC | Age: 72
End: 2022-04-29
Payer: COMMERCIAL

## 2022-04-29 VITALS — WEIGHT: 270 LBS | BODY MASS INDEX: 42.38 KG/M2 | HEIGHT: 67 IN

## 2022-04-29 DIAGNOSIS — E66.813 CLASS 3 SEVERE OBESITY DUE TO EXCESS CALORIES WITH SERIOUS COMORBIDITY AND BODY MASS INDEX (BMI) OF 40.0 TO 44.9 IN ADULT (H): Primary | ICD-10-CM

## 2022-04-29 DIAGNOSIS — E66.01 CLASS 3 SEVERE OBESITY DUE TO EXCESS CALORIES WITH SERIOUS COMORBIDITY AND BODY MASS INDEX (BMI) OF 40.0 TO 44.9 IN ADULT (H): Primary | ICD-10-CM

## 2022-04-29 PROCEDURE — 99214 OFFICE O/P EST MOD 30 MIN: CPT | Mod: 95 | Performed by: INTERNAL MEDICINE

## 2022-04-29 RX ORDER — TOPIRAMATE 25 MG/1
TABLET, FILM COATED ORAL
Qty: 360 TABLET | Refills: 1 | Status: SHIPPED | OUTPATIENT
Start: 2022-04-29 | End: 2022-07-08

## 2022-04-29 ASSESSMENT — PAIN SCALES - GENERAL: PAINLEVEL: MODERATE PAIN (5)

## 2022-04-29 NOTE — PATIENT INSTRUCTIONS
Thank you for allowing us the privilege of caring for you. We hope we provided you with the excellent service you deserve.    Please let us know if there is anything else we can do for you so that we can be sure you are completely satisfied with your care experience.    Your visit was with Dr. Rahman today.    Instructions per today's visit:  Can plan--  --topiramate is at 75 mg before supper;will add 25 mg at noon  --use this medication support to avoid snacking, avoid eating post supper, and with your meals emphasize lean protein and nonstarchy veggies  --we can plan a return visit in about 2-3 mo with Dr. Rahman    Please call our contact center at [phone] to schedule your next appointments.    Meal Replacement Products:    Here is the link to our new [employer]-store where you can purchase our meal replacement products    [email or web address]/store    The one week starter kit is a great way to sample a variety of products and see what works for you.    If you want more information about the product go to: [employer]    Free Shipping for orders over $75    Benefits of meal replacements products:    Portion and calorie control  Improved nutrition  Structured eating  Simplified food choices  Avoid contact with trigger foods    Interested in working with a health ?  Health coaches work with you to improve your overall health and wellbeing.  They look at the whole person, and may involve discussion of different areas of life, including, but not limited to the four pillars of health (sleep, exercise, nutrition, and stress management). Discuss with your care team if you would like to start working a health .    Health Coaching-3 Pack: Schedule by calling [phone]    $99 for three health coaching visits    Visits may be done in person or via phone    Coaching is a partnership between the  and the client; Coaches do not prescribe or diagnose    Coaching  helps inspire the client to reach his/her personal goals    Bluetooth Scale:    We hope to provide you with high quality telephone and virtual healthcare visits while social distancing for COVID-19 is necessary, as well as in the future when virtual visits may be more convenient for you.    Our technology team made it possible for Bluetooth scales to send weight measurements to our electronic medical record. This allows weights from you weighing at home to securely flow into the medical record, which will improve telephone and virtual visits.  Additionally, studies have shown that adults actually lose more weight when their weights are automatically sent to someone else, and also that this process is not stressful for those adults.    Below is a link for purchasing the scale, with a discount code for our patients. You may call your insurance company to see if they will reimburse you for the cost of the scale, as a piece of durable medical equipment. The scales only go up to a weight of 400 pounds. This is an issue and we are working with the developer on increasing this. We found no scales that go over 400lb that have blue-tooth for connecting to [employer].    Scale to purchase: the Axxana  Body  Scale: [email or web address]=aw.ds    Discount Code: We have a discount code for our patients to bring the cost down to $50, the code is:  withmed     Steps to link the scale to [employer] via an Android Phone (you can always disconnect at any point in the future):  1. The order must be placed first before the patient can access Track My Health within [employer].  2. Download Google Fit christa from the Google Play Store  a. Log in or register using your Google account  3. Download the [employer] christa from Google Play Store  a. Select add organization  b. Search for [employer] and select it  c. Log into [employer]  d. Select Track My Health  e. Select the  green connect my account button  f. When prompted log into your Google account  g. Select okay to confirm the account  4. Download the [employer] Health Mate christiano from Google Play Store  a. [place] for [employer]  b. Go to profile  c. Tap google fit under the Apps section  d. Select the option to activate Google Fit integration  e. Select the same Google account  f. Select okay to confirm the account  g.  Steps to link the scale to [employer] via an iPhone (you can always disconnect at any point in the future):  **Note [employer] Network is not available for download on an iPad**  1. The order must be placed first before the patient can access Track My Health within [employer].  2. Locate the Health christiano on your iPhone.  a. Set up your Apple Health account as prompted  b. The Sources page will show Apps that communicate with your Health christiano. Once all steps are completed, you should see Health 64 Pixels and MyChart listed under the Apps section and your iPhone under the devices section.  i. Select Health Mate  1. Under 'ALLOW  HEALTH MATE  TO WRITE DATA ensure the toggle is on for Weight.  2. This will allow the scale to add your weight to the Apple Health  ii. Select MyChart  1. Under 'ALLOW  LeanAppsT  TO READ DATA ensure the toggle is on for Weight.  2. This allows [employer] to grab the weight from [employer] so your provider can see your weights.  3. Download the abeot christiano from the [employer]o Store  a. Select add organization                                                  b. Search for [employer] and select it  c. Log into [employer]  d. Select Track My Health  e. Select the green connect my account button  f. Follow prompts to link your device to [employer].  4. Download the Withings health mate christiano in the [employer] Store  a. [place] for [employer]  b. Go to profile  c. Tap Health under the Apps section  d. If prompted to allow access with the Health Christiano, toggle weight on for read and write access.      For any questions/concerns contact Juanita Whitaker  ANDREASN at [phone]    To schedule appointments with our team, please call [phone]    Please call during clinic hours Monday through Friday 8:00a - 4:00p if you have questions or you can contact us via Tier 3 at anytime.      Lab results will be communicated through My Chart or letter (if My Chart not used). Please call the clinic if you have not received communication after 1 week or if you have any questions.    Clinic Fax: [phone]    Thank you,  Medical Weight Management Team

## 2022-04-29 NOTE — LETTER
"2022       RE: Shelia Sanchez  [address]  [place] [address]     Dear Colleague,    Thank you for referring your patient, Shelia Sanchez, to the [facility] [place] at [facility]. Please see a copy of my visit note below.      Return Medical Weight Management Note     Shelia Sanchez  MRN:  [id number]  :  1950  JOSEPH:  2022    Dear Mariana Benitez DO,    I had the pleasure of seeing your patient Shelia Sanchez. She is a 71 year old female who I am continuing to see for treatment of obesity related to:       9/15/2021   I have the following health issues associated with obesity: Pre-Diabetes, High Blood Pressure, High Cholesterol, Sleep Apnea, GERD (Reflux), Stress Incontinence, Osteoarthritis (joint disease)   I have the following symptoms associated with obesity: Knee Pain, Depression, Lower Extremity Swelling, Back Pain, Fatigue       INTERVAL HISTORY:    Return visit with her, last seen a few mo ago, reviewed and relevant history is as follows, as extracted from earlier note--  ------------------------------  \"--depressed over last few mo and PA working with psychiatrist has increased the bupropion, pt is feeling better  --pt has binge eating DO and is seen at [place]; notes the strategy she was given there is at odds with the advice she is getting from our nutritonist ([employer] wants 3 good mreals  --still planning knee replacement  --will get pt back to nutritionist  [facility] ([facility] is guiding pt's exercise towards lower impact); 2-3 times per wk initially; pt also has home exercises  --doing PT exercises everyday; in [employer] (but that is ending). [facility]. PT and therapist want pt to use recumbent bike (strengthen legs for surgery)\"  ------------------------------     LAST WEIGHT:   292 lbs 0 oz      Current pharm support and lifestyle " "approach are working well--  --she notes she went away for a short vacation (and no snacking in the car) and left food on the plate; the medication is working    --Current daily routine:  Usual breakfast--boiled egg/toast/fruit  Lunch does not eat  Snack around 3p (piece of fruit)  Dinner (not interested in cooking)--will get prepared turkey or chicken breasts from grocery store and will have a carb and veggies (amphasize); or a salad from grocery store. May have popcorn in the evening (hot air popper with a little butter)      Other knee went out a month ago; both knees bad now. May get injections. Needing to lose to around 255# for the knee surgery (BMI around 40)      CURRENT WEIGHT:   270 lbs 0 oz   Body mass index is 42.29 kg/m .    Initial Weight (lbs): 277 lbs  Last Visits Weight: 132.5 kg (292 lb)  Cumulative weight loss (lbs): 7  Weight Loss Percentage: 2.53%    Changes and Difficulties 4/28/2022   I have made the following changes to my diet since my last visit: Trying to eliminate sugar, carbs   With regards to my diet, I am still struggling with: Alcohol, carbs, skipping meals   I have made the following changes to my activity/exercise since my last visit: None Both knees in pain   With regards to my activity/exercise, I am still struggling with: Pain and balance issues       VITALS:  Ht 1.702 m (5' 7\")   Wt 122.5 kg (270 lb)   BMI 42.29 kg/m      MEDICATIONS:   Current Outpatient Medications   Medication Sig Dispense Refill     acetaminophen (TYLENOL) 500 MG tablet Take 500-1,000 mg by mouth every 6 hours as needed for mild pain       amLODIPine (NORVASC) 5 MG tablet Take dailly 90 tablet 3     aspirin (ASA) 81 MG chewable tablet TAKE 1 TABLET BY MOUTH DAILY 90 tablet 0     atorvastatin (LIPITOR) 20 MG tablet Take 1 tablet (20 mg) by mouth daily 90 tablet 2     buPROPion (WELLBUTRIN XL) 150 MG 24 hr tablet Take 300 mg by mouth every morning        esomeprazole (NEXIUM) 20 MG DR capsule Take 1 tablet " by mouth daily        gabapentin (NEURONTIN) 100 MG capsule TAKE 1 CAPSULE BY MOUTH  capsule 1     hydrochlorothiazide (HYDRODIURIL) 25 MG tablet TAKE 1 TABLET(25 MG) BY MOUTH DAILY 90 tablet 1     topiramate (TOPAMAX) 25 MG tablet 25 mg before supper for 1 week, 50 mg then for 1 week and 75 mg daily then thereafter 90 tablet 5     venlafaxine (EFFEXOR-XR) 37.5 MG 24 hr capsule Take 1 capsule (37.5 mg) by mouth daily (Patient taking differently: Take 37.5 mg by mouth daily 75 mg + 75 mg + 37.5 mg = 187.5 mg daily) 90 capsule 0     venlafaxine (EFFEXOR-XR) 75 MG 24 hr capsule TAKE 1 CAPSULE(75 MG) BY MOUTH DAILY (Patient taking differently: Take 150 mg by mouth daily 75 mg + 75 mg + 37.5 mg = 187.5 mg daily) 90 capsule 0     vitamin B complex with vitamin C (STRESS TAB) tablet Take 1 tablet by mouth daily       Vitamin D3 (CHOLECALCIFEROL) 125 MCG (5000 UT) tablet Take 1 tablet by mouth daily         Weight Loss Medication History Reviewed With Patient 4/28/2022   Which weight loss medications are you currently taking on a regular basis?  -   If you are not taking a weight loss medication that was prescribed to you, please indicate why: -   Are you having any side effects from the weight loss medication that we have prescribed you? Yes   If you are having side effects please describe: Scatter braibpned     Labs--2/22  HbA1c 6.2  SCr 0.88  AST/ALT wnl    ASSESSMENT/PLAN:  Shelia is a patient with early onset morbid obesity with significant element of familial/genetic influence and with current health consequences. Shelia Sanchez endorses binging, eats a high carb diet, eats a high fat diet, uses food as a reward, uses food as mood management and eats to obtain specific degree of fullness.     Her problem is complicated by a hunger disorder, strong craving/reward pathways, a binge eating component and mental health/psychopharmacological barriers     Her ability to lose weight is impacted by physical  impairment.     PLAN:    Decrease portion sizes  Purge house of food triggers  Dietician visit of education  Volumetrics eating plan  Calorie/low fat diet  Meal planning     Craving/Reward   Ancillary testing:  N/A.  Food Plan:  Volumetrics and High protein/low carbohydrate.   Activity Plan:  PT ongoing  Supplementary:  N/A.   Medication:  The patient starting medication to assist in lifestyle changes     Additionally--  Can plan--  --topiramate is at 75 mg before supper can continue;will add 25 mg at noon  --RTC in about 2-3 mo with me          Time: about 30 min spent on evaluation, management, counseling, education, & motivational interviewing (video) combined with previsit prep and post visit charting/follow up care same day       Sincerely,    Mable Greenfield MD

## 2022-04-29 NOTE — NURSING NOTE
"(   Chief Complaint   Patient presents with     RECHECK     Return MW    )    ( Weight: 122.5 kg (270 lb) (patient reported) )  ( Height: 170.2 cm (5' 7\") )  ( BMI (Calculated): 42.29 )  (   )  (   )  (   )  (   )  (   )  (   )    (   )  (   )  (   )  (   )  (   )  (   )  (   )    (   Patient Active Problem List   Diagnosis     BMI > 40     CARDIOVASCULAR SCREENING; LDL GOAL LESS THAN 130     Hypertension goal BP (blood pressure) < 140/90     Advanced directives, counseling/discussion     Left shoulder pain     Cataracts, both eyes     Urinary incontinence     Atypical nevus     Cervical pain     Left-sided headache     Menopausal syndrome (hot flashes)     Heartburn     Mild major depression (H)     LORI (obstructive sleep apnea)- severe ()     Binge eating disorder     Prediabetes    )  (   Current Outpatient Medications   Medication Sig Dispense Refill     acetaminophen (TYLENOL) 500 MG tablet Take 500-1,000 mg by mouth every 6 hours as needed for mild pain       amLODIPine (NORVASC) 5 MG tablet Take dailly 90 tablet 3     aspirin (ASA) 81 MG chewable tablet TAKE 1 TABLET BY MOUTH DAILY 90 tablet 0     atorvastatin (LIPITOR) 20 MG tablet Take 1 tablet (20 mg) by mouth daily 90 tablet 2     buPROPion (WELLBUTRIN XL) 150 MG 24 hr tablet Take 300 mg by mouth every morning        esomeprazole (NEXIUM) 20 MG DR capsule Take 1 tablet by mouth daily        gabapentin (NEURONTIN) 100 MG capsule TAKE 1 CAPSULE BY MOUTH  capsule 1     hydrochlorothiazide (HYDRODIURIL) 25 MG tablet TAKE 1 TABLET(25 MG) BY MOUTH DAILY 90 tablet 1     topiramate (TOPAMAX) 25 MG tablet 25 mg before supper for 1 week, 50 mg then for 1 week and 75 mg daily then thereafter 90 tablet 5     venlafaxine (EFFEXOR-XR) 37.5 MG 24 hr capsule Take 1 capsule (37.5 mg) by mouth daily (Patient taking differently: Take 37.5 mg by mouth daily 75 mg + 75 mg + 37.5 mg = 187.5 mg daily) 90 capsule 0     venlafaxine (EFFEXOR-XR) 75 MG 24 hr capsule " TAKE 1 CAPSULE(75 MG) BY MOUTH DAILY (Patient taking differently: Take 150 mg by mouth daily 75 mg + 75 mg + 37.5 mg = 187.5 mg daily) 90 capsule 0     vitamin B complex with vitamin C (STRESS TAB) tablet Take 1 tablet by mouth daily       Vitamin D3 (CHOLECALCIFEROL) 125 MCG (5000 UT) tablet Take 1 tablet by mouth daily      )  ( Diabetes Eval:    )    ( Pain Eval:  Moderate Pain (5) )    ( Wound Eval:       )    (   History   Smoking Status     Never Smoker   Smokeless Tobacco     Never Used    )    ( Signed By:  Rica Cueva, EMT; April 29, 2022; 10:43 AM )

## 2022-04-29 NOTE — PROGRESS NOTES
"Shelia is a 71 year old who is being evaluated via a billable video visit.      How would you like to obtain your AVS? MyChart  Will anyone else be joining your video visit? No    During this virtual visit the patient is located in [place], patient verifies this as the location during the entirety of this visit.   Video Start Time: 11:10  Video-Visit Details    Type of service:  Video Visit    Video End Time:11:25    Originating Location (pt. Location): Home    Distant Location (provider location):  [facility] WEIGHT MANAGEMENT [facility]IN[facility] [place]     Platform used for Video Visit: FLX Micro         [facility] Medical Weight Management Note     Shelia Sanchez  MRN:  [id number]  :  1950  JOSEPH:  2022    Dear Mariana Benitez DO,    I had the pleasure of seeing your patient Shelia Sanchez. She is a 71 year old female who I am continuing to see for treatment of obesity related to:       9/15/2021   I have the following health issues associated with obesity: Pre-Diabetes, High Blood Pressure, High Cholesterol, Sleep Apnea, GERD (Reflux), Stress Incontinence, Osteoarthritis (joint disease)   I have the following symptoms associated with obesity: Knee Pain, Depression, Lower Extremity Swelling, Back Pain, Fatigue       INTERVAL HISTORY:    Return visit with her, last seen a few mo ago, reviewed and relevant history is as follows, as extracted from earlier note--  ------------------------------  \"--depressed over last few mo and PA working with psychiatrist has increased the bupropion, pt is feeling better  --pt has binge eating DO and is seen at [facility]; notes the strategy she was given there is at odds with the advice she is getting from our nutritonist ([facility] wants 3 good mreals  --still planning knee replacement  --will get pt back to nutritionist  [facility] ([facility] is guiding pt's exercise towards lower impact); 2-3 times per wk initially; pt also has home exercises  --doing PT " "exercises everyday; in [employer] (but that is ending). [facility] PT and therapist want pt to use recumbent bike (strengthen legs for surgery)\"  ------------------------------     LAST WEIGHT:   292 lbs 0 oz      Current pharm support and lifestyle approach are working well--  --she notes she went away for a short vacation (and no snacking in the car) and left food on the plate; the medication is working    --Current daily routine:  Usual breakfast--boiled egg/toast/fruit  Lunch does not eat  Snack around 3p (piece of fruit)  Dinner (not interested in cooking)--will get prepared turkey or chicken breasts from grocery store and will have a carb and veggies (amphasize); or a salad from grocery store. May have popcorn in the evening (hot air popper with a little butter)      Other knee went out a month ago; both knees bad now. May get injections. Needing to lose to around 255# for the knee surgery (BMI around 40)      CURRENT WEIGHT:   270 lbs 0 oz   Body mass index is 42.29 kg/m .    Initial Weight (lbs): 277 lbs  Last Visits Weight: 132.5 kg (292 lb)  Cumulative weight loss (lbs): 7  Weight Loss Percentage: 2.53%    Changes and Difficulties 4/28/2022   I have made the following changes to my diet since my last visit: Trying to eliminate sugar, carbs   With regards to my diet, I am still struggling with: Alcohol, carbs, skipping meals   I have made the following changes to my activity/exercise since my last visit: None Both knees in pain   With regards to my activity/exercise, I am still struggling with: Pain and balance issues       VITALS:  Ht 1.702 m (5' 7\")   Wt 122.5 kg (270 lb)   BMI 42.29 kg/m      MEDICATIONS:   Current Outpatient Medications   Medication Sig Dispense Refill     acetaminophen (TYLENOL) 500 MG tablet Take 500-1,000 mg by mouth every 6 hours as needed for mild pain       amLODIPine (NORVASC) 5 MG tablet Take dailly 90 tablet 3     aspirin (ASA) 81 MG chewable tablet TAKE 1 " TABLET BY MOUTH DAILY 90 tablet 0     atorvastatin (LIPITOR) 20 MG tablet Take 1 tablet (20 mg) by mouth daily 90 tablet 2     buPROPion (WELLBUTRIN XL) 150 MG 24 hr tablet Take 300 mg by mouth every morning        esomeprazole (NEXIUM) 20 MG DR capsule Take 1 tablet by mouth daily        gabapentin (NEURONTIN) 100 MG capsule TAKE 1 CAPSULE BY MOUTH  capsule 1     hydrochlorothiazide (HYDRODIURIL) 25 MG tablet TAKE 1 TABLET(25 MG) BY MOUTH DAILY 90 tablet 1     topiramate (TOPAMAX) 25 MG tablet 25 mg before supper for 1 week, 50 mg then for 1 week and 75 mg daily then thereafter 90 tablet 5     venlafaxine (EFFEXOR-XR) 37.5 MG 24 hr capsule Take 1 capsule (37.5 mg) by mouth daily (Patient taking differently: Take 37.5 mg by mouth daily 75 mg + 75 mg + 37.5 mg = 187.5 mg daily) 90 capsule 0     venlafaxine (EFFEXOR-XR) 75 MG 24 hr capsule TAKE 1 CAPSULE(75 MG) BY MOUTH DAILY (Patient taking differently: Take 150 mg by mouth daily 75 mg + 75 mg + 37.5 mg = 187.5 mg daily) 90 capsule 0     vitamin B complex with vitamin C (STRESS TAB) tablet Take 1 tablet by mouth daily       Vitamin D3 (CHOLECALCIFEROL) 125 MCG (5000 UT) tablet Take 1 tablet by mouth daily         Weight Loss Medication History Reviewed With Patient 4/28/2022   Which weight loss medications are you currently taking on a regular basis?  -   If you are not taking a weight loss medication that was prescribed to you, please indicate why: -   Are you having any side effects from the weight loss medication that we have prescribed you? Yes   If you are having side effects please describe: Scatter braibpned     Labs--2/22  HbA1c 6.2  SCr 0.88  AST/ALT wnl    ASSESSMENT/PLAN:  Shelia is a patient with early onset morbid obesity with significant element of familial/genetic influence and with current health consequences. Shelia Sanchez endorses binging, eats a high carb diet, eats a high fat diet, uses food as a reward, uses food as mood management  and eats to obtain specific degree of fullness.     Her problem is complicated by a hunger disorder, strong craving/reward pathways, a binge eating component and mental health/psychopharmacological barriers     Her ability to lose weight is impacted by physical impairment.     PLAN:    Decrease portion sizes  Purge house of food triggers  Dietician visit of education  Volumetrics eating plan  Calorie/low fat diet  Meal planning     Craving/Reward   Ancillary testing:  N/A.  Food Plan:  Volumetrics and High protein/low carbohydrate.   Activity Plan:  PT ongoing  Supplementary:  N/A.   Medication:  The patient starting medication to assist in lifestyle changes     Additionally--  Can plan--  --topiramate is at 75 mg before supper can continue;will add 25 mg at noon  --RTC in about 2-3 mo with me          Time: about 30 min spent on evaluation, management, counseling, education, & motivational interviewing (video) combined with previsit prep and post visit charting/follow up care same day       Sincerely,    Mable Greenfield MD

## 2022-05-09 DIAGNOSIS — Z13.6 CARDIOVASCULAR SCREENING; LDL GOAL LESS THAN 130: Chronic | ICD-10-CM

## 2022-05-09 RX ORDER — ATORVASTATIN CALCIUM 10 MG/1
TABLET, FILM COATED ORAL
Qty: 90 TABLET | Refills: 1 | OUTPATIENT
Start: 2022-05-09

## 2022-05-09 NOTE — TELEPHONE ENCOUNTER
Patient is now taking 20mg, refused.     Tiana Valentine, RN, BSN, PHN  [facility]: [place]

## 2022-07-08 ENCOUNTER — VIRTUAL VISIT (OUTPATIENT)
Dept: ENDOCRINOLOGY | Facility: CLINIC | Age: 72
End: 2022-07-08
Payer: COMMERCIAL

## 2022-07-08 VITALS — BODY MASS INDEX: 42.69 KG/M2 | WEIGHT: 272 LBS | HEIGHT: 67 IN

## 2022-07-08 DIAGNOSIS — E66.813 CLASS 3 SEVERE OBESITY DUE TO EXCESS CALORIES WITH SERIOUS COMORBIDITY AND BODY MASS INDEX (BMI) OF 40.0 TO 44.9 IN ADULT (H): Primary | ICD-10-CM

## 2022-07-08 DIAGNOSIS — E66.01 CLASS 3 SEVERE OBESITY DUE TO EXCESS CALORIES WITH SERIOUS COMORBIDITY AND BODY MASS INDEX (BMI) OF 40.0 TO 44.9 IN ADULT (H): Primary | ICD-10-CM

## 2022-07-08 PROCEDURE — 99214 OFFICE O/P EST MOD 30 MIN: CPT | Mod: 95 | Performed by: INTERNAL MEDICINE

## 2022-07-08 RX ORDER — TOPIRAMATE 25 MG/1
TABLET, FILM COATED ORAL
Qty: 270 TABLET | Refills: 1 | Status: SHIPPED | OUTPATIENT
Start: 2022-07-08 | End: 2022-10-14 | Stop reason: SINTOL

## 2022-07-08 ASSESSMENT — PAIN SCALES - GENERAL: PAINLEVEL: NO PAIN (0)

## 2022-07-08 NOTE — LETTER
"2022       RE: Shelia Sanchez  [address]  [place] [address]     Dear Colleague,    Thank you for referring your patient, Shelia Sanchez, to the [facility]NAGEMENT [facility] [place] at [facility]. Please see a copy of my visit note below.      Return Medical Weight Management Note     Shelia Sanchez  MRN:  [id number]  :  1950  JOSEPH:  2022    Dear Mariana Benitez DO,    I had the pleasure of seeing your patient Shelia Sanchez. She is a 71 year old female who I am continuing to see for treatment of obesity related to:       9/15/2021   I have the following health issues associated with obesity: Pre-Diabetes, High Blood Pressure, High Cholesterol, Sleep Apnea, GERD (Reflux), Stress Incontinence, Osteoarthritis (joint disease)   I have the following symptoms associated with obesity: Knee Pain, Depression, Lower Extremity Swelling, Back Pain, Fatigue       INTERVAL HISTORY:    Return visit with her, last seen a few mo ago, reviewed and relevant history is as follows, as extracted from earlier note--  ------------------------------  \"--depressed over last few mo and PA working with psychiatrist has increased the bupropion, pt is feeling better  --pt has binge eating DO and is seen at [facility]; notes the strategy she was given there is at odds with the advice she is getting from our nutritonist ([facility] wants 3 good mreals  --still planning knee replacement  --will get pt back to nutritionist  [facility] ([facility] is guiding pt's exercise towards lower impact); 2-3 times per wk initially; pt also has home exercises  --doing PT exercises everyday; in [employer] (but that is ending). Planning to join the . PT and therapist want pt to use recumbent bike (strengthen legs for surgery)\"  ------------------------------          LAST WEIGHT:   270 lbs 0 oz   Body mass index is 42.29 " "kg/m .     Initial Weight (lbs): 277 lbs    Since last seen--  --still issues with snacking--med is providing some support. She did not tolerate naltrexone prior and I would not start phentermine-containing med.  --knees are still really painful; has a desk pedelar she will use. Will get another steroid injection soon. Does not look like liraglutide or semaglutide are covered as wt loss meds    CURRENT WEIGHT:   272 lbs 0 oz  Body mass index is 42.6 kg/m .         Wt Readings from Last 3 Encounters:   07/08/22 123.4 kg (272 lb)   04/29/22 122.5 kg (270 lb)   02/18/22 132.5 kg (292 lb)             Changes and Difficulties 7/6/2022   I have made the following changes to my diet since my last visit: Not a lot. Pretty much given up   With regards to my diet, I am still struggling with: Scheduled eating regular meals and alcohol on the weekends   I have made the following changes to my activity/exercise since my last visit: I can t get myself moving . Part depression and moving is painful   With regards to my activity/exercise, I am still struggling with: Any exercise due to painful knees and depression       VITALS:  Ht 1.702 m (5' 7\")   Wt 123.4 kg (272 lb)   BMI 42.60 kg/m      MEDICATIONS:   Current Outpatient Medications   Medication Sig Dispense Refill     acetaminophen (TYLENOL) 500 MG tablet Take 500-1,000 mg by mouth every 6 hours as needed for mild pain       amLODIPine (NORVASC) 5 MG tablet Take dailly 90 tablet 3     aspirin (ASA) 81 MG chewable tablet TAKE 1 TABLET BY MOUTH DAILY 90 tablet 0     atorvastatin (LIPITOR) 20 MG tablet Take 1 tablet (20 mg) by mouth daily 90 tablet 2     buPROPion (WELLBUTRIN XL) 150 MG 24 hr tablet Take 300 mg by mouth every morning        esomeprazole (NEXIUM) 20 MG DR capsule Take 1 tablet by mouth daily        gabapentin (NEURONTIN) 100 MG capsule TAKE 1 CAPSULE BY MOUTH  capsule 1     hydrochlorothiazide (HYDRODIURIL) 25 MG tablet TAKE 1 TABLET(25 MG) BY MOUTH DAILY " 90 tablet 1     topiramate (TOPAMAX) 25 MG tablet 25 mg AM and 75 mg daily PM before supper 360 tablet 1     venlafaxine (EFFEXOR-XR) 37.5 MG 24 hr capsule Take 1 capsule (37.5 mg) by mouth daily (Patient taking differently: Take 37.5 mg by mouth daily 75 mg + 75 mg + 37.5 mg = 187.5 mg daily) 90 capsule 0     venlafaxine (EFFEXOR-XR) 75 MG 24 hr capsule TAKE 1 CAPSULE(75 MG) BY MOUTH DAILY (Patient taking differently: Take 150 mg by mouth daily 75 mg + 75 mg + 37.5 mg = 187.5 mg daily) 90 capsule 0     vitamin B complex with vitamin C (STRESS TAB) tablet Take 1 tablet by mouth daily       Vitamin D3 (CHOLECALCIFEROL) 125 MCG (5000 UT) tablet Take 1 tablet by mouth daily         Weight Loss Medication History Reviewed With Patient 7/6/2022   Which weight loss medications are you currently taking on a regular basis?  Topamax (topiramate)   If you are not taking a weight loss medication that was prescribed to you, please indicate why: -   Are you having any side effects from the weight loss medication that we have prescribed you? Yes   If you are having side effects please describe: Seem to be very spacey - losing things, forgetting words         ASSESSMENT/PLAN:  Shelia is a patient with early onset morbid obesity with significant element of familial/genetic influence and with current health consequences. Shelia Sanchez endorses binging, eats a high carb diet, eats a high fat diet, uses food as a reward, uses food as mood management and eats to obtain specific degree of fullness.     Her problem is complicated by a hunger disorder, strong craving/reward pathways, a binge eating component and mental health/psychopharmacological barriers     Her ability to lose weight is impacted by physical impairment.     PLAN:    Decrease portion sizes  Purge house of food triggers  Dietician visit of education  Volumetrics eating plan  Calorie/low fat diet  Meal planning     Craving/Reward   Ancillary testing:  N/A.  Food  Plan:  Volumetrics and High protein/low carbohydrate.   Activity Plan:  PT ongoing  Supplementary:  N/A.   Medication:  The patient starting medication to assist in lifestyle changes     Additionally--  Can plan--  --topiramate is at 75 mg before supper can continue;will add 25 mg at noon---> to 50 mg before and continue before supper and continue with the 25 mg at noon for now (issues with word finding; she will let us know if this does not improve at lower dose)  --can consider Ozempic or Victoza potentially; do see after that the visit that she had a HbA1c elevated into prediabetes range at 6.2. Can look into whether or not this will be covered/afforadable if she would like. This has been discussed at points in the past--have not looked for this but [facility] had thought based on insurance plan not likely covered.  --RTC in about 2-3 mo with me           Time: about 35 min spent on evaluation, management, counseling, education, & motivational interviewing (video) combined with previsit prep and post visit charting/follow up care same day       Sincerely,    Mable Greenfield MD

## 2022-07-08 NOTE — PATIENT INSTRUCTIONS
Thank you for allowing us the privilege of caring for you. We hope we provided you with the excellent service you deserve.    Please let us know if there is anything else we can do for you so that we can be sure you are completely satisfied with your care experience.    Your visit was with Dr. Rahman today.    Instructions per today's visit:  --you can drop the topiramate to 50 mg before supper and continue with the 25 mg at noon for now. We discussed that you are having ssues with word finding; please will let us know if this does not improve at lower dose  --Also can consider Ozempic or Victoza potentially in available and affordable (injections); your last HbA1c was in the prediabetes range at 6.2.  We can look into whether or not this will be covered/afforadable if you would like.  --we can plan return to Dr. Rahman in about 2-3 mo    Please call our contact center at [phone] to schedule your next appointments.    Meal Replacement Products:    Here is the link to our new e-store where you can purchase our meal replacement products    [employer]/store    The one week starter kit is a great way to sample a variety of products and see what works for you.    If you want more information about the product go to: [employer]    Free Shipping for orders over $75    Benefits of meal replacements products:    Portion and calorie control  Improved nutrition  Structured eating  Simplified food choices  Avoid contact with trigger foods    Interested in working with a health ?  Health coaches work with you to improve your overall health and wellbeing.  They look at the whole person, and may involve discussion of different areas of life, including, but not limited to the four pillars of health (sleep, exercise, nutrition, and stress management). Discuss with your care team if you would like to start working a health .    Health Coaching-3 Pack: Schedule  by calling [phone]    $99 for three health coaching visits    Visits may be done in person or via phone    Coaching is a partnership between the  and the client; Coaches do not prescribe or diagnose    Coaching helps inspire the client to reach his/her personal goals    Bluetooth Scale:    We hope to provide you with high quality telephone and virtual healthcare visits while social distancing for COVID-19 is necessary, as well as in the future when virtual visits may be more convenient for you.    Our technology team made it possible for Bluetooth scales to send weight measurements to our electronic medical record. This allows weights from you weighing at home to securely flow into the medical record, which will improve telephone and virtual visits.  Additionally, studies have shown that adults actually lose more weight when their weights are automatically sent to someone else, and also that this process is not stressful for those adults.    Below is a link for purchasing the scale, with a discount code for our patients. You may call your insurance company to see if they will reimburse you for the cost of the scale, as a piece of durable medical equipment. The scales only go up to a weight of 400 pounds. This is an issue and we are working with the developer on increasing this. We found no scales that go over 400lb that have blue-tooth for connecting to [employer].    Scale to purchase: the [employer]  Body  Scale: [email or web address]=aw.ds    Discount Code: We have a discount code for our patients to bring the cost down to $50, the code is:  withmed     Steps to link the scale to [employer] via an Android Phone (you can always disconnect at any point in the future):  1. The order must be placed first before the patient can access Track My Health within [employer].  2. Download Google Fit christa from the Google Play Store  a. Log  in or register using your Google account  3. Download the MyChart christiano from Google Play Store  a. Select add organization  b. Search for [employer] and select it  c. Log into [employer]  d. Select Track My Health  e. Select the green connect my account button  f. When prompted log into your Google account  g. Select okay to confirm the account  4. Download the [employer] Health Mate christiano from Google Play Store  a. [place] for [employer]  b. Go to profile  c. Tap google fit under the Apps section  d. Select the option to activate Google Fit integration  e. Select the same Google account  f. Select okay to confirm the account  g.  Steps to link the scale to [employer] via an iPhone (you can always disconnect at any point in the future):  **Note Cloud[employer]ilt is not available for download on an iPad**  1. The order must be placed first before the patient can access Track Mailsuite Health within [employer].  2. Locate the Health christiano on your iPhone.  a. Set up your Apple Health account as prompted  b. The Sources page will show Apps that communicate with your Health christiano. Once all steps are completed, you should see Health [employer] and MyChart listed under the Apps section and your iPhone under the devices section.  i. Select Health Mate  1. Under 'ALLOW  HEALTH MATE  TO WRITE DATA ensure the toggle is on for Weight.  2. This will allow the scale to add your weight to the Apple Health  ii. Select MyChart  1. Under 'ALLOW  MYCHART  TO READ DATA ensure the toggle is on for Weight.  2. This allows [employer] to grab the weight from [employer] so your provider can see your weights.  3. Download the MyChart christiano from the [employer] Store  a. Select add organization                                                  b. Search for [employer] and select it  c. Log into [employer]  d. Select Track My Health  e. Select the green connect my account button  f. Follow prompts to link your device to [employer].  4. Download the Animoto health mate christiano in the [employer] Store  a. [place]  for [employer]  b. Go to profile  c. [employer] under the Apps section  d. If prompted to allow access with the Health [employer]o, toggle weight on for read and write access.      For any questions/concerns contact Juanita Whitaker LPN at [phone]    To schedule appointments with our team, please call [phone]    Please call during clinic hours Monday through Friday 8:00a - 4:00p if you have questions or you can contact us via Buy buy tea at anytime.      Lab results will be communicated through My Chart or letter (if My Chart not used). Please call the clinic if you have not received communication after 1 week or if you have any questions.    Clinic Fax: [phone]    Thank you,  Medical Weight Management Team

## 2022-07-08 NOTE — PROGRESS NOTES
"Shelia is a 71 year old who is being evaluated via a billable video visit.      How would you like to obtain your AVS? MyChart  If the video visit is dropped, the invitation should be resent by: [phone]  Will anyone else be joining your video visit? No     During this virtual visit the patient is located in [place], patient verifies this as the location during the entirety of this visit.     Video-Visit Details    --pt opted to change to phone visit  Shuttle[employer] platform; 17 min phone call    Return Medical Weight Management Note     Shelia Sanchez  MRN:  [id number]  :  1950  JOSEPH:  2022    Dear Mariana Benitez DO,    I had the pleasure of seeing your patient Shelia Sanchez. She is a 71 year old female who I am continuing to see for treatment of obesity related to:       9/15/2021   I have the following health issues associated with obesity: Pre-Diabetes, High Blood Pressure, High Cholesterol, Sleep Apnea, GERD (Reflux), Stress Incontinence, Osteoarthritis (joint disease)   I have the following symptoms associated with obesity: Knee Pain, Depression, Lower Extremity Swelling, Back Pain, Fatigue       INTERVAL HISTORY:    Return visit with her, last seen a few mo ago, reviewed and relevant history is as follows, as extracted from earlier note--  ------------------------------  \"--depressed over last few mo and PA working with psychiatrist has increased the bupropion, pt is feeling better  --pt has binge eating DO and is seen at [facility]; notes the strategy she was given there is at odds with the advice she is getting from our nutritonist ([facility] wants 3 good mreals  --still planning knee replacement  --will get pt back to nutritionist  --machine at  for work outs (PT is guiding pt's exercise towards lower impact); 2-3 times per wk initially; pt also has home exercises  --doing PT exercises everyday; in [employer] (but that is ending). [facility] PT and therapist want pt to " "use recumbent bike (strengthen legs for surgery)\"  ------------------------------          LAST WEIGHT:   270 lbs 0 oz   Body mass index is 42.29 kg/m .     Initial Weight (lbs): 277 lbs    Since last seen--  --still issues with snacking--med is providing some support. She did not tolerate naltrexone prior and I would not start phentermine-containing med.  --knees are still really painful; has a desk pedelar she will use. Will get another steroid injection soon. Does not look like liraglutide or semaglutide are covered as wt loss meds    CURRENT WEIGHT:   272 lbs 0 oz  Body mass index is 42.6 kg/m .         Wt Readings from Last 3 Encounters:   07/08/22 123.4 kg (272 lb)   04/29/22 122.5 kg (270 lb)   02/18/22 132.5 kg (292 lb)             Changes and Difficulties 7/6/2022   I have made the following changes to my diet since my last visit: Not a lot. Pretty much given up   With regards to my diet, I am still struggling with: Scheduled eating regular meals and alcohol on the weekends   I have made the following changes to my activity/exercise since my last visit: I can t get myself moving . Part depression and moving is painful   With regards to my activity/exercise, I am still struggling with: Any exercise due to painful knees and depression       VITALS:  Ht 1.702 m (5' 7\")   Wt 123.4 kg (272 lb)   BMI 42.60 kg/m      MEDICATIONS:   Current Outpatient Medications   Medication Sig Dispense Refill     acetaminophen (TYLENOL) 500 MG tablet Take 500-1,000 mg by mouth every 6 hours as needed for mild pain       amLODIPine (NORVASC) 5 MG tablet Take dailly 90 tablet 3     aspirin (ASA) 81 MG chewable tablet TAKE 1 TABLET BY MOUTH DAILY 90 tablet 0     atorvastatin (LIPITOR) 20 MG tablet Take 1 tablet (20 mg) by mouth daily 90 tablet 2     buPROPion (WELLBUTRIN XL) 150 MG 24 hr tablet Take 300 mg by mouth every morning        esomeprazole (NEXIUM) 20 MG DR capsule Take 1 tablet by mouth daily        gabapentin " (NEURONTIN) 100 MG capsule TAKE 1 CAPSULE BY MOUTH  capsule 1     hydrochlorothiazide (HYDRODIURIL) 25 MG tablet TAKE 1 TABLET(25 MG) BY MOUTH DAILY 90 tablet 1     topiramate (TOPAMAX) 25 MG tablet 25 mg AM and 75 mg daily PM before supper 360 tablet 1     venlafaxine (EFFEXOR-XR) 37.5 MG 24 hr capsule Take 1 capsule (37.5 mg) by mouth daily (Patient taking differently: Take 37.5 mg by mouth daily 75 mg + 75 mg + 37.5 mg = 187.5 mg daily) 90 capsule 0     venlafaxine (EFFEXOR-XR) 75 MG 24 hr capsule TAKE 1 CAPSULE(75 MG) BY MOUTH DAILY (Patient taking differently: Take 150 mg by mouth daily 75 mg + 75 mg + 37.5 mg = 187.5 mg daily) 90 capsule 0     vitamin B complex with vitamin C (STRESS TAB) tablet Take 1 tablet by mouth daily       Vitamin D3 (CHOLECALCIFEROL) 125 MCG (5000 UT) tablet Take 1 tablet by mouth daily         Weight Loss Medication History Reviewed With Patient 7/6/2022   Which weight loss medications are you currently taking on a regular basis?  Topamax (topiramate)   If you are not taking a weight loss medication that was prescribed to you, please indicate why: -   Are you having any side effects from the weight loss medication that we have prescribed you? Yes   If you are having side effects please describe: Seem to be very spacey - losing things, forgetting words         ASSESSMENT/PLAN:  Shelia is a patient with early onset morbid obesity with significant element of familial/genetic influence and with current health consequences. Shelia Sanchez endorses binging, eats a high carb diet, eats a high fat diet, uses food as a reward, uses food as mood management and eats to obtain specific degree of fullness.     Her problem is complicated by a hunger disorder, strong craving/reward pathways, a binge eating component and mental health/psychopharmacological barriers     Her ability to lose weight is impacted by physical impairment.     PLAN:    Decrease portion sizes  Purge house of food  triggers  Dietician visit of education  Volumetrics eating plan  Calorie/low fat diet  Meal planning     Craving/Reward   Ancillary testing:  N/A.  Food Plan:  Volumetrics and High protein/low carbohydrate.   Activity Plan:  PT ongoing  Supplementary:  N/A.   Medication:  The patient starting medication to assist in lifestyle changes     Additionally--  Can plan--  --topiramate is at 75 mg before supper can continue;will add 25 mg at noon---> to 50 mg before and continue before supper and continue with the 25 mg at noon for now (issues with word finding; she will let us know if this does not improve at lower dose)  --can consider Ozempic or Victoza potentially; do see after that the visit that she had a HbA1c elevated into prediabetes range at 6.2. Can look into whether or not this will be covered/afforadable if she would like. This has been discussed at points in the past--have not looked for this but Beba had thought based on insurance plan not likely covered.  --RTC in about 2-3 mo with me           Time: about 35 min spent on evaluation, management, counseling, education, & motivational interviewing (video) combined with previsit prep and post visit charting/follow up care same day       Sincerely,    Mable Greenfield MD

## 2022-07-08 NOTE — NURSING NOTE
"Chief Complaint   Patient presents with     RECHECK     Return [facility]       Vitals:    07/08/22 1114   Weight: 123.4 kg (272 lb)   Height: 1.702 m (5' 7\")       Body mass index is 42.6 kg/m .          LUIZ CARTER EMT    "

## 2022-07-08 NOTE — PROGRESS NOTES
Shelia is a 71 year old who is being evaluated via a billable video visit.      How would you like to obtain your AVS? MyChart  If the video0 visit is dropped, the invitation should be resent by: Text to cell phone: [phone]  Will anyone else be joining your video visit? No

## 2022-07-20 DIAGNOSIS — R73.03 PREDIABETES: ICD-10-CM

## 2022-07-20 DIAGNOSIS — E66.813 CLASS 3 SEVERE OBESITY DUE TO EXCESS CALORIES WITH SERIOUS COMORBIDITY AND BODY MASS INDEX (BMI) OF 40.0 TO 44.9 IN ADULT (H): Primary | ICD-10-CM

## 2022-07-20 DIAGNOSIS — E66.01 CLASS 3 SEVERE OBESITY DUE TO EXCESS CALORIES WITH SERIOUS COMORBIDITY AND BODY MASS INDEX (BMI) OF 40.0 TO 44.9 IN ADULT (H): Primary | ICD-10-CM

## 2022-07-20 RX ORDER — SEMAGLUTIDE 1.34 MG/ML
INJECTION, SOLUTION SUBCUTANEOUS
Qty: 2 MG | Refills: 5 | Status: SHIPPED | OUTPATIENT
Start: 2022-07-20 | End: 2022-08-15

## 2022-07-21 ENCOUNTER — TELEPHONE (OUTPATIENT)
Dept: ENDOCRINOLOGY | Facility: CLINIC | Age: 72
End: 2022-07-21

## 2022-07-21 NOTE — TELEPHONE ENCOUNTER
Prior Authorization Retail Medication Request    Medication/Dose: Ozempic  ICD code (if different than what is on RX):    Class 3 severe obesity due to excess calories with serious comorbidity and body mass index (BMI) of 40.0 to 44.9 in adult (H) [E66.01, Z68.41]        Prediabetes [R73.03]           Previously Tried and Failed:  Naltrexone, history of diet and exercise  Rationale:  I had the pleasure of seeing your patient Shelia Sanchez. She is a 71 year old female who I am continuing to see for treatment of obesity related to: Pre-Diabetes, High Blood Pressure, High Cholesterol, Sleep Apnea, GERD (Reflux), Stress Incontinence, Osteoarthritis (joint disease).    Shelia is a patient with early onset morbid obesity with significant element of familial/genetic influence and with current health consequences. Shelia Sanchez endorses binging, eats a high carb diet, eats a high fat diet, uses food as a reward, uses food as mood management and eats to obtain specific degree of fullness.     Her problem is complicated by a hunger disorder, strong craving/reward pathways, a binge eating component and mental health/psychopharmacological barriers     Her ability to lose weight is impacted by physical impairment.    Since last seen--  --still issues with snacking--med is providing some support. She did not tolerate naltrexone prior and I would not start phentermine-containing med.  --knees are still really painful; has a desk pedelar she will use. Will get another steroid injection soon.    --HbA1c elevated into prediabetes range at 6.2.     CURRENT WEIGHT:   272 lbs 0 oz  Body mass index is 42.6 kg/m .    Insurance Name:    Insurance ID:        Pharmacy Information (if different than what is on RX)  Name:  [id number] - [facility] [address] AT [facility] [address] [facility] [address]  Phone:  [phone]

## 2022-07-21 NOTE — TELEPHONE ENCOUNTER
Central Prior Authorization Team   Phone: [phone]      PRIOR AUTHORIZATION DENIED    Medication: semaglutide (OZEMPIC, 0.25 OR 0.5 MG/DOSE,) 2 MG/1.5ML SOPN pen - EPA DENIED     Denial Date: 7/21/2022    Denial Rational:   We denied this request under Medicare Part D because:  The information received from your physician does not support approval of this drug under your Medicare  Part D benefit because the Food and Drug Administration (FDA) has not approved the use of the  requested medication for the diagnosis provided by your physician. Your plan's formulary has a restriction  on this drug. This restriction has been approved by the Centers for Medicare and Medicaid Services  (CMS). Coverage of the requested medication is provided when the FDA has approved use of the  requested medication for the diagnosis provided by your physician. The FDA approved use of this  medication is as an adjunct to diet and exercise to improve glycemic (blood sugar) control in adults with  type 2 diabetes mellitus.    CaseId:[id number]            Appeal Information:

## 2022-07-22 NOTE — TELEPHONE ENCOUNTER
Central Prior Authorization Team   Phone: [phone]      PA Initiation    Medication: Ozempic-PA initiated  Insurance Company: [employer] - Phone [phone] Fax [phone]  Pharmacy Filling the Rx: [id number] [place], [place] - [facility] AT [facility] [address]  Filling Pharmacy Phone: [phone]  Filling Pharmacy Fax:    Start Date: 7/22/2022

## 2022-07-26 NOTE — TELEPHONE ENCOUNTER
PRIOR AUTHORIZATION DENIED    Medication: Ozempic-PA denied    Denial Date: 7/21/2022    Denial Rational:         Appeal Information:

## 2022-07-28 NOTE — TELEPHONE ENCOUNTER
Medication Appeal Request    Please initiate an appeal for the requested medication: semaglutide (OZEMPIC, 0.25 OR 0.5 MG/DOSE,) 2 MG/1.5ML SOPN pen - EPA DENIED     Has a letter of medical necessity been completed in EPIC?   yes    Any additional lab values/information to include?     Would you like to include any research articles?               If yes please include the hyperlink(s) below or fax to    [phone] for Specialty/Retail               [phone] for Infusion/Clinic Administered.                Include the patients name and MRN on the fax cover sheet.

## 2022-08-10 ENCOUNTER — OFFICE VISIT (OUTPATIENT)
Dept: OPTOMETRY | Facility: CLINIC | Age: 72
End: 2022-08-10
Payer: COMMERCIAL

## 2022-08-10 DIAGNOSIS — H47.321 OPTIC NERVE DRUSEN, RIGHT: ICD-10-CM

## 2022-08-10 DIAGNOSIS — H52.221 REGULAR ASTIGMATISM OF RIGHT EYE: ICD-10-CM

## 2022-08-10 DIAGNOSIS — H25.13 NUCLEAR AGE-RELATED CATARACT, BOTH EYES: ICD-10-CM

## 2022-08-10 DIAGNOSIS — Z01.01 ENCOUNTER FOR EXAMINATION OF EYES AND VISION WITH ABNORMAL FINDINGS: Primary | ICD-10-CM

## 2022-08-10 DIAGNOSIS — H52.13 MYOPIA OF BOTH EYES: ICD-10-CM

## 2022-08-10 PROCEDURE — 92015 DETERMINE REFRACTIVE STATE: CPT | Performed by: OPTOMETRIST

## 2022-08-10 PROCEDURE — 92014 COMPRE OPH EXAM EST PT 1/>: CPT | Performed by: OPTOMETRIST

## 2022-08-10 RX ORDER — BUPROPION HYDROCHLORIDE 300 MG/1
TABLET ORAL
COMMUNITY
Start: 2022-07-16 | End: 2023-04-26

## 2022-08-10 ASSESSMENT — VISUAL ACUITY
OD_CC: 20/40
OD_CC+: -1
METHOD: SNELLEN - LINEAR
OD_CC: 20/40
OS_SC+: -1
OS_CC: 20/40
OD_SC+: -1
OD_SC: 20/30
OD_SC: 20/40
OS_SC: 20/30
CORRECTION_TYPE: GLASSES
OS_CC: 20/30-1
OS_SC: 20/40

## 2022-08-10 ASSESSMENT — REFRACTION_WEARINGRX
OS_SPHERE: -1.00
OD_ADD: +2.25
OS_ADD: +2.25
OD_CYLINDER: +0.75
SPECS_TYPE: PAL
OD_SPHERE: -1.00
OD_AXIS: 015
OS_CYLINDER: +1.50
OS_AXIS: 023

## 2022-08-10 ASSESSMENT — REFRACTION_MANIFEST
OS_ADD: +2.50
OD_ADD: +2.50
OS_SPHERE: -0.50
OD_AXIS: 170
OD_SPHERE: -0.50
OS_CYLINDER: SPHERE
OD_CYLINDER: +1.50

## 2022-08-10 ASSESSMENT — EXTERNAL EXAM - RIGHT EYE: OD_EXAM: NORMAL

## 2022-08-10 ASSESSMENT — CUP TO DISC RATIO
OD_RATIO: 0.2
OS_RATIO: 0.15

## 2022-08-10 ASSESSMENT — SLIT LAMP EXAM - LIDS
COMMENTS: NORMAL
COMMENTS: NORMAL

## 2022-08-10 ASSESSMENT — CONF VISUAL FIELD
OS_NORMAL: 1
OD_NORMAL: 1
METHOD: COUNTING FINGERS

## 2022-08-10 ASSESSMENT — TONOMETRY
OD_IOP_MMHG: 17
OS_IOP_MMHG: 16
IOP_METHOD: APPLANATION

## 2022-08-10 ASSESSMENT — EXTERNAL EXAM - LEFT EYE: OS_EXAM: NORMAL

## 2022-08-10 NOTE — LETTER
8/10/2022         RE: Shelia Sanchez  [address]  [place] [address]        Dear Colleague,    Thank you for referring your patient, Shelia Sanchez, to the [facility]. Please see a copy of my visit note below.    Chief Complaint   Patient presents with     Diabetic Eye Exam     Hx of Pre-diabetes        Chief Complaint(s) and History of Present Illness(es)     Diabetic Eye Exam     Diabetes Type: controlled with diet    Duration: 3 years    Comments: Hx of Pre-diabetes               Lab Results   Component Value Date    A1C 6.2 02/17/2022    A1C 6.0 05/10/2021    A1C 6.3 02/08/2021    A1C 5.7 11/18/2019    A1C 6.1 08/07/2019    A1C 5.5 12/02/2015       Last Eye Exam: 9/24/2020   Dilated Previously: Yes, side effects of dilation explained today    What are you currently using to see?  Glasses - wears full time    Distance Vision Acuity: Noticed gradual change in both eyes - has been taking glasses off to watch TV    Near Vision Acuity: Not satisfied - having a harder time seeing fine print    Eye Comfort: watery right eye - thinks secondary to medication (Topiramate) that she has been on for ~3 months - affecting cognitive skills as well - has appt. with doctor to discuss next week    -Rubs eyes frequently - not due to itching  Or discomfort just thinks it may be habit  Do you use eye drops? : No   Occupation or Hobbies: Retired    Bharti Bass     Medical, surgical and family histories reviewed and updated 8/10/2022.       OBJECTIVE: See Ophthalmology exam    ASSESSMENT:    ICD-10-CM    1. Encounter for examination of eyes and vision with abnormal findings  Z01.01    2. Nuclear age-related cataract, both eyes  H25.13    3. 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 Myopia of both eyes  H52.13    5. Regular astigmatism of right eye  H52.221        PLAN:    Shelia Sanchez aware  eye exam results will be sent to Mariana Benitez.  Patient Instructions    Visually significant cataract right eye > left eye.   Refer for cataract evaluation with ophthalmology.     Hold off on glasses prescription at this time, as this will change after having the cataract surgery.       The effects of the dilating drops last for 4- 6 hours.  You will be more sensitive to light and vision will be blurry up close.  Mydriatic sunglasses were given if needed.     Chip Shoemaker, LEXIS  [address] [place]  [address]    [phone]              Again, thank you for allowing me to participate in the care of your patient.        Sincerely,        Chip Shoemaker, OD

## 2022-08-10 NOTE — PATIENT INSTRUCTIONS
Visually significant cataract right eye > left eye.   Refer for cataract evaluation with ophthalmology.     Hold off on glasses prescription at this time, as this will change after having the cataract surgery.       The effects of the dilating drops last for 4- 6 hours.  You will be more sensitive to light and vision will be blurry up close.  Mydriatic sunglasses were given if needed.     Chip Shoemaker, OD  [facility]  [address] [place] [address]. [place]  [place]  [address]    [phone]

## 2022-08-10 NOTE — PROGRESS NOTES
Chief Complaint   Patient presents with     Diabetic Eye Exam     Hx of Pre-diabetes        Chief Complaint(s) and History of Present Illness(es)     Diabetic Eye Exam     Diabetes Type: controlled with diet    Duration: 3 years    Comments: Hx of Pre-diabetes               Lab Results   Component Value Date    A1C 6.2 02/17/2022    A1C 6.0 05/10/2021    A1C 6.3 02/08/2021    A1C 5.7 11/18/2019    A1C 6.1 08/07/2019    A1C 5.5 12/02/2015       Last Eye Exam: 9/24/2020   Dilated Previously: Yes, side effects of dilation explained today    What are you currently using to see?  Glasses - wears full time    Distance Vision Acuity: Noticed gradual change in both eyes - has been taking glasses off to watch TV    Near Vision Acuity: Not satisfied - having a harder time seeing fine print    Eye Comfort: watery right eye - thinks secondary to medication (Topiramate) that she has been on for ~3 months - affecting cognitive skills as well - has appt. with doctor to discuss next week    -Rubs eyes frequently - not due to itching  Or discomfort just thinks it may be habit  Do you use eye drops? : No   Occupation or Hobbies: Retired    [employer]     Medical, surgical and family histories reviewed and updated 8/10/2022.       OBJECTIVE: See Ophthalmology exam    ASSESSMENT:    ICD-10-CM    1. Encounter for examination of eyes and vision with abnormal findings  Z01.01    2. Nuclear age-related cataract, both eyes  H25.13    3. 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 Myopia of both eyes  H52.13    5. Regular astigmatism of right eye  H52.221        PLAN:    Shelia Sanchez aware  eye exam results will be sent to Mariana Benitez.  Patient Instructions   Visually significant cataract right eye > left eye.   Refer for cataract evaluation with ophthalmology.     Hold off on glasses prescription at this time, as this will change after having the cataract surgery.       The effects of the dilating drops last for 4- 6  hours.  You will be more sensitive to light and vision will be blurry up close.  Mydriatic sunglasses were given if needed.     Chip Shoemaker, [address]. [place]  [address] [phone]

## 2022-08-11 NOTE — TELEPHONE ENCOUNTER
Called insurance and Appeal for Ozempic was denied 8-8-2022. I asked them to resend me denial letter, they said it could take a couple days

## 2022-08-15 ENCOUNTER — OFFICE VISIT (OUTPATIENT)
Dept: FAMILY MEDICINE | Facility: CLINIC | Age: 72
End: 2022-08-15
Payer: COMMERCIAL

## 2022-08-15 VITALS
WEIGHT: 281 LBS | RESPIRATION RATE: 18 BRPM | DIASTOLIC BLOOD PRESSURE: 74 MMHG | TEMPERATURE: 98.1 F | HEART RATE: 94 BPM | HEIGHT: 67 IN | BODY MASS INDEX: 44.1 KG/M2 | OXYGEN SATURATION: 99 % | SYSTOLIC BLOOD PRESSURE: 130 MMHG

## 2022-08-15 DIAGNOSIS — T14.8XXA HEMATOMA OF SKIN: ICD-10-CM
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
## 2022-08-15 DIAGNOSIS — R73.03 PREDIABETES: ICD-10-CM

## 2022-08-15 DIAGNOSIS — F50.819 BINGE EATING DISORDER: ICD-10-CM

## 2022-08-15 DIAGNOSIS — G47.33 OSA (OBSTRUCTIVE SLEEP APNEA): ICD-10-CM

## 2022-08-15 DIAGNOSIS — I10 HYPERTENSION GOAL BP (BLOOD PRESSURE) < 140/90: Primary | ICD-10-CM

## 2022-08-15 DIAGNOSIS — E66.813 CLASS 3 OBESITY: ICD-10-CM

## 2022-08-15 DIAGNOSIS — F32.0 CURRENT MILD EPISODE OF MAJOR DEPRESSIVE DISORDER, UNSPECIFIED WHETHER RECURRENT (H): Chronic | ICD-10-CM

## 2022-08-15 LAB
BASOPHILS # BLD AUTO: 0 10E3/UL (ref 0–0.2)
BASOPHILS NFR BLD AUTO: 0 %
EOSINOPHIL # BLD AUTO: 0.3 10E3/UL (ref 0–0.7)
EOSINOPHIL NFR BLD AUTO: 3 %
ERYTHROCYTE [DISTWIDTH] IN BLOOD BY AUTOMATED COUNT: 14 % (ref 10–15)
HBA1C MFR BLD: 6.3 % (ref 0–5.6)
HCT VFR BLD AUTO: 39.5 % (ref 35–47)
HGB BLD-MCNC: 12.9 G/DL (ref 11.7–15.7)
LYMPHOCYTES # BLD AUTO: 2.5 10E3/UL (ref 0.8–5.3)
LYMPHOCYTES NFR BLD AUTO: 24 %
MCH RBC QN AUTO: 28.3 PG (ref 26.5–33)
MCHC RBC AUTO-ENTMCNC: 32.7 G/DL (ref 31.5–36.5)
MCV RBC AUTO: 87 FL (ref 78–100)
MONOCYTES # BLD AUTO: 0.8 10E3/UL (ref 0–1.3)
MONOCYTES NFR BLD AUTO: 7 %
NEUTROPHILS # BLD AUTO: 7 10E3/UL (ref 1.6–8.3)
NEUTROPHILS NFR BLD AUTO: 66 %
PLATELET # BLD AUTO: 309 10E3/UL (ref 150–450)
RBC # BLD AUTO: 4.56 10E6/UL (ref 3.8–5.2)
WBC # BLD AUTO: 10.6 10E3/UL (ref 4–11)

## 2022-08-15 PROCEDURE — 36415 COLL VENOUS BLD VENIPUNCTURE: CPT | Performed by: FAMILY MEDICINE

## 2022-08-15 PROCEDURE — 90750 HZV VACC RECOMBINANT IM: CPT | Performed by: FAMILY MEDICINE

## 2022-08-15 PROCEDURE — 83036 HEMOGLOBIN GLYCOSYLATED A1C: CPT | Performed by: FAMILY MEDICINE

## 2022-08-15 PROCEDURE — 90471 IMMUNIZATION ADMIN: CPT | Performed by: FAMILY MEDICINE

## 2022-08-15 PROCEDURE — 80053 COMPREHEN METABOLIC PANEL: CPT | Performed by: FAMILY MEDICINE

## 2022-08-15 PROCEDURE — 99215 OFFICE O/P EST HI 40 MIN: CPT | Mod: 25 | Performed by: FAMILY MEDICINE

## 2022-08-15 PROCEDURE — 85025 COMPLETE CBC W/AUTO DIFF WBC: CPT | Performed by: FAMILY MEDICINE

## 2022-08-15 ASSESSMENT — PATIENT HEALTH QUESTIONNAIRE - PHQ9
10. IF YOU CHECKED OFF ANY PROBLEMS, HOW DIFFICULT HAVE THESE PROBLEMS MADE IT FOR YOU TO DO YOUR WORK, TAKE CARE OF THINGS AT HOME, OR GET ALONG WITH OTHER PEOPLE: SOMEWHAT DIFFICULT
SUM OF ALL RESPONSES TO PHQ QUESTIONS 1-9: 9
SUM OF ALL RESPONSES TO PHQ QUESTIONS 1-9: 9

## 2022-08-15 ASSESSMENT — PAIN SCALES - GENERAL: PAINLEVEL: NO PAIN (0)

## 2022-08-15 NOTE — PATIENT INSTRUCTIONS
Shingles shot advised  Monitor arm mass, heat compress, recheck in 8 weeks, need imaging if not resolving    Follow up with specialist as planned    Your pre diabetes mellitus test is stable, continue all meds  Dietary and lifestyle monitoring advised      Mariana Benitez D.O.        Patient Education

## 2022-08-15 NOTE — PROGRESS NOTES
ICD-10-CM    1. Hypertension goal BP (blood pressure) < 140/90  I10    2. Prediabetes  R73.03 Hemoglobin A1c     Comprehensive metabolic panel (BMP + Alb, Alk Phos, ALT, AST, Total. Bili, TP)     Hemoglobin A1c     Comprehensive metabolic panel (BMP + Alb, Alk Phos, ALT, AST, Total. Bili, TP)   3. LORI (obstructive sleep apnea)- severe ()  G47.33 Comprehensive metabolic panel (BMP + Alb, Alk Phos, ALT, AST, Total. Bili, TP)     Comprehensive metabolic panel (BMP + Alb, Alk Phos, ALT, AST, Total. Bili, TP)   4. Hematoma of skin  T14.8XXA CBC with platelets and differential     CBC with platelets and differential   5. Binge eating disorder  F50.81    6. Fall, sequela  W19.XXXS    7. Current mild episode of major depressive disorder, unspecified whether recurrent (H)  F32.0    8. Class 3 obesity  E66.9      Pre diabetes mellitus-stable,continue dietary  Changes    bmi 40-complicated by hypertension, preDM, working with eating disorder clinic, and weight management clinic, is on Topamax which is impacting her cognition, she is following with them to make med changes    lori-cpap    htn-stable,cont meds, check labs    High cholesterol-stable,cont med    Recent falls, doing better now, she had large area of bruising on arm and now there is subcutaneous mass, which looks like hematoma, no pain, warm compress if not better then follow up for imaging test, no joint pains    Depression-stable,cont med      Spent  52min greater than 50% of counseling and coordination of care for the conditions documented above.      Bryon Bass is a 71 year old presenting for the following health issues:  Chronic Disease Management    Patient last seen in February for medicare wellness visit. Here for 6 month follow up.   Fell two weeks ago. Black and blue discoloration from right elbow to shoulder after Injury. Bruising has resolved but a lump is persisting on upper right arm.     No pain    History of Present Illness  "      Diabetes:   She presents for follow up of diabetes.  She is not checking blood glucose. She is concerned about other. She is not experiencing numbness or burning in feet, excessive thirst, blurry vision, weight changes or redness, sores or blisters on feet.         Hypertension: She presents for follow up of hypertension.  She does not check blood pressure  regularly outside of the clinic. Outpatient blood pressures have not been over 140/90. She does not follow a low salt diet.               Review of Systems   Constitutional, HEENT, cardiovascular, pulmonary, GI, , musculoskeletal, neuro, skin, endocrine and psych systems are negative, except as otherwise noted.      Objective    /74   Pulse 94   Temp 98.1  F (36.7  C) (Oral)   Resp 18   Ht 1.702 m (5' 7\")   Wt 127.5 kg (281 lb)   SpO2 99%   BMI 44.01 kg/m    Body mass index is 44.01 kg/m .  Physical Exam   GENERAL: healthy, alert and no distress  NECK: no adenopathy, no asymmetry, masses, or scars and thyroid normal to palpation  RESP: lungs clear to auscultation - no rales, rhonchi or wheezes  CV: regular rate and rhythm, normal S1 S2, no S3 or S4, no murmur, click or rub, no peripheral edema and peripheral pulses strong  ABDOMEN: soft, nontender, no hepatosplenomegaly, no masses and bowel sounds normal  MS: larfe area of subcutaneous mass, not tender, per patient it is where her bruising was.  no gross musculoskeletal defects noted, no edema    Results for orders placed or performed in visit on 08/15/22   Hemoglobin A1c     Status: Abnormal   Result Value Ref Range    Hemoglobin A1C 6.3 (H) 0.0 - 5.6 %   Comprehensive metabolic panel (BMP + Alb, Alk Phos, ALT, AST, Total. Bili, TP)     Status: Abnormal   Result Value Ref Range    Sodium 141 133 - 144 mmol/L    Potassium 3.9 3.4 - 5.3 mmol/L    Chloride 110 (H) 94 - 109 mmol/L    Carbon Dioxide (CO2) 26 20 - 32 mmol/L    Anion Gap 5 3 - 14 mmol/L    Urea Nitrogen 28 7 - 30 mg/dL    " Creatinine 0.98 0.52 - 1.04 mg/dL    Calcium 10.0 8.5 - 10.1 mg/dL    Glucose 135 (H) 70 - 99 mg/dL    Alkaline Phosphatase 96 40 - 150 U/L    AST 14 0 - 45 U/L    ALT 26 0 - 50 U/L    Protein Total 7.0 6.8 - 8.8 g/dL    Albumin 3.9 3.4 - 5.0 g/dL    Bilirubin Total 0.4 0.2 - 1.3 mg/dL    GFR Estimate 61 >60 mL/min/1.73m2   CBC with platelets and differential     Status: None   Result Value Ref Range    WBC Count 10.6 4.0 - 11.0 10e3/uL    RBC Count 4.56 3.80 - 5.20 10e6/uL    Hemoglobin 12.9 11.7 - 15.7 g/dL    Hematocrit 39.5 35.0 - 47.0 %    MCV 87 78 - 100 fL    MCH 28.3 26.5 - 33.0 pg    MCHC 32.7 31.5 - 36.5 g/dL    RDW 14.0 10.0 - 15.0 %    Platelet Count 309 150 - 450 10e3/uL    % Neutrophils 66 %    % Lymphocytes 24 %    % Monocytes 7 %    % Eosinophils 3 %    % Basophils 0 %    Absolute Neutrophils 7.0 1.6 - 8.3 10e3/uL    Absolute Lymphocytes 2.5 0.8 - 5.3 10e3/uL    Absolute Monocytes 0.8 0.0 - 1.3 10e3/uL    Absolute Eosinophils 0.3 0.0 - 0.7 10e3/uL    Absolute Basophils 0.0 0.0 - 0.2 10e3/uL   CBC with platelets and differential     Status: None    Narrative    The following orders were created for panel order CBC with platelets and differential.  Procedure                               Abnormality         Status                     ---------                               -----------         ------                     CBC with platelets and d...[[id number]]                      Final result                 Please view results for these tests on the individual orders.                   .  ..

## 2022-08-16 LAB
ALBUMIN SERPL-MCNC: 3.9 G/DL (ref 3.4–5)
ALP SERPL-CCNC: 96 U/L (ref 40–150)
ALT SERPL W P-5'-P-CCNC: 26 U/L (ref 0–50)
ANION GAP SERPL CALCULATED.3IONS-SCNC: 5 MMOL/L (ref 3–14)
AST SERPL W P-5'-P-CCNC: 14 U/L (ref 0–45)
BILIRUB SERPL-MCNC: 0.4 MG/DL (ref 0.2–1.3)
BUN SERPL-MCNC: 28 MG/DL (ref 7–30)
CALCIUM SERPL-MCNC: 10 MG/DL (ref 8.5–10.1)
CHLORIDE BLD-SCNC: 110 MMOL/L (ref 94–109)
CO2 SERPL-SCNC: 26 MMOL/L (ref 20–32)
CREAT SERPL-MCNC: 0.98 MG/DL (ref 0.52–1.04)
GFR SERPL CREATININE-BSD FRML MDRD: 61 ML/MIN/1.73M2
GLUCOSE BLD-MCNC: 135 MG/DL (ref 70–99)
POTASSIUM BLD-SCNC: 3.9 MMOL/L (ref 3.4–5.3)
PROT SERPL-MCNC: 7 G/DL (ref 6.8–8.8)
SODIUM SERPL-SCNC: 141 MMOL/L (ref 133–144)

## 2022-08-25 DIAGNOSIS — M25.569 KNEE PAIN: Primary | ICD-10-CM

## 2022-09-02 ENCOUNTER — OFFICE VISIT (OUTPATIENT)
Dept: OPHTHALMOLOGY | Facility: CLINIC | Age: 72
End: 2022-09-02
Attending: OPTOMETRIST
Payer: COMMERCIAL

## 2022-09-02 ENCOUNTER — ANCILLARY PROCEDURE (OUTPATIENT)
Dept: GENERAL RADIOLOGY | Facility: CLINIC | Age: 72
End: 2022-09-02
Attending: FAMILY MEDICINE
Payer: COMMERCIAL

## 2022-09-02 ENCOUNTER — OFFICE VISIT (OUTPATIENT)
Dept: ORTHOPEDICS | Facility: CLINIC | Age: 72
End: 2022-09-02
Payer: COMMERCIAL

## 2022-09-02 VITALS — WEIGHT: 281 LBS | HEIGHT: 67 IN | BODY MASS INDEX: 44.1 KG/M2

## 2022-09-02 DIAGNOSIS — M17.0 ARTHRITIS OF BOTH KNEES: Primary | ICD-10-CM

## 2022-09-02 DIAGNOSIS — H52.203 MYOPIA OF BOTH EYES WITH ASTIGMATISM AND PRESBYOPIA: Primary | ICD-10-CM

## 2022-09-02 DIAGNOSIS — M25.569 KNEE PAIN: ICD-10-CM

## 2022-09-02 DIAGNOSIS — H25.13 NUCLEAR AGE-RELATED CATARACT, BOTH EYES: ICD-10-CM

## 2022-09-02 DIAGNOSIS — H52.4 MYOPIA OF BOTH EYES WITH ASTIGMATISM AND PRESBYOPIA: Primary | ICD-10-CM

## 2022-09-02 DIAGNOSIS — H52.13 MYOPIA OF BOTH EYES WITH ASTIGMATISM AND PRESBYOPIA: Primary | ICD-10-CM

## 2022-09-02 PROCEDURE — 99213 OFFICE O/P EST LOW 20 MIN: CPT | Mod: 25 | Performed by: FAMILY MEDICINE

## 2022-09-02 PROCEDURE — 20610 DRAIN/INJ JOINT/BURSA W/O US: CPT | Mod: 50 | Performed by: FAMILY MEDICINE

## 2022-09-02 PROCEDURE — 99204 OFFICE O/P NEW MOD 45 MIN: CPT | Performed by: OPHTHALMOLOGY

## 2022-09-02 PROCEDURE — 73562 X-RAY EXAM OF KNEE 3: CPT | Mod: LT | Performed by: RADIOLOGY

## 2022-09-02 PROCEDURE — 76519 ECHO EXAM OF EYE: CPT | Performed by: OPHTHALMOLOGY

## 2022-09-02 RX ORDER — TRIAMCINOLONE ACETONIDE 40 MG/ML
40 INJECTION, SUSPENSION INTRA-ARTICULAR; INTRAMUSCULAR
Status: SHIPPED | OUTPATIENT
Start: 2022-09-02

## 2022-09-02 RX ORDER — LIDOCAINE HYDROCHLORIDE 10 MG/ML
4 INJECTION, SOLUTION EPIDURAL; INFILTRATION; INTRACAUDAL; PERINEURAL
Status: SHIPPED | OUTPATIENT
Start: 2022-09-02

## 2022-09-02 RX ADMIN — LIDOCAINE HYDROCHLORIDE 4 ML: 10 INJECTION, SOLUTION EPIDURAL; INFILTRATION; INTRACAUDAL; PERINEURAL at 15:22

## 2022-09-02 RX ADMIN — TRIAMCINOLONE ACETONIDE 40 MG: 40 INJECTION, SUSPENSION INTRA-ARTICULAR; INTRAMUSCULAR at 15:22

## 2022-09-02 ASSESSMENT — VISUAL ACUITY
OS_CC+: -1
OD_CC: 20/30
METHOD: SNELLEN - LINEAR
CORRECTION_TYPE: GLASSES
OS_CC: 20/30

## 2022-09-02 ASSESSMENT — TONOMETRY
IOP_METHOD: TONOPEN
OD_IOP_MMHG: 17
OS_IOP_MMHG: 16

## 2022-09-02 ASSESSMENT — CUP TO DISC RATIO
OD_RATIO: 0.2
OS_RATIO: 0.15

## 2022-09-02 ASSESSMENT — EXTERNAL EXAM - RIGHT EYE: OD_EXAM: NORMAL

## 2022-09-02 ASSESSMENT — SLIT LAMP EXAM - LIDS
COMMENTS: NORMAL
COMMENTS: NORMAL

## 2022-09-02 ASSESSMENT — EXTERNAL EXAM - LEFT EYE: OS_EXAM: NORMAL

## 2022-09-02 NOTE — LETTER
9/2/2022      RE: Shelia Sanchez  [address]  [place] [address]     Dear Colleague,    Thank you for referring your patient, Shelia Sanchez, to the [facility] [place]. Please see a copy of my visit note below.     Subjective:   Shelia Sanchez is a 72 year old female who returns to clinic for bilateral knee pain. Last visit on 10/22/21 she received a right knee cortisone injection. She reports about 4 months of pain relief from this injection. She reports that the left knee in the past year has been bothering her as well. We obtained left knee imaging today.     She had a fall on 7/17/22, she came down mainly on the right knee and right knee.    Right knee pain has been present for years. Has had several (~5) cortisone injections in that knee. Most recent over a year ago. Has done PT for knees as well, most recently 5 months ago - graduated. Still doing PT at home 2x/week - doing both stretching and strengthening. Injections usually last about 4-6 months.    Has some paraesthesias in her feet that are chronic. Endorses clicking, locking.    Wants to lose weight (sees Dr. Greenfield for weight management). Is going to [place] for binge-eating disorder as well. Loves to walk but can't d/t knee pain. Was told if she can get her BMI < 40 they would do bariatric surgery, which she is game for.    Date of injury: Chronic, right knee fall on 7/17/22  Date last seen: 8/25/2022  Following Therapeutic Plan: Yes   Pain: Worsening  Function: Worsening       PAST MEDICAL, SOCIAL, SURGICAL AND FAMILY HISTORY: She  has a past medical history of Arthritis (1/28/14), Asteatotic eczema, Cholelithiasis with obstruction (5/10/2010), Depressive disorder, Diabetes (H) (Summer 2019), Gallstone pancreatitis (5/11/2010), Hypertension, and Right knee pain (12/27/2012).    She has no past medical history of Cancer (H), Cerebral infarction (H), Congestive heart failure (H), Congestive  "heart failure, unspecified, COPD (chronic obstructive pulmonary disease) (H), Coronary artery disease, Heart disease, History of blood transfusion, Thyroid disease, or Uncomplicated asthma.  She  has a past surgical history that includes VAG HYST,RMV TUBE/OVARY (2004); cholecystectomy, laporoscopic; and ERCP W STENT PLACEMENT BILE/PANCREATIC DUCT.  Her family history includes Alzheimer Disease in her father; Anxiety Disorder in her sister; Arthritis in her father and mother; Cerebrovascular Disease in her father; Congenital Anomalies in her father and mother; Coronary Artery Disease in her father; Depression in her mother and sister; Diabetes in her father; Eye Disorder in her mother; Glaucoma in her mother; Heart Disease in her maternal grandfather, maternal grandmother, and paternal grandfather; Hyperlipidemia in her father and mother; Hypertension in her father, mother, paternal grandmother, and sister; Macular Degeneration in her maternal uncle; Obesity in her father and mother; Prostate Cancer in her father.  She reports that she has never smoked. She has never used smokeless tobacco. She reports current alcohol use. She reports that she does not use drugs.    ALLERGIES: She is allergic to nitrofurantoin.    CURRENT MEDICATIONS: She has a current medication list which includes the following prescription(s): acetaminophen, amlodipine, aspirin, atorvastatin, bupropion, bupropion, esomeprazole, gabapentin, hydrochlorothiazide, topiramate, venlafaxine, vitamin b complex with vitamin c, and vitamin d3.     REVIEW OF SYSTEMS: 3 point review of systems is negative except as noted above.     Exam:   Ht 1.702 m (5' 7\")   Wt 127.5 kg (281 lb)   BMI 44.01 kg/m       CONSTITUTIONAL: healthy, alert and no distress  HEAD: Normocephalic. No masses, lesions, tenderness or abnormalities  SKIN: no suspicious lesions or rashes  GAIT: Uses cane, antalgic  NEUROLOGIC: Non-focal  PSYCHIATRIC: affect normal/bright and mentation " appears normal.    MUSCULOSKELETAL:   R knee - flexion only to 90 deg d/t pain, full extension; point TTP medial joint line  L knee - flexion only to 90 deg d/t pain, full extension; point TTP medial joint line       Assessment/Plan:     (M17.0) Arthritis of both knees  (primary encounter diagnosis)  Comment: Bilateral medial and patellofemoral compartment OA. Last CSI one year ago in R knee, never had in the L. Is working on weight loss, seeing Dr. Greenfield, weight management provider. Difficult d/t h/o binge eating disorder, following at [facility]. Has done formal PT in the past for knees, still doing exercises at home 2-3x/week.  Plan:   -B/l CSI done today  -Continue home PT  -F/u 3-4 months    Venancio Abraham MD, PGY3     Patient seen, evaluated and discussed with the resident. I have verified the content of the note, which accurately reflects my assessment of the patient and the plan of care. I was present for the injections performed by Dr. Abraham.   Supervising Physician:  Moni Fischer MD   X-RAY INTERPRETATION:   3 views left knee radiographs 9/2/2022 2:23 PM     History: Bilateral WB AP/LAT/SUN; Knee pain     Comparison: Right knee 5/5/2021     Findings:     Standing AP view of lateral knees, lateral and patellofemoral views of  the left knee were obtained.      Left:     No acute osseous abnormality. Small joint effusion.     Moderate medial compartment joint space loss.     No patellar tilt or lateral subluxation.  Soft tissue is unremarkable.     Right:     Moderate to severe medial compartment joint space loss.     Mild subcutaneous soft tissue stranding lateral calf.                                                                      Impression:  1. No acute osseous abnormality.  2. Medial compartment predominant osteoarthrosis of bilateral knees,  greater on the right.    ------------------    Procedure note:  The pt was verbally consented. Both knees were sterilely prepped in usual fashion.  1cc of 40mg/mL Kenalog and 4 cc of 1% lidocaine were injected via lateral approach without complication. Pt tolerated procedure well.     Large Joint Injection: bilateral knee    Date/Time: 9/2/2022 3:22 PM  Performed by: Moni Fischer MD  Authorized by: Moni Fischer MD     Indications:  Pain  Needle Size:  22 G  Guidance: landmark guided    Approach:  Anterolateral  Location:  Knee  Laterality:  Bilateral      Medications (Right):  40 mg triamcinolone 40 MG/ML; 4 mL lidocaine (PF) 1 %  Medications (Left):  40 mg triamcinolone 40 MG/ML; 4 mL lidocaine (PF) 1 %  Outcome:  Tolerated well, no immediate complications  Consent Given by:  Patient  Timeout: timeout called immediately prior to procedure    Prep: patient was prepped and draped in usual sterile fashion        I agree with the following injection documentation.    VIGNESH Fischer MD        Again, thank you for allowing me to participate in the care of your patient.      Sincerely,    Moni Fischer MD

## 2022-09-02 NOTE — PROGRESS NOTES
HPI     Cataract Evaluation     In both eyes.  Associated symptoms include blurred vision, glare and starburst.  Severity is severe.  Context:  distance vision and reading.  Affected activities include reading and night driving.              Comments     She notes her night vision is poor. Unable to drive while it's raining.   She used to be a avid reader, but not anymore due to difficulties with vision.          Last edited by Molly Cornejo on 9/2/2022  9:13 AM. (History)         Review of systems for the eyes was negative other than the pertinent positives/negatives listed in the HPI.      Assessment & Plan    HPI:  Shelia Sanchez is a 72 year old female with history of HLD, HTN, LORI, prediabetes, MDD, optic disc drusen right eye, myopia with astigmatism and presbyopia who presents for cataract evaluation from Dr. Shoemaker. Noticing decreased vision x years and difficulty driving at night x years      POHx:ptic disc drusen right eye, myopia with astigmatism and presbyopia  PMHx: HLD, HTN, LORI, prediabetes, MDD  Current Medications: acetaminophen (TYLENOL) 500 MG tablet, Take 500-1,000 mg by mouth every 6 hours as needed for mild pain  amLODIPine (NORVASC) 5 MG tablet, Take dailly  aspirin (ASA) 81 MG chewable tablet, TAKE 1 TABLET BY MOUTH DAILY  atorvastatin (LIPITOR) 20 MG tablet, Take 1 tablet (20 mg) by mouth daily  buPROPion (WELLBUTRIN XL) 150 MG 24 hr tablet, Take 300 mg by mouth every morning   buPROPion (WELLBUTRIN XL) 300 MG 24 hr tablet, TAKE 1 TABLET BY MOUTH EVERY DAY IN THE MORNING  esomeprazole (NEXIUM) 20 MG DR capsule, Take 1 tablet by mouth daily   gabapentin (NEURONTIN) 100 MG capsule, TAKE 1 CAPSULE BY MOUTH BID  hydrochlorothiazide (HYDRODIURIL) 25 MG tablet, TAKE 1 TABLET(25 MG) BY MOUTH DAILY  topiramate (TOPAMAX) 25 MG tablet, 25 mg AM and 50 mg daily PM before supper  venlafaxine (EFFEXOR-XR) 75 MG 24 hr capsule, TAKE 1 CAPSULE(75 MG) BY MOUTH DAILY (Patient taking differently: Take 150  mg by mouth daily 75 mg + 75 mg + 37.5 mg = 187.5 mg daily)  vitamin B complex with vitamin C (STRESS TAB) tablet, Take 1 tablet by mouth daily  Vitamin D3 (CHOLECALCIFEROL) 125 MCG (5000 UT) tablet, Take 1 tablet by mouth daily    No current facility-administered medications on file prior to visit.    FHx: glaucoma-mom, maternal uncle - ARMD  PSHx: denies history of ocular surgeries       Current Eye Medications:  None    Assessment & Plan:  (H25.13) Nuclear age-related cataract, both eyes  Special equipment/needs:  Eye: right  Anesthesia: topical  Dilates to: 7.5  Iris expansion:  No  Pseudoexfoliation: No  Trypan Blue: No  Trauma: No    Able lay to flat: No  Blood Thinner: Yes ASA 81  Tamsulosin: No  DM: Yes  Guttae: No    Dominant Eye: right    Plan: [place] right eye, -0.75 OS    We discussed the benefits, alternatives, and risks to cataract surgery, including blindness, decreased vision, and need for additional surgeries; and informed consent was obtained.  Proceed with CE/IOL right eye followed by OS.        (H52.13,  H52.203,  H52.4) Myopia of both eyes with astigmatism and presbyopia   Hold pending Cataract extraction/iol     Return for POD0.        Trae Wade MD     Attending Physician Attestation:  Complete documentation of historical and exam elements from today's encounter can be found in the full encounter summary report (not reduplicated in this progress note).  I personally obtained the chief complaint(s) and history of present illness.  I confirmed and edited as necessary the review of systems, past medical/surgical history, family history, social history, and examination findings as documented by others; and I examined the patient myself.  I personally reviewed the relevant tests, images, and reports as documented above.  I formulated and edited as necessary the assessment and plan and discussed the findings and management plan with the patient and family. - Trae Wade MD

## 2022-09-02 NOTE — NURSING NOTE
[address] [address]  Dept: [phone]  ______________________________________________________________________________    Patient: Shelia Sanchez   : 1950   MRN: [id number]   2022    INVASIVE PROCEDURE SAFETY CHECKLIST    Date: 2022   Procedure:Bilateral knee cortisone injection  Patient Name: Shelia Sanchez  MRN: [id number]  YOB: 1950    Action: Complete sections as appropriate. Any discrepancy results in a HARD COPY until resolved.     PRE PROCEDURE:  Patient ID verified with 2 identifiers (name and  or MRN): Yes  Procedure and site verified with patient/designee (when able): Yes  Accurate consent documentation in medical record: Yes  H&P (or appropriate assessment) documented in medical record: Yes  H&P must be up to 20 days prior to procedure and updates within 24 hours of procedure as applicable: NA  Relevant diagnostic and radiology test results appropriately labeled and displayed as applicable: Yes  Procedure site(s) marked with provider initials: NA    TIMEOUT:  Time-Out performed immediately prior to starting procedure, including verbal and active participation of all team members addressing the following:Yes  * Correct patient identify  * Confirmed that the correct side and site are marked  * An accurate procedure consent form  * Agreement on the procedure to be done  * Correct patient position  * Relevant images and results are properly labeled and appropriately displayed  * The need to administer antibiotics or fluids for irrigation purposes during the procedure as applicable   * Safety precautions based on patient history or medication use    DURING PROCEDURE: Verification of correct person, site, and procedures any time the responsibility for care of the patient is transferred to another member of the care team.       Prior to injection, verified patient identity using patient's  name and date of birth.  Due to injection administration, patient instructed to remain in clinic for 15 minutes  afterwards, and to report any adverse reaction to me immediately.    Joint injection was performed.      Drug Amount Wasted:  Yes: 2 mg/ml   Vial/Syringe: Single dose vial  Expiration Date:  12/01/2025      Mily Holman, REYNA  September 2, 2022

## 2022-09-02 NOTE — NURSING NOTE
Chief Complaints and History of Present Illnesses   Patient presents with     Cataract Evaluation       Chief Complaint(s) and History of Present Illness(es)     Cataract Evaluation     Laterality: both eyes    Associated symptoms: blurred vision, glare and starburst    Severity: severe    Context: distance vision and reading    Activities affected: reading and night driving              Comments     She notes her night vision is poor. Unable to drive while it's raining.   She used to be a avid reader, but not anymore due to difficulties with vision.                JULIO Ramirez  9:14 AM 09/02/2022

## 2022-09-02 NOTE — PROGRESS NOTES
Subjective:   Shelia Sanchez is a 72 year old female who returns to clinic for bilateral knee pain. Last visit on 10/22/21 she received a right knee cortisone injection. She reports about 4 months of pain relief from this injection. She reports that the left knee in the past year has been bothering her as well. We obtained left knee imaging today.     She had a fall on 7/17/22, she came down mainly on the right knee and right knee.    Right knee pain has been present for years. Has had several (~5) cortisone injections in that knee. Most recent over a year ago. Has done PT for knees as well, most recently 5 months ago - graduated. Still doing PT at home 2x/week - doing both stretching and strengthening. Injections usually last about 4-6 months.    Has some paraesthesias in her feet that are chronic. Endorses clicking, locking.    Wants to lose weight (sees Dr. Greenfield for weight management). Is going to [place] for binge-eating disorder as well. Loves to walk but can't d/t knee pain. Was told if she can get her BMI < 40 they would do bariatric surgery, which she is game for.    Date of injury: Chronic, right knee fall on 7/17/22  Date last seen: 8/25/2022  Following Therapeutic Plan: Yes   Pain: Worsening  Function: Worsening       PAST MEDICAL, SOCIAL, SURGICAL AND FAMILY HISTORY: She  has a past medical history of Arthritis (1/28/14), Asteatotic eczema, Cholelithiasis with obstruction (5/10/2010), Depressive disorder, Diabetes (H) (Summer 2019), Gallstone pancreatitis (5/11/2010), Hypertension, and Right knee pain (12/27/2012).    She has no past medical history of Cancer (H), Cerebral infarction (H), Congestive heart failure (H), Congestive heart failure, unspecified, COPD (chronic obstructive pulmonary disease) (H), Coronary artery disease, Heart disease, History of blood transfusion, Thyroid disease, or Uncomplicated asthma.  She  has a past surgical history that includes VAG HYST,RMV TUBE/OVARY (2004);  "cholecystectomy, laporoscopic; and ERCP W STENT PLACEMENT BILE/PANCREATIC DUCT.  Her family history includes Alzheimer Disease in her father; Anxiety Disorder in her sister; Arthritis in her father and mother; Cerebrovascular Disease in her father; Congenital Anomalies in her father and mother; Coronary Artery Disease in her father; Depression in her mother and sister; Diabetes in her father; Eye Disorder in her mother; Glaucoma in her mother; Heart Disease in her maternal grandfather, maternal grandmother, and paternal grandfather; Hyperlipidemia in her father and mother; Hypertension in her father, mother, paternal grandmother, and sister; Macular Degeneration in her maternal uncle; Obesity in her father and mother; Prostate Cancer in her father.  She reports that she has never smoked. She has never used smokeless tobacco. She reports current alcohol use. She reports that she does not use drugs.    ALLERGIES: She is allergic to nitrofurantoin.    CURRENT MEDICATIONS: She has a current medication list which includes the following prescription(s): acetaminophen, amlodipine, aspirin, atorvastatin, bupropion, bupropion, esomeprazole, gabapentin, hydrochlorothiazide, topiramate, venlafaxine, vitamin b complex with vitamin c, and vitamin d3.     REVIEW OF SYSTEMS: 3 point review of systems is negative except as noted above.     Exam:   Ht 1.702 m (5' 7\")   Wt 127.5 kg (281 lb)   BMI 44.01 kg/m       CONSTITUTIONAL: healthy, alert and no distress  HEAD: Normocephalic. No masses, lesions, tenderness or abnormalities  SKIN: no suspicious lesions or rashes  GAIT: Uses cane, antalgic  NEUROLOGIC: Non-focal  PSYCHIATRIC: affect normal/bright and mentation appears normal.    MUSCULOSKELETAL:   R knee - flexion only to 90 deg d/t pain, full extension; point TTP medial joint line  L knee - flexion only to 90 deg d/t pain, full extension; point TTP medial joint line       Assessment/Plan:     (M17.0) Arthritis of both knees  " (primary encounter diagnosis)  Comment: Bilateral medial and patellofemoral compartment OA. Last CSI one year ago in R knee, never had in the L. Is working on weight loss, seeing Dr. Greenfield, weight management provider. Difficult d/t h/o binge eating disorder, following at [place]. Has done formal PT in the past for knees, still doing exercises at home 2-3x/week.  Plan:   -B/l CSI done today  -Continue home PT  -F/u 3-4 months    Venancio Abraham MD, PGY3     Patient seen, evaluated and discussed with the resident. I have verified the content of the note, which accurately reflects my assessment of the patient and the plan of care. I was present for the injections performed by Dr. Abraham.   Supervising Physician:  Moni Fischer MD   X-RAY INTERPRETATION:   3 views left knee radiographs 9/2/2022 2:23 PM     History: Bilateral WB AP/LAT/SUN; Knee pain     Comparison: Right knee 5/5/2021     Findings:     Standing AP view of lateral knees, lateral and patellofemoral views of  the left knee were obtained.      Left:     No acute osseous abnormality. Small joint effusion.     Moderate medial compartment joint space loss.     No patellar tilt or lateral subluxation.  Soft tissue is unremarkable.     Right:     Moderate to severe medial compartment joint space loss.     Mild subcutaneous soft tissue stranding lateral calf.                                                                      Impression:  1. No acute osseous abnormality.  2. Medial compartment predominant osteoarthrosis of bilateral knees,  greater on the right.    ------------------    Procedure note:  The pt was verbally consented. Both knees were sterilely prepped in usual fashion. 1cc of 40mg/mL Kenalog and 4 cc of 1% lidocaine were injected via lateral approach without complication. Pt tolerated procedure well.     Large Joint Injection: bilateral knee    Date/Time: 9/2/2022 3:22 PM  Performed by: Moni Fischer MD  Authorized by:  Moni Fischer MD     Indications:  Pain  Needle Size:  22 G  Guidance: landmark guided    Approach:  Anterolateral  Location:  Knee  Laterality:  Bilateral      Medications (Right):  40 mg triamcinolone 40 MG/ML; 4 mL lidocaine (PF) 1 %  Medications (Left):  40 mg triamcinolone 40 MG/ML; 4 mL lidocaine (PF) 1 %  Outcome:  Tolerated well, no immediate complications  Consent Given by:  Patient  Timeout: timeout called immediately prior to procedure    Prep: patient was prepped and draped in usual sterile fashion        I agree with the following injection documentation.    VIGNESH iFscher MD

## 2022-09-07 ENCOUNTER — TELEPHONE (OUTPATIENT)
Dept: FAMILY MEDICINE | Facility: CLINIC | Age: 72
End: 2022-09-07

## 2022-09-07 NOTE — TELEPHONE ENCOUNTER
Called patient and left a voicemail message to see if we can move her appointment a little earlier. Asked her to call back and choose option two to speak to me.    PERLITA Perez  [facility]

## 2022-10-04 ENCOUNTER — MYC MEDICAL ADVICE (OUTPATIENT)
Dept: FAMILY MEDICINE | Facility: CLINIC | Age: 72
End: 2022-10-04

## 2022-10-14 ENCOUNTER — VIRTUAL VISIT (OUTPATIENT)
Dept: ENDOCRINOLOGY | Facility: CLINIC | Age: 72
End: 2022-10-14
Payer: COMMERCIAL

## 2022-10-14 VITALS — WEIGHT: 266 LBS | BODY MASS INDEX: 41.75 KG/M2 | HEIGHT: 67 IN

## 2022-10-14 DIAGNOSIS — E66.813 CLASS 3 SEVERE OBESITY DUE TO EXCESS CALORIES WITH SERIOUS COMORBIDITY AND BODY MASS INDEX (BMI) OF 40.0 TO 44.9 IN ADULT (H): Primary | ICD-10-CM

## 2022-10-14 DIAGNOSIS — E66.01 CLASS 3 SEVERE OBESITY DUE TO EXCESS CALORIES WITH SERIOUS COMORBIDITY AND BODY MASS INDEX (BMI) OF 40.0 TO 44.9 IN ADULT (H): Primary | ICD-10-CM

## 2022-10-14 PROCEDURE — 99214 OFFICE O/P EST MOD 30 MIN: CPT | Mod: 95 | Performed by: INTERNAL MEDICINE

## 2022-10-14 ASSESSMENT — PAIN SCALES - GENERAL: PAINLEVEL: SEVERE PAIN (7)

## 2022-10-14 NOTE — PROGRESS NOTES
"Shelia is a 72 year old who is being evaluated via a billable video visit.      How would you like to obtain your AVS? MyChart  If the video visit is dropped, the invitation should be resent by: Send to e-mail at: [email or web address]  Will anyone else be joining your video visit? No     Video-Visit Details  --pt was unable to re-enter the video room when I entered the room and asked to convert to phone visit which we completed on [employer] platform (approx 15 min after her start time)  Type of service:  Video Visit planned but converted to phone    Originating Location (pt. Location): Home    Distant Location (provider location):  Off-site    Platform used--Doxmity      Return Medical Weight Management Note     Shelia Sanchez  MRN:  [id number]  :  1950  JOSEPH:  10/14/2022    Dear Mariana Benitez, DO,    I had the pleasure of seeing your patient Shelia Sanchez. She is a 72 year old female who I am continuing to see for treatment of obesity related to:       9/15/2021   I have the following health issues associated with obesity: Pre-Diabetes, High Blood Pressure, High Cholesterol, Sleep Apnea, GERD (Reflux), Stress Incontinence, Osteoarthritis (joint disease)   I have the following symptoms associated with obesity: Knee Pain, Depression, Lower Extremity Swelling, Back Pain, Fatigue       INTERVAL HISTORY:    Return visit with her, last seen a few mo ago, reviewed and relevant history is as follows, as extracted from earlier note--  ------------------------------  \"--depressed over last few mo and PA working with psychiatrist has increased the bupropion, pt is feeling better  --pt has binge eating DO and is seen at [place]; notes the strategy she was given there is at odds with the advice she is getting from our nutritonist ([employer] wants 3 good mreals  --still planning knee replacement  --will get pt back to nutritionist  [facility] ([facility] is guiding pt's exercise towards lower " "impact); 2-3 times per wk initially; pt also has home exercises  --doing PT exercises everyday; in [employer] (but that is ending). [facility] PT and therapist want pt to use recumbent bike (strengthen legs for surgery)\"  ------------------------------             Issues for her were as follows at last visit--  --still issues with snacking--med is providing some support. She did not tolerate naltrexone prior and I would not start phentermine-containing med.  --knees are still really painful; has a desk pedelar she will use. Will get another steroid injection soon. Does not look like liraglutide or semaglutide are covered as wt loss meds    Since last seen of note is the following--  --she is having issues with word-dropping/difficulty talking on 75 mg daily topiramate dose; she will drop to 25 mg and take that for 3 days then stop altogether  --she is working with her therapist on managing the impulse to binge (using limiting the crave foods portion amts or distracting herself while the urge is present, can also work with substitutions for the cravings)  --she notes she got billed several hundred dollars for Ozempic even though though she did not pick it up/had the pharm not fill it; if she did not get the med filled, she should not be charged. She will see about getting that charge reversed     LAST WEIGHT:   272 lbs 0 oz  Body mass index is 42.6 kg/m .      CURRENT WEIGHT:   266 lbs 0 oz   Body mass index is 41.66 kg/m .    Initial Weight (lbs): 277 lbs  Last Visits Weight: 123.4 kg (272 lb)  Cumulative weight loss (lbs): 11  Weight Loss Percentage: 3.97%    Changes and Difficulties 10/13/2022   I have made the following changes to my diet since my last visit: None   With regards to my diet, I am still struggling with: Binge eating and eating regular scheduled meals   I have made the following changes to my activity/exercise since my last visit: None   With regards to my activity/exercise, I am " "still struggling with: Knee pain       VITALS:  Ht 1.702 m (5' 7\")   Wt 120.7 kg (266 lb)   BMI 41.66 kg/m      MEDICATIONS:   Current Outpatient Medications   Medication Sig Dispense Refill     acetaminophen (TYLENOL) 500 MG tablet Take 500-1,000 mg by mouth every 6 hours as needed for mild pain       amLODIPine (NORVASC) 5 MG tablet Take dailly 90 tablet 3     aspirin (ASA) 81 MG chewable tablet TAKE 1 TABLET BY MOUTH DAILY 90 tablet 0     atorvastatin (LIPITOR) 20 MG tablet Take 1 tablet (20 mg) by mouth daily 90 tablet 2     buPROPion (WELLBUTRIN XL) 300 MG 24 hr tablet TAKE 1 TABLET BY MOUTH EVERY DAY IN THE MORNING       esomeprazole (NEXIUM) 20 MG DR capsule Take 1 tablet by mouth daily        gabapentin (NEURONTIN) 100 MG capsule TAKE 1 CAPSULE BY MOUTH  capsule 1     hydrochlorothiazide (HYDRODIURIL) 25 MG tablet TAKE 1 TABLET(25 MG) BY MOUTH DAILY 90 tablet 1     topiramate (TOPAMAX) 25 MG tablet 25 mg AM and 50 mg daily PM before supper 270 tablet 1     venlafaxine (EFFEXOR-XR) 75 MG 24 hr capsule TAKE 1 CAPSULE(75 MG) BY MOUTH DAILY (Patient taking differently: Take 150 mg by mouth daily 75 mg + 75 mg + 37.5 mg = 187.5 mg daily) 90 capsule 0     vitamin B complex with vitamin C (STRESS TAB) tablet Take 1 tablet by mouth daily       Vitamin D3 (CHOLECALCIFEROL) 125 MCG (5000 UT) tablet Take 1 tablet by mouth daily       buPROPion (WELLBUTRIN XL) 150 MG 24 hr tablet Take 300 mg by mouth every morning          Weight Loss Medication History Reviewed With Patient 10/13/2022   Which weight loss medications are you currently taking on a regular basis?  Topamax (topiramate)   If you are not taking a weight loss medication that was prescribed to you, please indicate why: -   Are you having any side effects from the weight loss medication that we have prescribed you? Yes   If you are having side effects please describe: Cognitive issues         ASSESSMENT/PLAN:  Shelia is a patient with early onset " morbid obesity with significant element of familial/genetic influence and with current health consequences. Shelia Sanchez endorses binging, eats a high carb diet, eats a high fat diet, uses food as a reward, uses food as mood management and eats to obtain specific degree of fullness.     Her problem is complicated by a hunger disorder, strong craving/reward pathways, a binge eating component and mental health/psychopharmacological barriers     Her ability to lose weight is impacted by physical impairment.     PLAN:    Decrease portion sizes  Purge house of food triggers  Dietician visit of education  Volumetrics eating plan  Calorie/low fat diet  Meal planning     Craving/Reward   Ancillary testing:  N/A.  Food Plan:  Volumetrics and High protein/low carbohydrate.   Activity Plan:  PT ongoing  Supplementary:  N/A.   Medication:  The patient starting medication to assist in lifestyle changes     Additionally--  Can plan--  --with topiramate she can drop to 25 mg and take that for 3 days then stop altogether  --she is working with her therapist on managing the impulse to binge (using limiting the crave foods or distracting herself while the urge is present, can also work with substitutions for the cravings); she will continue that work on behavior changes  --she will go w/out pharm support over the next 2-3 mo  --encouraged to reach out with any questions/concerns or additional support is needed in the interim             Time: about 30 min spent on evaluation, management, counseling, education, & motivational interviewing (phone visit) combined with previsit prep and post visit charting/follow up care same day       Sincerely,    Mable Greenfield MD

## 2022-10-14 NOTE — NURSING NOTE
"Chief Complaint   Patient presents with     RECHECK     2-3 month follow up       Vitals:    10/14/22 1022   Weight: 120.7 kg (266 lb)   Height: 1.702 m (5' 7\")       Body mass index is 41.66 kg/m .          LUIZ CARTER EMT    "

## 2022-10-14 NOTE — PATIENT INSTRUCTIONS
Thank you for allowing us the privilege of caring for you. We hope we provided you with the excellent service you deserve.    Please let us know if there is anything else we can do for you so that we can be sure you are completely satisfied with your care experience.    Your visit was with Dr. Greenfield today.    Instructions per today's visit:  --with topiramate--you can drop to 25 mg and take that for 3 days then stop altogether  --keep working with her therapist on managing the impulse to binge (using limiting the crave foods or distracting herself while the urge is present, you can also work with substitutions for the cravings)  --we will go w/out pharmaceutical support over the next 3 mo and plan plan on your next return visit in about 3 mo  --please each out with any questions/concerns or if additional support is needed in the interim        Please call our contact center at [phone] to schedule your next appointments.    Meal Replacement Products:    Here is the link to our new e-store where you can purchase our meal replacement products    [email or web address]    The one week starter kit is a great way to sample a variety of products and see what works for you.    If you want more information about the product go to: ThinkHR    Free Shipping for orders over $75    Benefits of meal replacements products:    Portion and calorie control  Improved nutrition  Structured eating  Simplified food choices  Avoid contact with trigger foods    Interested in working with a health ?  Health coaches work with you to improve your overall health and wellbeing.  They look at the whole person, and may involve discussion of different areas of life, including, but not limited to the four pillars of health (sleep, exercise, nutrition, and stress management). Discuss with your care team if you would like to start working a health .    Health Coaching-3 Pack:  Schedule by calling [phone]    $99 for three health coaching visits    Visits may be done in person or via phone    Coaching is a partnership between the  and the client; Coaches do not prescribe or diagnose    Coaching helps inspire the client to reach his/her personal goals    Bluetooth Scale:    We hope to provide you with high quality telephone and virtual healthcare visits while social distancing for COVID-19 is necessary, as well as in the future when virtual visits may be more convenient for you.    Our technology team made it possible for Bluetooth scales to send weight measurements to our electronic medical record. This allows weights from you weighing at home to securely flow into the medical record, which will improve telephone and virtual visits.  Additionally, studies have shown that adults actually lose more weight when their weights are automatically sent to someone else, and also that this process is not stressful for those adults.    Below is a link for purchasing the scale, with a discount code for our patients. You may call your insurance company to see if they will reimburse you for the cost of the scale, as a piece of durable medical equipment. The scales only go up to a weight of 400 pounds. This is an issue and we are working with the developer on increasing this. We found no scales that go over 400lb that have blue-tooth for connecting to Tred.    Scale to purchase: the OnlineSheetMusic  Body  Scale: [email or web address]=aw.ds    Discount Code: We have a discount code for our patients to bring the cost down to $50, the code is:  withmed     Steps to link the scale to Tred via an Android Phone (you can always disconnect at any point in the future):  1. The order must be placed first before the patient can access Track My Health within Tred.  2. Download Google Fit christa from the Google Play  Store  a. Log in or register using your Google account  3. Download the MyChart christiano from Google Play Store  a. Select add organization  b. Search for liveBooks and select it  c. Log into Terascalat  d. Select Track My Health  e. Select the green connect my account button  f. When prompted log into your Google account  g. Select okay to confirm the account  4. Download the Withings Health Mate christiano from Google Play Store  a. [place] for Tellagence  b. Go to profile  c. Tap google fit under the Apps section  d. Select the option to activate Google Fit integration  e. Select the same Google account  f. Select okay to confirm the account  g.  Steps to link the scale to Neonode via an iPhone (you can always disconnect at any point in the future):  **Note Colabo is not available for download on an iPad**  1. The order must be placed first before the patient can access Track Percentil Health within Neonode.  2. Locate the Health christiano on your iPhone.  a. Set up your Apple Health account as prompted  b. The Sources page will show Apps that communicate with your Health christiano. Once all steps are completed, you should see Health EyeLock and MyChart listed under the Apps section and your iPhone under the devices section.  i. Select Health Mate  1. Under 'ALLOW  HEALTH MATE  TO WRITE DATA ensure the toggle is on for Weight.  2. This will allow the scale to add your weight to the Apple Health  ii. Select MyChart  1. Under 'ALLOW  MYCHART  TO READ DATA ensure the toggle is on for Weight.  2. This allows DigitalGlobehart to grab the weight from Colabo so your provider can see your weights.  3. Download the MyChart christiano from the Christiano Store  a. Select add organization                                                  b. Search for [place] and select it  c. Log into Neonode  d. Select Track My Health  e. Select the green connect my account button  f. Follow prompts to link your device to Terascalat.  4. Download the Withings health mate christiano in the Christiano  Store  a. [place] for Withings  b. Go to profile  c. AchieveIt Online under the Apps section  d. If prompted to allow access with the Health Christiano, toggle weight on for read and write access.      For any questions/concerns contact Juanita Whitaker LPN at [phone]    To schedule appointments with our team, please call [phone]    Please call during clinic hours Monday through Friday 8:00a - 4:00p if you have questions or you can contact us via [place] at anytime.      Lab results will be communicated through My Chart or letter (if My Chart not used). Please call the clinic if you have not received communication after 1 week or if you have any questions.    Clinic Fax: [phone]    Thank you,  Medical Weight Management Team

## 2022-10-14 NOTE — LETTER
"10/14/2022       RE: Shelia Sanchez  [address]  [place] [address]     Dear Colleague,    Thank you for referring your patient, Shelia Sanchez, to the [facility] [place] at [facility]. Please see a copy of my visit note below.        Return Medical Weight Management Note     Shelia Sanchez  MRN:  [id number]  :  1950  JOSEPH:  10/14/2022    Dear Mariana Benitez DO,    I had the pleasure of seeing your patient Shelia Sanchez. She is a 72 year old female who I am continuing to see for treatment of obesity related to:       9/15/2021   I have the following health issues associated with obesity: Pre-Diabetes, High Blood Pressure, High Cholesterol, Sleep Apnea, GERD (Reflux), Stress Incontinence, Osteoarthritis (joint disease)   I have the following symptoms associated with obesity: Knee Pain, Depression, Lower Extremity Swelling, Back Pain, Fatigue       INTERVAL HISTORY:    Return visit with her, last seen a few mo ago, reviewed and relevant history is as follows, as extracted from earlier note--  ------------------------------  \"--depressed over last few mo and PA working with psychiatrist has increased the bupropion, pt is feeling better  --pt has binge eating DO and is seen at [place]; notes the strategy she was given there is at odds with the advice she is getting from our nutritonist ([employer] wants 3 good mreals  --still planning knee replacement  --will get pt back to nutritionist  [facility] (PT is guiding pt's exercise towards lower impact); 2-3 times per wk initially; pt also has home exercises  --doing PT exercises everyday; in [employer] (but that is ending). [facility] PT and therapist want pt to use recumbent bike (strengthen legs for surgery)\"  ------------------------------             Issues for her were as follows at last visit--  --still " "issues with snacking--med is providing some support. She did not tolerate naltrexone prior and I would not start phentermine-containing med.  --knees are still really painful; has a desk pedelar she will use. Will get another steroid injection soon. Does not look like liraglutide or semaglutide are covered as wt loss meds    Since last seen of note is the following--  --she is having issues with word-dropping/difficulty talking on 75 mg daily topiramate dose; she will drop to 25 mg and take that for 3 days then stop altogether  --she is working with her therapist on managing the impulse to binge (using limiting the crave foods portion amts or distracting herself while the urge is present, can also work with substitutions for the cravings)  --she notes she got billed several hundred dollars for Ozempic even though though she did not pick it up/had the pharm not fill it; if she did not get the med filled, she should not be charged. She will see about getting that charge reversed     LAST WEIGHT:   272 lbs 0 oz  Body mass index is 42.6 kg/m .      CURRENT WEIGHT:   266 lbs 0 oz   Body mass index is 41.66 kg/m .    Initial Weight (lbs): 277 lbs  Last Visits Weight: 123.4 kg (272 lb)  Cumulative weight loss (lbs): 11  Weight Loss Percentage: 3.97%    Changes and Difficulties 10/13/2022   I have made the following changes to my diet since my last visit: None   With regards to my diet, I am still struggling with: Binge eating and eating regular scheduled meals   I have made the following changes to my activity/exercise since my last visit: None   With regards to my activity/exercise, I am still struggling with: Knee pain       VITALS:  Ht 1.702 m (5' 7\")   Wt 120.7 kg (266 lb)   BMI 41.66 kg/m      MEDICATIONS:   Current Outpatient Medications   Medication Sig Dispense Refill     acetaminophen (TYLENOL) 500 MG tablet Take 500-1,000 mg by mouth every 6 hours as needed for mild pain       amLODIPine (NORVASC) 5 MG tablet " Take dailly 90 tablet 3     aspirin (ASA) 81 MG chewable tablet TAKE 1 TABLET BY MOUTH DAILY 90 tablet 0     atorvastatin (LIPITOR) 20 MG tablet Take 1 tablet (20 mg) by mouth daily 90 tablet 2     buPROPion (WELLBUTRIN XL) 300 MG 24 hr tablet TAKE 1 TABLET BY MOUTH EVERY DAY IN THE MORNING       esomeprazole (NEXIUM) 20 MG DR capsule Take 1 tablet by mouth daily        gabapentin (NEURONTIN) 100 MG capsule TAKE 1 CAPSULE BY MOUTH  capsule 1     hydrochlorothiazide (HYDRODIURIL) 25 MG tablet TAKE 1 TABLET(25 MG) BY MOUTH DAILY 90 tablet 1     topiramate (TOPAMAX) 25 MG tablet 25 mg AM and 50 mg daily PM before supper 270 tablet 1     venlafaxine (EFFEXOR-XR) 75 MG 24 hr capsule TAKE 1 CAPSULE(75 MG) BY MOUTH DAILY (Patient taking differently: Take 150 mg by mouth daily 75 mg + 75 mg + 37.5 mg = 187.5 mg daily) 90 capsule 0     vitamin B complex with vitamin C (STRESS TAB) tablet Take 1 tablet by mouth daily       Vitamin D3 (CHOLECALCIFEROL) 125 MCG (5000 UT) tablet Take 1 tablet by mouth daily       buPROPion (WELLBUTRIN XL) 150 MG 24 hr tablet Take 300 mg by mouth every morning          Weight Loss Medication History Reviewed With Patient 10/13/2022   Which weight loss medications are you currently taking on a regular basis?  Topamax (topiramate)   If you are not taking a weight loss medication that was prescribed to you, please indicate why: -   Are you having any side effects from the weight loss medication that we have prescribed you? Yes   If you are having side effects please describe: Cognitive issues         ASSESSMENT/PLAN:  Shelia is a patient with early onset morbid obesity with significant element of familial/genetic influence and with current health consequences. Shelia Sanchez endorses binging, eats a high carb diet, eats a high fat diet, uses food as a reward, uses food as mood management and eats to obtain specific degree of fullness.     Her problem is complicated by a hunger  disorder, strong craving/reward pathways, a binge eating component and mental health/psychopharmacological barriers     Her ability to lose weight is impacted by physical impairment.     PLAN:    Decrease portion sizes  Purge house of food triggers  Dietician visit of education  Volumetrics eating plan  Calorie/low fat diet  Meal planning     Craving/Reward   Ancillary testing:  N/A.  Food Plan:  Volumetrics and High protein/low carbohydrate.   Activity Plan:  PT ongoing  Supplementary:  N/A.   Medication:  The patient starting medication to assist in lifestyle changes     Additionally--  Can plan--  --with topiramate she can drop to 25 mg and take that for 3 days then stop altogether  --she is working with her therapist on managing the impulse to binge (using limiting the crave foods or distracting herself while the urge is present, can also work with substitutions for the cravings); she will continue that work on behavior changes  --she will go w/out pharm support over the next 2-3 mo  --encouraged to reach out with any questions/concerns or additional support is needed in the interim             Time: about 30 min spent on evaluation, management, counseling, education, & motivational interviewing (phone visit) combined with previsit prep and post visit charting/follow up care same day       Sincerely,    Mable Greenfield MD

## 2022-10-20 ENCOUNTER — TELEPHONE (OUTPATIENT)
Dept: ENDOCRINOLOGY | Facility: CLINIC | Age: 72
End: 2022-10-20

## 2022-11-10 DIAGNOSIS — M79.18 MYOFASCIAL PAIN: ICD-10-CM

## 2022-11-10 DIAGNOSIS — H25.13 NUCLEAR AGE-RELATED CATARACT, BOTH EYES: Primary | ICD-10-CM

## 2022-11-10 RX ORDER — PREDNISOLONE ACETATE 10 MG/ML
SUSPENSION/ DROPS OPHTHALMIC
Qty: 4 ML | Refills: 1 | Status: SHIPPED | OUTPATIENT
Start: 2022-11-10 | End: 2022-12-08

## 2022-11-10 RX ORDER — KETOROLAC TROMETHAMINE 5 MG/ML
SOLUTION OPHTHALMIC
Qty: 4 ML | Refills: 1 | Status: SHIPPED | OUTPATIENT
Start: 2022-11-10 | End: 2022-12-06

## 2022-11-10 RX ORDER — GABAPENTIN 100 MG/1
CAPSULE ORAL
Qty: 180 CAPSULE | Refills: 1 | Status: SHIPPED | OUTPATIENT
Start: 2022-11-10 | End: 2023-05-08

## 2022-11-10 RX ORDER — MOXIFLOXACIN 5 MG/ML
1 SOLUTION/ DROPS OPHTHALMIC 4 TIMES DAILY
Qty: 6 ML | Refills: 1 | Status: SHIPPED | OUTPATIENT
Start: 2022-11-10 | End: 2022-11-17

## 2022-11-14 NOTE — PROGRESS NOTES
[address] [address]  Phone: [phone]  Primary Provider: Daisy Jasmine  Pre-op Performing Provider: DAISY JASMINE      PREOPERATIVE EVALUATION:  Today's date: 11/17/2022    Shelia Sanchez is a 72 year old female who presents for a preoperative evaluation.    Surgical Information:  Surgery/Procedure: cataract repair  Surgery Location: [facility]  Surgeon: Dr. Wade  Surgery Date: 12/8/22  Time of Surgery: 7:30am  Where patient plans to recover: At home with family  Fax number for surgical facility: Note does not need to be faxed, will be available electronically in Epic.    Type of Anesthesia Anticipated: local with mac      ICD-10-CM    1. Preop general physical exam  Z01.818       2. Cataract of both eyes, unspecified cataract type  H26.9       3. LORI (obstructive sleep apnea)- severe ()  G47.33       4. Hypertension goal BP (blood pressure) < 140/90  I10       5. Current mild episode of major depressive disorder, unspecified whether recurrent (H)  F32.0       6. Hearing problem of both ears  H91.93 REMOVE IMPACTED CERUMEN      7. Screen for colon cancer  Z12.11       8. Bilateral impacted cerumen  H61.23 REMOVE IMPACTED CERUMEN      9. Prediabetes  R73.03         Cataract -cleared for surgery  lori-stable ,cont cpap  predm-stable,check in 3 months  Hearing problems-wax in ears, flushed out today, hearing better   htn-stable,cont meds      Subjective     HPI related to upcoming procedure: Her vision has been worsening over the years.  She has been seeing eye care provider and is to have cataract surgery  No acute symptoms, no chest pain, no bleeding, no sob  chornic disease stable        Preop Questions 11/15/2022   1. Have you ever had a heart attack or stroke? No   2. Have you ever had surgery on your heart or blood vessels, such as a stent placement, a coronary artery bypass, or surgery on an artery in your  head, neck, heart, or legs? No   3. Do you have chest pain with activity? No   4. Do you have a history of  heart failure? No   5. Do you currently have a cold, bronchitis or symptoms of other infection? No   6. Do you have a cough, shortness of breath, or wheezing? No   7. Do you or anyone in your family have previous history of blood clots? No   8. Do you or does anyone in your family have a serious bleeding problem such as prolonged bleeding following surgeries or cuts? No   9. Have you ever had problems with anemia or been told to take iron pills? No   10. Have you had any abnormal blood loss such as black, tarry or bloody stools, or abnormal vaginal bleeding? No   11. Have you ever had a blood transfusion? YES -    11a. Have you ever had a transfusion reaction? No   12. Are you willing to have a blood transfusion if it is medically needed before, during, or after your surgery? Yes   13. Have you or any of your relatives ever had problems with anesthesia? No   14. Do you have sleep apnea, excessive snoring or daytime drowsiness? YES -    14a. Do you have a CPAP machine? Yes   15. Do you have any artifical heart valves or other implanted medical devices like a pacemaker, defibrillator, or continuous glucose monitor? No   16. Do you have artificial joints? No   17. Are you allergic to latex? No     Health Care Directive:  Patient does not have a Health Care Directive or Living Will: Discussed advance care planning with patient; however, patient declined at this time.    Preoperative Review of :   reviewed - no record of controlled substances prescribed.      Status of Chronic Conditions:  See problem list for active medical problems.  Problems all longstanding and stable, except as noted/documented.  See ROS for pertinent symptoms related to these conditions.      Review of Systems  Constitutional, neuro, ENT, endocrine, pulmonary, cardiac, gastrointestinal, genitourinary, musculoskeletal, integument and  psychiatric systems are negative, except as otherwise noted.    Patient Active Problem List    Diagnosis Date Noted     Nuclear age-related cataract, both eyes 09/02/2022     Priority: Medium     Added automatically from request for surgery [id number]       Binge eating disorder 08/08/2019     Priority: Medium     Prediabetes 08/08/2019     Priority: Medium     LORI (obstructive sleep apnea)- severe () 08/09/2018     Priority: Medium     8/8/2018 [place] Split Sleep Study (275.0 lbs) - .3, .7, Supine .3, REM AHI n/a, Low O2% 81.3%, Time Spent ?88% 2.6, Time Spent ?89% 5.2. Treatment was titrated to a pressure of CPAP 8 with an AHI 28.9. Time spent in REM at this pressure was 0 minutes.       Mild major depression (H) 01/10/2018     Priority: Medium     Heartburn 11/16/2016     Priority: Medium     Menopausal syndrome (hot flashes) 03/30/2016     Priority: Medium     Cervical pain 11/19/2015     Priority: Medium     Left-sided headache 11/19/2015     Priority: Medium     Atypical nevus 02/13/2015     Priority: Medium     MILD, risk factor for melanoma. Needs yearly skin exam.   The entire mole was not removed, so if it grows, needs to be completely excised.   Do you wish to do the replacement in the background? yes         Urinary incontinence 02/02/2015     Priority: Medium     Cataracts, both eyes 05/07/2014     Priority: Medium     Left shoulder pain 12/27/2012     Priority: Medium     Advanced directives, counseling/discussion 02/09/2012     Priority: Medium     Advance Care Planning:   ACP Review and Resources Provided:  Reviewed chart for advance care plan.  Shelia Sanchez has no plan or code status on file. Discussed available resources and provided with information. Confirmed code status reflects current choices pending further ACP discussions.  Confirmed/documented designated decision maker(s). See permanent comments section of demographics in clinical tab.   Added by Brisa FELDMAN  David on 3/6/2015  Discussed advance care planning with patient; information given to patient to review. 2/9/2012          Hypertension goal BP (blood pressure) < 140/90 07/02/2011     Priority: Medium     CARDIOVASCULAR SCREENING; LDL GOAL LESS THAN 130 06/11/2010     Priority: Medium     BMI > 40      Priority: Medium      Past Medical History:   Diagnosis Date     Arthritis 01/28/2014    Bursitis in left hip     Asteatotic eczema      Cholelithiasis with obstruction 05/10/2010     Depressive disorder      Diabetes (H) Summer 2019    Pre-diabetes     Gallstone pancreatitis 05/11/2010     History of blood transfusion 40 years ago     Hypertension      Right knee pain 12/27/2012     Past Surgical History:   Procedure Laterality Date     CHOLECYSTECTOMY, LAPOROSCOPIC      Cholecystectomy, Laparoscopic     HEAD & NECK SURGERY      Treatment for arthritis in neck by deadening nerves     [facility] VAG HYST,RMV TUBE/OVARY  2004     [facility] ERCP W STENT PLACEMENT BILE/PANCREATIC DUCT       Current Outpatient Medications   Medication Sig Dispense Refill     acetaminophen (TYLENOL) 500 MG tablet Take 500-1,000 mg by mouth every 6 hours as needed for mild pain       amLODIPine (NORVASC) 5 MG tablet Take dailly 90 tablet 3     aspirin (ASA) 81 MG chewable tablet TAKE 1 TABLET BY MOUTH DAILY 90 tablet 0     atorvastatin (LIPITOR) 20 MG tablet Take 1 tablet (20 mg) by mouth daily 90 tablet 2     buPROPion (WELLBUTRIN XL) 300 MG 24 hr tablet TAKE 1 TABLET BY MOUTH EVERY DAY IN THE MORNING       esomeprazole (NEXIUM) 20 MG DR capsule Take 1 tablet by mouth daily        gabapentin (NEURONTIN) 100 MG capsule TAKE 1 CAPSULE BY MOUTH TWICE DAILY 180 capsule 1     hydrochlorothiazide (HYDRODIURIL) 25 MG tablet TAKE 1 TABLET(25 MG) BY MOUTH DAILY 90 tablet 1     ketorolac (ACULAR) 0.5 % ophthalmic solution Apply 1 drop to eye 4 times daily for 7 days, THEN 1 drop 3 times daily for 7 days, THEN 1 drop 2 times daily for 7 days, THEN 1 drop  daily for 7 days. Start 2 days prior to surgery in operative eye. 4 mL 1     moxifloxacin (VIGAMOX) 0.5 % ophthalmic solution Apply 1 drop to eye 4 times daily for 7 days Start morning of surgery in operative eye. 6 mL 1     prednisoLONE acetate (PRED FORTE) 1 % ophthalmic suspension Apply 1 drop to eye 4 times daily for 7 days, THEN 1 drop 3 times daily for 7 days, THEN 1 drop 2 times daily for 7 days, THEN 1 drop daily for 7 days. Start after surgery 4 mL 1     venlafaxine (EFFEXOR-XR) 75 MG 24 hr capsule TAKE 1 CAPSULE(75 MG) BY MOUTH DAILY (Patient taking differently: Take 150 mg by mouth daily 75 mg + 75 mg + 37.5 mg = 187.5 mg daily) 90 capsule 0     vitamin B complex with vitamin C (STRESS TAB) tablet Take 1 tablet by mouth daily       Vitamin D3 (CHOLECALCIFEROL) 125 MCG (5000 UT) tablet Take 1 tablet by mouth daily         Allergies   Allergen Reactions     Nitrofurantoin      Stomach pain        Social History     Tobacco Use     Smoking status: Never     Smokeless tobacco: Never   Substance Use Topics     Alcohol use: Yes     Comment: Weekends mostly.     Family History   Problem Relation Age of Onset     Arthritis Mother      Congenital Anomalies Mother      Eye Disorder Mother      Glaucoma Mother      Hypertension Mother      Depression Mother      Obesity Mother      Hyperlipidemia Mother      Osteoporosis Mother      Congenital Anomalies Father      Alzheimer Disease Father      Arthritis Father      Diabetes Father      Hypertension Father      Cerebrovascular Disease Father         Father     Prostate Cancer Father      Obesity Father      Coronary Artery Disease Father      Hyperlipidemia Father      Heart Disease Maternal Grandmother      Heart Disease Maternal Grandfather      Hypertension Paternal Grandmother         Mother s side     Heart Disease Paternal Grandfather      Depression Sister      Hypertension Sister         Sister     Anxiety Disorder Sister      Macular Degeneration Maternal  "Uncle      History   Drug Use No         Objective     /72   Pulse 92   Temp 98  F (36.7  C) (Oral)   Resp 16   Ht 1.702 m (5' 7\")   Wt 127.9 kg (282 lb)   SpO2 97%   BMI 44.17 kg/m      Physical Exam    GENERAL APPEARANCE: healthy, alert and no distress     EYES: EOMI, PERRL     HENT: ear canals bilaterally cerumen impacted, could not be easily removed with curette, additional effort was required to attempt curette removal and allow for irrigation and  Went back in the room removed a large collection cerumen close to the external canal, TM's normal and nose and mouth without ulcers or lesions     NECK: no adenopathy, no asymmetry, masses, or scars and thyroid normal to palpation     RESP: lungs clear to auscultation - no rales, rhonchi or wheezes     CV: regular rates and rhythm, normal S1 S2, no S3 or S4 and no murmur, click or rub     ABDOMEN:  soft, nontender, no HSM or masses and bowel sounds normal     MS: extremities normal- no gross deformities noted, no evidence of inflammation in joints, FROM in all extremities.     SKIN: no suspicious lesions or rashes     NEURO: Normal strength and tone, sensory exam grossly normal, mentation intact and speech normal     PSYCH: mentation appears normal. and affect normal/bright     LYMPHATICS: No cervical adenopathy    Recent Labs   Lab Test 08/15/22  1047 02/17/22  0825   HGB 12.9 13.0    276    140   POTASSIUM 3.9 3.5   CR 0.98 0.88   A1C 6.3* 6.2*        Diagnostics:  No labs were ordered during this visit.   No EKG required for low risk surgery (cataract, skin procedure, breast biopsy, etc).    Revised Cardiac Risk Index (RCRI):  The patient has the following serious cardiovascular risks for perioperative complications:   - No serious cardiac risks = 0 points     RCRI Interpretation: 0 points: Class I (very low risk - 0.4% complication rate)           Signed Electronically by: Mariana Benitez DO  Copy of this evaluation report is " provided to requesting physician.      Answers for HPI/ROS submitted by the patient on 11/17/2022  If you checked off any problems, how difficult have these problems made it for you to do your work, take care of things at home, or get along with other people?: Somewhat difficult  PHQ9 TOTAL SCORE: 9

## 2022-11-17 ENCOUNTER — OFFICE VISIT (OUTPATIENT)
Dept: FAMILY MEDICINE | Facility: CLINIC | Age: 72
End: 2022-11-17
Payer: COMMERCIAL

## 2022-11-17 VITALS
TEMPERATURE: 98 F | RESPIRATION RATE: 16 BRPM | DIASTOLIC BLOOD PRESSURE: 72 MMHG | HEIGHT: 67 IN | SYSTOLIC BLOOD PRESSURE: 130 MMHG | WEIGHT: 282 LBS | BODY MASS INDEX: 44.26 KG/M2 | OXYGEN SATURATION: 97 % | HEART RATE: 92 BPM

## 2022-11-17 DIAGNOSIS — R73.03 PREDIABETES: ICD-10-CM

## 2022-11-17 DIAGNOSIS — H91.93 HEARING PROBLEM OF BOTH EARS: ICD-10-CM

## 2022-11-17 DIAGNOSIS — G47.33 OSA (OBSTRUCTIVE SLEEP APNEA): Chronic | ICD-10-CM

## 2022-11-17 DIAGNOSIS — F32.0 CURRENT MILD EPISODE OF MAJOR DEPRESSIVE DISORDER, UNSPECIFIED WHETHER RECURRENT (H): Chronic | ICD-10-CM

## 2022-11-17 DIAGNOSIS — H26.9 CATARACT OF BOTH EYES, UNSPECIFIED CATARACT TYPE: ICD-10-CM

## 2022-11-17 DIAGNOSIS — I10 HYPERTENSION GOAL BP (BLOOD PRESSURE) < 140/90: Chronic | ICD-10-CM

## 2022-11-17 DIAGNOSIS — Z01.818 PREOP GENERAL PHYSICAL EXAM: Primary | ICD-10-CM

## 2022-11-17 DIAGNOSIS — Z12.11 SCREEN FOR COLON CANCER: ICD-10-CM

## 2022-11-17 DIAGNOSIS — H61.23 BILATERAL IMPACTED CERUMEN: ICD-10-CM

## 2022-11-17 PROCEDURE — 69209 REMOVE IMPACTED EAR WAX UNI: CPT | Mod: LT | Performed by: FAMILY MEDICINE

## 2022-11-17 PROCEDURE — 99214 OFFICE O/P EST MOD 30 MIN: CPT | Mod: 25 | Performed by: FAMILY MEDICINE

## 2022-11-17 ASSESSMENT — PATIENT HEALTH QUESTIONNAIRE - PHQ9
SUM OF ALL RESPONSES TO PHQ QUESTIONS 1-9: 9
SUM OF ALL RESPONSES TO PHQ QUESTIONS 1-9: 9
10. IF YOU CHECKED OFF ANY PROBLEMS, HOW DIFFICULT HAVE THESE PROBLEMS MADE IT FOR YOU TO DO YOUR WORK, TAKE CARE OF THINGS AT HOME, OR GET ALONG WITH OTHER PEOPLE: SOMEWHAT DIFFICULT

## 2022-11-17 ASSESSMENT — PAIN SCALES - GENERAL: PAINLEVEL: NO PAIN (0)

## 2022-11-17 NOTE — PATIENT INSTRUCTIONS
Flush ears today due to hearing issues    Follow up in 3 months for DIABETES MELLITUS check up as discussed and labs    Shingles shot at the pharmacy  Continue your current meds      Mariana Benitez D.O.

## 2022-11-17 NOTE — NURSING NOTE
Patient identified using two patient identifiers.  Ear exam showing wax occlusion completed by provider.  Solution: warm water was placed in the bilateral ear(s) via irrigation tool: elephant ear.  Some discomfort reports and provider checked ears to remove wax manually.     Paty Gandara MA

## 2022-11-18 ENCOUNTER — ANESTHESIA EVENT (OUTPATIENT)
Dept: SURGERY | Facility: AMBULATORY SURGERY CENTER | Age: 72
End: 2022-11-18
Payer: COMMERCIAL

## 2022-11-21 NOTE — ANESTHESIA PREPROCEDURE EVALUATION
Anesthesia Pre-Procedure Evaluation    Patient: Shelia Sanchez   MRN: [id number] : 1950        Procedure : Procedure(s):  PHACOEMULSIFICATION, CATARACT, WITH INTRAOCULAR LENS IMPLANT          Past Medical History:   Diagnosis Date     Arthritis 2014    Bursitis in left hip     Asteatotic eczema      Cholelithiasis with obstruction 05/10/2010     Depressive disorder      Diabetes (H) Summer 2019    Pre-diabetes     Gallstone pancreatitis 2010     History of blood transfusion 40 years ago     Hypertension      Right knee pain 2012      Past Surgical History:   Procedure Laterality Date     CHOLECYSTECTOMY, LAPOROSCOPIC      Cholecystectomy, Laparoscopic     HEAD & NECK SURGERY      Treatment for arthritis in neck by deadening nerves     [facility] VAG HYST,RMV TUBE/OVARY  2004     [facility] ERCP W STENT PLACEMENT BILE/PANCREATIC DUCT        Allergies   Allergen Reactions     Nitrofurantoin      Stomach pain      Social History     Tobacco Use     Smoking status: Never     Smokeless tobacco: Never   Substance Use Topics     Alcohol use: Yes     Comment: Weekends mostly.      Wt Readings from Last 1 Encounters:   22 127.9 kg (282 lb)        Anesthesia Evaluation            ROS/MED HX  ENT/Pulmonary:     (+) sleep apnea, uses CPAP,     Neurologic:       Cardiovascular:     (+) hypertension----- (-) murmur   METS/Exercise Tolerance: >4 METS    Hematologic:     (+) history of blood transfusion,     Musculoskeletal:       GI/Hepatic:       Renal/Genitourinary:       Endo:     (+) type II DM, Obesity,     Psychiatric/Substance Use:     (+) psychiatric history depression     Infectious Disease:       Malignancy:       Other:            Physical Exam    Airway        Mallampati: II   TM distance: > 3 FB   Neck ROM: full   Mouth opening: > 3 cm    Respiratory Devices and Support         Dental  no notable dental history         Cardiovascular          Rhythm and rate: regular and normal (-) no  murmur    Pulmonary   pulmonary exam normal        breath sounds clear to auscultation           OUTSIDE LABS:  CBC:   Lab Results   Component Value Date    WBC 10.6 08/15/2022    WBC 10.0 02/17/2022    HGB 12.9 08/15/2022    HGB 13.0 02/17/2022    HCT 39.5 08/15/2022    HCT 40.6 02/17/2022     08/15/2022     02/17/2022     BMP:   Lab Results   Component Value Date     08/15/2022     02/17/2022    POTASSIUM 3.9 08/15/2022    POTASSIUM 3.5 02/17/2022    CHLORIDE 110 (H) 08/15/2022    CHLORIDE 109 02/17/2022    CO2 26 08/15/2022    CO2 23 02/17/2022    BUN 28 08/15/2022    BUN 24 02/17/2022    CR 0.98 08/15/2022    CR 0.88 02/17/2022     (H) 08/15/2022     (H) 02/17/2022     COAGS: No results found for: PTT, INR, FIBR  POC: No results found for: BGM, HCG, HCGS  HEPATIC:   Lab Results   Component Value Date    ALBUMIN 3.9 08/15/2022    PROTTOTAL 7.0 08/15/2022    ALT 26 08/15/2022    AST 14 08/15/2022    ALKPHOS 96 08/15/2022    BILITOTAL 0.4 08/15/2022     OTHER:   Lab Results   Component Value Date    A1C 6.3 (H) 08/15/2022    MYLES 10.0 08/15/2022    LIPASE 88 02/08/2021    TSH 1.62 02/17/2022    CRP <2.9 12/02/2015    SED 8 12/02/2015       Anesthesia Plan    ASA Status:  2   NPO Status:  NPO Appropriate    Anesthesia Type: MAC.   Induction: Intravenous.           Consents    Anesthesia Plan(s) and associated risks, benefits, and realistic alternatives discussed. Questions answered and patient/representative(s) expressed understanding.    - Discussed:     - Discussed with:  Patient      - Extended Intubation/Ventilatory Support Discussed: No.      - Patient is DNR/DNI Status: No    Use of blood products discussed: No .     Postoperative Care    Pain management: IV analgesics.        Comments:    Other Comments: Discussed plan for light IV anesthetic with native airway, topical anesthetic. Discussed potential need for conversion to general anesthetic with airway management and  likely recall.          H&P reviewed: Unable to attach H&P to encounter due to EHR limitations. H&P Update: appropriate H&P reviewed, patient examined. No interval changes since H&P (within 30 days).         Russell Bronson MD

## 2022-11-22 ENCOUNTER — ANESTHESIA (OUTPATIENT)
Dept: SURGERY | Facility: AMBULATORY SURGERY CENTER | Age: 72
End: 2022-11-22
Payer: COMMERCIAL

## 2022-11-22 ENCOUNTER — HOSPITAL ENCOUNTER (OUTPATIENT)
Facility: AMBULATORY SURGERY CENTER | Age: 72
Discharge: HOME | End: 2022-11-22
Attending: OPHTHALMOLOGY
Payer: COMMERCIAL

## 2022-11-22 ENCOUNTER — OFFICE VISIT (OUTPATIENT)
Dept: OPHTHALMOLOGY | Facility: CLINIC | Age: 72
End: 2022-11-22
Payer: COMMERCIAL

## 2022-11-22 VITALS
SYSTOLIC BLOOD PRESSURE: 125 MMHG | WEIGHT: 282 LBS | DIASTOLIC BLOOD PRESSURE: 56 MMHG | RESPIRATION RATE: 16 BRPM | TEMPERATURE: 97.3 F | BODY MASS INDEX: 44.17 KG/M2 | OXYGEN SATURATION: 95 %

## 2022-11-22 DIAGNOSIS — H25.13 NUCLEAR AGE-RELATED CATARACT, BOTH EYES: ICD-10-CM

## 2022-11-22 DIAGNOSIS — H25.811 COMBINED FORM OF AGE-RELATED CATARACT, RIGHT EYE: Primary | ICD-10-CM

## 2022-11-22 DIAGNOSIS — Z96.1 PSEUDOPHAKIA, RIGHT EYE: Primary | ICD-10-CM

## 2022-11-22 PROCEDURE — G8918 PT W/O PREOP ORDER IV AB PRO: HCPCS

## 2022-11-22 PROCEDURE — 66984 XCAPSL CTRC RMVL W/O ECP: CPT | Mod: RT

## 2022-11-22 PROCEDURE — 99024 POSTOP FOLLOW-UP VISIT: CPT | Performed by: OPHTHALMOLOGY

## 2022-11-22 PROCEDURE — 66984 XCAPSL CTRC RMVL W/O ECP: CPT | Mod: RT | Performed by: OPHTHALMOLOGY

## 2022-11-22 PROCEDURE — G8907 PT DOC NO EVENTS ON DISCHARG: HCPCS

## 2022-11-22 DEVICE — TECNIS 1-PC CLEAR MONO 6.0MM 19.5D
Type: IMPLANTABLE DEVICE | Site: EYE | Status: FUNCTIONAL
Brand: TECNIS IOL

## 2022-11-22 RX ORDER — METOPROLOL TARTRATE 1 MG/ML
1-2 INJECTION, SOLUTION INTRAVENOUS EVERY 5 MIN PRN
Status: DISCONTINUED | OUTPATIENT
Start: 2022-11-22 | End: 2022-11-23 | Stop reason: HOSPADM

## 2022-11-22 RX ORDER — TETRACAINE HYDROCHLORIDE 5 MG/ML
SOLUTION OPHTHALMIC PRN
Status: DISCONTINUED | OUTPATIENT
Start: 2022-11-22 | End: 2022-11-22 | Stop reason: HOSPADM

## 2022-11-22 RX ORDER — MOXIFLOXACIN IN NACL,ISO-OS/PF 0.3MG/0.3
SYRINGE (ML) INTRAOCULAR PRN
Status: DISCONTINUED | OUTPATIENT
Start: 2022-11-22 | End: 2022-11-22 | Stop reason: HOSPADM

## 2022-11-22 RX ORDER — CYCLOPENTOLAT/TROPIC/PHENYLEPH 1%-1%-2.5%
1 DROPS (EA) OPHTHALMIC (EYE)
Status: COMPLETED | OUTPATIENT
Start: 2022-11-22 | End: 2022-11-22

## 2022-11-22 RX ORDER — MOXIFLOXACIN 5 MG/ML
1 SOLUTION/ DROPS OPHTHALMIC
Status: COMPLETED | OUTPATIENT
Start: 2022-11-22 | End: 2022-11-22

## 2022-11-22 RX ORDER — LIDOCAINE 40 MG/G
CREAM TOPICAL
Status: DISCONTINUED | OUTPATIENT
Start: 2022-11-22 | End: 2022-11-23 | Stop reason: HOSPADM

## 2022-11-22 RX ORDER — SODIUM CHLORIDE, SODIUM LACTATE, POTASSIUM CHLORIDE, CALCIUM CHLORIDE 600; 310; 30; 20 MG/100ML; MG/100ML; MG/100ML; MG/100ML
INJECTION, SOLUTION INTRAVENOUS CONTINUOUS
Status: DISCONTINUED | OUTPATIENT
Start: 2022-11-22 | End: 2022-11-23 | Stop reason: HOSPADM

## 2022-11-22 RX ORDER — ONDANSETRON 2 MG/ML
4 INJECTION INTRAMUSCULAR; INTRAVENOUS EVERY 30 MIN PRN
Status: DISCONTINUED | OUTPATIENT
Start: 2022-11-22 | End: 2022-11-23 | Stop reason: HOSPADM

## 2022-11-22 RX ORDER — PROPARACAINE HYDROCHLORIDE 5 MG/ML
1 SOLUTION/ DROPS OPHTHALMIC
Status: DISCONTINUED | OUTPATIENT
Start: 2022-11-22 | End: 2022-11-23 | Stop reason: HOSPADM

## 2022-11-22 RX ORDER — HYDRALAZINE HYDROCHLORIDE 20 MG/ML
2.5-5 INJECTION INTRAMUSCULAR; INTRAVENOUS EVERY 10 MIN PRN
Status: DISCONTINUED | OUTPATIENT
Start: 2022-11-22 | End: 2022-11-23 | Stop reason: HOSPADM

## 2022-11-22 RX ORDER — FENTANYL CITRATE 50 UG/ML
INJECTION, SOLUTION INTRAMUSCULAR; INTRAVENOUS PRN
Status: DISCONTINUED | OUTPATIENT
Start: 2022-11-22 | End: 2022-11-22

## 2022-11-22 RX ORDER — KETOROLAC TROMETHAMINE 5 MG/ML
1 SOLUTION OPHTHALMIC
Status: COMPLETED | OUTPATIENT
Start: 2022-11-22 | End: 2022-11-22

## 2022-11-22 RX ORDER — ACETAMINOPHEN 325 MG/1
975 TABLET ORAL ONCE
Status: COMPLETED | OUTPATIENT
Start: 2022-11-22 | End: 2022-11-22

## 2022-11-22 RX ORDER — ONDANSETRON 4 MG/1
4 TABLET, ORALLY DISINTEGRATING ORAL EVERY 30 MIN PRN
Status: DISCONTINUED | OUTPATIENT
Start: 2022-11-22 | End: 2022-11-23 | Stop reason: HOSPADM

## 2022-11-22 RX ORDER — BALANCED SALT SOLUTION 6.4; .75; .48; .3; 3.9; 1.7 MG/ML; MG/ML; MG/ML; MG/ML; MG/ML; MG/ML
SOLUTION OPHTHALMIC PRN
Status: DISCONTINUED | OUTPATIENT
Start: 2022-11-22 | End: 2022-11-22 | Stop reason: HOSPADM

## 2022-11-22 RX ORDER — PROPARACAINE HYDROCHLORIDE 5 MG/ML
1 SOLUTION/ DROPS OPHTHALMIC ONCE
Status: COMPLETED | OUTPATIENT
Start: 2022-11-22 | End: 2022-11-22

## 2022-11-22 RX ADMIN — MOXIFLOXACIN 1 DROP: 5 SOLUTION/ DROPS OPHTHALMIC at 08:56

## 2022-11-22 RX ADMIN — MOXIFLOXACIN 1 DROP: 5 SOLUTION/ DROPS OPHTHALMIC at 09:01

## 2022-11-22 RX ADMIN — KETOROLAC TROMETHAMINE 1 DROP: 5 SOLUTION OPHTHALMIC at 08:51

## 2022-11-22 RX ADMIN — MOXIFLOXACIN 1 DROP: 5 SOLUTION/ DROPS OPHTHALMIC at 08:51

## 2022-11-22 RX ADMIN — FENTANYL CITRATE 50 MCG: 50 INJECTION, SOLUTION INTRAMUSCULAR; INTRAVENOUS at 09:42

## 2022-11-22 RX ADMIN — Medication 1 DROP: at 08:51

## 2022-11-22 RX ADMIN — Medication 1 DROP: at 08:56

## 2022-11-22 RX ADMIN — SODIUM CHLORIDE, SODIUM LACTATE, POTASSIUM CHLORIDE, CALCIUM CHLORIDE: 600; 310; 30; 20 INJECTION, SOLUTION INTRAVENOUS at 09:42

## 2022-11-22 RX ADMIN — Medication 1 DROP: at 09:01

## 2022-11-22 RX ADMIN — ACETAMINOPHEN 975 MG: 325 TABLET ORAL at 08:47

## 2022-11-22 RX ADMIN — KETOROLAC TROMETHAMINE 1 DROP: 5 SOLUTION OPHTHALMIC at 08:56

## 2022-11-22 RX ADMIN — KETOROLAC TROMETHAMINE 1 DROP: 5 SOLUTION OPHTHALMIC at 09:01

## 2022-11-22 RX ADMIN — PROPARACAINE HYDROCHLORIDE 1 DROP: 5 SOLUTION/ DROPS OPHTHALMIC at 08:51

## 2022-11-22 ASSESSMENT — VISUAL ACUITY
OD_SC+: +1
OD_SC: 20/50
METHOD: SNELLEN - LINEAR

## 2022-11-22 ASSESSMENT — TONOMETRY
IOP_METHOD: TONOPEN
OD_IOP_MMHG: 22

## 2022-11-22 ASSESSMENT — SLIT LAMP EXAM - LIDS: COMMENTS: NORMAL

## 2022-11-22 NOTE — PROGRESS NOTES
Review of systems for the eyes was negative other than the pertinent positives/negatives listed in the HPI.      Assessment & Plan    HPI:  Shelia Sanchez is a 72 year old female with history of HLD, HTN, LORI, prediabetes, MDD, optic disc drusen right eye, myopia with astigmatism and presbyopia who presents for cataract evaluation from Dr. Shoemaker. Postoperative day 0 right eye     POHx:ptic disc drusen right eye, myopia with astigmatism and presbyopia  PMHx: HLD, HTN, LORI, prediabetes, MDD  Current Medications: acetaminophen (TYLENOL) 500 MG tablet, Take 500-1,000 mg by mouth every 6 hours as needed for mild pain  amLODIPine (NORVASC) 5 MG tablet, Take dailly  aspirin (ASA) 81 MG chewable tablet, TAKE 1 TABLET BY MOUTH DAILY  atorvastatin (LIPITOR) 20 MG tablet, Take 1 tablet (20 mg) by mouth daily  buPROPion (WELLBUTRIN XL) 300 MG 24 hr tablet, TAKE 1 TABLET BY MOUTH EVERY DAY IN THE MORNING  esomeprazole (NEXIUM) 20 MG DR capsule, Take 1 tablet by mouth daily   gabapentin (NEURONTIN) 100 MG capsule, TAKE 1 CAPSULE BY MOUTH TWICE DAILY  hydrochlorothiazide (HYDRODIURIL) 25 MG tablet, TAKE 1 TABLET(25 MG) BY MOUTH DAILY  ketorolac (ACULAR) 0.5 % ophthalmic solution, Apply 1 drop to eye 4 times daily for 7 days, THEN 1 drop 3 times daily for 7 days, THEN 1 drop 2 times daily for 7 days, THEN 1 drop daily for 7 days. Start 2 days prior to surgery in operative eye.  prednisoLONE acetate (PRED FORTE) 1 % ophthalmic suspension, Apply 1 drop to eye 4 times daily for 7 days, THEN 1 drop 3 times daily for 7 days, THEN 1 drop 2 times daily for 7 days, THEN 1 drop daily for 7 days. Start after surgery  venlafaxine (EFFEXOR-XR) 75 MG 24 hr capsule, TAKE 1 CAPSULE(75 MG) BY MOUTH DAILY (Patient taking differently: Take 150 mg by mouth daily 75 mg + 75 mg + 37.5 mg = 187.5 mg daily)  vitamin B complex with vitamin C (STRESS TAB) tablet, Take 1 tablet by mouth daily  Vitamin D3 (CHOLECALCIFEROL) 125 MCG (5000 UT) tablet,  Take 1 tablet by mouth daily    [COMPLETED] acetaminophen (TYLENOL) tablet 975 mg  hydrALAZINE (APRESOLINE) injection 2.5-5 mg  [COMPLETED] ketorolac (ACULAR) 0.5 % ophthalmic solution 1 drop  lactated ringers infusion  lactated ringers infusion  lactated ringers infusion  lidocaine (LMX4) kit  lidocaine (PF) (XYLOCAINE) 1 % injection 4 mL  lidocaine (PF) (XYLOCAINE) 1 % injection 4 mL  lidocaine 1 % 0.1-1 mL  metoprolol (LOPRESSOR) injection 1-2 mg  [COMPLETED] moxifloxacin (VIGAMOX) 0.5 % ophthalmic solution 1 drop  ondansetron (ZOFRAN ODT) ODT tab 4 mg   Or  ondansetron (ZOFRAN) injection 4 mg  prochlorperazine (COMPAZINE) injection 5 mg  proparacaine (ALCAINE) 0.5 % ophthalmic solution 1 drop  [COMPLETED] proparacaine (ALCAINE) 0.5 % ophthalmic solution 1 drop  sodium chloride (PF) 0.9% PF flush 3 mL  sodium chloride (PF) 0.9% PF flush 3 mL  triamcinolone (KENALOG-40) injection 40 mg  triamcinolone (KENALOG-40) injection 40 mg  [COMPLETED] tropicamide 1 %-cyclopentolate 1 %-phenylephrine 2.5% 1-1-2.5 % ophthalmic solution 1 drop      FHx: glaucoma-mom, maternal uncle - ARMD  PSHx: denies history of ocular surgeries       Current Eye Medications:  Moxifloxacin/pred forte/ketorolac four times a day      Assessment & Plan:  Pseudophakia, right eye, day 0  Wade OD 11/22/22   Doing well  Keep patch in place at night for 7 days  Start post-operative drops and taper according to instructions  Post-operative do's and don'ts reviewed, questions answered    Recheck 1 week    (H25.13) Nuclear age-related cataract, both eyes  Special equipment/needs:  Eye: left  Anesthesia: topical  Dilates to: 7.5  Iris expansion:  No  Pseudoexfoliation: No  Trypan Blue: No  Trauma: No    Able lay to flat: No  Blood Thinner: Yes ASA 81  Tamsulosin: No  DM: Yes  Guttae: No    Dominant Eye: right    Plan: [place] right eye, -0.75 OS      Proceed with CE/IOL left eye .      (H52.13,  H52.203,  H52.4) Myopia of both eyes with astigmatism  and presbyopia   Hold pending Cataract extraction/iol     Return for as scheduled.        Trae Wade MD     Attending Physician Attestation:  Complete documentation of historical and exam elements from today's encounter can be found in the full encounter summary report (not reduplicated in this progress note).  I personally obtained the chief complaint(s) and history of present illness.  I confirmed and edited as necessary the review of systems, past medical/surgical history, family history, social history, and examination findings as documented by others; and I examined the patient myself.  I personally reviewed the relevant tests, images, and reports as documented above.  I formulated and edited as necessary the assessment and plan and discussed the findings and management plan with the patient and family. - Trae Wade MD

## 2022-11-22 NOTE — ANESTHESIA POSTPROCEDURE EVALUATION
Patient: Shelia Sanchez    Procedure: Procedure(s):  RIGHT PHACOEMULSIFICATION, CATARACT, WITH INTRAOCULAR LENS IMPLANT       Anesthesia Type:  MAC    Note:  Disposition: Outpatient   Postop Pain Control: Uneventful            Sign Out: Well controlled pain   PONV: No   Neuro/Psych: Uneventful            Sign Out: Acceptable/Baseline neuro status   Airway/Respiratory: Uneventful            Sign Out: Acceptable/Baseline resp. status   CV/Hemodynamics: Uneventful            Sign Out: Acceptable CV status; No obvious hypovolemia; No obvious fluid overload   Other NRE: NONE   DID A NON-ROUTINE EVENT OCCUR? No           Last vitals:  Vitals Value Taken Time   /56 11/22/22 1033   Temp     Pulse     Resp 16 11/22/22 1033   SpO2 95 % 11/22/22 1033       Electronically Signed By: Russell Bronson MD  November 22, 2022  4:22 PM

## 2022-11-22 NOTE — ANESTHESIA CARE TRANSFER NOTE
Patient: Shelia Sanchez    Procedure: Procedure(s):  RIGHT PHACOEMULSIFICATION, CATARACT, WITH INTRAOCULAR LENS IMPLANT       Diagnosis: Nuclear age-related cataract, both eyes [H25.13]  Diagnosis Additional Information: No value filed.    Anesthesia Type:   MAC     Note:      Level of Consciousness: awake  Oxygen Supplementation: room air    Independent Airway: airway patency satisfactory and stable  Dentition: dentition unchanged  Vital Signs Stable: post-procedure vital signs reviewed and stable  Report to RN Given: handoff report given  Patient transferred to: Phase II    Handoff Report: Identifed the Patient, Identified the Reponsible Provider, Reviewed the pertinent medical history, Discussed the surgical course, Reviewed Intra-OP anesthesia mangement and issues during anesthesia, Set expectations for post-procedure period and Allowed opportunity for questions and acknowledgement of understanding      Vitals:  Vitals Value Taken Time   /76    Temp 97    Pulse 76    Resp 12    SpO2 97        Electronically Signed By: TOY Pagan CRNA  November 22, 2022  10:12 AM

## 2022-11-22 NOTE — OP NOTE
PREOPERATIVE DIAGNOSIS:   1. Combined form of age-related cataract, right eye    2. Nuclear age-related cataract, both eyes     Right eye   POSTOPERATIVE DIAGNOSIS: Same   PROCEDURES:   1. Cataract extraction with intraocular lens implant Right eye.  SURGEON: Trae Wade M.D.  INDICATIONS: The patient Shelia Sanchez presented to the eye clinic with decreased vision secondary to cataract in the Right eye. The risks, benefits and alternatives to cataract extraction were discussed. The patient elected to proceed. All questions were answered to the patient's satisfaction.   DESCRIPTION OF PROCEDURE:   Prior to the procedure, appropriate cardiac and respiratory monitors were applied to the patient.  In the pre-operative holding area, a drop of topical tetracaine was placed in the operative eye.  The patient was brought to the operating room.  With adequate anesthesia, the Right eye was prepped and draped in the usual sterile fashion. A lid speculum was placed, and the operating microscope was rotated into position. A surgical pause was carried out to identify with all members of the surgical team the correct surgical site. A paracentesis was created.  Through this limbal paracentesis, the anterior chamber was filled with preservative-free lidocaine with epi shugarcaine followed by viscoelastic.  A temporal wound was created at the limbus using a 2.6 mm blade. A capsulorrhexis was initiated using a bent 25-gauge needle and was completed in continuous and circular fashion using the capsulorrhexis forceps. The lens nucleus was hydrodissected using balanced salt solution.  The lens nucleus was rotated and removed using phacoemulsification in a divide and conquer technique.  Residual cortical material was removed using irrigation-aspiration.  The capsular bag was reinflated to its maximal extent with cohesive viscoelastic.  A 19.5 diopter ZCB00 was inserted into the capsular bag.  The lens power selected was reviewed  using the intraocular lens power measurements that were obtained preoperatively to confirm that the correct lens was selected for the desired post-operative refractive state. The residual viscoelastic was removed in its entirety, the wound were hydrated and found to be self-sealing.  Intracameral moxifloxacin was administered. Tactile pressure was confirmed to be in a normal range.  The lid speculum was removed and a shield was applied.  The patient tolerated the procedure well, and there were no complications.    PLAN: The patient will be discharged to home and will follow up today  EBL:  None  Complications:  None  Implant Name Type Inv. Item Serial No.  Lot No. LRB No. Used Action   EYE IMP IOL KARLOS PCL TECNIS ZCB00 19.5 - [id number] Lens/Eye Implant EYE IMP IOL KARLOS PCL TECNIS ZCB00 19.5 [id number] [employer]  Right 1 Implanted

## 2022-12-05 ENCOUNTER — OFFICE VISIT (OUTPATIENT)
Dept: OPHTHALMOLOGY | Facility: CLINIC | Age: 72
End: 2022-12-05
Payer: COMMERCIAL

## 2022-12-05 ENCOUNTER — ANESTHESIA EVENT (OUTPATIENT)
Dept: SURGERY | Facility: AMBULATORY SURGERY CENTER | Age: 72
End: 2022-12-05
Payer: COMMERCIAL

## 2022-12-05 DIAGNOSIS — Z96.1 PSEUDOPHAKIA, RIGHT EYE: Primary | ICD-10-CM

## 2022-12-05 PROCEDURE — 99024 POSTOP FOLLOW-UP VISIT: CPT | Performed by: OPHTHALMOLOGY

## 2022-12-05 ASSESSMENT — VISUAL ACUITY
METHOD: SNELLEN - LINEAR
OD_SC: 20/25

## 2022-12-05 ASSESSMENT — SLIT LAMP EXAM - LIDS
COMMENTS: NORMAL
COMMENTS: NORMAL

## 2022-12-05 ASSESSMENT — TONOMETRY
IOP_METHOD: ICARE
OD_IOP_MMHG: 13
OS_IOP_MMHG: 15

## 2022-12-05 ASSESSMENT — EXTERNAL EXAM - LEFT EYE: OS_EXAM: NORMAL

## 2022-12-05 ASSESSMENT — REFRACTION_MANIFEST
OD_AXIS: 174
OD_SPHERE: -1.00
OD_CYLINDER: +1.50

## 2022-12-05 ASSESSMENT — EXTERNAL EXAM - RIGHT EYE: OD_EXAM: NORMAL

## 2022-12-05 NOTE — NURSING NOTE
Chief Complaints and History of Present Illnesses   Patient presents with     Post Op (Ophthalmology) Right Eye       Chief Complaint(s) and History of Present Illness(es)     Post Op (Ophthalmology) Right Eye            Laterality: right eye          Comments    Here for post op 2 week cataract surgery right eye 11/22.  She notes her vision is good on the right eye.   She denies pain/discomfort in either eye.   No floaters/flashes of lights.   She is using Prednisolone x2 a day, ketorolac x2 a day, moxifloxacin 2x a day.                   JULIO Ramirez  1:31 PM 12/05/2022

## 2022-12-05 NOTE — PROGRESS NOTES
HPI     Post Op (Ophthalmology) Right Eye    In right eye.           Comments    Here for post op 2 week cataract surgery right eye 11/22.  She notes her vision is good on the right eye.   She denies pain/discomfort in either eye.             Last edited by Molly Cornejo on 12/5/2022  1:31 PM.         Review of systems for the eyes was negative other than the pertinent positives/negatives listed in the HPI.      Assessment & Plan    HPI:  Shelia Sanchez is a 72 year old female with history of HLD, HTN, LORI, prediabetes, MDD, optic disc drusen right eye, myopia with astigmatism and presbyopia who presents for cataract evaluation from Dr. Shoemaker. Postoperative week 2 right eye     POHx:ptic disc drusen right eye, myopia with astigmatism and presbyopia  PMHx: HLD, HTN, LORI, prediabetes, MDD  Current Medications: acetaminophen (TYLENOL) 500 MG tablet, Take 500-1,000 mg by mouth every 6 hours as needed for mild pain  amLODIPine (NORVASC) 5 MG tablet, Take dailly  aspirin (ASA) 81 MG chewable tablet, TAKE 1 TABLET BY MOUTH DAILY  atorvastatin (LIPITOR) 20 MG tablet, Take 1 tablet (20 mg) by mouth daily  buPROPion (WELLBUTRIN XL) 300 MG 24 hr tablet, TAKE 1 TABLET BY MOUTH EVERY DAY IN THE MORNING  esomeprazole (NEXIUM) 20 MG DR capsule, Take 1 tablet by mouth daily   gabapentin (NEURONTIN) 100 MG capsule, TAKE 1 CAPSULE BY MOUTH TWICE DAILY  hydrochlorothiazide (HYDRODIURIL) 25 MG tablet, TAKE 1 TABLET(25 MG) BY MOUTH DAILY  ketorolac (ACULAR) 0.5 % ophthalmic solution, Apply 1 drop to eye 4 times daily for 7 days, THEN 1 drop 3 times daily for 7 days, THEN 1 drop 2 times daily for 7 days, THEN 1 drop daily for 7 days. Start 2 days prior to surgery in operative eye.  prednisoLONE acetate (PRED FORTE) 1 % ophthalmic suspension, Apply 1 drop to eye 4 times daily for 7 days, THEN 1 drop 3 times daily for 7 days, THEN 1 drop 2 times daily for 7 days, THEN 1 drop daily for 7 days. Start after surgery  venlafaxine  (EFFEXOR-XR) 75 MG 24 hr capsule, TAKE 1 CAPSULE(75 MG) BY MOUTH DAILY (Patient taking differently: Take 150 mg by mouth daily 75 mg + 75 mg + 37.5 mg = 187.5 mg daily)  vitamin B complex with vitamin C (STRESS TAB) tablet, Take 1 tablet by mouth daily  Vitamin D3 (CHOLECALCIFEROL) 125 MCG (5000 UT) tablet, Take 1 tablet by mouth daily    lidocaine (PF) (XYLOCAINE) 1 % injection 4 mL  lidocaine (PF) (XYLOCAINE) 1 % injection 4 mL  triamcinolone (KENALOG-40) injection 40 mg  triamcinolone (KENALOG-40) injection 40 mg      FHx: glaucoma-mom, maternal uncle - ARMD  PSHx: denies history of ocular surgeries       Current Eye Medications:  pred forte/ketorolac two times a day  Right eye     Assessment & Plan:  Pseudophakia, right eye  Sheri OD 11/22/22   Doing great    (H25.13) Nuclear age-related cataract, both eyes  Special equipment/needs:  Eye: left  Anesthesia: topical  Dilates to: 7.5  Iris expansion:  No  Pseudoexfoliation: No  Trypan Blue: No  Trauma: No    Able lay to flat: No  Blood Thinner: Yes ASA 81  Tamsulosin: No  DM: Yes  Guttae: No    Dominant Eye: right    Plan: [place] right eye, -0.75 OS      Proceed with CE/IOL left eye .      (H52.13,  H52.203,  H52.4) Myopia of both eyes with astigmatism and presbyopia   Hold pending Cataract extraction/iol     Return for as scheduled.        Trae Wade MD     Attending Physician Attestation:  Complete documentation of historical and exam elements from today's encounter can be found in the full encounter summary report (not reduplicated in this progress note).  I personally obtained the chief complaint(s) and history of present illness.  I confirmed and edited as necessary the review of systems, past medical/surgical history, family history, social history, and examination findings as documented by others; and I examined the patient myself.  I personally reviewed the relevant tests, images, and reports as documented above.  I formulated and edited as  necessary the assessment and plan and discussed the findings and management plan with the patient and family. - Trae Wade MD

## 2022-12-06 DIAGNOSIS — H25.13 NUCLEAR AGE-RELATED CATARACT, BOTH EYES: ICD-10-CM

## 2022-12-06 RX ORDER — KETOROLAC TROMETHAMINE 5 MG/ML
SOLUTION OPHTHALMIC
Qty: 4 ML | Refills: 1 | Status: SHIPPED | OUTPATIENT
Start: 2022-12-06 | End: 2023-01-03

## 2022-12-08 ENCOUNTER — ANESTHESIA (OUTPATIENT)
Dept: SURGERY | Facility: AMBULATORY SURGERY CENTER | Age: 72
End: 2022-12-08
Payer: COMMERCIAL

## 2022-12-08 ENCOUNTER — OFFICE VISIT (OUTPATIENT)
Dept: OPHTHALMOLOGY | Facility: CLINIC | Age: 72
End: 2022-12-08
Payer: COMMERCIAL

## 2022-12-08 ENCOUNTER — HOSPITAL ENCOUNTER (OUTPATIENT)
Facility: AMBULATORY SURGERY CENTER | Age: 72
Discharge: HOME | End: 2022-12-08
Attending: OPHTHALMOLOGY
Payer: COMMERCIAL

## 2022-12-08 VITALS
OXYGEN SATURATION: 98 % | HEART RATE: 94 BPM | RESPIRATION RATE: 16 BRPM | DIASTOLIC BLOOD PRESSURE: 67 MMHG | SYSTOLIC BLOOD PRESSURE: 145 MMHG | TEMPERATURE: 98.1 F

## 2022-12-08 DIAGNOSIS — H25.812 COMBINED FORM OF AGE-RELATED CATARACT, LEFT EYE: Primary | ICD-10-CM

## 2022-12-08 DIAGNOSIS — Z96.1 PSEUDOPHAKIA OF BOTH EYES: Primary | ICD-10-CM

## 2022-12-08 PROCEDURE — 66984 XCAPSL CTRC RMVL W/O ECP: CPT | Mod: LT

## 2022-12-08 PROCEDURE — G8907 PT DOC NO EVENTS ON DISCHARG: HCPCS

## 2022-12-08 PROCEDURE — G8918 PT W/O PREOP ORDER IV AB PRO: HCPCS

## 2022-12-08 PROCEDURE — 99024 POSTOP FOLLOW-UP VISIT: CPT | Performed by: OPHTHALMOLOGY

## 2022-12-08 PROCEDURE — 66984 XCAPSL CTRC RMVL W/O ECP: CPT | Mod: 79 | Performed by: OPHTHALMOLOGY

## 2022-12-08 DEVICE — TECNIS 1-PC CLEAR MONO 6.0MM 19.0D
Type: IMPLANTABLE DEVICE | Site: EYE | Status: FUNCTIONAL
Brand: TECNIS IOL

## 2022-12-08 RX ORDER — LORAZEPAM 2 MG/ML
.5-1 INJECTION INTRAMUSCULAR
Status: DISCONTINUED | OUTPATIENT
Start: 2022-12-08 | End: 2022-12-09 | Stop reason: HOSPADM

## 2022-12-08 RX ORDER — MOXIFLOXACIN 5 MG/ML
1 SOLUTION/ DROPS OPHTHALMIC
Status: COMPLETED | OUTPATIENT
Start: 2022-12-08 | End: 2022-12-08

## 2022-12-08 RX ORDER — FENTANYL CITRATE 50 UG/ML
25 INJECTION, SOLUTION INTRAMUSCULAR; INTRAVENOUS
Status: DISCONTINUED | OUTPATIENT
Start: 2022-12-08 | End: 2022-12-09 | Stop reason: HOSPADM

## 2022-12-08 RX ORDER — SODIUM CHLORIDE, SODIUM LACTATE, POTASSIUM CHLORIDE, CALCIUM CHLORIDE 600; 310; 30; 20 MG/100ML; MG/100ML; MG/100ML; MG/100ML
INJECTION, SOLUTION INTRAVENOUS CONTINUOUS
Status: DISCONTINUED | OUTPATIENT
Start: 2022-12-08 | End: 2022-12-09 | Stop reason: HOSPADM

## 2022-12-08 RX ORDER — KETOROLAC TROMETHAMINE 5 MG/ML
1 SOLUTION OPHTHALMIC
Status: DISCONTINUED | OUTPATIENT
Start: 2022-12-08 | End: 2022-12-09 | Stop reason: HOSPADM

## 2022-12-08 RX ORDER — MEPERIDINE HYDROCHLORIDE 25 MG/ML
12.5 INJECTION INTRAMUSCULAR; INTRAVENOUS; SUBCUTANEOUS
Status: DISCONTINUED | OUTPATIENT
Start: 2022-12-08 | End: 2022-12-09 | Stop reason: HOSPADM

## 2022-12-08 RX ORDER — ONDANSETRON 2 MG/ML
4 INJECTION INTRAMUSCULAR; INTRAVENOUS EVERY 30 MIN PRN
Status: DISCONTINUED | OUTPATIENT
Start: 2022-12-08 | End: 2022-12-09 | Stop reason: HOSPADM

## 2022-12-08 RX ORDER — CYCLOPENTOLAT/TROPIC/PHENYLEPH 1%-1%-2.5%
1 DROPS (EA) OPHTHALMIC (EYE)
Status: COMPLETED | OUTPATIENT
Start: 2022-12-08 | End: 2022-12-08

## 2022-12-08 RX ORDER — FENTANYL CITRATE 50 UG/ML
25 INJECTION, SOLUTION INTRAMUSCULAR; INTRAVENOUS EVERY 5 MIN PRN
Status: DISCONTINUED | OUTPATIENT
Start: 2022-12-08 | End: 2022-12-09 | Stop reason: HOSPADM

## 2022-12-08 RX ORDER — HYDRALAZINE HYDROCHLORIDE 20 MG/ML
2.5-5 INJECTION INTRAMUSCULAR; INTRAVENOUS EVERY 10 MIN PRN
Status: DISCONTINUED | OUTPATIENT
Start: 2022-12-08 | End: 2022-12-09 | Stop reason: HOSPADM

## 2022-12-08 RX ORDER — DICLOFENAC SODIUM 1 MG/ML
1 SOLUTION/ DROPS OPHTHALMIC
Status: DISCONTINUED | OUTPATIENT
Start: 2022-12-08 | End: 2022-12-09 | Stop reason: HOSPADM

## 2022-12-08 RX ORDER — ONDANSETRON 4 MG/1
4 TABLET, ORALLY DISINTEGRATING ORAL EVERY 30 MIN PRN
Status: DISCONTINUED | OUTPATIENT
Start: 2022-12-08 | End: 2022-12-09 | Stop reason: HOSPADM

## 2022-12-08 RX ORDER — MOXIFLOXACIN IN NACL,ISO-OS/PF 0.3MG/0.3
SYRINGE (ML) INTRAOCULAR PRN
Status: DISCONTINUED | OUTPATIENT
Start: 2022-12-08 | End: 2022-12-08 | Stop reason: HOSPADM

## 2022-12-08 RX ORDER — ACETAMINOPHEN 325 MG/1
975 TABLET ORAL ONCE
Status: COMPLETED | OUTPATIENT
Start: 2022-12-08 | End: 2022-12-08

## 2022-12-08 RX ORDER — PROPARACAINE HYDROCHLORIDE 5 MG/ML
1 SOLUTION/ DROPS OPHTHALMIC ONCE
Status: COMPLETED | OUTPATIENT
Start: 2022-12-08 | End: 2022-12-08

## 2022-12-08 RX ORDER — TETRACAINE HYDROCHLORIDE 5 MG/ML
SOLUTION OPHTHALMIC PRN
Status: DISCONTINUED | OUTPATIENT
Start: 2022-12-08 | End: 2022-12-08 | Stop reason: HOSPADM

## 2022-12-08 RX ORDER — ALBUTEROL SULFATE 0.83 MG/ML
2.5 SOLUTION RESPIRATORY (INHALATION) EVERY 4 HOURS PRN
Status: DISCONTINUED | OUTPATIENT
Start: 2022-12-08 | End: 2022-12-09 | Stop reason: HOSPADM

## 2022-12-08 RX ORDER — PROPARACAINE HYDROCHLORIDE 5 MG/ML
1 SOLUTION/ DROPS OPHTHALMIC
Status: DISCONTINUED | OUTPATIENT
Start: 2022-12-08 | End: 2022-12-09 | Stop reason: HOSPADM

## 2022-12-08 RX ORDER — FENTANYL CITRATE 50 UG/ML
INJECTION, SOLUTION INTRAMUSCULAR; INTRAVENOUS PRN
Status: DISCONTINUED | OUTPATIENT
Start: 2022-12-08 | End: 2022-12-08

## 2022-12-08 RX ORDER — BALANCED SALT SOLUTION 6.4; .75; .48; .3; 3.9; 1.7 MG/ML; MG/ML; MG/ML; MG/ML; MG/ML; MG/ML
SOLUTION OPHTHALMIC PRN
Status: DISCONTINUED | OUTPATIENT
Start: 2022-12-08 | End: 2022-12-08 | Stop reason: HOSPADM

## 2022-12-08 RX ORDER — LIDOCAINE 40 MG/G
CREAM TOPICAL
Status: DISCONTINUED | OUTPATIENT
Start: 2022-12-08 | End: 2022-12-09 | Stop reason: HOSPADM

## 2022-12-08 RX ORDER — FENTANYL CITRATE 50 UG/ML
50 INJECTION, SOLUTION INTRAMUSCULAR; INTRAVENOUS EVERY 5 MIN PRN
Status: DISCONTINUED | OUTPATIENT
Start: 2022-12-08 | End: 2022-12-09 | Stop reason: HOSPADM

## 2022-12-08 RX ADMIN — Medication 1 DROP: at 06:25

## 2022-12-08 RX ADMIN — ACETAMINOPHEN 975 MG: 325 TABLET ORAL at 06:13

## 2022-12-08 RX ADMIN — PROPARACAINE HYDROCHLORIDE 1 DROP: 5 SOLUTION/ DROPS OPHTHALMIC at 06:13

## 2022-12-08 RX ADMIN — MOXIFLOXACIN 1 DROP: 5 SOLUTION/ DROPS OPHTHALMIC at 06:25

## 2022-12-08 RX ADMIN — Medication 1 DROP: at 06:13

## 2022-12-08 RX ADMIN — MOXIFLOXACIN 1 DROP: 5 SOLUTION/ DROPS OPHTHALMIC at 06:18

## 2022-12-08 RX ADMIN — SODIUM CHLORIDE, SODIUM LACTATE, POTASSIUM CHLORIDE, CALCIUM CHLORIDE: 600; 310; 30; 20 INJECTION, SOLUTION INTRAVENOUS at 07:29

## 2022-12-08 RX ADMIN — FENTANYL CITRATE 50 MCG: 50 INJECTION, SOLUTION INTRAMUSCULAR; INTRAVENOUS at 07:29

## 2022-12-08 RX ADMIN — MOXIFLOXACIN 1 DROP: 5 SOLUTION/ DROPS OPHTHALMIC at 06:13

## 2022-12-08 RX ADMIN — Medication 1 DROP: at 06:18

## 2022-12-08 ASSESSMENT — VISUAL ACUITY
METHOD: SNELLEN - LINEAR
OS_SC: 20/150

## 2022-12-08 ASSESSMENT — TONOMETRY
IOP_METHOD: TONOPEN
OS_IOP_MMHG: 23

## 2022-12-08 ASSESSMENT — SLIT LAMP EXAM - LIDS: COMMENTS: NORMAL

## 2022-12-08 NOTE — ANESTHESIA CARE TRANSFER NOTE
Patient: Shelia Sanchez    Procedure: Procedure(s):  PHACOEMULSIFICATION, CATARACT, WITH INTRAOCULAR LENS IMPLANT       Diagnosis: Nuclear age-related cataract, both eyes [H25.13]  Diagnosis Additional Information: No value filed.    Anesthesia Type:   No value filed.     Note:      Level of Consciousness: awake  Oxygen Supplementation: room air    Independent Airway: airway patency satisfactory and stable  Dentition: dentition unchanged  Vital Signs Stable: post-procedure vital signs reviewed and stable  Report to RN Given: handoff report given  Patient transferred to: Phase II    Handoff Report: Identifed the Patient, Identified the Reponsible Provider, Reviewed the pertinent medical history, Discussed the surgical course, Reviewed Intra-OP anesthesia mangement and issues during anesthesia, Set expectations for post-procedure period and Allowed opportunity for questions and acknowledgement of understanding      Vitals:  Vitals Value Taken Time   /75    Temp 97    Pulse 97    Resp 12    SpO2 99        Electronically Signed By: TOY Pagan CRNA  December 8, 2022  8:06 AM

## 2022-12-08 NOTE — PROGRESS NOTES
Review of systems for the eyes was negative other than the pertinent positives/negatives listed in the HPI.      Assessment & Plan    HPI:  Shelia Sanchez is a 72 year old female with history of HLD, HTN, LORI, prediabetes, MDD, optic disc drusen right eye, myopia with astigmatism and presbyopia who presents for cataract evaluation from Dr. Shoemaker. Postoperative day 0 left eye      POHx:ptic disc drusen right eye, myopia with astigmatism and presbyopia  PMHx: HLD, HTN, LORI, prediabetes, MDD  Current Medications: acetaminophen (TYLENOL) 500 MG tablet, Take 500-1,000 mg by mouth every 6 hours as needed for mild pain  amLODIPine (NORVASC) 5 MG tablet, Take dailly  aspirin (ASA) 81 MG chewable tablet, TAKE 1 TABLET BY MOUTH DAILY  atorvastatin (LIPITOR) 20 MG tablet, Take 1 tablet (20 mg) by mouth daily  buPROPion (WELLBUTRIN XL) 300 MG 24 hr tablet, TAKE 1 TABLET BY MOUTH EVERY DAY IN THE MORNING  esomeprazole (NEXIUM) 20 MG DR capsule, Take 1 tablet by mouth daily   gabapentin (NEURONTIN) 100 MG capsule, TAKE 1 CAPSULE BY MOUTH TWICE DAILY  hydrochlorothiazide (HYDRODIURIL) 25 MG tablet, TAKE 1 TABLET(25 MG) BY MOUTH DAILY  ketorolac (ACULAR) 0.5 % ophthalmic solution, Apply 1 drop to eye 4 times daily for 7 days, THEN 1 drop 3 times daily for 7 days, THEN 1 drop 2 times daily for 7 days, THEN 1 drop daily for 7 days. Start 2 days prior to surgery in operative eye.  prednisoLONE acetate (PRED FORTE) 1 % ophthalmic suspension, Apply 1 drop to eye 4 times daily for 7 days, THEN 1 drop 3 times daily for 7 days, THEN 1 drop 2 times daily for 7 days, THEN 1 drop daily for 7 days. Start after surgery  venlafaxine (EFFEXOR-XR) 75 MG 24 hr capsule, TAKE 1 CAPSULE(75 MG) BY MOUTH DAILY (Patient taking differently: Take 150 mg by mouth daily 75 mg + 75 mg + 37.5 mg = 187.5 mg daily)  vitamin B complex with vitamin C (STRESS TAB) tablet, Take 1 tablet by mouth daily  Vitamin D3 (CHOLECALCIFEROL) 125 MCG (5000 UT) tablet,  Take 1 tablet by mouth daily    [COMPLETED] acetaminophen (TYLENOL) tablet 975 mg  albuterol (PROVENTIL) neb solution 2.5 mg  diclofenac (VOLTAREN) 0.1 % ophthalmic solution 1 drop  fentaNYL (PF) (SUBLIMAZE) injection 25 mcg  fentaNYL (PF) (SUBLIMAZE) injection 25 mcg  fentaNYL (PF) (SUBLIMAZE) injection 50 mcg  hydrALAZINE (APRESOLINE) injection 2.5-5 mg  HYDROmorphone (DILAUDID) injection 0.2 mg  HYDROmorphone (DILAUDID) injection 0.4 mg  ketorolac (ACULAR) 0.5 % ophthalmic solution 1 drop  [COMPLETED] ketorolac (patient own, no charge) (ACULAR) 0.5 % ophthalmic solution 1 drop  lactated ringers infusion  lactated ringers infusion  lactated ringers infusion  lidocaine (LMX4) kit  lidocaine (PF) (XYLOCAINE) 1 % injection 4 mL  lidocaine (PF) (XYLOCAINE) 1 % injection 4 mL  lidocaine 1 % 0.1-1 mL  LORazepam (ATIVAN) injection 0.5-1 mg  meperidine (DEMEROL) injection 12.5 mg  [COMPLETED] moxifloxacin (VIGAMOX) 0.5 % ophthalmic solution 1 drop  ondansetron (ZOFRAN ODT) ODT tab 4 mg   Or  ondansetron (ZOFRAN) injection 4 mg  prochlorperazine (COMPAZINE) injection 5 mg  proparacaine (ALCAINE) 0.5 % ophthalmic solution 1 drop  [COMPLETED] proparacaine (ALCAINE) 0.5 % ophthalmic solution 1 drop  racEPINEPHrine neb solution 0.5 mL  sodium chloride (PF) 0.9% PF flush 3 mL  sodium chloride (PF) 0.9% PF flush 3 mL  triamcinolone (KENALOG-40) injection 40 mg  triamcinolone (KENALOG-40) injection 40 mg  [COMPLETED] tropicamide 1 %-cyclopentolate 1 %-phenylephrine 2.5% 1-1-2.5 % ophthalmic solution 1 drop      FHx: glaucoma-mom, maternal uncle - ARMD  PSHx: Cataract extraction/IOL right eye 11/22/22, left eye 12/08/22       Current Eye Medications:  pred forte/ketorolac two times a day Right eye   Moxifloxacin/pred forte/ketorolac four times a day  Left eye       Assessment & Plan:  Pseudophakia, left eye, day 0  Sheri OS 12/08/22   Doing well  Keep patch in place at night for 7 days  Start post-operative drops and taper  according to instructions  Post-operative do's and don'ts reviewed, questions answered    Recheck 1 week    Pseudophakia, right eye  Sheri OD 11/22/22   Doing great          (H52.13,  H52.203,  H52.4) Myopia of both eyes with astigmatism and presbyopia   Hold pending Cataract extraction/iol     Return for as scheduled.        Trae Wade MD     Attending Physician Attestation:  Complete documentation of historical and exam elements from today's encounter can be found in the full encounter summary report (not reduplicated in this progress note).  I personally obtained the chief complaint(s) and history of present illness.  I confirmed and edited as necessary the review of systems, past medical/surgical history, family history, social history, and examination findings as documented by others; and I examined the patient myself.  I personally reviewed the relevant tests, images, and reports as documented above.  I formulated and edited as necessary the assessment and plan and discussed the findings and management plan with the patient and family. - Trae Wade MD

## 2022-12-08 NOTE — ANESTHESIA POSTPROCEDURE EVALUATION
Patient: Shelia Sanchez    Procedure: Procedure(s):  PHACOEMULSIFICATION, CATARACT, WITH INTRAOCULAR LENS IMPLANT       Anesthesia Type:  MAC    Note:  Disposition: Outpatient   Postop Pain Control: Uneventful            Sign Out: Well controlled pain   PONV: No   Neuro/Psych: Uneventful            Sign Out: Acceptable/Baseline neuro status   Airway/Respiratory: Uneventful            Sign Out: Acceptable/Baseline resp. status   CV/Hemodynamics: Uneventful            Sign Out: Acceptable CV status; No obvious hypovolemia; No obvious fluid overload   Other NRE: NONE   DID A NON-ROUTINE EVENT OCCUR? No           Last vitals:  Vitals Value Taken Time   /67 12/08/22 0821   Temp 98.1  F (36.7  C) 12/08/22 0804   Pulse 94 12/08/22 0821   Resp 16 12/08/22 0821   SpO2 98 % 12/08/22 0821       Electronically Signed By: Angel Hayes MD  December 8, 2022  9:21 AM

## 2022-12-08 NOTE — OP NOTE
PREOPERATIVE DIAGNOSIS:   1. Combined form of age-related cataract, left eye     Left eye   POSTOPERATIVE DIAGNOSIS: Same   PROCEDURES:   1. Cataract extraction with intraocular lens implant Left eye.  SURGEON: Trae Wade M.D.  INDICATIONS: The patient Shelia Sanchez presented to the eye clinic with decreased vision secondary to cataract in the Left eye. The risks, benefits and alternatives to cataract extraction were discussed. The patient elected to proceed. All questions were answered to the patient's satisfaction.   DESCRIPTION OF PROCEDURE:   Prior to the procedure, appropriate cardiac and respiratory monitors were applied to the patient.  In the pre-operative holding area, a drop of topical tetracaine was placed in the operative eye.  The patient was brought to the operating room.  With adequate anesthesia, the Left eye was prepped and draped in the usual sterile fashion. A lid speculum was placed, and the operating microscope was rotated into position. A surgical pause was carried out to identify with all members of the surgical team the correct surgical site. A paracentesis was created.  Through this limbal paracentesis, the anterior chamber was filled with preservative-free lidocaine with epi shugarcaine followed by viscoelastic.  A temporal wound was created at the limbus using a 2.6 mm blade. A capsulorrhexis was initiated using a bent 25-gauge needle and was completed in continuous and circular fashion using the capsulorrhexis forceps. The lens nucleus was hydrodissected using balanced salt solution.  The lens nucleus was rotated and removed using phacoemulsification in a divide and conquer technique.  Residual cortical material was removed using irrigation-aspiration.  The capsular bag was reinflated to its maximal extent with cohesive viscoelastic.  A 19.0 diopter ZCB00 was inserted into the capsular bag.  The lens power selected was reviewed using the intraocular lens power measurements that  were obtained preoperatively to confirm that the correct lens was selected for the desired post-operative refractive state. The residual viscoelastic was removed in its entirety, the wound were hydrated and found to be self-sealing.  Intracameral moxifloxacin was administered. Tactile pressure was confirmed to be in a normal range.  The lid speculum was removed and a shield was applied.  The patient tolerated the procedure well, and there were no complications.    PLAN: The patient will be discharged to home and will follow up today  EBL:  None  Complications:  None  Implant Name Type Inv. Item Serial No.  Lot No. LRB No. Used Action   EYE IMP IOL KARLOS PCL TECNIS ZCB00 19.0 - [id number] Lens/Eye Implant EYE IMP IOL KARLOS PCL TECNIS ZCB00 19.0 [id number] [employer]  Left 1 Implanted

## 2022-12-08 NOTE — ANESTHESIA PREPROCEDURE EVALUATION
Anesthesia Pre-Procedure Evaluation    Patient: Shelia Sanchez   MRN: [id number] : 1950        Procedure : Procedure(s):  PHACOEMULSIFICATION, CATARACT, WITH INTRAOCULAR LENS IMPLANT          Past Medical History:   Diagnosis Date     Arthritis 2014    Bursitis in left hip     Asteatotic eczema      Cholelithiasis with obstruction 05/10/2010     Depressive disorder      Diabetes (H) Summer 2019    Pre-diabetes     Gallstone pancreatitis 2010     History of blood transfusion 40 years ago     Hypertension      Right knee pain 2012      Past Surgical History:   Procedure Laterality Date     CHOLECYSTECTOMY, LAPOROSCOPIC      Cholecystectomy, Laparoscopic     HEAD & NECK SURGERY      Treatment for arthritis in neck by deadening nerves     PHACOEMULSIFICATION CLEAR CORNEA WITH STANDARD INTRAOCULAR LENS IMPLANT Right 2022    Procedure: RIGHT PHACOEMULSIFICATION, CATARACT, WITH INTRAOCULAR LENS IMPLANT;  Surgeon: Trae Wade MD;  Location: [facility] OR     ZZC VAG HYST,RMV TUBE/OVARY  2004     ZZHC ERCP W STENT PLACEMENT BILE/PANCREATIC DUCT        Allergies   Allergen Reactions     Nitrofurantoin      Stomach pain      Social History     Tobacco Use     Smoking status: Never     Smokeless tobacco: Never   Substance Use Topics     Alcohol use: Yes     Comment: Weekends mostly.      Wt Readings from Last 1 Encounters:   22 127.9 kg (282 lb)        Anesthesia Evaluation   Pt has had prior anesthetic. Type: MAC (Sleep apnea).        ROS/MED HX  ENT/Pulmonary:     (+) sleep apnea, severe, uses CPAP,     Neurologic:  - neg neurologic ROS     Cardiovascular:     (+) hypertension-----    METS/Exercise Tolerance:     Hematologic:  - neg hematologic  ROS     Musculoskeletal:  - neg musculoskeletal ROS     GI/Hepatic:  - neg GI/hepatic ROS     Renal/Genitourinary:  - neg Renal ROS     Endo:     (+) Obesity,     Psychiatric/Substance Use:  - neg psychiatric ROS     Infectious  Disease:  - neg infectious disease ROS     Malignancy:  - neg malignancy ROS     Other:  - neg other ROS          Physical Exam    Airway  airway exam normal           Respiratory Devices and Support         Dental  no notable dental history         Cardiovascular   cardiovascular exam normal          Pulmonary   pulmonary exam normal                OUTSIDE LABS:  CBC:   Lab Results   Component Value Date    WBC 10.6 08/15/2022    WBC 10.0 02/17/2022    HGB 12.9 08/15/2022    HGB 13.0 02/17/2022    HCT 39.5 08/15/2022    HCT 40.6 02/17/2022     08/15/2022     02/17/2022     BMP:   Lab Results   Component Value Date     08/15/2022     02/17/2022    POTASSIUM 3.9 08/15/2022    POTASSIUM 3.5 02/17/2022    CHLORIDE 110 (H) 08/15/2022    CHLORIDE 109 02/17/2022    CO2 26 08/15/2022    CO2 23 02/17/2022    BUN 28 08/15/2022    BUN 24 02/17/2022    CR 0.98 08/15/2022    CR 0.88 02/17/2022     (H) 08/15/2022     (H) 02/17/2022     COAGS: No results found for: PTT, INR, FIBR  POC: No results found for: BGM, HCG, HCGS  HEPATIC:   Lab Results   Component Value Date    ALBUMIN 3.9 08/15/2022    PROTTOTAL 7.0 08/15/2022    ALT 26 08/15/2022    AST 14 08/15/2022    ALKPHOS 96 08/15/2022    BILITOTAL 0.4 08/15/2022     OTHER:   Lab Results   Component Value Date    A1C 6.3 (H) 08/15/2022    MYLES 10.0 08/15/2022    LIPASE 88 02/08/2021    TSH 1.62 02/17/2022    CRP <2.9 12/02/2015    SED 8 12/02/2015       Anesthesia Plan    ASA Status:  3   NPO Status:  NPO Appropriate    Anesthesia Type: MAC.     - Reason for MAC: chronic cardiopulmonary disease   Induction: N/a.   Maintenance: N/A.        Consents    Anesthesia Plan(s) and associated risks, benefits, and realistic alternatives discussed. Questions answered and patient/representative(s) expressed understanding.    - Discussed:     - Discussed with:  Patient      - Extended Intubation/Ventilatory Support Discussed: No.      - Patient is  DNR/DNI Status: No    Use of blood products discussed: No .     Postoperative Care    Pain management: IV analgesics, Oral pain medications.   PONV prophylaxis: Ondansetron (or other 5HT-3)     Comments:                Angel Hayes MD

## 2022-12-19 ENCOUNTER — OFFICE VISIT (OUTPATIENT)
Dept: OPHTHALMOLOGY | Facility: CLINIC | Age: 72
End: 2022-12-19
Payer: COMMERCIAL

## 2022-12-19 DIAGNOSIS — Z96.1 PSEUDOPHAKIA OF BOTH EYES: Primary | ICD-10-CM

## 2022-12-19 PROCEDURE — 99024 POSTOP FOLLOW-UP VISIT: CPT | Performed by: OPHTHALMOLOGY

## 2022-12-19 RX ORDER — PREDNISOLONE ACETATE 10 MG/ML
1 SUSPENSION/ DROPS OPHTHALMIC 3 TIMES DAILY
COMMUNITY
End: 2023-01-16

## 2022-12-19 ASSESSMENT — TONOMETRY
OD_IOP_MMHG: 14
OS_IOP_MMHG: 15
IOP_METHOD: TONOPEN

## 2022-12-19 ASSESSMENT — VISUAL ACUITY
OD_SC+: -2
OS_SC: 20/50
OD_SC: 20/30
METHOD: SNELLEN - LINEAR

## 2022-12-19 ASSESSMENT — EXTERNAL EXAM - LEFT EYE: OS_EXAM: NORMAL

## 2022-12-19 ASSESSMENT — SLIT LAMP EXAM - LIDS
COMMENTS: NORMAL
COMMENTS: NORMAL

## 2022-12-19 ASSESSMENT — EXTERNAL EXAM - RIGHT EYE: OD_EXAM: NORMAL

## 2022-12-19 NOTE — NURSING NOTE
Chief Complaints and History of Present Illnesses   Patient presents with     Post Op (Ophthalmology) Both Eyes       Chief Complaint(s) and History of Present Illness(es)     Post Op (Ophthalmology) Both Eyes            Laterality: both eyes          Comments    Post op cataract surgery right eye 11/22, left eye 12/08.  She notes her distance is good. Near is not great right now.   She denies pain/discomfort in either eye.   Denies floaters/flashes of lights.                  Molly Cornejo, COA  1:36 PM 12/19/2022

## 2022-12-19 NOTE — PROGRESS NOTES
HPI     Post Op (Ophthalmology) Both Eyes    In both eyes.           Comments    Post op cataract surgery right eye 11/22, left eye 12/08.  She notes her distance is good. Near is not great right now.   She denies pain/discomfort in either eye.   Denies floaters/flashes of lights.           Last edited by Molly Cornejo on 12/19/2022  1:33 PM.         Review of systems for the eyes was negative other than the pertinent positives/negatives listed in the HPI.      Assessment & Plan    HPI:  Shelia Sanchez is a 72 year old female with history of HLD, HTN, LORI, prediabetes, MDD, optic disc drusen right eye, myopia with astigmatism and presbyopia who presents for post op   POHx:ptic disc drusen right eye, myopia with astigmatism and presbyopia  PMHx: HLD, HTN, LORI, prediabetes, MDD  Current Medications: prednisoLONE acetate (PRED FORTE) 1 % ophthalmic suspension, Place 1 drop Into the left eye 3 times daily  acetaminophen (TYLENOL) 500 MG tablet, Take 500-1,000 mg by mouth every 6 hours as needed for mild pain  amLODIPine (NORVASC) 5 MG tablet, Take dailly  aspirin (ASA) 81 MG chewable tablet, TAKE 1 TABLET BY MOUTH DAILY  atorvastatin (LIPITOR) 20 MG tablet, Take 1 tablet (20 mg) by mouth daily  buPROPion (WELLBUTRIN XL) 300 MG 24 hr tablet, TAKE 1 TABLET BY MOUTH EVERY DAY IN THE MORNING  esomeprazole (NEXIUM) 20 MG DR capsule, Take 1 tablet by mouth daily   gabapentin (NEURONTIN) 100 MG capsule, TAKE 1 CAPSULE BY MOUTH TWICE DAILY  hydrochlorothiazide (HYDRODIURIL) 25 MG tablet, TAKE 1 TABLET(25 MG) BY MOUTH DAILY  ketorolac (ACULAR) 0.5 % ophthalmic solution, Apply 1 drop to eye 4 times daily for 7 days, THEN 1 drop 3 times daily for 7 days, THEN 1 drop 2 times daily for 7 days, THEN 1 drop daily for 7 days. Start 2 days prior to surgery in operative eye.  venlafaxine (EFFEXOR-XR) 75 MG 24 hr capsule, TAKE 1 CAPSULE(75 MG) BY MOUTH DAILY (Patient taking differently: Take 150 mg by mouth daily 75 mg + 75 mg + 37.5  mg = 187.5 mg daily)  vitamin B complex with vitamin C (STRESS TAB) tablet, Take 1 tablet by mouth daily  Vitamin D3 (CHOLECALCIFEROL) 125 MCG (5000 UT) tablet, Take 1 tablet by mouth daily    lidocaine (PF) (XYLOCAINE) 1 % injection 4 mL  lidocaine (PF) (XYLOCAINE) 1 % injection 4 mL  triamcinolone (KENALOG-40) injection 40 mg  triamcinolone (KENALOG-40) injection 40 mg      FHx: glaucoma-mom, maternal uncle - ARMD  PSHx: Cataract extraction/IOL right eye 11/22/22, left eye 12/08/22       Current Eye Medications:  pred forte/ketorolac two times a day left eye         Assessment & Plan:  Pseudophakia  Wade OS 12/08/22 goal -0.75  Wade OD 11/22/22 goal plano    (H52.13,  H52.203,  H52.4) Myopia of both eyes with astigmatism and presbyopia   Hold pending Cataract extraction/iol     Return in about 3 weeks (around 1/9/2023) for v/t/d/mrx.        Trae Wade MD     Attending Physician Attestation:  Complete documentation of historical and exam elements from today's encounter can be found in the full encounter summary report (not reduplicated in this progress note).  I personally obtained the chief complaint(s) and history of present illness.  I confirmed and edited as necessary the review of systems, past medical/surgical history, family history, social history, and examination findings as documented by others; and I examined the patient myself.  I personally reviewed the relevant tests, images, and reports as documented above.  I formulated and edited as necessary the assessment and plan and discussed the findings and management plan with the patient and family. - Trae Wade MD

## 2023-01-16 ENCOUNTER — OFFICE VISIT (OUTPATIENT)
Dept: OPHTHALMOLOGY | Facility: CLINIC | Age: 73
End: 2023-01-16
Payer: COMMERCIAL

## 2023-01-16 DIAGNOSIS — Z96.1 PSEUDOPHAKIA OF BOTH EYES: Primary | ICD-10-CM

## 2023-01-16 DIAGNOSIS — H52.4 MYOPIA OF BOTH EYES WITH ASTIGMATISM AND PRESBYOPIA: ICD-10-CM

## 2023-01-16 DIAGNOSIS — H52.203 MYOPIA OF BOTH EYES WITH ASTIGMATISM AND PRESBYOPIA: ICD-10-CM

## 2023-01-16 DIAGNOSIS — H52.13 MYOPIA OF BOTH EYES WITH ASTIGMATISM AND PRESBYOPIA: ICD-10-CM

## 2023-01-16 PROCEDURE — 99024 POSTOP FOLLOW-UP VISIT: CPT | Performed by: OPHTHALMOLOGY

## 2023-01-16 ASSESSMENT — VISUAL ACUITY
OD_SC: 20/25
OD_SC+: -2
OS_SC: 20/50
METHOD: SNELLEN - LINEAR

## 2023-01-16 ASSESSMENT — CONF VISUAL FIELD
OD_NORMAL: 1
OS_SUPERIOR_NASAL_RESTRICTION: 0
OD_SUPERIOR_NASAL_RESTRICTION: 0
OD_INFERIOR_TEMPORAL_RESTRICTION: 0
OS_NORMAL: 1
OS_SUPERIOR_TEMPORAL_RESTRICTION: 0
OS_INFERIOR_TEMPORAL_RESTRICTION: 0
METHOD: COUNTING FINGERS
OD_SUPERIOR_TEMPORAL_RESTRICTION: 0
OS_INFERIOR_NASAL_RESTRICTION: 0
OD_INFERIOR_NASAL_RESTRICTION: 0

## 2023-01-16 ASSESSMENT — TONOMETRY
OD_IOP_MMHG: 12
IOP_METHOD: ICARE
OS_IOP_MMHG: 13

## 2023-01-16 ASSESSMENT — SLIT LAMP EXAM - LIDS
COMMENTS: NORMAL
COMMENTS: NORMAL

## 2023-01-16 ASSESSMENT — REFRACTION_MANIFEST
OS_CYLINDER: SPHERE
OD_SPHERE: -0.50
OD_ADD: +2.50
OS_ADD: +2.50
OS_SPHERE: -1.00
OD_AXIS: 150
OD_CYLINDER: +0.50

## 2023-01-16 ASSESSMENT — EXTERNAL EXAM - RIGHT EYE: OD_EXAM: NORMAL

## 2023-01-16 ASSESSMENT — CUP TO DISC RATIO
OS_RATIO: 0.15
OD_RATIO: 0.2

## 2023-01-16 ASSESSMENT — EXTERNAL EXAM - LEFT EYE: OS_EXAM: NORMAL

## 2023-01-16 NOTE — PROGRESS NOTES
HPI     Post Op (Ophthalmology) Both Eyes    In both eyes.           Comments    S/p cataract surgery 11/22/22 right eye and 12/8/22 left eye. Pt states she has some tearing from the right eye every once in awhile. No other concerns. Done with drops.           Last edited by Tori Salazar COT on 1/16/2023  2:52 PM.         Review of systems for the eyes was negative other than the pertinent positives/negatives listed in the HPI.      Assessment & Plan    HPI:  Shelia Sanchez is a 72 year old female with history of HLD, HTN, LORI, prediabetes, MDD, optic disc drusen right eye, myopia with astigmatism and presbyopia who presents for final post op     POHx:ptic disc drusen right eye, myopia with astigmatism and presbyopia  PMHx: HLD, HTN, LORI, prediabetes, MDD  Current Medications: acetaminophen (TYLENOL) 500 MG tablet, Take 500-1,000 mg by mouth every 6 hours as needed for mild pain  amLODIPine (NORVASC) 5 MG tablet, Take dailly  aspirin (ASA) 81 MG chewable tablet, TAKE 1 TABLET BY MOUTH DAILY  atorvastatin (LIPITOR) 20 MG tablet, Take 1 tablet (20 mg) by mouth daily  buPROPion (WELLBUTRIN XL) 300 MG 24 hr tablet, TAKE 1 TABLET BY MOUTH EVERY DAY IN THE MORNING  esomeprazole (NEXIUM) 20 MG DR capsule, Take 1 tablet by mouth daily   gabapentin (NEURONTIN) 100 MG capsule, TAKE 1 CAPSULE BY MOUTH TWICE DAILY  hydrochlorothiazide (HYDRODIURIL) 25 MG tablet, TAKE 1 TABLET(25 MG) BY MOUTH DAILY  venlafaxine (EFFEXOR-XR) 75 MG 24 hr capsule, TAKE 1 CAPSULE(75 MG) BY MOUTH DAILY (Patient taking differently: Take 150 mg by mouth daily 75 mg + 75 mg + 75 mg = 225 mg daily)  vitamin B complex with vitamin C (STRESS TAB) tablet, Take 1 tablet by mouth daily  Vitamin D3 (CHOLECALCIFEROL) 125 MCG (5000 UT) tablet, Take 1 tablet by mouth daily    lidocaine (PF) (XYLOCAINE) 1 % injection 4 mL  lidocaine (PF) (XYLOCAINE) 1 % injection 4 mL  triamcinolone (KENALOG-40) injection 40 mg  triamcinolone (KENALOG-40) injection 40  mg      FHx: glaucoma-mom, maternal uncle - ARMD  PSHx: Cataract extraction/IOL right eye 11/22/22, left eye 12/08/22       Current Eye Medications:  Artificial tears     Assessment & Plan:  Pseudophakia  Wade OS 12/08/22 goal -0.75  Wade OD 11/22/22 goal plano    (H52.13,  H52.203,  H52.4) Myopia of both eyes with astigmatism and presbyopia   Patient has minimal change in myopia but a copy of today's glasses prescription was given.  The patient may wish to update the glasses if the lenses are scratched or the frames are too small.  Presbyopia is difficulty seeing up close and is treated with bifocals or over the counter reading glasses      Return in 1 year (on 1/16/2024) for [facility] annual exam, Annual Visit.        Trae Wade MD     Attending Physician Attestation:  Complete documentation of historical and exam elements from today's encounter can be found in the full encounter summary report (not reduplicated in this progress note).  I personally obtained the chief complaint(s) and history of present illness.  I confirmed and edited as necessary the review of systems, past medical/surgical history, family history, social history, and examination findings as documented by others; and I examined the patient myself.  I personally reviewed the relevant tests, images, and reports as documented above.  I formulated and edited as necessary the assessment and plan and discussed the findings and management plan with the patient and family. - Trae Wade MD

## 2023-01-16 NOTE — LETTER
1/16/2023       RE: Shelia Sanchez  [address]  [place] [address]     Dear Dr. Shoemaker,    Thank you for referring your patient, Shelia Sanchez, to the [facility]. She underwent uncomplicated cataract surgery in both eyes with a goal of mini-monovision-plano right eye -0.75 left eye. I will have her return to your for annual eye exam. Please see a copy of my visit note below.    HPI     Post Op (Ophthalmology) Both Eyes    In both eyes.           Comments    S/p cataract surgery 11/22/22 right eye and 12/8/22 left eye. Pt states she has some tearing from the right eye every once in awhile. No other concerns. Done with drops.           Last edited by Tori Salazar COT on 1/16/2023  2:52 PM.         Review of systems for the eyes was negative other than the pertinent positives/negatives listed in the HPI.      Assessment & Plan   HPI:  Shelia Sanchez is a 72 year old female with history of HLD, HTN, LORI, prediabetes, MDD, optic disc drusen right eye, myopia with astigmatism and presbyopia who presents for final post op     POHx:ptic disc drusen right eye, myopia with astigmatism and presbyopia  PMHx: HLD, HTN, LORI, prediabetes, MDD  Current Medications: acetaminophen (TYLENOL) 500 MG tablet, Take 500-1,000 mg by mouth every 6 hours as needed for mild pain  amLODIPine (NORVASC) 5 MG tablet, Take dailly  aspirin (ASA) 81 MG chewable tablet, TAKE 1 TABLET BY MOUTH DAILY  atorvastatin (LIPITOR) 20 MG tablet, Take 1 tablet (20 mg) by mouth daily  buPROPion (WELLBUTRIN XL) 300 MG 24 hr tablet, TAKE 1 TABLET BY MOUTH EVERY DAY IN THE MORNING  esomeprazole (NEXIUM) 20 MG DR capsule, Take 1 tablet by mouth daily   gabapentin (NEURONTIN) 100 MG capsule, TAKE 1 CAPSULE BY MOUTH TWICE DAILY  hydrochlorothiazide (HYDRODIURIL) 25 MG tablet, TAKE 1 TABLET(25 MG) BY MOUTH DAILY  venlafaxine (EFFEXOR-XR) 75 MG 24 hr  capsule, TAKE 1 CAPSULE(75 MG) BY MOUTH DAILY (Patient taking differently: Take 150 mg by mouth daily 75 mg + 75 mg + 75 mg = 225 mg daily)  vitamin B complex with vitamin C (STRESS TAB) tablet, Take 1 tablet by mouth daily  Vitamin D3 (CHOLECALCIFEROL) 125 MCG (5000 UT) tablet, Take 1 tablet by mouth daily    lidocaine (PF) (XYLOCAINE) 1 % injection 4 mL  lidocaine (PF) (XYLOCAINE) 1 % injection 4 mL  triamcinolone (KENALOG-40) injection 40 mg  triamcinolone (KENALOG-40) injection 40 mg      FHx: glaucoma-mom, maternal uncle - ARMD  PSHx: Cataract extraction/IOL right eye 11/22/22, left eye 12/08/22       Current Eye Medications:  Artificial tears     Assessment & Plan:  Pseudophakia  Wade OS 12/08/22 goal -0.75  Wade OD 11/22/22 goal plano    (H52.13,  H52.203,  H52.4) Myopia of both eyes with astigmatism and presbyopia   Patient has minimal change in myopia but a copy of today's glasses prescription was given.  The patient may wish to update the glasses if the lenses are scratched or the frames are too small.  Presbyopia is difficulty seeing up close and is treated with bifocals or over the counter reading glasses      Return in 1 year (on 1/16/2024) for [facility] annual exam, Annual Visit.        Trae Wade MD     Attending Physician Attestation:  Complete documentation of historical and exam elements from today's encounter can be found in the full encounter summary report (not reduplicated in this progress note).  I personally obtained the chief complaint(s) and history of present illness.  I confirmed and edited as necessary the review of systems, past medical/surgical history, family history, social history, and examination findings as documented by others; and I examined the patient myself.  I personally reviewed the relevant tests, images, and reports as documented above.  I formulated and edited as necessary the assessment and plan and discussed the findings and management plan with the  patient and family. - Trae Wade MD         Again, thank you for allowing me to participate in the care of your patient.      Sincerely,    Trae Wade MD

## 2023-01-16 NOTE — NURSING NOTE
Chief Complaints and History of Present Illnesses   Patient presents with     Post Op (Ophthalmology) Both Eyes       Chief Complaint(s) and History of Present Illness(es)     Post Op (Ophthalmology) Both Eyes            Laterality: both eyes          Comments    S/p cataract surgery 11/22/22 right eye and 12/8/22 left eye. Pt states she has some tearing from the right eye every once in awhile. No other concerns. Done with drops.                  Tori Salazar, COT

## 2023-02-03 ENCOUNTER — OFFICE VISIT (OUTPATIENT)
Dept: ORTHOPEDICS | Facility: CLINIC | Age: 73
End: 2023-02-03
Payer: COMMERCIAL

## 2023-02-03 DIAGNOSIS — I10 ESSENTIAL HYPERTENSION WITH GOAL BLOOD PRESSURE LESS THAN 140/90: ICD-10-CM

## 2023-02-03 DIAGNOSIS — M17.0 ARTHRITIS OF BOTH KNEES: Primary | ICD-10-CM

## 2023-02-03 PROCEDURE — 20611 DRAIN/INJ JOINT/BURSA W/US: CPT | Mod: 50 | Performed by: FAMILY MEDICINE

## 2023-02-03 PROCEDURE — 99207 PR DROP WITH A PROCEDURE: CPT | Performed by: FAMILY MEDICINE

## 2023-02-03 RX ORDER — LIDOCAINE HYDROCHLORIDE 10 MG/ML
4 INJECTION, SOLUTION EPIDURAL; INFILTRATION; INTRACAUDAL; PERINEURAL
Status: SHIPPED | OUTPATIENT
Start: 2023-02-03

## 2023-02-03 RX ORDER — TRIAMCINOLONE ACETONIDE 40 MG/ML
40 INJECTION, SUSPENSION INTRA-ARTICULAR; INTRAMUSCULAR
Status: SHIPPED | OUTPATIENT
Start: 2023-02-03

## 2023-02-03 RX ORDER — HYDROCHLOROTHIAZIDE 25 MG/1
TABLET ORAL
Qty: 90 TABLET | Refills: 1 | Status: SHIPPED | OUTPATIENT
Start: 2023-02-03 | End: 2023-04-26

## 2023-02-03 RX ADMIN — TRIAMCINOLONE ACETONIDE 40 MG: 40 INJECTION, SUSPENSION INTRA-ARTICULAR; INTRAMUSCULAR at 16:20

## 2023-02-03 RX ADMIN — LIDOCAINE HYDROCHLORIDE 4 ML: 10 INJECTION, SOLUTION EPIDURAL; INFILTRATION; INTRACAUDAL; PERINEURAL at 16:19

## 2023-02-03 RX ADMIN — LIDOCAINE HYDROCHLORIDE 4 ML: 10 INJECTION, SOLUTION EPIDURAL; INFILTRATION; INTRACAUDAL; PERINEURAL at 16:20

## 2023-02-03 RX ADMIN — TRIAMCINOLONE ACETONIDE 40 MG: 40 INJECTION, SUSPENSION INTRA-ARTICULAR; INTRAMUSCULAR at 16:19

## 2023-02-03 NOTE — LETTER
2/3/2023      RE: Shelia Sanchez  [address]  [place] [address]     Dear Colleague,    Thank you for referring your patient, Shelia Sanchez, to the [facility] [place]. Please see a copy of my visit note below.    ASSESSMENT/PLAN:    72-year-old female with past medical history of hypertension, left shoulder pain, prediabetes, obesity presenting with bilateral knee pain  1.  Bilateral moderate to severe medial compartment loss bilateral knees-knee osteoarthritis  Patient reported that she really only got about couple months out of the last injections strongly encouraged that today's are done under ultrasound due to body habitus  Bilateral ultrasound-guided injections delivered today      Pt is a 72 year old female here today for:     Pt reports high level of pain in the medial and anterior bilateral knees.  Moderate to Severe OA of bilateral knees.    ESTABLISHED PATIENT FOLLOW-UP:  Pain of the Left Knee and Pain of the Right Knee       HISTORY OF PRESENT ILLNESS  Ms. Sanchez is a pleasant 72 year old year old female who presents to clinic today for follow-up of bilateral knee pain.     Date of injury: Chronic   Date last seen: 9/2/22  Following Therapeutic Plan: Cortisone injection and physical therapy   Pain: 10/10 on the right and 7/10 on the left   Function: Altered gait   Interval History: walking with cane, less    Additional medical/Social/Surgical histories reviewed in [facility] and updated as appropriate.    REVIEW OF SYSTEMS (2/3/2023)  CONSTITUTIONAL: Denies fever and weight loss  GASTROINTESTINAL: Denies abdominal pain, nausea, vomiting  MUSCULOSKELETAL: See HPI  SKIN: Denies any recent rash or lesion  NEUROLOGICAL: Denies numbness or focal weakness      EXAM:   Medial joint line pain, anterior tibial pain    Past Medical History:   Diagnosis Date     Arthritis 01/28/2014    Bursitis in left hip     Asteatotic eczema      Cholelithiasis with  obstruction 05/10/2010     Depressive disorder      Diabetes (H) Summer 2019    Pre-diabetes     Gallstone pancreatitis 05/11/2010     History of blood transfusion 40 years ago     Hypertension      Right knee pain 12/27/2012      Past Surgical History:   Procedure Laterality Date     CHOLECYSTECTOMY, LAPOROSCOPIC      Cholecystectomy, Laparoscopic     HEAD & NECK SURGERY      Treatment for arthritis in neck by deadening nerves     PHACOEMULSIFICATION CLEAR CORNEA WITH STANDARD INTRAOCULAR LENS IMPLANT Right 11/22/2022    Procedure: RIGHT PHACOEMULSIFICATION, CATARACT, WITH INTRAOCULAR LENS IMPLANT;  Surgeon: Trae Wade MD;  Location: [facility] OR     PHACOEMULSIFICATION CLEAR CORNEA WITH STANDARD INTRAOCULAR LENS IMPLANT Left 12/8/2022    Procedure: LEFT PHACOEMULSIFICATION, CATARACT, WITH INTRAOCULAR LENS IMPLANT;  Surgeon: Trae Wade MD;  Location: [facility] OR     ZZC VAG HYST,RMV TUBE/OVARY  2004     ZZHC ERCP W STENT PLACEMENT BILE/PANCREATIC DUCT        Current Outpatient Medications   Medication Sig Dispense Refill     acetaminophen (TYLENOL) 500 MG tablet Take 500-1,000 mg by mouth every 6 hours as needed for mild pain       amLODIPine (NORVASC) 5 MG tablet TAKE 1 TABLET BY MOUTH DAILY 90 tablet 3     aspirin (ASA) 81 MG chewable tablet TAKE 1 TABLET BY MOUTH DAILY 90 tablet 0     atorvastatin (LIPITOR) 20 MG tablet Take 1 tablet (20 mg) by mouth daily 90 tablet 2     buPROPion (WELLBUTRIN XL) 300 MG 24 hr tablet TAKE 1 TABLET BY MOUTH EVERY DAY IN THE MORNING       esomeprazole (NEXIUM) 20 MG DR capsule Take 1 tablet by mouth daily        gabapentin (NEURONTIN) 100 MG capsule TAKE 1 CAPSULE BY MOUTH TWICE DAILY 180 capsule 1     hydrochlorothiazide (HYDRODIURIL) 25 MG tablet TAKE 1 TABLET(25 MG) BY MOUTH DAILY 90 tablet 1     venlafaxine (EFFEXOR-XR) 75 MG 24 hr capsule TAKE 1 CAPSULE(75 MG) BY MOUTH DAILY (Patient taking differently: Take 150 mg by mouth daily 75 mg + 75 mg + 75 mg =  225 mg daily) 90 capsule 0     vitamin B complex with vitamin C (STRESS TAB) tablet Take 1 tablet by mouth daily       Vitamin D3 (CHOLECALCIFEROL) 125 MCG (5000 UT) tablet Take 1 tablet by mouth daily        Allergies   Allergen Reactions     Nitrofurantoin      Stomach pain      ROS:   Gen- no fevers/chills   Rheum - no morning stiffness   Derm - no rash/ redness   Neuro - no numbness, no tingling   Remainder of ROS negative.     Exam:   There were no vitals taken for this visit.     Xray of right and left knees- films personally reviewed with patient at time of visit     My impression: medial compartment loss    Procedure: risks and benefits discussed, consented.  Linear probe was used to identify the patella underlying femur.  Chloroprep, ethyl chloride.  Probe was turned into a short access and the depth was followed for needle placement.  An in plane approach was used.  4 cc 1% lidocaine and 40 mg of Kenalog and a 5 cc syringe.  Suprapatellar synovial approach of the synovial envelope, medication was delivered, imaging of the needle was obtained and saved on the [facility] ultrasound. Band aids    Large Joint Injection/Arthocentesis: R knee joint    Date/Time: 2/3/2023 4:19 PM  Performed by: oMni Fischer MD  Authorized by: Moni Fischer MD     Indications:  Pain and osteoarthritis  Needle Size:  22 G  Guidance: ultrasound    Approach:  Anterolateral  Location:  Knee      Medications:  40 mg triamcinolone 40 MG/ML; 4 mL lidocaine (PF) 1 %  Outcome:  Tolerated well, no immediate complications  Procedure discussed: discussed risks, benefits, and alternatives    Consent Given by:  Patient  Timeout: timeout called immediately prior to procedure    Prep: patient was prepped and draped in usual sterile fashion    Large Joint Injection/Arthocentesis: L knee joint    Date/Time: 2/3/2023 4:20 PM  Performed by: Moni Fischer MD  Authorized by: Moni Fischer MD      Indications:  Pain and osteoarthritis  Needle Size:  22 G  Guidance: ultrasound    Approach:  Anterolateral  Location:  Knee      Medications:  40 mg triamcinolone 40 MG/ML; 4 mL lidocaine (PF) 1 %  Outcome:  Tolerated well, no immediate complications  Procedure discussed: discussed risks, benefits, and alternatives    Consent Given by:  Patient  Timeout: timeout called immediately prior to procedure    Prep: patient was prepped and draped in usual sterile fashion        I agree with the following injection documentation.    VIGNESH Fischer MD

## 2023-02-03 NOTE — NURSING NOTE
[address] [address]  Dept: [phone]  ______________________________________________________________________________    Patient: Shelia Sanchez   : 1950   MRN: [id number]   February 3, 2023    INVASIVE PROCEDURE SAFETY CHECKLIST    Date: February 3, 2023   Procedure:Bilateral knee cortisone injections  Patient Name: Shelia Sanchez  MRN: [id number]  YOB: 1950    Action: Complete sections as appropriate. Any discrepancy results in a HARD COPY until resolved.     PRE PROCEDURE:  Patient ID verified with 2 identifiers (name and  or MRN): Yes  Procedure and site verified with patient/designee (when able): Yes  Accurate consent documentation in medical record: Yes  H&P (or appropriate assessment) documented in medical record: Yes  H&P must be up to 20 days prior to procedure and updates within 24 hours of procedure as applicable: NA  Relevant diagnostic and radiology test results appropriately labeled and displayed as applicable: Yes  Procedure site(s) marked with provider initials: NA    TIMEOUT:  Time-Out performed immediately prior to starting procedure, including verbal and active participation of all team members addressing the following:Yes  * Correct patient identify  * Confirmed that the correct side and site are marked  * An accurate procedure consent form  * Agreement on the procedure to be done  * Correct patient position  * Relevant images and results are properly labeled and appropriately displayed  * The need to administer antibiotics or fluids for irrigation purposes during the procedure as applicable   * Safety precautions based on patient history or medication use    DURING PROCEDURE: Verification of correct person, site, and procedures any time the responsibility for care of the patient is transferred to another member of the care team.       Prior to injection, verified patient identity using patient's  name and date of birth.  Due to injection administration, patient instructed to remain in clinic for 15 minutes  afterwards, and to report any adverse reaction to me immediately.    Joint injection was performed.      Drug Amount Wasted:  Yes: 1 mg/ml   Vial/Syringe: Single dose vial  Expiration Date:  09/26      Eve Cannon [facility]  February 3, 2023

## 2023-02-03 NOTE — PROGRESS NOTES
ASSESSMENT/PLAN:    72-year-old female with past medical history of hypertension, left shoulder pain, prediabetes, obesity presenting with bilateral knee pain  1.  Bilateral moderate to severe medial compartment loss bilateral knees-knee osteoarthritis  Patient reported that she really only got about couple months out of the last injections strongly encouraged that today's are done under ultrasound due to body habitus  Bilateral ultrasound-guided injections delivered today      Pt is a 72 year old female here today for:     Pt reports high level of pain in the medial and anterior bilateral knees.  Moderate to Severe OA of bilateral knees.    ESTABLISHED PATIENT FOLLOW-UP:  Pain of the Left Knee and Pain of the Right Knee       HISTORY OF PRESENT ILLNESS  Ms. Sanchez is a pleasant 72 year old year old female who presents to clinic today for follow-up of bilateral knee pain.     Date of injury: Chronic   Date last seen: 9/2/22  Following Therapeutic Plan: Cortisone injection and physical therapy   Pain: 10/10 on the right and 7/10 on the left   Function: Altered gait   Interval History: walking with cane, less    Additional medical/Social/Surgical histories reviewed in [facility] and updated as appropriate.    REVIEW OF SYSTEMS (2/3/2023)  CONSTITUTIONAL: Denies fever and weight loss  GASTROINTESTINAL: Denies abdominal pain, nausea, vomiting  MUSCULOSKELETAL: See HPI  SKIN: Denies any recent rash or lesion  NEUROLOGICAL: Denies numbness or focal weakness      EXAM:   Medial joint line pain, anterior tibial pain    Past Medical History:   Diagnosis Date     Arthritis 01/28/2014    Bursitis in left hip     Asteatotic eczema      Cholelithiasis with obstruction 05/10/2010     Depressive disorder      Diabetes (H) Summer 2019    Pre-diabetes     Gallstone pancreatitis 05/11/2010     History of blood transfusion 40 years ago     Hypertension      Right knee pain 12/27/2012      Past Surgical History:   Procedure Laterality Date      CHOLECYSTECTOMY, LAPOROSCOPIC      Cholecystectomy, Laparoscopic     HEAD & NECK SURGERY      Treatment for arthritis in neck by deadening nerves     PHACOEMULSIFICATION CLEAR CORNEA WITH STANDARD INTRAOCULAR LENS IMPLANT Right 11/22/2022    Procedure: RIGHT PHACOEMULSIFICATION, CATARACT, WITH INTRAOCULAR LENS IMPLANT;  Surgeon: Trea Wade MD;  Location: [facility] OR     PHACOEMULSIFICATION CLEAR CORNEA WITH STANDARD INTRAOCULAR LENS IMPLANT Left 12/8/2022    Procedure: LEFT PHACOEMULSIFICATION, CATARACT, WITH INTRAOCULAR LENS IMPLANT;  Surgeon: Trae Wade MD;  Location: [facility] OR     ZZC VAG HYST,RMV TUBE/OVARY  2004     ZZHC ERCP W STENT PLACEMENT BILE/PANCREATIC DUCT        Current Outpatient Medications   Medication Sig Dispense Refill     acetaminophen (TYLENOL) 500 MG tablet Take 500-1,000 mg by mouth every 6 hours as needed for mild pain       amLODIPine (NORVASC) 5 MG tablet TAKE 1 TABLET BY MOUTH DAILY 90 tablet 3     aspirin (ASA) 81 MG chewable tablet TAKE 1 TABLET BY MOUTH DAILY 90 tablet 0     atorvastatin (LIPITOR) 20 MG tablet Take 1 tablet (20 mg) by mouth daily 90 tablet 2     buPROPion (WELLBUTRIN XL) 300 MG 24 hr tablet TAKE 1 TABLET BY MOUTH EVERY DAY IN THE MORNING       esomeprazole (NEXIUM) 20 MG DR capsule Take 1 tablet by mouth daily        gabapentin (NEURONTIN) 100 MG capsule TAKE 1 CAPSULE BY MOUTH TWICE DAILY 180 capsule 1     hydrochlorothiazide (HYDRODIURIL) 25 MG tablet TAKE 1 TABLET(25 MG) BY MOUTH DAILY 90 tablet 1     venlafaxine (EFFEXOR-XR) 75 MG 24 hr capsule TAKE 1 CAPSULE(75 MG) BY MOUTH DAILY (Patient taking differently: Take 150 mg by mouth daily 75 mg + 75 mg + 75 mg = 225 mg daily) 90 capsule 0     vitamin B complex with vitamin C (STRESS TAB) tablet Take 1 tablet by mouth daily       Vitamin D3 (CHOLECALCIFEROL) 125 MCG (5000 UT) tablet Take 1 tablet by mouth daily        Allergies   Allergen Reactions     Nitrofurantoin      Stomach pain       ROS:   Gen- no fevers/chills   Rheum - no morning stiffness   Derm - no rash/ redness   Neuro - no numbness, no tingling   Remainder of ROS negative.     Exam:   There were no vitals taken for this visit.     Xray of right and left knees- films personally reviewed with patient at time of visit     My impression: medial compartment loss    Procedure: risks and benefits discussed, consented.  Linear probe was used to identify the patella underlying femur.  Chloroprep, ethyl chloride.  Probe was turned into a short access and the depth was followed for needle placement.  An in plane approach was used.  4 cc 1% lidocaine and 40 mg of Kenalog and a 5 cc syringe.  Suprapatellar synovial approach of the synovial envelope, medication was delivered, imaging of the needle was obtained and saved on the [facility] ultrasound. Band aids    Large Joint Injection/Arthocentesis: R knee joint    Date/Time: 2/3/2023 4:19 PM  Performed by: Moni Fischer MD  Authorized by: Moni Fischer MD     Indications:  Pain and osteoarthritis  Needle Size:  22 G  Guidance: ultrasound    Approach:  Anterolateral  Location:  Knee      Medications:  40 mg triamcinolone 40 MG/ML; 4 mL lidocaine (PF) 1 %  Outcome:  Tolerated well, no immediate complications  Procedure discussed: discussed risks, benefits, and alternatives    Consent Given by:  Patient  Timeout: timeout called immediately prior to procedure    Prep: patient was prepped and draped in usual sterile fashion    Large Joint Injection/Arthocentesis: L knee joint    Date/Time: 2/3/2023 4:20 PM  Performed by: Moni Fischer MD  Authorized by: Moni Fischer MD     Indications:  Pain and osteoarthritis  Needle Size:  22 G  Guidance: ultrasound    Approach:  Anterolateral  Location:  Knee      Medications:  40 mg triamcinolone 40 MG/ML; 4 mL lidocaine (PF) 1 %  Outcome:  Tolerated well, no immediate complications  Procedure discussed: discussed  risks, benefits, and alternatives    Consent Given by:  Patient  Timeout: timeout called immediately prior to procedure    Prep: patient was prepped and draped in usual sterile fashion        I agree with the following injection documentation.    VIGNESH Fischer MD

## 2023-02-04 ENCOUNTER — OFFICE VISIT (OUTPATIENT)
Dept: FAMILY MEDICINE | Facility: CLINIC | Age: 73
End: 2023-02-04
Payer: COMMERCIAL

## 2023-02-04 ENCOUNTER — NURSE TRIAGE (OUTPATIENT)
Dept: NURSING | Facility: CLINIC | Age: 73
End: 2023-02-04
Payer: COMMERCIAL

## 2023-02-04 VITALS
HEART RATE: 102 BPM | BODY MASS INDEX: 43.42 KG/M2 | DIASTOLIC BLOOD PRESSURE: 81 MMHG | WEIGHT: 277.2 LBS | SYSTOLIC BLOOD PRESSURE: 150 MMHG | OXYGEN SATURATION: 97 % | TEMPERATURE: 98.6 F

## 2023-02-04 DIAGNOSIS — R60.0 BILATERAL LOWER EXTREMITY EDEMA: ICD-10-CM

## 2023-02-04 DIAGNOSIS — L03.116 CELLULITIS OF LEFT LOWER EXTREMITY: ICD-10-CM
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
## 2023-02-04 DIAGNOSIS — L08.9 INFECTION OF CAT SCRATCH WOUND: Primary | ICD-10-CM

## 2023-02-04 PROCEDURE — 99214 OFFICE O/P EST MOD 30 MIN: CPT | Performed by: FAMILY MEDICINE

## 2023-02-04 RX ORDER — MUPIROCIN 20 MG/G
OINTMENT TOPICAL 3 TIMES DAILY
Qty: 22 G | Refills: 0 | Status: SHIPPED | OUTPATIENT
Start: 2023-02-04 | End: 2023-02-04

## 2023-02-04 RX ORDER — MUPIROCIN 20 MG/G
OINTMENT TOPICAL 3 TIMES DAILY
Qty: 22 G | Refills: 0 | Status: SHIPPED | OUTPATIENT
Start: 2023-02-04 | End: 2023-04-26

## 2023-02-04 NOTE — TELEPHONE ENCOUNTER
S-(situation): claw puncture wound    B-(background):   Pt was seen yesterday bu sports medicine and had cortisone shot in both knees.   Last night, pet cat had to use front claw t pull itself on pt's lab. Pt woke up with blood and wound drainage on her sheets.    A-(assessment):   2 puncture wounds on left lower leg. Pt states she has washed it thoroughly with soap and water, but it kept weeping, has had to change dressing several times as it was soaked.    Pt did say her legs retain fluids.    Cat is an indoor pet cat and is current with rabies shot.    R-(recommendations):   FNA did not attempt 2LT, recommended UC within 4 hours. Advised that [facility] is open until 8 PM< arrive before 7:30 PM.    Pt verbalized understanding.    Carolann Metzger RN/[place] Nurse Advisor        Reason for Disposition    [1] Puncture wound (hole through the skin) AND [2] from a cat bite (or deep claw puncture wound)    Additional Information    Negative: [1] Major bleeding (e.g., actively dripping or spurting) AND [2] can't be stopped    Negative: Sounds like a life-threatening emergency to the triager    Negative: Snake bite    Negative: Bite, wound, or sting from fish    Negative: [1] Any break in skin from BITE (e.g., cut, puncture or scratch) AND[2] WILD animal at risk for RABIES (e.g., bat, raccoon, shen, skunk, coyote, other carnivores)    Negative: [1] Any break in skin from BITE (e.g., cut, puncture or scratch) AND[2] PET animal (e.g., dog, cat, or ferret) at risk for RABIES (e.g., sick, stray, unprovoked bite, developing country)    Negative: [1] Any break in skin from BITE (e.g., cut, puncture or scratch) AND[2] monkey    Negative: [1] EXPOSURE of non-intact skin (e.g., exposed person has dermatitis, abrasion, wound) AND[2] with animal BODY FLUID (e.g., saliva such as licking, blood, brain) AND[3] animal at high-risk for RABIES (e.g., bat, raccoon, shen, skunk, coyote, other carnivores)    Negative: [1] Cut (length >  1/8 inch or 3 mm) or skin tear AND [2] any animal  (Exception: superficial scratch that doesn't go through dermis)    Negative: [1] Bleeding AND [2] won't stop after 10 minutes of direct pressure (using correct technique)    Negative: Description of bite sounds severe to the triager    Protocols used: ANIMAL BITE-A-AH

## 2023-02-04 NOTE — PROGRESS NOTES
ASSESSMENT/PLAN:      ICD-10-CM    1. Infection of cat scratch wound  L08.9 amoxicillin-clavulanate (AUGMENTIN) 875-125 MG tablet    W55.03XA mupirocin (BACTROBAN) 2 % external ointment     DISCONTINUED: amoxicillin-clavulanate (AUGMENTIN) 875-125 MG tablet     DISCONTINUED: mupirocin (BACTROBAN) 2 % external ointment      2. Cellulitis of left lower extremity  L03.116       3. Bilateral lower extremity edema  R60.0                 Reviewed medication instructions and side effects. Follow up if experiences side effects.     I reviewed supportive care, otc meds to use if needed, expected course, and signs of concern.  Follow up as needed or if she does not improve within  1-2 days or if worsens in any way.  Reviewed red flag symptoms and is to go to the ER if experiences any of these.     The use of [employer] services may have been used to construct the content in this note; any grammatical or spelling errors are non-intentional. Please contact the author of this note directly if you are in need of any clarification.      On the day of the encounter, time spend on chart review, patient visit, review of testing, documentation was 30  minutes          Patient Instructions     Augmentin 2 times a day for 10 days take with food for infected cat scratch    Increase redness ,increased pain, streaking, our pus  draining from the from the wound to ER      Please contact your primary care provider regarding your lower extremity edema that has been progressing since the fall 2022  The swelling could be from your amlodipine-can cause lower legs to swell-request a telephone virtual visit     Keep legs elevated warm compresses over area  Keep area covered when wearing clothing that can rub the area of infection                     Patient presents with:  WOUND CARE: Scratched by her own cat today.  Animal is up to date on vaccines.  Td 11/2019.  Did receive cortisone injections in both knees yesterday.           Subjective     Shelia Sanchez is a 72 year old female who presents to clinic today for the following health issues:    HPI     Last pm cat scratch,-her own cat, vaccinated  puncture wounds to her lower left leg, now with increase swelling/redness, pain in area of puncture wounds, no fever, no streaking   Patient with hx of lymphedema, but leg swelling has been worse in last week, not going down with elevating legs as in past, has been slowly getting worse since fall of 2022, no sob/chest pressure/pain, no changes in meds, taking her medications as prescribed, no hx of CHF, fluid from leg swelling oozing out of puncture wounds from cat scratch  Knee injections this week cortisone, swelling present at that time, no worse after injections       Past Medical History:   Diagnosis Date     Arthritis 01/28/2014    Bursitis in left hip     Asteatotic eczema      Cholelithiasis with obstruction 05/10/2010     Depressive disorder      Diabetes (H) Summer 2019    Pre-diabetes     Gallstone pancreatitis 05/11/2010     History of blood transfusion 40 years ago     Hypertension      Right knee pain 12/27/2012     Social History     Tobacco Use     Smoking status: Never     Smokeless tobacco: Never   Substance Use Topics     Alcohol use: Yes     Comment: Weekends mostly.       Current Outpatient Medications   Medication Sig Dispense Refill     amoxicillin-clavulanate (AUGMENTIN) 875-125 MG tablet Take 1 tablet by mouth 2 times daily 20 tablet 0     mupirocin (BACTROBAN) 2 % external ointment Apply topically 3 times daily 22 g 0     acetaminophen (TYLENOL) 500 MG tablet Take 500-1,000 mg by mouth every 6 hours as needed for mild pain       amLODIPine (NORVASC) 5 MG tablet TAKE 1 TABLET BY MOUTH DAILY 90 tablet 3     aspirin (ASA) 81 MG chewable tablet TAKE 1 TABLET BY MOUTH DAILY 90 tablet 0     atorvastatin (LIPITOR) 20 MG tablet Take 1 tablet (20 mg) by mouth daily 90 tablet 2     buPROPion (WELLBUTRIN XL) 300 MG 24 hr  tablet TAKE 1 TABLET BY MOUTH EVERY DAY IN THE MORNING       esomeprazole (NEXIUM) 20 MG DR capsule Take 1 tablet by mouth daily        gabapentin (NEURONTIN) 100 MG capsule TAKE 1 CAPSULE BY MOUTH TWICE DAILY 180 capsule 1     hydrochlorothiazide (HYDRODIURIL) 25 MG tablet TAKE 1 TABLET(25 MG) BY MOUTH DAILY 90 tablet 1     venlafaxine (EFFEXOR-XR) 75 MG 24 hr capsule TAKE 1 CAPSULE(75 MG) BY MOUTH DAILY (Patient taking differently: Take 150 mg by mouth daily 75 mg + 75 mg + 75 mg = 225 mg daily) 90 capsule 0     vitamin B complex with vitamin C (STRESS TAB) tablet Take 1 tablet by mouth daily       Vitamin D3 (CHOLECALCIFEROL) 125 MCG (5000 UT) tablet Take 1 tablet by mouth daily       Allergies   Allergen Reactions     Nitrofurantoin      Stomach pain             ROS are negative, except as otherwise noted HPI      Objective    BP (!) 150/81 (BP Location: Right arm, Patient Position: Sitting, Cuff Size: Adult Large)   Pulse 102   Temp 98.6  F (37  C) (Tympanic)   Wt 125.7 kg (277 lb 3.2 oz)   SpO2 97%   BMI 43.42 kg/m    Body mass index is 43.42 kg/m .  Physical Exam   GENERAL: healthy, alert and no distress  RESP: lungs clear to auscultation - no rales, rhonchi or wheezes  CV: regular rate and rhythm, normal S1 S2, no S3 or S4, no murmur, click or rub,   MS: bilateral lower leg edema 1-2+ pitting edema , puncture/scratches from cat anterior mid left lower leg, mild surrounding erythema, tender, no streaking small amount of serousangious fluid oozing from puncture wounds   NEURO: Normal strength and tone, mentation intact and speech normal, slow deliberate gait due to swelling in legs/chornic knee pain bilateral       Diagnostic Test Results:  Labs reviewed in Epic  No results found for any visits on 02/04/23.

## 2023-02-05 NOTE — PATIENT INSTRUCTIONS
Augmentin 2 times a day for 10 days take with food for infected cat scratch    Increase redness ,increased pain, streaking, our pus  draining from the from the wound to ER      Please contact your primary care provider regarding your lower extremity edema that has been progressing since the fall 2022  The swelling could be from your amlodipine-can cause lower legs to swell-request a telephone virtual visit     Keep legs elevated warm compresses over area  Keep area covered when wearing clothing that can rub the area of infection

## 2023-02-27 ENCOUNTER — TELEPHONE (OUTPATIENT)
Dept: FAMILY MEDICINE | Facility: CLINIC | Age: 73
End: 2023-02-27
Payer: COMMERCIAL

## 2023-02-27 NOTE — TELEPHONE ENCOUNTER
Patient Quality Outreach    Patient is due for the following:   Hypertension -  Hypertension follow-up visit  Colon Cancer Screening  Breast Cancer Screening - Mammogram  Physical Annual Wellness Visit    Next Steps:   Schedule a Annual Wellness Visit    Type of outreach:    Sent [employer] message.      Questions for provider review:    None     Paty Gandara CMA

## 2023-04-07 DIAGNOSIS — E78.00 HIGH CHOLESTEROL: ICD-10-CM

## 2023-04-10 RX ORDER — ATORVASTATIN CALCIUM 20 MG/1
20 TABLET, FILM COATED ORAL DAILY
Qty: 90 TABLET | Refills: 0 | Status: SHIPPED | OUTPATIENT
Start: 2023-04-10 | End: 2023-04-26

## 2023-04-13 NOTE — PROGRESS NOTES
"SUBJECTIVE:   Shelia Sanchez is a 72 year old female who presents for Preventive Visit.    Patient has been advised of split billing requirements and indicates understanding: Yes  Are you in the first 12 months of your Medicare Part B coverage?  No    Physical Health:  Answers for HPI/ROS submitted by the patient on 2023  In general, how would you rate your overall physical health?: fair  Frequency of exercise:: None  Do you usually eat at least 4 servings of fruit and vegetables a day, include whole grains & fiber, and avoid regularly eating high fat or \"junk\" foods? : Yes  Taking medications regularly:: Yes  Medication side effects:: Other  Activities of Daily Living: no assistance needed  Home safety: no safety concerns identified  Hearing Impairment:: need to ask people to speak up or repeat themselves  In the past 6 months, have you been bothered by leaking of urine?: Yes  cough: Yes  dizziness: Yes  nervous/anxious: Yes  heartburn: Yes  arthralgias: Yes  joint swelling: Yes  peripheral edema: Yes  mood changes: Yes  urgency: Yes  In general, how would you rate your overall mental or emotional health?: fair  Additional concerns today:: Yes    Hearing Screening:  Right Ear  4000Hz: fail  2000Hz: pass  1000Hz: pass  500Hz: pass    Left Ear  4000Hz: fail  2000Hz: fail  1000Hz: pass    500Hz: fail    Mental Health:    In general, how would you rate your overall mental or emotional health? fair  PHQ-2 Score:      Do you feel safe in your environment? Yes    Have you ever done Advance Care Planning? (For example, a Health Directive, POLST, or a discussion with a medical provider or your loved ones about your wishes): No, advance care planning information given to patient to review.  Patient plans to discuss their wishes with loved ones or provider.      Fall risk:  Fallen 2 or more times in the past year?: No  Any fall with injury in the past year?: No    Cognitive Screenin) Repeat 3 items (Leader, " "Season, Table)    2) Clock draw: NORMAL  3) 3 item recall: Recalls 3 objects  Results: 3 items recalled: COGNITIVE IMPAIRMENT LESS LIKELY      OBJECTIVE:   /64   Pulse 97   Temp 97.8  F (36.6  C)   Resp 16   Ht 1.692 m (5' 6.6\")   Wt 126.6 kg (279 lb)   SpO2 99%   BMI 44.22 kg/m   Estimated body mass index is 44.22 kg/m  as calculated from the following:    Height as of this encounter: 1.692 m (5' 6.6\").    Weight as of this encounter: 126.6 kg (279 lb).        ASSESSMENT / PLAN:       Encounter for Medicare annual wellness exam    Failed hearing screening  Declines formal audiology testing at this time.     COUNSELING:  Reviewed preventive health counseling, as reflected in patient instructions       Regular exercise       Healthy diet/nutrition       Hearing screening                  "

## 2023-04-13 NOTE — PATIENT INSTRUCTIONS
Patient Education   Personalized Prevention Plan  You are due for the preventive services outlined below.  Your care team is available to assist you in scheduling these services.  If you have already completed any of these items, please share that information with your care team to update in your medical record.  Health Maintenance Due   Topic Date Due     Depression Action Plan  Never done     Colorectal Cancer Screening  Never done     Mammogram  01/24/2020     Annual Wellness Visit  02/08/2022     Basic Metabolic Panel  02/15/2023     Cholesterol Lab  02/17/2023     FALL RISK ASSESSMENT  02/17/2023     Zoster (Shingles) Vaccine (3 of 3) 10/10/2022

## 2023-04-23 ENCOUNTER — HEALTH MAINTENANCE LETTER (OUTPATIENT)
Age: 73
End: 2023-04-23

## 2023-04-23 ASSESSMENT — ENCOUNTER SYMPTOMS
ARTHRALGIAS: 1
JOINT SWELLING: 1
HEMATURIA: 0
NERVOUS/ANXIOUS: 1
CONSTIPATION: 0
SHORTNESS OF BREATH: 0
MYALGIAS: 0
HEADACHES: 0
CHILLS: 0
SORE THROAT: 0
PARESTHESIAS: 0
PALPITATIONS: 0
ABDOMINAL PAIN: 0
DYSURIA: 0
DIZZINESS: 1
HEMATOCHEZIA: 0
FEVER: 0
HEARTBURN: 1
COUGH: 1
DIARRHEA: 0
WEAKNESS: 0
NAUSEA: 0
FREQUENCY: 1
EYE PAIN: 0
BREAST MASS: 0

## 2023-04-23 ASSESSMENT — ACTIVITIES OF DAILY LIVING (ADL): CURRENT_FUNCTION: NO ASSISTANCE NEEDED

## 2023-04-26 ENCOUNTER — OFFICE VISIT (OUTPATIENT)
Dept: FAMILY MEDICINE | Facility: CLINIC | Age: 73
End: 2023-04-26

## 2023-04-26 VITALS
TEMPERATURE: 97.8 F | WEIGHT: 279 LBS | HEART RATE: 97 BPM | BODY MASS INDEX: 43.79 KG/M2 | HEIGHT: 67 IN | RESPIRATION RATE: 16 BRPM | SYSTOLIC BLOOD PRESSURE: 132 MMHG | OXYGEN SATURATION: 99 % | DIASTOLIC BLOOD PRESSURE: 64 MMHG

## 2023-04-26 DIAGNOSIS — R94.120 FAILED HEARING SCREENING: ICD-10-CM

## 2023-04-26 DIAGNOSIS — G47.33 OSA (OBSTRUCTIVE SLEEP APNEA): Chronic | ICD-10-CM

## 2023-04-26 DIAGNOSIS — M25.471 SWELLING OF BOTH ANKLES: ICD-10-CM

## 2023-04-26 DIAGNOSIS — Z12.31 BREAST CANCER SCREENING BY MAMMOGRAM: ICD-10-CM

## 2023-04-26 DIAGNOSIS — F50.819 BINGE EATING DISORDER: ICD-10-CM

## 2023-04-26 DIAGNOSIS — E66.813 CLASS 3 SEVERE OBESITY DUE TO EXCESS CALORIES WITH SERIOUS COMORBIDITY AND BODY MASS INDEX (BMI) OF 40.0 TO 44.9 IN ADULT (H): ICD-10-CM

## 2023-04-26 DIAGNOSIS — I10 PRIMARY HYPERTENSION: ICD-10-CM

## 2023-04-26 DIAGNOSIS — Z12.12 SCREENING FOR COLORECTAL CANCER: ICD-10-CM

## 2023-04-26 DIAGNOSIS — E66.01 CLASS 3 SEVERE OBESITY DUE TO EXCESS CALORIES WITH SERIOUS COMORBIDITY AND BODY MASS INDEX (BMI) OF 40.0 TO 44.9 IN ADULT (H): ICD-10-CM

## 2023-04-26 DIAGNOSIS — F32.0 CURRENT MILD EPISODE OF MAJOR DEPRESSIVE DISORDER, UNSPECIFIED WHETHER RECURRENT (H): ICD-10-CM

## 2023-04-26 DIAGNOSIS — R73.03 PREDIABETES: ICD-10-CM

## 2023-04-26 DIAGNOSIS — Z12.11 SCREENING FOR COLORECTAL CANCER: ICD-10-CM

## 2023-04-26 DIAGNOSIS — E78.00 HYPERCHOLESTEREMIA: ICD-10-CM

## 2023-04-26 DIAGNOSIS — M79.3 LIPODERMATOSCLEROSIS OF BOTH LOWER EXTREMITIES: ICD-10-CM

## 2023-04-26 DIAGNOSIS — M25.472 SWELLING OF BOTH ANKLES: ICD-10-CM

## 2023-04-26 DIAGNOSIS — Z00.00 ENCOUNTER FOR MEDICARE ANNUAL WELLNESS EXAM: Primary | ICD-10-CM

## 2023-04-26 LAB
B-TYPE NATRIURETIC PEPTIDE: 22.6 PG/ML (ref 0–100)
CHOLESTEROL: 156 MG/DL (ref 100–199)
FASTING?: YES
HDL (RMG): 71 MG/DL (ref 40–?)
LDL CALCULATED (RMG): 73 MG/DL (ref 0–130)
TRIGLYCERIDES (RMG): 56 MG/DL (ref 0–149)

## 2023-04-26 PROCEDURE — 80061 LIPID PANEL: CPT | Mod: QW | Performed by: FAMILY MEDICINE

## 2023-04-26 PROCEDURE — 36415 COLL VENOUS BLD VENIPUNCTURE: CPT | Performed by: FAMILY MEDICINE

## 2023-04-26 PROCEDURE — 99214 OFFICE O/P EST MOD 30 MIN: CPT | Mod: 25 | Performed by: FAMILY MEDICINE

## 2023-04-26 PROCEDURE — G0439 PPPS, SUBSEQ VISIT: HCPCS | Performed by: FAMILY MEDICINE

## 2023-04-26 PROCEDURE — 83880 ASSAY OF NATRIURETIC PEPTIDE: CPT | Mod: QW | Performed by: FAMILY MEDICINE

## 2023-04-26 RX ORDER — HYDROCHLOROTHIAZIDE 25 MG/1
25 TABLET ORAL DAILY
Qty: 90 TABLET | Refills: 3 | Status: SHIPPED | OUTPATIENT
Start: 2023-04-26 | End: 2024-04-22

## 2023-04-26 RX ORDER — BUPROPION HYDROCHLORIDE 150 MG/1
TABLET, FILM COATED, EXTENDED RELEASE ORAL
COMMUNITY
Start: 2021-01-01 | End: 2023-04-26

## 2023-04-26 RX ORDER — AMLODIPINE BESYLATE 5 MG/1
TABLET ORAL
Qty: 90 TABLET | Refills: 3 | Status: SHIPPED | OUTPATIENT
Start: 2023-04-26 | End: 2024-04-18

## 2023-04-26 RX ORDER — BUPROPION HYDROCHLORIDE 150 MG/1
TABLET ORAL
COMMUNITY
Start: 2023-04-18

## 2023-04-26 RX ORDER — ATORVASTATIN CALCIUM 20 MG/1
20 TABLET, FILM COATED ORAL DAILY
Qty: 90 TABLET | Refills: 3 | Status: SHIPPED | OUTPATIENT
Start: 2023-04-26 | End: 2024-04-19

## 2023-04-26 RX ORDER — POTASSIUM CHLORIDE 750 MG/1
10 TABLET, EXTENDED RELEASE ORAL
COMMUNITY
End: 2023-04-26

## 2023-04-26 RX ORDER — MULTIVIT-MIN/IRON/FA/VIT K/LUT 8MG-400MCG
TABLET ORAL
COMMUNITY
End: 2023-04-26

## 2023-04-26 RX ORDER — METOPROLOL SUCCINATE 50 MG/1
50 TABLET, EXTENDED RELEASE ORAL
COMMUNITY
End: 2023-04-26

## 2023-04-26 RX ORDER — TRIAMCINOLONE ACETONIDE 0.25 MG/G
CREAM TOPICAL
COMMUNITY
End: 2023-04-26

## 2023-04-26 RX ORDER — MOXIFLOXACIN 5 MG/ML
SOLUTION/ DROPS OPHTHALMIC
COMMUNITY
Start: 2022-11-30 | End: 2023-04-26

## 2023-04-26 ASSESSMENT — PATIENT HEALTH QUESTIONNAIRE - PHQ9
5. POOR APPETITE OR OVEREATING: MORE THAN HALF THE DAYS
SUM OF ALL RESPONSES TO PHQ QUESTIONS 1-9: 15

## 2023-04-26 ASSESSMENT — ANXIETY QUESTIONNAIRES
GAD7 TOTAL SCORE: 10
2. NOT BEING ABLE TO STOP OR CONTROL WORRYING: SEVERAL DAYS
IF YOU CHECKED OFF ANY PROBLEMS ON THIS QUESTIONNAIRE, HOW DIFFICULT HAVE THESE PROBLEMS MADE IT FOR YOU TO DO YOUR WORK, TAKE CARE OF THINGS AT HOME, OR GET ALONG WITH OTHER PEOPLE: SOMEWHAT DIFFICULT
1. FEELING NERVOUS, ANXIOUS, OR ON EDGE: MORE THAN HALF THE DAYS
6. BECOMING EASILY ANNOYED OR IRRITABLE: NEARLY EVERY DAY
7. FEELING AFRAID AS IF SOMETHING AWFUL MIGHT HAPPEN: NOT AT ALL
GAD7 TOTAL SCORE: 10
5. BEING SO RESTLESS THAT IT IS HARD TO SIT STILL: NOT AT ALL
3. WORRYING TOO MUCH ABOUT DIFFERENT THINGS: MORE THAN HALF THE DAYS

## 2023-04-26 NOTE — PROGRESS NOTES
"SUBJECTIVE:    This 72 year old female presents with the following concerns:     1. BMI 44, pre DM : Needs bilateral knee replacements, but has been advised she needs to lose weight first.  2. Pigment lesion to left posterior thigh area. H/o melanoma.   3. Bilateral leg swelling. Onset over last few months.   4. Hypertension : CCB, thiazide. Takes 81 mg ASA also.   5. MDD : Effexor 75 mg daily, Wellbutrin 150 mg daily. Follows with psychiatry.  6. Hypercholesterolemia : lipitor 20 mg daily  7. BED : working with [facility]. Referred there by prior PCP. Prior trial of Topamax : intolerable brain fog. Endorses strong emotinal eating cutes.   8. LORI : CPAP machine was recalled. Was without CPAP for 6 months. Took to sleeping in a recliner. Lots of clutter in bedroom and on bed precluding her from sleeping in it at present /grew up in hoarding house.   9. Urinary incontience: s/p hysterectomy. Wears urinary incontinence pads both day and night. Has to change pad at night.      Health Maintenance:  Health maintenance alerts were reviewed and updated as appropriate.  Breast cancer screening: Last mamm 1/24/18. Needs updated / final.  Osteoporosis screening:  Last dexa 1/24/18. Normal : repeat in 10 years.   Colorectal cancer screening: opts for cologuard. None prior    OBJECTIVE:  Vitals:    04/26/23 1445   BP: 132/64   Pulse: 97   Resp: 16   Temp: 97.8  F (36.6  C)   SpO2: 99%   Weight: 126.6 kg (279 lb)   Height: 1.692 m (5' 6.6\")    Body mass index is 44.22 kg/m .    General: no acute distress, cooperative with exam.  HEENT:  PERRLA. Bilateral TM's, external canals, oropharynx normal.    Neck:  Supple, no lymphadenopathy or thyromegaly   Lungs: clear to auscultation bilaterally, normal respiratory effort.  Heart:  RRR without murmurs, rubs or gallops.  Normal S1 and S2.  Abdomen: normal bowel sounds, nontender, no palpable organomegaly.  Skin: left posterior thigh : 3 mm discoid tan lesion with stuck on appearance, in " keeping with SK.  Extremities: 2+ pitting edema to bilateral ankles, extending to mid shin. Induration and patches of hyperpigmentation with inverted champaign bottle appearance (tighenting of skin at ankles). Brisk capillary refill <2 seconds at both great toes.   Neuro:  CN II-XII intact, motor & sensory function all intact.    Psych: mental status normal, mood and affect appropriate.        11/15/2021     1:24 PM 8/15/2022    10:12 AM 11/17/2022     8:37 AM   PHQ   PHQ-9 Total Score 18 9 9   Q9: Thoughts of better off dead/self-harm past 2 weeks Not at all Not at all Not at all       ASSESSMENT / PLAN:      Current mild episode of major depressive disorder, unspecified whether recurrent (H)   Effexor 75 mg daily, Wellbutrin 150 mg daily. Follows with psychiatry.  -     DEPRESSION ACTION PLAN (DAP)    Class 3 severe obesity due to excess calories with serious comorbidity and body mass index (BMI) of 40.0 to 44.9 in adult (H)  LORI (obstructive sleep apnea)- severe ()  BMI 40.0-44.9, adult (H)  Prediabetes  Binge eating disorder  -Follows with [facility]  -has been advised to lose weight prior to being a surgical candidate for bilateral TKA (patient not aware of goal weight or BMI)\  -Offered medical weight management. Issued with intake form to complete. Will return with this for dedicated medical weight management first appointment.  -     Lipid Profile ([facility])  -     Hemoglobin A1C ([facility])    Breast cancer screening by mammogram  -     MAMMO -  Screening Digital Bilateral (FUTURE/[facility]); Future    Screening for colorectal cancer  -     TAMIR([employer])    Essential hypertension  At target.  -     hydrochlorothiazide (HYDRODIURIL) 25 MG tablet; Take 1 tablet (25 mg) by mouth daily  -     amLODIPine (NORVASC) 5 MG tablet; TAKE 1 TABLET BY MOUTH DAILY  -     Comp. Metabolic Panel (14) ([facility])    Hypercholesteremia  -     Lipid Profile ([facility])  -     atorvastatin (LIPITOR) 20 MG tablet; Take 1  tablet (20 mg) by mouth daily    Swelling of both ankles  Lipodermatosclerosis of both lower extremities  -Suspect underlying bilateral venous insufficiency    -worsening of peripheral edema directly correlates to when patient started sleeping in recliner (with lower extremities below level of heart while sleeping)   -Discussed:   Clearing out bedroom so that she can start sleeping in bed again   Continued use of CPAP   Use of compression stockings : script sent for prescription strength compression and fitting.     -     Comp. Metabolic Panel (14) ([facility])  -     B-Type Natriuretic Peptide - BNP ([facility])  -     Compression Sleeve/Stocking Order for DME - ONLY FOR DME

## 2023-04-26 NOTE — Clinical Note
Ops, my last message was incomplete. She needs help with getting her apartment in order. Has been sleeping in her recliner as she cant get to the bed due to clutter. She was open to a call from you for ideas on assistance with clearing things and making her house more navigable / usable. Thank you!

## 2023-04-26 NOTE — Clinical Note
"This bo lady lives alone. I suspect she has some hoarding attendances  (raised by 2 \"WW2 babies\" : who never threw anything out :  Social wortker.cluttered. 1 bedroom appartment.  : you dont throw anything out. Grew up in horsding house. "

## 2023-04-27 LAB
ALBUMIN SERPL-MCNC: 4.3 G/DL (ref 3.7–4.7)
ALBUMIN/GLOB SERPL: 2 {RATIO} (ref 1.2–2.2)
ALP SERPL-CCNC: 93 IU/L (ref 44–121)
ALT SERPL-CCNC: 20 IU/L (ref 0–32)
AST SERPL-CCNC: 17 IU/L (ref 0–40)
BILIRUB SERPL-MCNC: 0.4 MG/DL (ref 0–1.2)
BUN SERPL-MCNC: 28 MG/DL (ref 8–27)
BUN/CREATININE RATIO: 38 (ref 12–28)
CALCIUM SERPL-MCNC: 10 MG/DL (ref 8.7–10.3)
CHLORIDE SERPLBLD-SCNC: 105 MMOL/L (ref 96–106)
CREAT SERPL-MCNC: 0.73 MG/DL (ref 0.57–1)
EGFR: 86 ML/MIN/1.73
GLOBULIN, TOTAL: 2.1 G/DL (ref 1.5–4.5)
GLUCOSE SERPL-MCNC: 98 MG/DL (ref 70–99)
HBA1C MFR BLD: 6.2 % (ref 4.8–5.6)
POTASSIUM SERPL-SCNC: 3.5 MMOL/L (ref 3.5–5.2)
PROT SERPL-MCNC: 6.4 G/DL (ref 6–8.5)
SODIUM SERPL-SCNC: 144 MMOL/L (ref 134–144)
TOTAL CO2: 24 MMOL/L (ref 20–29)

## 2023-05-01 ENCOUNTER — PATIENT OUTREACH (OUTPATIENT)
Dept: FAMILY MEDICINE | Facility: CLINIC | Age: 73
End: 2023-05-01

## 2023-05-01 NOTE — TELEPHONE ENCOUNTER
Social Work Care Coordination initial contact:      Reason for referral: Concerns for hoarding    Medical concerns:  Obesity, prediabetes, hyun eating disorder, LORI (severe). Primary hypertension, hypercholesteremia, swelling of both ankles, lipodermatosclerosis of both lower extremeties, chronic knee pain, in  Need of surgery  Behavioral concerns: Major depression, binge eating      Psychosocial Concerns:  Romy is a 72 year old woman who lives alone in her condo in [place]. Pt reports she struggles with depression and that it is a genetic tendency, with mother and sister with history of depression. Pt reports she has to have knee surgery as her knees are both bone on bone. Providers wont due surgery unless she loses weight but pt reports significant pain when walking and can't exercise to lose weight.     Pt reports to be a binge eater. She works with [facility] and a therapist on this.Pt also reports she's a binge  as well and has what seems to be a hoarder house. Pt reports she shops when sad or happy and will buy 4 or more of something. Bedroom is piled with boxes and bags and pt reports she cannot sleep in her bed anymore due to clutter. She also reports that the pathways in her apartment make it difficult for her to navigate about her apartment safely.     [facility] discussed [place] ho[place] task force as a way to declutter her space so it's safer for her to reside there. Pt open to resources and ongoing support from this writer.      Immediate needs: hoarding/ decluttering support      Questions pertaining to care plan:None      Resources shared:Jalen Stanton!    Here are the resources I was talking about. Check them out, let me know if you have any questions.    [place] hoarding:  [email or web address]    [employer] has some resources for clean up/ organization as  well:  [email or web address]      I hope these are helpful. Please let me know if they don t work out or if I can offer some additional supports.         Plan: Pt to review hoarding supports and call out for more information, [facility] to outreach in a week.    RIVAS Thornton  , Care Coordination  [facility]  [phone]  [email or web address]

## 2023-05-08 DIAGNOSIS — M79.18 MYOFASCIAL PAIN: ICD-10-CM

## 2023-05-08 RX ORDER — GABAPENTIN 100 MG/1
CAPSULE ORAL
Qty: 180 CAPSULE | Refills: 1 | Status: SHIPPED | OUTPATIENT
Start: 2023-05-08 | End: 2023-10-23

## 2023-05-09 ENCOUNTER — PATIENT OUTREACH (OUTPATIENT)
Dept: FAMILY MEDICINE | Facility: CLINIC | Age: 73
End: 2023-05-09

## 2023-05-09 NOTE — TELEPHONE ENCOUNTER
"Social Work Care Coordination follow up:      Previous plan:Pt to review hoarding supports and call out for more information, CC to outreach in a week      Current concern/ issue :   PC to Shelia. She continues to work with [facility] weekly to address binge eating/ shopping behaviors. She was asked to journal which she felt was difficult. sWCC discussed free writing without subject to do a \"mental dump\" and see how that works . Pt stated she would discuss this with her therapist.    Pt is going to be getting her compression socks with home care soon. She had called them awhile ago and they didn't get back to her until today.     Pt stated goal is to lose weight to be able to have knee surgery. SWCC discussed strategies to maintain movement in her days. Pt has weights and a pedal machine she can use while seated. She stated she will intend to used this a few minutes a day .      Current Plan:Obtain compression socks, complete weight management paperwork, continue with [facility] therapy, begin journal work    [facility] to outreach in 2 weeks.      Jerilyn Sparrow, [facility]  , Care Coordination  [facility]  [phone]  [email or web address]   "

## 2023-05-24 ENCOUNTER — PATIENT OUTREACH (OUTPATIENT)
Dept: FAMILY MEDICINE | Facility: CLINIC | Age: 73
End: 2023-05-24

## 2023-05-24 NOTE — TELEPHONE ENCOUNTER
Social Work Care Coordination follow up:      Previous plan:Obtain compression socks, complete weight management paperwork, continue with [facility] therapy, begin journal work         Current concern/ issue : Pt has yet to obtain compression socks but intends to do so next week. She is still working on weight management paperwork.     Pt states she and her sisters have begun to go through her things which has been difficult but necessary. She has begun to do daily journaling , free writing, and has found it to be very helpful in dumping out her negative thinking and being able to move on with her day without remaining negative.     She has purchased a scooter for mobility purposes. It is foldable so it will afford her opportunities to get out and about more.    Pt reports her legs are quite swollen and that last week, she nicked her leg while cleaning and it began to weep. She has been putting antibiotic ointment on it and feels it's improving.     She continues with [facility] for therapy.       Current Plan:  Pt to obtain compression socks, complete weight management paperwork. [facility] will outreach in a month    RIVAS Thornton  , Care Coordination  [facility]  [phone]  [email or web address]

## 2023-06-23 ENCOUNTER — PATIENT OUTREACH (OUTPATIENT)
Dept: FAMILY MEDICINE | Facility: CLINIC | Age: 73
End: 2023-06-23

## 2023-06-23 NOTE — TELEPHONE ENCOUNTER
"PC to pt. She reports to be going through a \"funk\" now but attributes it to the weather being so warm.She continues with her therapist and psychiatrist and daily journaling.Pt reports she 'over did \" the other day and her leg is swollen but she is remedying it with ice and elevation.    Pt denies any need for additional supports at this time.    [facility] will outreach lorri  Month to assess for needs.    RIVAS Thornton  , Care Coordination  [facility]  Cell: [phone]  Clinic: [phone]  [email or web address]           "

## 2023-07-19 ENCOUNTER — TELEPHONE (OUTPATIENT)
Dept: FAMILY MEDICINE | Facility: CLINIC | Age: 73
End: 2023-07-19

## 2023-07-19 ENCOUNTER — TELEPHONE (OUTPATIENT)
Dept: ORTHOPEDICS | Facility: CLINIC | Age: 73
End: 2023-07-19
Payer: COMMERCIAL

## 2023-07-19 NOTE — TELEPHONE ENCOUNTER
Patient transferred to RN via triage line. RN was able to connect patient with sports medicine with Dr. Fishcer.     BRITTANY AcostaN, RN

## 2023-07-19 NOTE — TELEPHONE ENCOUNTER
Health Call Center    Phone Message    May a detailed message be left on voicemail: yes     Reason for Call: Medication Refill Request    Has the patient contacted the pharmacy for the refill? Yes   Name of medication being requested: pain medication?  Provider who prescribed the medication: Dr. Fischer  Pharmacy: [facility] in [place] on [address]  Date medication is needed: as soon as possible     Patient is not currently on any prescribed meds and is now looking for some, so this would be a NEW script.    In addition, patient is requesting a call back to triage her symptoms.    Action Taken: Message routed to:  [facility] ([facility]): sports    Travel Screening: Not Applicable

## 2023-07-24 NOTE — TELEPHONE ENCOUNTER
I attempted to call the patient regarding the medication refill request as they were last seen in Februay of 2023 and received bilateral knee injections. She is not currently on a prescribed medication from Dr. Fischer. No answer, left message to call back and discuss if she is looking for a pain medication for her knee pain or if she should return to clinic for possible repeat cortisone injections. Mily Holman, REYNA on 7/24/2023 at 9:05 AM

## 2023-07-24 NOTE — TELEPHONE ENCOUNTER
Patient returned phone call and we discussed her initial request, she states that she initially called in for nurse triage, but got transferred around. She states that she had a recent fall and had an appointment on 7/28/23 with Dr. Fischer for possible repeat injections. I told her that we should get repeat injections since she had that recent fall, patient was in agreement. All questions were answered at this time. Mily Holman, ATC on 7/24/2023 at 9:21 AM

## 2023-07-28 ENCOUNTER — ANCILLARY PROCEDURE (OUTPATIENT)
Dept: GENERAL RADIOLOGY | Facility: CLINIC | Age: 73
End: 2023-07-28
Attending: FAMILY MEDICINE
Payer: COMMERCIAL

## 2023-07-28 ENCOUNTER — OFFICE VISIT (OUTPATIENT)
Dept: ORTHOPEDICS | Facility: CLINIC | Age: 73
End: 2023-07-28
Payer: COMMERCIAL

## 2023-07-28 DIAGNOSIS — S81.802A WOUND OF LEFT LOWER EXTREMITY, INITIAL ENCOUNTER: ICD-10-CM

## 2023-07-28 DIAGNOSIS — S81.801A OPEN WOUND OF LOWER LEG, RIGHT, INITIAL ENCOUNTER: ICD-10-CM

## 2023-07-28 DIAGNOSIS — M25.561 CHRONIC PAIN OF BOTH KNEES: Primary | ICD-10-CM

## 2023-07-28 DIAGNOSIS — M25.561 BILATERAL KNEE PAIN: ICD-10-CM

## 2023-07-28 DIAGNOSIS — G89.29 CHRONIC PAIN OF BOTH KNEES: Primary | ICD-10-CM

## 2023-07-28 DIAGNOSIS — M25.562 BILATERAL KNEE PAIN: ICD-10-CM

## 2023-07-28 DIAGNOSIS — M25.562 CHRONIC PAIN OF BOTH KNEES: Primary | ICD-10-CM

## 2023-07-28 PROCEDURE — 20611 DRAIN/INJ JOINT/BURSA W/US: CPT | Mod: RT | Performed by: FAMILY MEDICINE

## 2023-07-28 PROCEDURE — 99214 OFFICE O/P EST MOD 30 MIN: CPT | Mod: 25 | Performed by: FAMILY MEDICINE

## 2023-07-28 PROCEDURE — 73562 X-RAY EXAM OF KNEE 3: CPT | Mod: LT | Performed by: RADIOLOGY

## 2023-07-28 RX ORDER — LIDOCAINE HYDROCHLORIDE 10 MG/ML
4 INJECTION, SOLUTION EPIDURAL; INFILTRATION; INTRACAUDAL; PERINEURAL
Status: SHIPPED | OUTPATIENT
Start: 2023-07-28

## 2023-07-28 RX ORDER — TRIAMCINOLONE ACETONIDE 40 MG/ML
40 INJECTION, SUSPENSION INTRA-ARTICULAR; INTRAMUSCULAR
Status: SHIPPED | OUTPATIENT
Start: 2023-07-28

## 2023-07-28 RX ADMIN — TRIAMCINOLONE ACETONIDE 40 MG: 40 INJECTION, SUSPENSION INTRA-ARTICULAR; INTRAMUSCULAR at 16:29

## 2023-07-28 RX ADMIN — LIDOCAINE HYDROCHLORIDE 4 ML: 10 INJECTION, SOLUTION EPIDURAL; INFILTRATION; INTRACAUDAL; PERINEURAL at 16:29

## 2023-07-28 NOTE — NURSING NOTE
[address] [address]  Dept: [phone]  ______________________________________________________________________________    Patient: Shelia Sanchez   : 1950   MRN: [id number]   2023    INVASIVE PROCEDURE SAFETY CHECKLIST    Date: 23   Procedure: Right knee IA USG CSI  Patient Name: Shelia Sanchez  MRN: [id number]  YOB: 1950    Action: Complete sections as appropriate. Any discrepancy results in a HARD COPY until resolved.     PRE PROCEDURE:  Patient ID verified with 2 identifiers (name and  or MRN): Yes  Procedure and site verified with patient/designee (when able): Yes  Accurate consent documentation in medical record: Yes  H&P (or appropriate assessment) documented in medical record: Yes  H&P must be up to 20 days prior to procedure and updates within 24 hours of procedure as applicable: NA  Relevant diagnostic and radiology test results appropriately labeled and displayed as applicable: Yes  Procedure site(s) marked with provider initials: NA    TIMEOUT:  Time-Out performed immediately prior to starting procedure, including verbal and active participation of all team members addressing the following:Yes  * Correct patient identify  * Confirmed that the correct side and site are marked  * An accurate procedure consent form  * Agreement on the procedure to be done  * Correct patient position  * Relevant images and results are properly labeled and appropriately displayed  * The need to administer antibiotics or fluids for irrigation purposes during the procedure as applicable   * Safety precautions based on patient history or medication use    DURING PROCEDURE: Verification of correct person, site, and procedures any time the responsibility for care of the patient is transferred to another member of the care team.       Prior to injection, verified patient identity using patient's name and date of  birth.  Due to injection administration, patient instructed to remain in clinic for 15 minutes  afterwards, and to report any adverse reaction to me immediately.    Joint injection was performed.      Drug Amount Wasted:  None.  Vial/Syringe: Single dose vial  Expiration Date:  3/1/25      Ron Mullins, ATC  July 28, 2023

## 2023-07-28 NOTE — LETTER
"  7/28/2023      RE: Shelia Sanchez  [address]  [place] [address]     Dear Colleague,    Thank you for referring your patient, Shelia Sanchez, to the [facility] [place]. Please see a copy of my visit note below.    ASSESSMENT/PLAN:  Is a 72-year-old white female with past medical history of of heartburn, obstructive sleep apnea, BMI 45, prediabetes presenting with bilateral knee pain  1.  Right knee pain due to osteoarthritis-  Patient has been working with nutrition and eating disorder clinic to improve her nutrition and for weight loss  Patient reports up to this point in time she is looking for the right combination of medications  Patient agreeable to CSI on the right  Patient is prediabetic and last A1c was 6.2, monitor blood glucose  2.  Wounds on bilateral shins-  Patient suffered a fall and has a skin tear across the anterior left tibia  Wound cares with [facility], non-stick bandages    Wound clinic referral    RTC PRN    Pt is a 72 year old female here today for:     Open sores from fall, bilateral shins  Unable to do pool therapy, open sores present    ESTABLISHED PATIENT FOLLOW-UP:  Follow Up of the Left Knee and Follow Up of the Right Knee       HISTORY OF PRESENT ILLNESS  Ms. Sanchez is a pleasant 72 year old year old female who presents to clinic today for follow-up of Bilateral knee pain  5'6\" and 279 lbs  Tried to see nutrition, Topamax didn't help and messed with her mind, considering Ozempic but doesn't want to take life long med  Nutrition put on diet, Eating disorder clinic didn't like  Fluid and scab around right tibia, left tibia from the fall  A1C 6.2, prediabetes  Can walk, has a scooter- motorized    Date of injury: Chronic - Recent fall on July 6th  Date last seen: 2/3/23  Following Therapeutic Plan: Previous CSI  Pain: Increasing  Function: Worsening  Interval History: Pain is increasing after her fall     Additional " medical/Social/Surgical histories reviewed in [facility] and updated as appropriate.    REVIEW OF SYSTEMS (7/28/2023)  CONSTITUTIONAL: Denies fever and weight loss  GASTROINTESTINAL: Denies abdominal pain, nausea, vomiting  MUSCULOSKELETAL: See HPI  SKIN: Denies any recent rash or lesion  NEUROLOGICAL: Denies numbness or focal weakness      EXAM:   Nickel sized lesion right LE, anterior shin slightly lateral- mild drainage  Skin tear on left leg, two other sores with drainage- 3 total on left leg  right > left Knee:   ROM: 0-130; Crepitus: none  Effusion: none ; Swelling: yes, into lower extremities   Strength: Full in flexion/ extension   Tenderness: Patella - none Medial joint line - none; Lateral joint line - none; Quad tendon - none; Patellar tendon- none; Hamstring - none.   Cruciates: anterior drawer - neg/posterior drawer -neg. Lachman - neg   Collaterals: varus -neg/valgus -neg.   Patella: patellar compression - neg; single leg bend- neg   Meniscus: William - neg; Thessaly - not performed   Maneuvers: Aureliano - not performed      Past Medical History:   Diagnosis Date    Arthritis 01/28/2014    Bursitis in left hip    Asteatotic eczema     Cholelithiasis with obstruction 05/10/2010    Depressive disorder     Diabetes (H) Summer 2019    Pre-diabetes    Gallstone pancreatitis 05/11/2010    History of blood transfusion 40 years ago    Hypertension     Right knee pain 12/27/2012      Past Surgical History:   Procedure Laterality Date    CHOLECYSTECTOMY, LAPOROSCOPIC      Cholecystectomy, Laparoscopic    HEAD & NECK SURGERY      Treatment for arthritis in neck by deadening nerves    PHACOEMULSIFICATION CLEAR CORNEA WITH STANDARD INTRAOCULAR LENS IMPLANT Right 11/22/2022    Procedure: RIGHT PHACOEMULSIFICATION, CATARACT, WITH INTRAOCULAR LENS IMPLANT;  Surgeon: Trae Wade MD;  Location: [facility] OR    PHACOEMULSIFICATION CLEAR CORNEA WITH STANDARD INTRAOCULAR LENS IMPLANT Left 12/8/2022    Procedure: LEFT  PHACOEMULSIFICATION, CATARACT, WITH INTRAOCULAR LENS IMPLANT;  Surgeon: Trae Wade MD;  Location:  OR    [facility] VAG HYST,RMV TUBE/OVARY  2004    [facility] W STENT PLACEMENT BILE/PANCREATIC DUCT        Current Outpatient Medications   Medication Sig Dispense Refill    amLODIPine (NORVASC) 5 MG tablet TAKE 1 TABLET BY MOUTH DAILY 90 tablet 3    aspirin (ASA) 81 MG chewable tablet TAKE 1 TABLET BY MOUTH DAILY 90 tablet 0    atorvastatin (LIPITOR) 20 MG tablet Take 1 tablet (20 mg) by mouth daily 90 tablet 3    B Complex-Biotin-FA (HM VITAMIN B100 COMPLEX) TABS       buPROPion (WELLBUTRIN XL) 150 MG 24 hr tablet take 1 tablet by mouth daily as directed      esomeprazole (NEXIUM) 20 MG DR capsule Take 1 tablet by mouth daily       gabapentin (NEURONTIN) 100 MG capsule TAKE 1 CAPSULE BY MOUTH TWICE DAILY 180 capsule 1    hydrochlorothiazide (HYDRODIURIL) 25 MG tablet Take 1 tablet (25 mg) by mouth daily 90 tablet 3    venlafaxine (EFFEXOR-XR) 75 MG 24 hr capsule TAKE 1 CAPSULE(75 MG) BY MOUTH DAILY (Patient taking differently: Take 150 mg by mouth daily 75 mg + 75 mg + 75 mg = 225 mg daily) 90 capsule 0    vitamin B complex with vitamin C (STRESS TAB) tablet Take 1 tablet by mouth daily      Vitamin D3 (CHOLECALCIFEROL) 125 MCG (5000 UT) tablet Take 1 tablet by mouth daily        Allergies   Allergen Reactions    Nitrofurantoin      Stomach pain      ROS:   Gen- no fevers/chills   Rheum - no morning stiffness   Derm - no rash/ redness, open wounds on lower extremities,   Neuro - no numbness, no tingling   Remainder of ROS negative.     Exam:   There were no vitals taken for this visit.     Xray of bilateral knee pain on July 28, 2023 at [facility] [place] [place] - films personally reviewed with patient at time of visit     My impression: high grade narrowing of medial compartment, lateral, PF- right knee is worse          Again, thank you for allowing me to participate in the care of your patient.       Sincerely,    Moni Fischer MD

## 2023-07-28 NOTE — PROGRESS NOTES
"ASSESSMENT/PLAN:  Is a 72-year-old white female with past medical history of of heartburn, obstructive sleep apnea, BMI 45, prediabetes presenting with bilateral knee pain  1.  Right knee pain due to osteoarthritis-  Patient has been working with nutrition and eating disorder clinic to improve her nutrition and for weight loss  Patient reports up to this point in time she is looking for the right combination of medications  Patient agreeable to CSI on the right  Patient is prediabetic and last A1c was 6.2, monitor blood glucose  2.  Wounds on bilateral shins-  Patient suffered a fall and has a skin tear across the anterior left tibia  Wound cares with [facility]ic, non-stick bandages    Wound clinic referral    RTC PRN    Pt is a 72 year old female here today for:     Open sores from fall, bilateral shins  Unable to do pool therapy, open sores present    ESTABLISHED PATIENT FOLLOW-UP:  Follow Up of the Left Knee and Follow Up of the Right Knee       HISTORY OF PRESENT ILLNESS  Ms. Sanchez is a pleasant 72 year old year old female who presents to clinic today for follow-up of Bilateral knee pain  5'6\" and 279 lbs  Tried to see nutrition, Topamax didn't help and messed with her mind, considering Ozempic but doesn't want to take life long med  Nutrition put on diet, Eating disorder clinic didn't like  Fluid and scab around right tibia, left tibia from the fall  A1C 6.2, prediabetes  Can walk, has a scooter- motorized    Date of injury: Chronic - Recent fall on July 6th  Date last seen: 2/3/23  Following Therapeutic Plan: Previous CSI  Pain: Increasing  Function: Worsening  Interval History: Pain is increasing after her fall     Additional medical/Social/Surgical histories reviewed in [facility] and updated as appropriate.    REVIEW OF SYSTEMS (7/28/2023)  CONSTITUTIONAL: Denies fever and weight loss  GASTROINTESTINAL: Denies abdominal pain, nausea, vomiting  MUSCULOSKELETAL: See HPI  SKIN: Denies any recent rash or " lesion  NEUROLOGICAL: Denies numbness or focal weakness      EXAM:   Nickel sized lesion right LE, anterior shin slightly lateral- mild drainage  Skin tear on left leg, two other sores with drainage- 3 total on left leg  right > left Knee:   ROM: 0-130; Crepitus: none  Effusion: none ; Swelling: yes, into lower extremities   Strength: Full in flexion/ extension   Tenderness: Patella - none Medial joint line - none; Lateral joint line - none; Quad tendon - none; Patellar tendon- none; Hamstring - none.   Cruciates: anterior drawer - neg/posterior drawer -neg. Lachman - neg   Collaterals: varus -neg/valgus -neg.   Patella: patellar compression - neg; single leg bend- neg   Meniscus: William - neg; Thessaly - not performed   Maneuvers: Aureilano - not performed      Past Medical History:   Diagnosis Date    Arthritis 01/28/2014    Bursitis in left hip    Asteatotic eczema     Cholelithiasis with obstruction 05/10/2010    Depressive disorder     Diabetes (H) Summer 2019    Pre-diabetes    Gallstone pancreatitis 05/11/2010    History of blood transfusion 40 years ago    Hypertension     Right knee pain 12/27/2012      Past Surgical History:   Procedure Laterality Date    CHOLECYSTECTOMY, LAPOROSCOPIC      Cholecystectomy, Laparoscopic    HEAD & NECK SURGERY      Treatment for arthritis in neck by deadening nerves    PHACOEMULSIFICATION CLEAR CORNEA WITH STANDARD INTRAOCULAR LENS IMPLANT Right 11/22/2022    Procedure: RIGHT PHACOEMULSIFICATION, CATARACT, WITH INTRAOCULAR LENS IMPLANT;  Surgeon: Trae Wade MD;  Location: [facility] OR    PHACOEMULSIFICATION CLEAR CORNEA WITH STANDARD INTRAOCULAR LENS IMPLANT Left 12/8/2022    Procedure: LEFT PHACOEMULSIFICATION, CATARACT, WITH INTRAOCULAR LENS IMPLANT;  Surgeon: Trae Wade MD;  Location: [facility] OR    [facility] VAG HYST,RMV TUBE/OVARY  2004    [facility] ERCP W STENT PLACEMENT BILE/PANCREATIC DUCT        Current Outpatient Medications   Medication Sig Dispense Refill     amLODIPine (NORVASC) 5 MG tablet TAKE 1 TABLET BY MOUTH DAILY 90 tablet 3    aspirin (ASA) 81 MG chewable tablet TAKE 1 TABLET BY MOUTH DAILY 90 tablet 0    atorvastatin (LIPITOR) 20 MG tablet Take 1 tablet (20 mg) by mouth daily 90 tablet 3    B Complex-Biotin-FA (HM VITAMIN B100 COMPLEX) TABS       buPROPion (WELLBUTRIN XL) 150 MG 24 hr tablet take 1 tablet by mouth daily as directed      esomeprazole (NEXIUM) 20 MG DR capsule Take 1 tablet by mouth daily       gabapentin (NEURONTIN) 100 MG capsule TAKE 1 CAPSULE BY MOUTH TWICE DAILY 180 capsule 1    hydrochlorothiazide (HYDRODIURIL) 25 MG tablet Take 1 tablet (25 mg) by mouth daily 90 tablet 3    venlafaxine (EFFEXOR-XR) 75 MG 24 hr capsule TAKE 1 CAPSULE(75 MG) BY MOUTH DAILY (Patient taking differently: Take 150 mg by mouth daily 75 mg + 75 mg + 75 mg = 225 mg daily) 90 capsule 0    vitamin B complex with vitamin C (STRESS TAB) tablet Take 1 tablet by mouth daily      Vitamin D3 (CHOLECALCIFEROL) 125 MCG (5000 UT) tablet Take 1 tablet by mouth daily        Allergies   Allergen Reactions    Nitrofurantoin      Stomach pain      ROS:   Gen- no fevers/chills   Rheum - no morning stiffness   Derm - no rash/ redness, open wounds on lower extremities,   Neuro - no numbness, no tingling   Remainder of ROS negative.     Exam:   There were no vitals taken for this visit.     Xray of bilateral knee pain on July 28, 2023 at [facility] [place] location - films personally reviewed with patient at time of visit     My impression: high grade narrowing of medial compartment, lateral, PF- right knee is worse      Procedure: For some guidance is necessary due to body habitus.  Patient was given risks and benefits of the procedure and consent was obtained.  Linear probe was used to identify the patella as well as the femur.  ChloraPrep was used to clean the area and sterile procedure .  linear probe was in short axis.  Ethyl chloride was used to anesthetize the area needle approach was then  medial to lateral using an in-plane approach.  Target was the suprapatellar synovial pouch of the synovial envelope.  4 cc of 1% lidocaine and 1 cc 40 mg Kenalog delivered through a 22-gauge spinal needle.  Patient tolerated the procedure well and Band-Aid was placed without immediate complication    Large Joint Injection: R knee joint    Date/Time: 7/28/2023 4:29 PM    Performed by: Moni Fischer MD  Authorized by: Moni Fischer MD    Indications:  Pain and osteoarthritis  Needle Size:  22 G  Guidance: ultrasound    Approach:  Superolateral  Location:  Knee      Medications:  40 mg triamcinolone 40 MG/ML; 4 mL lidocaine (PF) 1 %  Outcome:  Tolerated well, no immediate complications  Procedure discussed: discussed risks, benefits, and alternatives    Consent Given by:  Patient  Timeout: timeout called immediately prior to procedure    Prep: patient was prepped and draped in usual sterile fashion     Ron Mullins ATC on 7/28/2023 at 4:31 PM    I agree with the following injection documentation.    VIGNESH Fischer MD

## 2023-07-31 ENCOUNTER — PATIENT OUTREACH (OUTPATIENT)
Dept: FAMILY MEDICINE | Facility: CLINIC | Age: 73
End: 2023-07-31

## 2023-07-31 NOTE — TELEPHONE ENCOUNTER
Voicemail      SWCC called patient    Left a voicemail for a call back.      SWCC will outreach again in 2 weeks.    RIVAS Thornton  , Care Coordination  [facility]  [phone]  [email or web address]

## 2023-08-01 ENCOUNTER — TELEPHONE (OUTPATIENT)
Dept: WOUND CARE | Facility: CLINIC | Age: 73
End: 2023-08-01
Payer: COMMERCIAL

## 2023-08-01 NOTE — TELEPHONE ENCOUNTER
KELL and sent [employer] for scheduling new appt for wound care referral placed by Dr. Fischer. Schedule new pt visit with Dr. Angel Wheatley in his Thursday wound clinic.

## 2023-08-09 ENCOUNTER — PATIENT OUTREACH (OUTPATIENT)
Dept: FAMILY MEDICINE | Facility: CLINIC | Age: 73
End: 2023-08-09

## 2023-08-09 NOTE — TELEPHONE ENCOUNTER
"Social Work Care Coordination follow up:      Previous plan:continue with therapy @ [place]      Current concern/ issue : PC to pt. She reports that in July she took a fall and banged up her left leg. She has a wound on her leg that has taken a long time to heal due to leg swelling but reports it seems to be weeping less. She was referred to a wound clinic but she has not gone yet as she's hoping to have PCP look at wound tomorrow and determine necessity.    Pt has appt for weight management tomorrow with PCP. Pt reports apprehension to some medications due to side effects but is willing to look at options.    Pt continues to journal and has been using the [employer] journal from her [place] therapist that addresses food addiction. Pt is unable to exercise due to knees and pain. CC discussed some upper body exercises to try while she is sitting to help build strength and burn calories. Pt has some weights and resistance bands she can use.     [facility] also discussed carving out a time for self care ( journaling, exercising as able) n the morning,  and instead of looking at is a chore, reevaluate the tasks as something she enjoys and are good for her vs \"homework\". Pt reports all the tasks feel like \"work \" as they are assigned to her by therapist and reports it sometimes feels overwhelming to have to \"do\" so many things. Pt states she will attempt to do self care in the mornings and see how that goes.      Current Plan:Pt to attend appt with pcp tomorrow to discuss weight management, pt to begin a.m routine of journaling and exercises as appropriate. [facility] to outreach in 1 month      RIVAS Thornton  , Care Coordination  [facility]  [phone]  [email or web address]    "

## 2023-08-10 ENCOUNTER — OFFICE VISIT (OUTPATIENT)
Dept: FAMILY MEDICINE | Facility: CLINIC | Age: 73
End: 2023-08-10

## 2023-08-10 VITALS
BODY MASS INDEX: 44.79 KG/M2 | HEART RATE: 101 BPM | DIASTOLIC BLOOD PRESSURE: 69 MMHG | OXYGEN SATURATION: 100 % | SYSTOLIC BLOOD PRESSURE: 138 MMHG | WEIGHT: 282.6 LBS

## 2023-08-10 DIAGNOSIS — E66.01 MORBID OBESITY (H): ICD-10-CM

## 2023-08-10 DIAGNOSIS — G47.33 OSA (OBSTRUCTIVE SLEEP APNEA): Chronic | ICD-10-CM

## 2023-08-10 DIAGNOSIS — R12 HEARTBURN: Chronic | ICD-10-CM

## 2023-08-10 DIAGNOSIS — F50.819 BINGE EATING DISORDER: Primary | ICD-10-CM

## 2023-08-10 PROBLEM — M19.90 ARTHRITIS: Status: ACTIVE | Noted: 2022-03-01

## 2023-08-10 PROCEDURE — 99215 OFFICE O/P EST HI 40 MIN: CPT | Performed by: FAMILY MEDICINE

## 2023-08-10 NOTE — PATIENT INSTRUCTIONS
"YOUR PHYSICAL ACTIVITY PRESCRIPTION    Frequency: **  Intensity: **  Time: **  Type: **  Enjoyment: **    We should all move every day  Some movement is better than none.     You have chair based exercises you have bought  : 'called better 5\"  You have a binder from PT : all standing, while holding on to kitchen counter.     Our shared goal : getting you to knee replacement surgery so [place] and walk around [place] art fairs.     You can do hard things!      Explore mindful eating: no distractions. Wait a moment before starting the meal. Think about what is on your plate, where each ingredient comes from, put your silver wear down between each mouthful, counting chews, anything else you can think of that sticks for you and is meaningful.    A great resource for intuitive eating :    http://www.intuitiveeating.org/            "

## 2023-08-10 NOTE — PROGRESS NOTES
Primary Care Weight Management                                                          INITIAL ASSESSMENT      HPI  Shelia Sanchez is a 72 year old female being seen today for metabolic health and weight management assessment with the following weight related health issues :   -hypertension,   -pre DM,   -LORI,   -hypercholesterolemia  -GERD.   -Needs bilateral knee replacements, but has been advised she needs to lose weight first.    I'm an emotional eater. My eating habits are terrible . I tend to shove whatever is in front of my down me while watching the TV. Working on mindfulness with therapist (Rashmi).     Weight history is as follows:    BED : Working with [facility] therapist. Referred there by prior PCP. Prior trial of Topamax : intolerable brain fog. Endorses strong emotional eating cutes.     Working with clinic [facility] : begin a.m routine of journaling and exercises as appropriate. [facility] to outreach in 1 month     Highest adult weight:279 lb  Lowest adult weight: 150 lb  Ideal weight: 150 lb    Current exercise:    Days per week: 0  Type of exercise and duration:  sedentary    Eating behaviors:   Binging: present  Grazing:present  Eating after dinner:present  Waking at night to eat:present  Emotional eating: present  Carbohydrate craving: present  Experiencing constant hunger: present    Eats most meals at home : Yes:   Cooks most meals: No  Orders out meals during the week :No  Eats at the table without distraction : No  Eats most meals in front of the TV/ computer/electronic device: Yes:     Binged at least once a week for the past 3 months: Yes:    If binges present, cause distress: Yes:   Feeling out of control when eating: Yes:   Eating until uncomfortably full: Yes:   Eating large amounts when not physically hungry : No  Eating fast: Yes:   Eating alone / embarrassment : No   Disgusted, depressed, guilty after overeating: Yes:      Prior weight loss programs tried:  WW x3 , nutritional weight and  wellness    Past psychological treatment:  Inpatient treatment:  no  Outpatient treatment:  yes : [facility]  Hx of substance abuse: no  History of physical, emotional or sexual abuse? no      PHYSICAL EXAMINATION:  Vitals: /69   Pulse 101   Wt 128.2 kg (282 lb 9.6 oz)   SpO2 100%   BMI 44.79 kg/m    General: no acute distress, cooperative with exam.   Psych: mental status normal, mood and affect appropriate.    Lab Results   Component Value Date    A1C 6.2 04/26/2023    A1C 6.3 08/15/2022    A1C 6.2 02/17/2022    A1C 6.0 05/10/2021    A1C 6.3 02/08/2021    A1C 5.7 11/18/2019    A1C 6.1 08/07/2019     ASSESSMENT/PLAN:         Binge eating disorder  LORI (obstructive sleep apnea)- severe ()  Heartburn  BMI 40.0-44.9, adult (H)  Morbid obesity (H)  -Agreed together to treat obesity-associated medical conditions and conditions exacerbated by or contributing to weight gain by medical management of weight:  -Discussed the need to address all four pillars of medical weight management (nutrition, physical activity, behavior and medication) going forward for best chances of success in sustained weight loss  -Discussed obesity as being a chronic, relapsing, multifactorial condition with a neurohormonal basis and that medical management should be though of as a long term treatment.  -Managed expectations of weight loss and discussed aiming for sustained loss.  -An understanding of the necessity for commitment to life-long follow-up, lifestyle changes and dietary modification required for long-term success was also verbalized.      Plan for management includes the following:  -Nutrition: working with [facility]   -Exercise: discussed exercise prescription : see below  -Medications: trial of topamax : intolerable brain fog. Declines trial of GLP 1 / GLP/GIP : worries about side effects and negative effects through personal research. Not currently interested in anti obesity medications.   -Behavior: BED : Working with  "[facility] therapist.  Working with clinic [facility] : begin a.m routine of journaling and exercises as appropriate. [facility] to outreach in 1 month. Discussed intuitive eating.          Discussed and issued on AVS:  YOUR PHYSICAL ACTIVITY PRESCRIPTION    Frequency: **  Intensity: **  Time: **  Type: **  Enjoyment: **    We should all move every day  Some movement is better than none.     You have chair based exercises you have bought  : 'called better 5\"  You have a binder from PT : all standing, while holding on to kitchen counter.     Our shared goal : getting you to knee replacement surgery so you can walk the lakes and walk around the art fairs.     You can do hard things!      Explore mindful eating: no distractions. Wait a moment before starting the meal. Think about what is on your plate, where each ingredient comes from, put your silver wear down between each mouthful, counting chews, anything else you can think of that sticks for you and is meaningful.    A great resource for intuitive eating :    http://www.intuitiveeating.org/        Follow up with me in 1 month    Time spent doing chart review, history and exam, documentation and further activities per the note : 41 minutes      "

## 2023-08-12 DIAGNOSIS — E78.00 HYPERCHOLESTEREMIA: ICD-10-CM

## 2023-08-14 RX ORDER — ATORVASTATIN CALCIUM 20 MG/1
20 TABLET, FILM COATED ORAL DAILY
Qty: 90 TABLET | Refills: 3 | OUTPATIENT
Start: 2023-08-14

## 2023-09-15 ENCOUNTER — OFFICE VISIT (OUTPATIENT)
Dept: FAMILY MEDICINE | Facility: CLINIC | Age: 73
End: 2023-09-15

## 2023-09-15 VITALS
BODY MASS INDEX: 45.27 KG/M2 | WEIGHT: 285.6 LBS | DIASTOLIC BLOOD PRESSURE: 63 MMHG | OXYGEN SATURATION: 100 % | SYSTOLIC BLOOD PRESSURE: 122 MMHG | HEART RATE: 89 BPM

## 2023-09-15 DIAGNOSIS — R73.03 PREDIABETES: ICD-10-CM

## 2023-09-15 DIAGNOSIS — F50.819 BINGE EATING DISORDER: Primary | ICD-10-CM

## 2023-09-15 DIAGNOSIS — I10 HYPERTENSION GOAL BP (BLOOD PRESSURE) < 140/90: Chronic | ICD-10-CM

## 2023-09-15 DIAGNOSIS — F32.0 CURRENT MILD EPISODE OF MAJOR DEPRESSIVE DISORDER, UNSPECIFIED WHETHER RECURRENT (H): ICD-10-CM

## 2023-09-15 DIAGNOSIS — G47.33 OSA (OBSTRUCTIVE SLEEP APNEA): Chronic | ICD-10-CM

## 2023-09-15 PROCEDURE — 99214 OFFICE O/P EST MOD 30 MIN: CPT | Performed by: FAMILY MEDICINE

## 2023-09-15 NOTE — PATIENT INSTRUCTIONS
- have some time thinking about what triggered eating the kind bars  - focus on your relationship with yourself    - good enough IS good enough  - perfection dose not exist     -its been an up and down months    There are always positives:    - you came to the appointment ; you showed up!  - you allowed yourself to be vulnerable with a provider and tell me about the kind bars yesterday.

## 2023-09-15 NOTE — PROGRESS NOTES
"PRIMARY CARE WEIGHT MANAGEMENT PROGRESS NOTE    CHIEF COMPLAINT:  Shelia Sanchez is a 73 year old female who is following up for medical weight management.     Metabolic health and weight management assessment with the following weight related health issues :   -hypertension,   -pre DM,   -LORI,   -hypercholesterolemia  -GERD.   -Needs bilateral knee replacements, but has been advised she needs to lose weight first.    Medications:   Medications: trial of topamax : intolerable brain fog. Declines trial of GLP 1 / GLP/GIP : worries about side effects and negative effects through personal research. Not currently interested in anti obesity medications.     Nutrition:  Finds grocery shopping overwhelming and tiring (knee pain, decreased mobility and deconditioning). Has been opting for home delivery and sister taking her to grocery store.     Finding it difficult to get started with a diet.     Behavior:   BED : Working with [facility] therapist. \"It's been a weird month\". Anniversary of mother death (day before 9/11).   Binging episode yesterday (kind bars). Feels unsure what triggered this. Plans to talk with [facility] psychologist.    Physical Activity:   Limited due to osteoarthritis knee.    WEIGHT HISTORY:   Wt Readings from Last 3 Encounters:   09/15/23 129.5 kg (285 lb 9.6 oz)   08/10/23 128.2 kg (282 lb 9.6 oz)   04/26/23 126.6 kg (279 lb)       PHYSICAL EXAM:  VITAL SIGNS:  /63   Pulse 89   Wt 129.5 kg (285 lb 9.6 oz)   SpO2 100%   BMI 45.27 kg/m      Weight at start of treatment: 282 lb 8/10/2023   Weight today: 285 lbs  Weight change since last appointment: +3 lbs  Weight change since start of treatment : + 3 lbs    General: no acute distress, cooperative with exam.  Psych: mental status normal, mood and affect appropriate.      ASSESSMENT/PLAN:  Shelia Sanchez is a 73 year old female who is following up for medical weight management.    Binge eating disorder  Prediabetes  BMI 45.0-49.9, adult " (H)  Current mild episode of major depressive disorder, unspecified whether recurrent (H)  Recent active binge. Discussed focusing on relationship with self and relationship with food as foundation of successful future weight loss journey. Will continue to work with [facility] Would not recommend focusing on active weight loss lifestyle modification while binging is active.     LORI (obstructive sleep apnea)- severe ()  In the supine position without sleep related hypoxemia and hypoventilation. Plan to treat in part with future weight loss.     Hypertension goal BP (blood pressure) < 140/90  At goal. Continue hydrochlorothiazide 25 mg daily, Norvasc 5 mg daily.       Follow up:  1 month      Time spent doing chart review, history and exam, documentation and further activities per the note : 32 minutes

## 2023-10-23 DIAGNOSIS — M79.18 MYOFASCIAL PAIN: ICD-10-CM

## 2023-10-23 RX ORDER — GABAPENTIN 100 MG/1
CAPSULE ORAL
Qty: 180 CAPSULE | Refills: 1 | Status: SHIPPED | OUTPATIENT
Start: 2023-10-23 | End: 2024-05-09

## 2023-10-23 NOTE — TELEPHONE ENCOUNTER
Med: Gabapentin     LOV (related): 4/26/23      Due for F/U around: Return in about 53 weeks (around 5/1/2024) for Annual Wellness Visit.     Next Appt: 10/31/23 with Steven

## 2023-11-25 DIAGNOSIS — I10 PRIMARY HYPERTENSION: ICD-10-CM

## 2023-11-27 RX ORDER — HYDROCHLOROTHIAZIDE 25 MG/1
25 TABLET ORAL DAILY
Qty: 90 TABLET | Refills: 3 | OUTPATIENT
Start: 2023-11-27

## 2023-12-01 ENCOUNTER — OFFICE VISIT (OUTPATIENT)
Dept: ORTHOPEDICS | Facility: CLINIC | Age: 73
End: 2023-12-01
Payer: COMMERCIAL

## 2023-12-01 DIAGNOSIS — M17.11 PRIMARY OSTEOARTHRITIS OF RIGHT KNEE: ICD-10-CM

## 2023-12-01 DIAGNOSIS — G89.29 CHRONIC PAIN OF RIGHT KNEE: Primary | ICD-10-CM

## 2023-12-01 DIAGNOSIS — M25.561 CHRONIC PAIN OF RIGHT KNEE: Primary | ICD-10-CM

## 2023-12-01 PROCEDURE — 99207 PR DROP WITH A PROCEDURE: CPT | Performed by: FAMILY MEDICINE

## 2023-12-01 PROCEDURE — 20611 DRAIN/INJ JOINT/BURSA W/US: CPT | Mod: RT | Performed by: FAMILY MEDICINE

## 2023-12-01 RX ORDER — TRIAMCINOLONE ACETONIDE 40 MG/ML
40 INJECTION, SUSPENSION INTRA-ARTICULAR; INTRAMUSCULAR
Status: SHIPPED | OUTPATIENT
Start: 2023-12-01

## 2023-12-01 RX ORDER — LIDOCAINE HYDROCHLORIDE 10 MG/ML
4 INJECTION, SOLUTION EPIDURAL; INFILTRATION; INTRACAUDAL; PERINEURAL
Status: SHIPPED | OUTPATIENT
Start: 2023-12-01

## 2023-12-01 RX ADMIN — TRIAMCINOLONE ACETONIDE 40 MG: 40 INJECTION, SUSPENSION INTRA-ARTICULAR; INTRAMUSCULAR at 08:05

## 2023-12-01 RX ADMIN — LIDOCAINE HYDROCHLORIDE 4 ML: 10 INJECTION, SOLUTION EPIDURAL; INFILTRATION; INTRACAUDAL; PERINEURAL at 08:05

## 2023-12-01 NOTE — PROGRESS NOTES
S: 3-year-old female with past medical history of obstructive sleep apnea, cervical pain, heartburn, history of over eating/binge disorder and prediabetes presenting with right knee pain  Patient frustrated as she is not a diabetic and so she is not qualifying for weight loss medications like Ozempic or Wegovy  Patient states that she has been several orthopedic surgeons and she is aware that she needs to lose more weight before total knee replacement would be an option  In the past she has been able to receive a nerve block in her neck and this worked for years      O: OA on x-ray    A/P: 72 yo white female with prediabetes presenting with right knee OA  Will reach out for SKOAP trial  Pt is interested in possible nerve block  Working with eating disorder clinic, unable to reduce weight  Would have to see Pain clinic and we could refer, insurance may not  cost    Procedure: right knee OA-  Risks and benefits discussed, consented  Pt anatomy reviewed using US, had to use in setting of body habitus  Linear probe  Chloroprep, ethyl chloride  1 ml 40mg kenalog, 4 ml 1% lidocaine  Spinal needle used  Images saved on US machine      RTC PRN  Large Joint Injection: R knee joint    Date/Time: 12/1/2023 8:05 AM    Performed by: Moni Fischer MD  Authorized by: Moni Fischer MD    Indications:  Pain  Needle Size:  22 G  Guidance: ultrasound    Approach:  Anterolateral  Location:  Knee      Medications:  40 mg triamcinolone 40 MG/ML; 4 mL lidocaine (PF) 1 %  Outcome:  Tolerated well, no immediate complications  Procedure discussed: discussed risks, benefits, and alternatives    Consent Given by:  Patient  Timeout: timeout called immediately prior to procedure    Prep: patient was prepped and draped in usual sterile fashion      I agree with the following injection documentation.    VIGNEHS Fischer MD

## 2023-12-01 NOTE — NURSING NOTE
[address] [address]  Dept: [phone]  ______________________________________________________________________________    Patient: Shelia Sanchez   : 1950   MRN: [id number]   2023    INVASIVE PROCEDURE SAFETY CHECKLIST    Date: 23   Procedure: Right knee USG kenalog injection  Patient Name: Shelia Sanchez  MRN: [id number]  YOB: 1950    Action: Complete sections as appropriate. Any discrepancy results in a HARD COPY until resolved.     PRE PROCEDURE:  Patient ID verified with 2 identifiers (name and  or MRN): Yes  Procedure and site verified with patient/designee (when able): Yes  Accurate consent documentation in medical record: Yes  H&P (or appropriate assessment) documented in medical record: Yes  H&P must be up to 20 days prior to procedure and updates within 24 hours of procedure as applicable: NA  Relevant diagnostic and radiology test results appropriately labeled and displayed as applicable: Yes  Procedure site(s) marked with provider initials: NA    TIMEOUT:  Time-Out performed immediately prior to starting procedure, including verbal and active participation of all team members addressing the following:Yes  * Correct patient identify  * Confirmed that the correct side and site are marked  * An accurate procedure consent form  * Agreement on the procedure to be done  * Correct patient position  * Relevant images and results are properly labeled and appropriately displayed  * The need to administer antibiotics or fluids for irrigation purposes during the procedure as applicable   * Safety precautions based on patient history or medication use    DURING PROCEDURE: Verification of correct person, site, and procedures any time the responsibility for care of the patient is transferred to another member of the care team.       Prior to injection, verified patient identity using patient's name and  date of birth.  Due to injection administration, patient instructed to remain in clinic for 15 minutes  afterwards, and to report any adverse reaction to me immediately.    Joint injection was performed.      Drug Amount Wasted:  Yes: 1 mg/ml   Vial/Syringe: Single dose vial  Expiration Date:  05/2027      Leny Rosario ATC  December 1, 2023

## 2023-12-01 NOTE — LETTER
12/1/2023      RE: Shelia Sanchez  [address]  [place] [address]     Dear Colleague,    Thank you for referring your patient, Shelia Sanchez, to the [facility] [place]. Please see a copy of my visit note below.    S: 3-year-old female with past medical history of obstructive sleep apnea, cervical pain, heartburn, history of over eating/binge disorder and prediabetes presenting with right knee pain  Patient frustrated as she is not a diabetic and so she is not qualifying for weight loss medications like Ozempic or Wegovy  Patient states that she has been several orthopedic surgeons and she is aware that she needs to lose more weight before total knee replacement would be an option  In the past she has been able to receive a nerve block in her neck and this worked for years      O: OA on x-ray    A/P: 72 yo white female with prediabetes presenting with right knee OA  Will reach out for SKOAP trial  Pt is interested in possible nerve block  Working with eating disorder clinic, unable to reduce weight  Would have to see Pain clinic and we could refer, insurance may not  cost    Procedure: right knee OA-  Risks and benefits discussed, consented  Pt anatomy reviewed using US, had to use in setting of body habitus  Linear probe  Chloroprep, ethyl chloride  1 ml 40mg kenalog, 4 ml 1% lidocaine  Spinal needle used  Images saved on US machine      RTC PRN  Large Joint Injection: R knee joint    Date/Time: 12/1/2023 8:05 AM    Performed by: Moni Fischer MD  Authorized by: Moni Fischer MD    Indications:  Pain  Needle Size:  22 G  Guidance: ultrasound    Approach:  Anterolateral  Location:  Knee      Medications:  40 mg triamcinolone 40 MG/ML; 4 mL lidocaine (PF) 1 %  Outcome:  Tolerated well, no immediate complications  Procedure discussed: discussed risks, benefits, and alternatives    Consent Given by:  Patient  Timeout: timeout  called immediately prior to procedure    Prep: patient was prepped and draped in usual sterile fashion      I agree with the following injection documentation.    VIGNESH Fischer MD      Again, thank you for allowing me to participate in the care of your patient.      Sincerely,    Moni Fischer MD

## 2023-12-11 ENCOUNTER — MYC MEDICAL ADVICE (OUTPATIENT)
Dept: FAMILY MEDICINE | Facility: CLINIC | Age: 73
End: 2023-12-11

## 2023-12-11 DIAGNOSIS — M25.561 RIGHT KNEE PAIN: Primary | ICD-10-CM

## 2023-12-27 ENCOUNTER — OFFICE VISIT (OUTPATIENT)
Dept: FAMILY MEDICINE | Facility: CLINIC | Age: 73
End: 2023-12-27

## 2023-12-27 VITALS
WEIGHT: 285.2 LBS | OXYGEN SATURATION: 99 % | DIASTOLIC BLOOD PRESSURE: 72 MMHG | HEART RATE: 91 BPM | SYSTOLIC BLOOD PRESSURE: 153 MMHG | BODY MASS INDEX: 45.21 KG/M2

## 2023-12-27 DIAGNOSIS — R73.03 PREDIABETES: ICD-10-CM

## 2023-12-27 DIAGNOSIS — F50.819 BINGE EATING DISORDER: Primary | ICD-10-CM

## 2023-12-27 LAB
ANION GAP SERPL CALCULATED.3IONS-SCNC: 11 MMOL/L (ref 7–15)
BUN SERPL-MCNC: 30.4 MG/DL (ref 8–23)
CALCIUM SERPL-MCNC: 9.6 MG/DL (ref 8.8–10.2)
CHLORIDE SERPL-SCNC: 106 MMOL/L (ref 98–107)
CREAT SERPL-MCNC: 0.93 MG/DL (ref 0.51–0.95)
DEPRECATED HCO3 PLAS-SCNC: 24 MMOL/L (ref 22–29)
EGFRCR SERPLBLD CKD-EPI 2021: 64 ML/MIN/1.73M2
GLUCOSE SERPL-MCNC: 93 MG/DL (ref 70–99)
HBA1C MFR BLD: 6.2 %
POTASSIUM SERPL-SCNC: 3.4 MMOL/L (ref 3.4–5.3)
SODIUM SERPL-SCNC: 141 MMOL/L (ref 135–145)

## 2023-12-27 PROCEDURE — 80048 BASIC METABOLIC PNL TOTAL CA: CPT | Performed by: FAMILY MEDICINE

## 2023-12-27 PROCEDURE — 83036 HEMOGLOBIN GLYCOSYLATED A1C: CPT | Performed by: FAMILY MEDICINE

## 2023-12-27 PROCEDURE — 99214 OFFICE O/P EST MOD 30 MIN: CPT | Performed by: FAMILY MEDICINE

## 2023-12-27 PROCEDURE — 36415 COLL VENOUS BLD VENIPUNCTURE: CPT | Performed by: FAMILY MEDICINE

## 2023-12-27 ASSESSMENT — ANXIETY QUESTIONNAIRES
GAD7 TOTAL SCORE: 12
5. BEING SO RESTLESS THAT IT IS HARD TO SIT STILL: SEVERAL DAYS
6. BECOMING EASILY ANNOYED OR IRRITABLE: NEARLY EVERY DAY
7. FEELING AFRAID AS IF SOMETHING AWFUL MIGHT HAPPEN: NOT AT ALL
2. NOT BEING ABLE TO STOP OR CONTROL WORRYING: MORE THAN HALF THE DAYS
IF YOU CHECKED OFF ANY PROBLEMS ON THIS QUESTIONNAIRE, HOW DIFFICULT HAVE THESE PROBLEMS MADE IT FOR YOU TO DO YOUR WORK, TAKE CARE OF THINGS AT HOME, OR GET ALONG WITH OTHER PEOPLE: VERY DIFFICULT
1. FEELING NERVOUS, ANXIOUS, OR ON EDGE: MORE THAN HALF THE DAYS
GAD7 TOTAL SCORE: 12
3. WORRYING TOO MUCH ABOUT DIFFERENT THINGS: MORE THAN HALF THE DAYS

## 2023-12-27 ASSESSMENT — PATIENT HEALTH QUESTIONNAIRE - PHQ9
SUM OF ALL RESPONSES TO PHQ QUESTIONS 1-9: 15
5. POOR APPETITE OR OVEREATING: MORE THAN HALF THE DAYS

## 2023-12-27 NOTE — PROGRESS NOTES
"PRIMARY CARE WEIGHT MANAGEMENT PROGRESS NOTE    CHIEF COMPLAINT:  Shelia Sanchez is a 73 year old female who is following up for medical weight management.     Metabolic health and weight management assessment with the following weight related health issues :   -hypertension,   -pre DM,   -LORI,   -hypercholesterolemia  -GERD.   -Needs bilateral knee replacements, but has been advised she needs to lose weight first.    Medications:   Medications: trial of topamax : intolerable brain fog. With bariatric clinic.   Declines trial of GLP 1 / GLP/GIP : worries about side effects and negative effects through personal research and aware insurance does not cover.   Historically declines anti obesity medications.     Nutrition/Behavior:   BED : Working with [facility] therapist.   Continued active binge eating : Feeling tearful and weepy : responded by binge eating full [employer] peppermint candies.     \"I just don't know if subliminally I am trying to get fatter. I just haven't done anything\".   \"I don't know where I am, but I'm just not helping myself at all\".   \"I am just out of solutions\"  \"I just cannot sit here and eat like a normal person and lose weight\".     Physical Activity:   Limited due to osteoarthritis knee.    Low mood / MDD : taking CBD gummies OTC with some good effect on mood. Improved sleep.     WEIGHT HISTORY:   Wt Readings from Last 3 Encounters:   12/27/23 129.4 kg (285 lb 3.2 oz)   09/15/23 129.5 kg (285 lb 9.6 oz)   08/10/23 128.2 kg (282 lb 9.6 oz)       PHYSICAL EXAM:  VITAL SIGNS:  BP (!) 153/72   Pulse 91   Wt 129.4 kg (285 lb 3.2 oz)   SpO2 99%   BMI 45.21 kg/m      Weight at start of treatment: 282 lb 8/10/2023   Weight at last appointment : 285 lbs  Weight today: 285 lbs  Weight change since last appointment: weight neutral    Weight change since start of treatment : + 3 lbs    General: no acute distress, cooperative with exam.  Psych: mental status normal, mood and affect " appropriate.    Lab Results   Component Value Date    A1C 6.2 04/26/2023    A1C 6.3 08/15/2022    A1C 6.2 02/17/2022    A1C 6.0 05/10/2021    A1C 6.3 02/08/2021    A1C 5.7 11/18/2019    A1C 6.1 08/07/2019     ASSESSMENT/PLAN:  Shelia Sanchez is a 73 year old female who is following up for medical weight management.    Binge eating disorder  BMI 45.0-49.9, adult (H)  Continued active binging. Struggling to make lifestyle change.   Discussed the need for multidisciplinary team approach with bariatric program and [facility] working in conjunction with best chances of success.   Patient has too complex of a psychological component for primary care based medical weight management alone.   Patient open to referral.   -     Adult Comprehensive Weight Management  Referral; Future    Prediabetes  Recheck A1c to screen for progression to T2DM. Would be good candidate for metformin and GLP 1 / GLP/GIP start for concurrent Rx of eT2DM and morbid obesity.   -     Hemoglobin A1c; Future  -     Basic metabolic panel; Future    Hypertension   Above target of <140/90.  Recheck at follow up   Continue  on CCB  Screen for CKD with BMP as above.

## 2024-01-16 ASSESSMENT — ANXIETY QUESTIONNAIRES
2. NOT BEING ABLE TO STOP OR CONTROL WORRYING: SEVERAL DAYS
7. FEELING AFRAID AS IF SOMETHING AWFUL MIGHT HAPPEN: NOT AT ALL
GAD7 TOTAL SCORE: 7
7. FEELING AFRAID AS IF SOMETHING AWFUL MIGHT HAPPEN: NOT AT ALL
8. IF YOU CHECKED OFF ANY PROBLEMS, HOW DIFFICULT HAVE THESE MADE IT FOR YOU TO DO YOUR WORK, TAKE CARE OF THINGS AT HOME, OR GET ALONG WITH OTHER PEOPLE?: SOMEWHAT DIFFICULT
6. BECOMING EASILY ANNOYED OR IRRITABLE: NEARLY EVERY DAY
5. BEING SO RESTLESS THAT IT IS HARD TO SIT STILL: NOT AT ALL
IF YOU CHECKED OFF ANY PROBLEMS ON THIS QUESTIONNAIRE, HOW DIFFICULT HAVE THESE PROBLEMS MADE IT FOR YOU TO DO YOUR WORK, TAKE CARE OF THINGS AT HOME, OR GET ALONG WITH OTHER PEOPLE: SOMEWHAT DIFFICULT
1. FEELING NERVOUS, ANXIOUS, OR ON EDGE: SEVERAL DAYS
3. WORRYING TOO MUCH ABOUT DIFFERENT THINGS: SEVERAL DAYS
4. TROUBLE RELAXING: SEVERAL DAYS
GAD7 TOTAL SCORE: 7

## 2024-01-16 ASSESSMENT — PAIN SCALES - PAIN ENJOYMENT GENERAL ACTIVITY SCALE (PEG)
INTERFERED_GENERAL_ACTIVITY: 10
AVG_PAIN_PASTWEEK: 10
PEG_TOTALSCORE: 10
PEG_TOTALSCORE: 10
INTERFERED_GENERAL_ACTIVITY: 10 - COMPLETELY INTERFERES
INTERFERED_ENJOYMENT_LIFE: 10 - COMPLETELY INTERFERES
AVG_PAIN_PASTWEEK: 10 - PAIN AS BAD AS YOU CAN IMAGINE
INTERFERED_ENJOYMENT_LIFE: 10

## 2024-01-18 ENCOUNTER — OFFICE VISIT (OUTPATIENT)
Dept: PALLIATIVE MEDICINE | Facility: CLINIC | Age: 74
End: 2024-01-18
Attending: FAMILY MEDICINE
Payer: COMMERCIAL

## 2024-01-18 VITALS — SYSTOLIC BLOOD PRESSURE: 140 MMHG | HEART RATE: 87 BPM | DIASTOLIC BLOOD PRESSURE: 83 MMHG

## 2024-01-18 DIAGNOSIS — G89.29 CHRONIC MIDLINE LOW BACK PAIN WITH RIGHT-SIDED SCIATICA: ICD-10-CM

## 2024-01-18 DIAGNOSIS — M25.561 CHRONIC PAIN OF RIGHT KNEE: Primary | ICD-10-CM

## 2024-01-18 DIAGNOSIS — G89.29 CHRONIC PAIN OF RIGHT KNEE: Primary | ICD-10-CM

## 2024-01-18 DIAGNOSIS — M54.41 CHRONIC MIDLINE LOW BACK PAIN WITH RIGHT-SIDED SCIATICA: ICD-10-CM

## 2024-01-18 DIAGNOSIS — M79.2 NEUROPATHIC PAIN: ICD-10-CM

## 2024-01-18 DIAGNOSIS — M17.0 PRIMARY OSTEOARTHRITIS OF BOTH KNEES: ICD-10-CM

## 2024-01-18 PROCEDURE — 99417 PROLNG OP E/M EACH 15 MIN: CPT

## 2024-01-18 PROCEDURE — 99205 OFFICE O/P NEW HI 60 MIN: CPT

## 2024-01-18 ASSESSMENT — PAIN SCALES - GENERAL: PAINLEVEL: EXTREME PAIN (9)

## 2024-01-18 NOTE — PATIENT INSTRUCTIONS
Pain Physical Therapy:  YES   I am referring for targeting stretching, strengthening, and home exercise plan to support functional goals/ADLs.  If you have not been contacted to schedule within 2 business days, please call [phone] to make an appointment. Recommend scheduling with Nabor Petty PT.     Pain Psychologist to address relaxation, behavioral change, coping style, and other factors important to improvement.  NO - not at this time, may consider in future as needed/if indicated.     Diagnostic Studies:  Bilateral knee xray 7/28/23 - degenerative/osteoarthritic changes noted, R>L.     Medication Management:   Not interested in medications at this point, will consider in future if indicated/as needed.     Potential procedures:   Recommend right genicular nerve block - orders placed to schedule with physician partner. Not candidate for knee replacement at this point, diminishing benefit with steroid and Synvisc injections.   Pending outcome from nerve block, may consider ablation if covered by insurance.     Other Orders/Referrals:   None     Follow up with TOY Lebron CNP in 2-4 weeks after genicular nerve block      ----------------------------------------------------------------  Clinic Number:  [phone]   Call with any questions about your care and for scheduling assistance.   Calls are returned Monday through Friday between 8 AM and 4:30 PM. We usually get back to you within 2 business days depending on the issue/request.    If we are prescribing your medications:  For opioid medication refills, call the clinic or send a [employer] message 7 days in advance.  Please include:  Name of requested medication  Name of the pharmacy.  For non-opioid medications, call your pharmacy directly to request a refill. Please allow 3-4 days to be processed.   Per [place] State Law:  All controlled substance prescriptions must be filled within 30 days of being written.    For those controlled substances allowing  refills, pickup must occur within 30 days of last fill.      We believe regular attendance is key to your success in our program!    Any time you are unable to keep your appointment we ask that you call us at least 24 hours in advance to cancel.This will allow us to offer the appointment time to another patient.   Multiple missed appointments may lead to dismissal from the clinic.

## 2024-01-18 NOTE — PROGRESS NOTES
[facility]     Date of visit: 1/18/2024    Assessment:  Shelia Sanchez is a 73 year old female with a past medical history significant for heartburn, arthritis, cervical pain, hx depression/binge eating disorder, LORI, HTN, DM, who presents with complaints of bilateral knee pain (referral for right knee pain).      Bilateral knee pain - Onset of pain around 4 years ago with progressive worsening since then. Bilateral knee xray 7/28/23 demonstrated osteoarthritic/degenerative changes of both knees, though R>L. She had multiple steroid and Synvisc injections, though diminishing benefit over time. Not surgical candidate at this time, advised to work on weight loss/management first. Etiology seems most consistent with DJD, though I suspect genicular neuropathic process contributing to an extent.     Assigned to [place] nursing team.     Visit diagnoses:   1. Chronic pain of right knee    2. Neuropathic pain    3. Primary osteoarthritis of both knees    4. Chronic midline low back pain with right-sided sciatica        Plan:  The following recommendations were given to the patient. Diagnosis, treatment options, risks, benefits, and alternatives were discussed, and all questions were answered. The patient expressed understanding of the plan for management.     I am recommending a multidisciplinary treatment plan to help this patient better manage her pain.  This includes:     Pain Physical Therapy:  YES   I am referring for targeting stretching, strengthening, and home exercise plan to support functional goals/ADLs.  If you have not been contacted to schedule within 2 business days, please call [phone] to make an appointment. Recommend scheduling with Nabor Petty PT.     Pain Psychologist to address relaxation, behavioral change, coping style, and other factors important to improvement.  NO - not at this time, may consider in future as needed/if indicated.     Diagnostic Studies:  Bilateral  knee xray 7/28/23 - degenerative/osteoarthritic changes noted, R>L.     Medication Management:   Not interested in medications at this point, will consider in future if indicated/as needed.     Potential procedures:   Recommend right genicular nerve block - orders placed to schedule with physician partner. Not candidate for knee replacement at this point, diminishing benefit with steroid and Synvisc injections.   Pending outcome from nerve block, may consider ablation if covered by insurance.     Other Orders/Referrals:   None     Follow up with TOY Lebron CNP in 2-4 weeks after genicular nerve block    Review of Electronic Chart: Today I have also reviewed available medical information in the patient's medical record at [facility] ([facility]) and Care Everywhere (if available), including relevant provider notes, laboratory work, and imaging.     Ligia Mcintyre DNP, APRN, AGNP-C  [facility]         -------------------------------------------------------------------    Subjective     Reason for consultation:    Shelia Sanchez is a 73 year old female who is seen in consultation today at the request of PCP, Dr. Hailee Montenegro, for evaluation of her pain issues and recommendations for management, with specific emphasis on  Right knee pain [M25.561]  - Primary      Reason for Referral:Comprehensive Pain Evaluation Are there any red flags that may impact the assessment or management of the patient?No Red Flags Provider, please review opioid agreement in the process instructions above. Do you agree to these terms?Yes Scheduling Instructions:[employer] Theodore will call you to coordinate care as prescribed your provider. If you don t hear from a representative within 2 business days, please call [phone]. Additional Information:Chronic right knee pain    Please see the [facility] [place] health questionnaire which the patient completed and reviewed with me in detail (if  available).     Review of [place] Prescription Monitoring Program (): No concern for abuse or misuse of controlled medications based on this report. Reviewed - gabapentin last in August, no CS rx [place] since then.     Review of Electronic Chart: Today I have also reviewed available medical information in the patient's medical record at [facility] ([facility]), including relevant provider notes, laboratory work, and imaging.     Pain medications are being prescribed by N/A.     Chief Complaint:    Chief Complaint   Patient presents with    Pain         HPI:     Shelia Sanchez is a 73 year old female presents with a chief complaint of right knee pain.     The pain has been present for about 4 years, went in for evaluation  .    The pain is Extreme Pain (9) in severity.    The pain is described as constant aching. Fluctuating intensity, intermittent sharp qualities, particularly with activities.   The pain is alleviated by stopping activity/rest.  Activity modification (lifts cat's litter box up to a higher level to clean it, increased pain if she tries to bend over).   It is exacerbated by walking, standing, very limited in activities. Shower, car rides (getting out of car),   Modalities that have been utilized in the past which were helpful include PT, ibuprofen (mixed benefit), steroid injections (initially helpful, but diminishing benefit over time), Synvisc (helped initially but then diminishing benefit).    Things that were not helpful, but tried ,include voltaren, ibuprofen (occasionally, marginal benefit with more severe pain).    The patient has never tried medical cannabis, pain PT, genicular nerve block, neuropathics/meds.   Shelia Sanchez has been seen at a pain clinic in the past.  She previously saw Dr. Magaly Vicente in the past, last seen 2019.     -She has eating disorder and depression.  -She reports right knee is bone on bone, left knee is getting to that point. She had right knee  steroid injection, most recently saw Dr. Fischer.   -She follows with [facility] counselor, also had an evaluation in weight management (recommended by Dr. Fischer). Grey helps with depression management.   -She was an  at [employer] for many years before [employer], very active and on her feet a lot.   -She states she joined "[employer] yesterday, has been on weight loss programs in the past, has reached goal weight x 3 rounds in the past.   -She states that depression really started to set in after her mother , the day before .   -She had a bad fall last year, states she was coming out of a restaurant, broke her glasses that cut her face with bruising all over.   -Reports sisters recently bought her motorized cart to be able to get out enjoy activities, art fair, concerns, states fair.   -She has two sisters, she is the middle child.         Pain Questionnaire    What number best describes your pain right now: 10  (0 = No pain to 10 = Worst pain imaginable)    How would you describe the pain? sharp    Which of the following worsen your pain? standing, walking, exercise    Which of the following improve or reduce your pain? nothing relieves the pain    What number best describes your average pain for the past week: 9  (0 = No pain to 10 = Worst pain imaginable)    What number best describes your LOWEST pain in past 24 hours: 8  (0 = No pain to 10 = Worst pain imaginable)    What number best describes your WORST pain in past 24 hours: 8  (0 = No pain to 10 = Worst pain imaginable)    When is your pain worst? Constant    What non-medicine treatments have you already had for your pain? physical therapy, other    Have you tried treating your pain with medication? Yes    If yes, please answer the below questions -     What topical medications have you tried in the past but are no longer taking? Diclofenac (Voltaren) gel    What anti-convulsants (seizure medicines) have you tried in the past  but are no longer taking? Gabapentin (Neurontin)    What anti-depressant medication have you tried in the past but are no longer taking? Bupropion (Wellbutrin), Venlafaxine (Effexor)    What sleep aid medications have you tried in the past but are no longer taking? None    What opioid medications have you tried in the past but are no longer taking? None    What NSAID medications have you tried in the past but are no longer taking? Ibuprofen (Advil, Motrin)    What muscle relaxer medications have you tried in the past but are no longer taking? None    What anti-migraine medications have you tried in the past but are no longer taking? None      Are you currently taking medications for your pain? Yes    If yes, please answer below -     During the past month, list all the medications that you have used for pain. Please list drug name, dose, and frequency taking: Advil 2 tablets twice a day, Voltraren - as needef        Current Pain Treatments:    Medications:    OTC medications PRN - occasional use, unclear degree of benefit.        2. Other therapies:    Pain PT - referred today    Right genicular nerve block - orders placed today        Current Outpatient Medications   Medication    amLODIPine (NORVASC) 5 MG tablet    aspirin (ASA) 81 MG chewable tablet    atorvastatin (LIPITOR) 20 MG tablet    B Complex-Biotin-FA (HM VITAMIN B100 COMPLEX) TABS    buPROPion (WELLBUTRIN XL) 150 MG 24 hr tablet    esomeprazole (NEXIUM) 20 MG DR capsule    gabapentin (NEURONTIN) 100 MG capsule    hydrochlorothiazide (HYDRODIURIL) 25 MG tablet    venlafaxine (EFFEXOR-XR) 75 MG 24 hr capsule    vitamin B complex with vitamin C (STRESS TAB) tablet    Vitamin D3 (CHOLECALCIFEROL) 125 MCG (5000 UT) tablet     Current Facility-Administered Medications   Medication    lidocaine (PF) (XYLOCAINE) 1 % injection 4 mL    lidocaine (PF) (XYLOCAINE) 1 % injection 4 mL    lidocaine (PF) (XYLOCAINE) 1 % injection 4 mL    lidocaine (PF) (XYLOCAINE) 1 %  injection 4 mL    lidocaine (PF) (XYLOCAINE) 1 % injection 4 mL    lidocaine (PF) (XYLOCAINE) 1 % injection 4 mL    triamcinolone (KENALOG-40) injection 40 mg    triamcinolone (KENALOG-40) injection 40 mg    triamcinolone (KENALOG-40) injection 40 mg    triamcinolone (KENALOG-40) injection 40 mg    triamcinolone (KENALOG-40) injection 40 mg    triamcinolone (KENALOG-40) injection 40 mg     Allergies   Allergen Reactions    Nitrofurantoin      Stomach pain    Topiramate Confusion      Past Medical History:   Diagnosis Date    Arthritis 01/28/2014    Bursitis in left hip    Asteatotic eczema     Cholelithiasis with obstruction 05/10/2010    Depressive disorder     Diabetes (H) Summer 2019    Pre-diabetes    Gallstone pancreatitis 05/11/2010    History of blood transfusion 40 years ago    Hypertension     Right knee pain 12/27/2012     Past Surgical History:   Procedure Laterality Date    CHOLECYSTECTOMY, LAPOROSCOPIC      Cholecystectomy, Laparoscopic    HEAD & NECK SURGERY      Treatment for arthritis in neck by deadening nerves    PHACOEMULSIFICATION CLEAR CORNEA WITH STANDARD INTRAOCULAR LENS IMPLANT Right 11/22/2022    Procedure: RIGHT PHACOEMULSIFICATION, CATARACT, WITH INTRAOCULAR LENS IMPLANT;  Surgeon: Trae Wade MD;  Location: [facility] OR    PHACOEMULSIFICATION CLEAR CORNEA WITH STANDARD INTRAOCULAR LENS IMPLANT Left 12/8/2022    Procedure: LEFT PHACOEMULSIFICATION, CATARACT, WITH INTRAOCULAR LENS IMPLANT;  Surgeon: Trae Wade MD;  Location: [facility] OR    ZZC VAG HYST,RMV TUBE/OVARY  2004    ZZHC ERCP W STENT PLACEMENT BILE/PANCREATIC DUCT       Family History   Problem Relation Age of Onset    Arthritis Mother     Congenital Anomalies Mother     Eye Disorder Mother     Glaucoma Mother     Hypertension Mother     Depression Mother     Obesity Mother     Hyperlipidemia Mother     Osteoporosis Mother     Congenital Anomalies Father     Alzheimer Disease Father     Arthritis Father      Diabetes Father     Hypertension Father     Cerebrovascular Disease Father         Father    Prostate Cancer Father     Obesity Father     Coronary Artery Disease Father     Hyperlipidemia Father     Heart Disease Maternal Grandmother     Heart Disease Maternal Grandfather     Hypertension Paternal Grandmother         Mother s side    Heart Disease Paternal Grandfather     Depression Sister     Hypertension Sister         Sister    Anxiety Disorder Sister     Macular Degeneration Maternal Uncle      Social History     Socioeconomic History    Marital status: Single   Tobacco Use    Smoking status: Never    Smokeless tobacco: Never   Vaping Use    Vaping Use: Never used   Substance and Sexual Activity    Alcohol use: Yes     Comment: Weekends mostly.    Drug use: No    Sexual activity: Not Currently     Partners: Male     Birth control/protection: Abstinence   Other Topics Concern     Service No    Blood Transfusions Yes     Comment: 1976    Caffeine Concern No    Occupational Exposure No    Hobby Hazards No    Sleep Concern No    Stress Concern No    Weight Concern Yes     Comment: OVERWEIGHT    Special Diet No    Back Care No    Exercise No    Seat Belt Yes    Self-Exams Yes    Parent/sibling w/ CABG, MI or angioplasty before 65F 55M? No   Social History Narrative     for 6 years in [facility] [employer]. Did not enjoy. Since worked for  for [employer] for 30 years. 2 older sisters and 1 younger sister. Not  with no children,. Has a cat (Mamie : 14). Enjoys reading, puzzles.     Social Determinants of Health     Financial Resource Strain: Low Risk  (12/25/2023)    Financial Resource Strain     Within the past 12 months, have you or your family members you live with been unable to get utilities (heat, electricity) when it was really needed?: No   Food Insecurity: Low Risk  (12/25/2023)    Food Insecurity     Within the past 12 months, did you worry that your food would run out before  you got money to buy more?: No     Within the past 12 months, did the food you bought just not last and you didn t have money to get more?: No   Transportation Needs: Low Risk  (2023)    Transportation Needs     Within the past 12 months, has lack of transportation kept you from medical appointments, getting your medicines, non-medical meetings or appointments, work, or from getting things that you need?: No   Housing Stability: Low Risk  (2023)    Housing Stability     Do you have housing? : Yes     Are you worried about losing your housing?: No      ROS: 10 point ROS neg other than the symptoms noted above in the HPI.      Objective      Diagnostic Testing - Imaging/Labs:    Labs:    BMP 23 - GFR 64  A1c 23 - 6.2    Imagin views right and left knee radiographs 2023 3:53 PM     History: Bilateral knee pain; Bilateral knee pain     Comparison: 2022     Findings:     AP view of bilateral knees, lateral and patellofemoral views of the  right and left knee were obtained.      Left: No acute osseous abnormality. Small joint effusion.     High-grade narrowing of the medial compartment, osteophytosis of the  patellofemoral and lateral compartments.     Mild patellar tilt or lateral subluxation.  Soft tissue is  unremarkable.     Right:  No acute osseous abnormality. Small joint effusion.     High-grade narrowing of the medial compartment, osteophytosis of the  lateral and patellofemoral compartments.     Mild patellar tilt or lateral subluxation.  Soft tissue is  unremarkable.                                                                      Impression:  1. No acute osseous abnormality.  2. Bilaterally, tricompartmental osteoarthritic changes most  significant in the medial compartment, more pronounced on the right  when compared to the left, which have progressed since the prior  examination.     JUTTA ELLERMANN, MD       Physical Exam  HENT:      Head: Normocephalic.    Pulmonary:      Effort: Pulmonary effort is normal.   Musculoskeletal:      Comments: Ambulates with cane, mobility limitations r/t knee pain.    Neurological:      Mental Status: She is alert and oriented to person, place, and time.      Comments: Antalgic gait.    Psychiatric:         Mood and Affect: Mood normal.           BILLING TIME DOCUMENTATION:   The total TIME spent on this patient on the date of the encounter/appointment was 87 minutes.      TOTAL TIME includes:   Time spent preparing to see the patient (reviewing records and tests)   Time spent face to face (or over the phone) with the patient   Time spent ordering tests, medications, procedures and referrals   Time spent Referring and communicating with other healthcare professionals   Time spent documenting clinical information in Epic

## 2024-01-19 ENCOUNTER — TELEPHONE (OUTPATIENT)
Dept: PALLIATIVE MEDICINE | Facility: CLINIC | Age: 74
End: 2024-01-19
Payer: COMMERCIAL

## 2024-01-19 DIAGNOSIS — G89.29 CHRONIC PAIN OF RIGHT KNEE: ICD-10-CM

## 2024-01-19 DIAGNOSIS — M17.11 OSTEOARTHRITIS OF RIGHT KNEE, UNSPECIFIED OSTEOARTHRITIS TYPE: Primary | ICD-10-CM

## 2024-01-19 DIAGNOSIS — M25.561 CHRONIC PAIN OF RIGHT KNEE: ICD-10-CM

## 2024-01-19 NOTE — TELEPHONE ENCOUNTER
"Screening Questions for Radiology Injections:    Injection to be done at which interventional clinic site? [facility] [place] [facility] [place]    If choosing [facility] for location, please inform patient:  \"[facility] is a Hospital based clinic. Before your visit, you should check with your insurance about how it covers the charges for facility services in a hospital-based clinic.     Procedure ordered by     Procedure ordered? right knee genicular nerve block   Transforaminal Cervical CATRACHO - Send to [facility] ([facility]) - No [facility] Site providers perform this procedure    What insurance would patient like us to bill for this procedure? Ucare & Medicare   IF SCHEDULING IN [place] PAIN OR SPINE PLEASE SCHEDULE AT LEAST 7-10 BUSINESS DAYS OUT SO A PA CAN BE OBTAINED  Worker's comp or MVA (motor vehicle accident) -Any injection DO NOT SCHEDULE and route to Arnulfo Milan.    [employer] insurance - For SI joint injections, DO NOT SCHEDULE and route to Romy Thao.     ALL BCBS, Humana and [employer] CIGNA - DO NOT SCHEDULE and route to Romy Thao  MEDICA- facet joint injections, [facility]    Is patient scheduled at [facility]?    If YES, route every encounter to [facility] SPINE CENTER CARE NAVIGATION POOL [[phone]]    Is an  needed? No     Patient has a  home? (Review Grid) YES: Informed     Any chance of pregnancy? Not Applicable   If YES, do NOT schedule and route to RN pool  - Dr. Groves route to Vicky Diallo and PM&R Nurse  [[id number]]      Is patient actively being treated for cancer or immunocompromised? No  If YES, do NOT schedule and route to RN pool/ Dr. Groves's Team    Does the patient have a bleeding or clotting disorder? No   If YES, okay to schedule AND route to RN nurse / Dr. Groves's Team   (For any patients with platelet count <100, RN must forward to provider)    Is patient taking any Blood Thinners OR Antiplatelet medication?  No   If hold needed, " do NOT schedule, route to RN pool/ Dr. Groves's Team  Examples:   Blood Thinners: (Coumadin, Warfarin, Jantoven, Pradaxa, Xarelto, Eliquis, Edoxaban, Enoxaparin, Lovenox, Heparin, Arixtra, Fondaparinux or Fragmin)  Antiplatelet Medications: (Plavix, Brilinta or Effient)     Is patient taking any aspirin products (includes Excedrin and Fiorinal)? No   If more than 325mg/day, OK to schedule; Instruct Pt to decrease to less than 325 mg for 7 days AND route to RN pool/ Dr. Groves's Team   For CERVICAL procedures, hold all aspirin products for 6 days.   Tell Pt that if aspirin product is not held for 6 days, the procedure WILL BE cancelled.     Any allergies to contrast dye, iodine, shellfish, or numbing and steroid medications? No  If YES, schedule and add allergy information to appointment notes AND route to the RN pool/ Dr. Groves's Team  If CATRACHO and Contrast Dye / Iodine Allergy? DO NOT SCHEDULE, route to RN pool/ Dr. Groves's Team  Allergies: Nitrofurantoin and Topiramate     Does patient have an active infection or treated for one within the past week? No  Is patient currently taking any antibiotics or steroid medications?  No   For patients on chronic, preventative, or prophylactic antibiotics, procedures may be scheduled.   For patients on antibiotics for active or recent infection, schedule 4 days after completed.  For patients on steroid medications, schedule 4 days after completed.     Has the patient had a flu shot or any other vaccinations within the past 7 days? No  If yes, explain that for the vaccine to work best they need to:     wait 1 week before and 1 week after getting any Vaccine  wait 1 week before and 2 weeks after getting any Covid Vaccine   If patient has concerns about the timing, send to RN pool/ Dr. Groves's Team    Does patient have an MRI/CT?  Not Applicable Include Date and Check Procedure Scheduling Grid to see if required.  Was the MRI/CT done within the last 3 years?  NA   If no route to  MARY S[place]th/ Dr. Groves's Team  If yes, where was the MRI/CT done?    Refer to PACS Transmissions list for approved external locations and route to RN Pool High Priority/ Dr. Roberts Team  If MRI was not done at approved external location do NOT schedule and route to RN pool/ Dr. Roberts Team    If patient has an imaging disc, the injection MAY be scheduled but patient must bring disc to appt or appt will be cancelled.    Is patient able to transfer to a procedure table with minimal or no assistance? Yes   If no, do NOT schedule and route to RN Pool/ Dr. Groves's Team    Procedure Specific Instructions:  If celiac plexus block, informed patient NPO for 6 hours and that it is okay to take medications with sips of water, especially blood pressure medications Not Applicable       If this is for a cervical procedure, informed patient that aspirin needs to be held for 6 days.   Not Applicable    Sedation, If Sedation is ordered for any procedure, patient must be NPO for 6 hours prior to procedure Not Applicable    If IV needed:  Do not schedule procedures requiring IV placement in the first appointment of the day or first appointment after lunch. Do NOT schedule at 0745, 0815 or 1245.   Instructed patient to arrive 30 minutes early for IV start if required. (Check Procedure Scheduling Grid)  Not Applicable    Reminders:  If you are started on any steroids or antibiotics between now and your appointment, you must contact us because the procedure may need to be cancelled.  Yes    As a reminder, receiving steroids can decrease your body's ability to fight infection.   Would you still like to move forward with scheduling the injection?  Yes    IV Sedation is not provided for procedures. If oral anti-anxiety medication is needed, the patient should request this from their referring provider.    Instruct patient to arrive as directed prior to the scheduled appointment time:  If IV needed 30 minutes before appointment time      For patients 85 or older we recommend having an adult stay w/ them for the remainder of the day.     If the patient is Diabetic, remind them to bring their glucometer.      Does the patient have any questions?  NO  Bailee Palacio  [facility]

## 2024-02-02 ENCOUNTER — THERAPY VISIT (OUTPATIENT)
Dept: PHYSICAL THERAPY | Facility: CLINIC | Age: 74
End: 2024-02-02
Payer: COMMERCIAL

## 2024-02-02 DIAGNOSIS — M79.2 NEUROPATHIC PAIN: ICD-10-CM

## 2024-02-02 DIAGNOSIS — M25.561 CHRONIC PAIN OF RIGHT KNEE: ICD-10-CM

## 2024-02-02 DIAGNOSIS — G89.29 CHRONIC MIDLINE LOW BACK PAIN WITH RIGHT-SIDED SCIATICA: ICD-10-CM

## 2024-02-02 DIAGNOSIS — M54.41 CHRONIC MIDLINE LOW BACK PAIN WITH RIGHT-SIDED SCIATICA: ICD-10-CM

## 2024-02-02 DIAGNOSIS — M17.0 PRIMARY OSTEOARTHRITIS OF BOTH KNEES: ICD-10-CM

## 2024-02-02 DIAGNOSIS — G89.29 CHRONIC PAIN OF RIGHT KNEE: ICD-10-CM

## 2024-02-02 PROCEDURE — 97112 NEUROMUSCULAR REEDUCATION: CPT | Mod: GP | Performed by: PHYSICAL THERAPIST

## 2024-02-02 PROCEDURE — 97161 PT EVAL LOW COMPLEX 20 MIN: CPT | Mod: GP | Performed by: PHYSICAL THERAPIST

## 2024-02-02 PROCEDURE — 97110 THERAPEUTIC EXERCISES: CPT | Mod: GP | Performed by: PHYSICAL THERAPIST

## 2024-02-02 NOTE — PROGRESS NOTES
PHYSICAL THERAPY EVALUATION  Type of Visit: Evaluation    See electronic medical record for Abuse and Falls Screening details.    Subjective       Presenting condition or subjective complaint: Chronic pain in right knee from arthritis. Last x-rays indicate joint is bone on bone.  Date of onset: 01/18/24    Relevant medical history: Arthritis; Depression; High blood pressure; Incontinence; Overweight; Sleep disorder like apnea   Dates & types of surgery: Hysterectomy, gall bladder removed    Prior diagnostic imaging/testing results: MRI; CT scan; X-ray     Prior therapy history for the same diagnosis, illness or injury: Yes ?      Living Environment  Social support: Alone   Type of home: Apartment/condo; 1 level   Stairs to enter the home: No       Ramp: Yes   Stairs inside the home: No       Help at home: None  Equipment owned: Straight Cane; Walker with wheels; Power wheelchair or scooter; Bedrail; Grab bars     Employment: No    Hobbies/Interests: Reading, art fairs, help sister at horse shows    Patient goals for therapy: I need to lose weight by diet and exercise. Exercise  is difficult due to the pain level. My balance needs work. I have lost my independence because of pain level when walking       Objective   KNEE EVALUATION  POSTURE: Standing Posture: Forward head, Thoracic kyphosis increased, L trunk lean in sitting and standing  GAIT:  Weightbearing Status: WBAT  Assistive Device(s): Cane (single end), has 4WW at home  Gait Deviations: Antalgic  Stride length decreased  Min trunk rotation, hip ext, toe off, knee flex  BALANCE/PROPRIOCEPTION: Single Leg Stance Eyes Open (seconds): unable on R or L  ROM:  R knee 0-0-70 degrees, L knee 0-0-85.  Lumbar flex 50%, ext none  STRENGTH:  poor quad set R>L,  weak hip abd B.  Unable to perform heel walk, toe walk    Assessment & Plan   CLINICAL IMPRESSIONS  Medical Diagnosis: R knee pain, LBP, neuropathic pain    Treatment Diagnosis: R knee pain, LBP, neuropathic  pain, weakness, gait abnormality   Impression/Assessment: Patient is a 73 year old female with chronic R>L knee pain, LBP, neuropathic pain complaints.  The following significant findings have been identified: Pain, Decreased ROM/flexibility, Decreased strength, Impaired gait, Impaired muscle performance, Decreased activity tolerance, and Impaired posture. These impairments interfere with their ability to perform self care tasks, recreational activities, household chores, household mobility, and community mobility as compared to previous level of function.     Clinical Decision Making (Complexity):  Clinical Presentation: Stable/Uncomplicated  Clinical Presentation Rationale: based on medical and personal factors listed in PT evaluation  Clinical Decision Making (Complexity): Moderate complexity    PLAN OF CARE  Treatment Interventions:  Interventions: Neuromuscular Re-education, Therapeutic Activity, Therapeutic Exercise, Self-Care/Home Management    Long Term Goals     PT Goal 1  Goal Identifier: ambulation  Goal Description: able to ambulate with approp AD x 15 minutes  Rationale: to maximize safety and independence with performance of ADLs and functional tasks;to maximize safety and independence within the home;to maximize safety and independence within the community;to maximize safety and independence with self cares  Target Date: 06/02/24      Frequency of Treatment: 1x wk x 8 wks, 2x month x 2 months  Duration of Treatment: 4 months  \  Education Assessment:        Risks and benefits of evaluation/treatment have been explained.   Patient/Family/caregiver agrees with Plan of Care.     Evaluation Time:     PT Eval, Low Complexity Minutes (08055): 35     Signing Clinician: Agnel Petty PT      [facility]                                                                                   OUTPATIENT PHYSICAL THERAPY      PLAN OF TREATMENT FOR OUTPATIENT REHABILITATION   Patient's Last  Name, First Name, JAZShelia Schaeffer YOB: 1950   Provider's Name   [facility]   Medical Record No.  [id number]     Onset Date: 01/18/24  Start of Care Date: 02/02/24     Medical Diagnosis:  R knee pain, LBP, neuropathic pain      PT Treatment Diagnosis:  R knee pain, LBP, neuropathic pain, weakness, gait abnormality Plan of Treatment  Frequency/Duration: 1x wk x 8 wks, 2x month x 2 months/ 4 months    Certification date from 02/02/24 to 05/01/24         See note for plan of treatment details and functional goals     Angel Petty, PT                         I CERTIFY THE NEED FOR THESE SERVICES FURNISHED UNDER        THIS PLAN OF TREATMENT AND WHILE UNDER MY CARE     (Physician attestation of this document indicates review and certification of the therapy plan).              Referring Provider:  Ligia Mcintyre    Initial Assessment  See Epic Evaluation- Start of Care Date: 02/02/24

## 2024-02-08 ENCOUNTER — RADIOLOGY INJECTION OFFICE VISIT (OUTPATIENT)
Dept: PALLIATIVE MEDICINE | Facility: CLINIC | Age: 74
End: 2024-02-08
Payer: COMMERCIAL

## 2024-02-08 DIAGNOSIS — G89.29 CHRONIC PAIN OF RIGHT KNEE: ICD-10-CM

## 2024-02-08 DIAGNOSIS — M25.561 CHRONIC PAIN OF RIGHT KNEE: ICD-10-CM

## 2024-02-08 DIAGNOSIS — M79.2 NEUROPATHIC PAIN: ICD-10-CM

## 2024-02-08 DIAGNOSIS — M17.11 OSTEOARTHRITIS OF RIGHT KNEE, UNSPECIFIED OSTEOARTHRITIS TYPE: ICD-10-CM

## 2024-02-08 PROCEDURE — 64454 NJX AA&/STRD GNCLR NRV BRNCH: CPT | Mod: RT | Performed by: PAIN MEDICINE

## 2024-02-08 ASSESSMENT — PAIN SCALES - GENERAL
PAINLEVEL: MILD PAIN (2)
PAINLEVEL: EXTREME PAIN (8)

## 2024-02-08 NOTE — NURSING NOTE
Discharge Information    IV Discontiued Time:  NA    Amount of Fluid Infused:  NA    Discharge Criteria = When patient returns to baseline or as per MD order    Consciousness:  Pt is fully awake    Circulation:  BP +/- 20% of pre-procedure level    Respiration:  Patient is able to breathe deeply    O2 Sat:  Patient is able to maintain O2 Sat >92% on room air    Activity:  Moves 4 extremities on command    Ambulation:  Patient is able to stand and walk or stand and pivot into wheelchair    Dressing:  Clean/dry or No Dressing    Notes:   Discharge instructions and AVS given to patient    Patient meets criteria for discharge?  YES    Admitted to PCU?  No    Responsible adult present to accompany patient home?  Yes    Signature/Title:    Mily Muñoz RN  RN Care Coordinator  [facility]

## 2024-02-08 NOTE — NURSING NOTE
Pre-procedure Intake  If YES to any questions or NO to having a   Please complete laminated checklist and leave on the computer keyboard for Provider, verbally inform provider if able.    For SCS Trial, RFA's or any sedation procedure:  Have you been fasting? NA  If yes, for how long?     Are you taking any any blood thinners such as Coumadin, Warfarin, Jantoven, Pradaxa Xarelto, Eliquis, Edoxaban, Enoxaparin, Lovenox, Heparin, Arixtra, Fondaparinux, or Fragmin? OR Antiplatelet medication such as Plavix, Brilinta, or Effient?   No   If yes, when did you take your last dose?     Do you take aspirin?  Yes -   ASA  If cervical procedure, have you held aspirin for 6 days?   NA    Do you have any allergies to contrast dye, iodine, steroid and/or numbing medications?  NO    Are you currently taking antibiotics or have an active infection?  NO    Have you had a fever/elevated temperature within the past week? NO    Are you currently taking oral steroids? NO    Do you have a ? Yes    Are you pregnant or breastfeeding?  NO    Have you received the COVID-19 vaccine? Yes  If yes, was it your 1st, 2nd or only dose needed? 2nd dose and booster  Date of most recent vaccine: 10/03/2023    Notify provider and RNs if systolic BP >170, diastolic BP >100, P >100 or O2 sats < 90%

## 2024-02-08 NOTE — PROGRESS NOTES
Pre procedure Diagnosis: right knee OA,  Post procedure Diagnosis: Same  Procedure performed: Right Genicular Nerve block, fluoroscopically guided  Indication: Therapeutic for pain  Anesthesia none  Complications: none  Operators: Janes Brennan MD     Indications:   Shelia Sanchez is a 73 year old year old female presents today for right genicular nerve block  They have a history of chronic knee pain with only partial response to prior steroid injections   Options/alternatives, benefits and risks were discussed with the patient including but not limited to infection, bleeding, nerve injury, radiation exposure, and lack of expected response. Questions were answered to their satisfaction and they agreed to proceed. Voluntary informed consent was obtained and signed.       Vitals were reviewed: Yes  BP (!) 162/82   Pulse 83   SpO2 100%   Allergies were reviewed: Yes   Medications were reviewed: Yes   Pre-procedure pain score: 8/10     Procedure:  After getting informed consent, patient was brought into the procedure suite and was placed in a prone position on the procedure table with a pillow beneath the right knee to place the knee in a flexed position. A Pause for the Cause was performed. Patient was prepped and draped in sterile fashion.       After identifying the right knee, the C-arm was positioned to provide a true AP view. The skin and subcutaneous tissue was anesthetized coaxial with the fluoroscopy beam at this location. Two 25gauge 3.5 cm RFA needles were advanced under intermittent fluoroscopy until they were felt to contact periostium, then advanced further along periostium until the needles were midline from anterior to posterior - targeting the superomedial and superolateral genicular nerves and the inferomedial genicular nerve as outlined below.      - Medial genicular nerve located at the medial aspect of the femur at the distal diaphysis (where the apophyseal head begins to widen) and  needle tip contacting the mid-position in the anterior/posterior dimension.       - Lateral genicular nerve located at the lateral aspect of the femur at the distal diaphysis, again with needle tip at the mid-position of the anterior/posterior dimension.       - Inferior genicular nerve located at the medial aspect of the proximal diaphysis of the tibia, again with the needle tip at the mid-position of anterior/posterior dimension.       Locations of the needles, at the mid-position in the anterior/posterior dimension were conformed with a True-lateral view.  A total of 1ml of Omnipaque was used 9 ml wasted.       Each location above was injected with 2ml of 0.25% Bupivicaine for local anesthetic block at the lesion sites prior to lesioning.          The needles were removed and hemostasis was confirmed. Band aids were applied where needed       The patient tolerated the procedure well, and was taken to the recovery room.   Images were saved to PACS.           Post-procedure pain score: 2/10 in the right knee  Follow-up includes:   Pending results      Janes Brennan MD  [facility]

## 2024-02-08 NOTE — PATIENT INSTRUCTIONS
[facility]   Medial Branch Block Discharge Instructions      Your procedure was performed by: Dr. Janes Brennan       Medications used include:  Lidocaine  Bupivicaine  Omnipaque  Omniscan  Ropivicaine Normal saline     You will need to complete the Pain Scale Log form and return it to us as soon as possible.  Once we have received the form, we will review it and call you to determine the next steps.     The form can be faxed to [phone] or mailed to:   [facility]   [id number]   [place] [address]    You may resume your regular medications  You may resume your regular activities  Be cautious with walking as numbness and/or weakness in the lower extremities may occur for up to 6-8 hours due to the effects of the anesthetic.  Avoid driving for 6 hours. The local anesthetic could slow your reflexes.   You may shower, however no swimming or tub baths or hot tubs for 24 hours following your procedure.  Your pain will return after the numbing medications have worn off.  You may use your current pain medications as needed.  Unless you have been directed to avoid the use of anti-inflammatory medications (NSAIDS), you may use medications such as ibuprofen, Aleve or Tylenol for pain control if needed.  Some people find it helpful to alternate ibuprofen and Tylenol every 3 hours for a couple of days.  You may use ice packs 10-15 minutes three to four times a day at the injection site for comfort.   Do not use heat to painful areas for 6 to 8 hours. This will give the local anesthetic time to wear off and prevent you from accidentally burning your skin.   If you experience any of the following, call the Pain Clinic during work hours (Monday through Friday 8 am-4:30 pm) at [phone] or the Provider Line after hours at [phone]:  -Fever over 100 degree F  -Swelling, bleeding, redness, drainage, warmth at the injection site  -Progressive weakness or  numbness in your legs   -If lumbar, call if you have a loss of bowel or bladder function  -Unusual new onset of pain that is not improving

## 2024-02-09 VITALS — SYSTOLIC BLOOD PRESSURE: 152 MMHG | DIASTOLIC BLOOD PRESSURE: 78 MMHG | OXYGEN SATURATION: 100 % | HEART RATE: 83 BPM

## 2024-02-13 ENCOUNTER — MYC MEDICAL ADVICE (OUTPATIENT)
Dept: PALLIATIVE MEDICINE | Facility: CLINIC | Age: 74
End: 2024-02-13
Payer: COMMERCIAL

## 2024-02-13 DIAGNOSIS — M17.0 PRIMARY OSTEOARTHRITIS OF BOTH KNEES: ICD-10-CM

## 2024-02-13 DIAGNOSIS — M25.561 CHRONIC PAIN OF RIGHT KNEE: Primary | ICD-10-CM

## 2024-02-13 DIAGNOSIS — G89.29 CHRONIC PAIN OF RIGHT KNEE: Primary | ICD-10-CM

## 2024-02-13 NOTE — TELEPHONE ENCOUNTER
Pt had a right genicular (knee) medial branch block # 1 on 2/8/24.  The post medial branch block form was received.    According to pain diary pt would like to proceed with medial branch block #2/RFA.  Max relief from block is:    Pt had significant on the day of the block. (80 to 100%)  Physical therapy:    Last done in?:  Has 4 currently scheduled.   How many sessions?: 0  Where was it  done?  Angel Petty, PT .       Routed to Romy to review. Does pt had enough relief insurance-wise to proceed to RFA/block #2?  Is PT required before proceeding? Pt has 4 PT visits schedules.      Beatris Cortes, RN-BSN  [facility]

## 2024-02-14 ENCOUNTER — THERAPY VISIT (OUTPATIENT)
Dept: PHYSICAL THERAPY | Facility: CLINIC | Age: 74
End: 2024-02-14
Payer: COMMERCIAL

## 2024-02-14 DIAGNOSIS — M17.0 PRIMARY OSTEOARTHRITIS OF BOTH KNEES: ICD-10-CM

## 2024-02-14 DIAGNOSIS — M79.2 NEUROPATHIC PAIN: Primary | ICD-10-CM

## 2024-02-14 PROCEDURE — 97112 NEUROMUSCULAR REEDUCATION: CPT | Mod: GP | Performed by: PHYSICAL THERAPIST

## 2024-02-14 PROCEDURE — 97110 THERAPEUTIC EXERCISES: CPT | Mod: GP | Performed by: PHYSICAL THERAPIST

## 2024-02-14 NOTE — TELEPHONE ENCOUNTER
[employer] Medicare products follow Medicare guidelines  Per Medicare medical policy-No PA required  RFA  Follows Medicare guidelines   Coverage Guidance-No PA required    Coverage Indications, Limitations, and/or Medical Necessity    Facet Joint Interventions generally consist of four types of procedures: Intraarticular (IA) Facet Joint Injections, Medial Branch Blocks (MBB), Radiofrequency Ablations (RFA) and Facet cyst rupture/aspiration:  Facet Joint Interventions are considered medically reasonable and necessary for the diagnosis and treatment of chronic pain in patients who meet ALL the following criteria:  Moderate to severe chronic neck or low back pain, predominantly axial, that causes functional deficit measured on pain or disability scale*  Pain present for minimum of 3 months with documented failure to respond to noninvasive conservative management (as tolerated)  Absence of untreated radiculopathy or neurogenic claudication (except for radiculopathy caused by facet joint synovial cyst)  There is no non-facet pathology per clinical assessment or radiology studies that could explain the source of the patient's pain, including but not limited to fracture, tumor, infection, or significant deformity.  Pain assessment must be performed and documented at baseline, after each diagnostic procedure using the same pain scale for each assessment. A disability scale must also be obtained at baseline to be used for functional assessment (if patient qualifies for treatment).    A. Diagnostic Facet Joint Procedures (IA or MBB):    The primary indication of a diagnostic facet joint procedure is to diagnose whether the patient has facet syndrome. Intraarticular (IA) facet block(s) are considered reasonable and necessary as a diagnostic test only if medial branch blocks (MMB) cannot be performed due to specific documented anatomic restrictions or there is an indication to proceed with therapeutic intraarticular injections.  These restrictions must be clearly documented in the medical record and made available upon request.  Diagnostic procedures should be performed with the intent that if successful, radiofrequency ablation (RFA) procedure would be considered the primary treatment goal at the diagnosed level(s).  A second diagnostic facet procedure is considered medically necessary to confirm validity of the initial diagnostic facet procedure when administered at the same level. The second diagnostic procedure may only be performed a minimum of 2 weeks after the initial diagnostic procedure. Clinical circumstances that necessitate an exception to the two-week duration may be considered on an individual basis and must be clearly documented in the medical record.  For the first diagnostic facet joint procedure:    For the first diagnostic facet joint procedure to be considered medically reasonable and necessary, the patient must meet the criteria outlined under indications for facet joint interventions.  A second confirmatory diagnostic facet joint procedure is considered medically reasonable and necessary in patients who meet ALL the following criteria:  The patient meets the criteria for the first diagnostic procedure; AND  After the first diagnostic facet joint procedure, there must be a consistent positive response of at least 80% relief of primary (index) pain (with the duration of relief being consistent with the agent used)  Frequency limitation: For each covered spinal region no more than four (4) diagnostic joint sessions will be reimbursed per rolling 12 months, in recognition that the pain generator cannot always be identified with the initial and confirmatory diagnostic procedure.         C. Facet Joint Denervation:    The thermal radiofrequency destruction of cervical, thoracic, or lumbar paravertebral facet joint (medial branch) nerves are considered medically reasonable and necessary for patients who meet ALL the following  criteria:  Initial thermal RFA:  After the patient has had at least two (2) medically reasonable and necessary diagnostic MBBs, with each one providing a consistent minimum of 80% sustained relief of primary (index) pain (with the duration of relief being consistent with the agent used) AND  Repeat thermal facet joint RFA at the same anatomic site is considered medically reasonable and necessary provided the patient had a minimum of consistent 50% improvement in pain for at least six (6) months or at least 50% consistent improvement in the ability to perform previously painful movements and ADLs as compared to baseline measurement using the same scale;  Frequency Limitation: For each covered spinal region no more than two (2) radiofrequency sessions will be reimbursed per rolling 12 months.            Okay to schedule MBB #2        Romy Huff   [facility]

## 2024-02-15 NOTE — TELEPHONE ENCOUNTER
"Screening Questions for Radiology Injections:    Injection to be done at which interventional clinic site? [facility] [place]    If choosing [facility] for location, please inform patient:  \"[facility] is a Hospital based clinic. Before your visit, you should check with your insurance about how it covers the charges for facility services in a hospital-based clinic.     Procedure ordered by     Procedure ordered? right knee genicular nerve block   Transforaminal Cervical CATRACHO - Send to [facility] ([facility]) - No [facility] providers perform this procedure    What insurance would patient like us to bill for this procedure? Ucare & Medicare   IF SCHEDULING IN [place] PAIN OR SPINE PLEASE SCHEDULE AT LEAST 7-10 BUSINESS DAYS OUT SO A PA CAN BE OBTAINED  Worker's comp or MVA (motor vehicle accident) -Any injection DO NOT SCHEDULE and route to Arnulfo Milan.    [employer] insurance - For SI joint injections, DO NOT SCHEDULE and route to Romy Thao.     ALL BCBS, [employer] and [employer] C[employer] - DO NOT SCHEDULE and route to Romy Thao  MEDICA- facet joint injections, [facility]    Is patient scheduled at [facility]?    If YES, route every encounter to [facility] CARE NAVIGATION POOL [[phone]]    Is an  needed? No     Patient has a  home? (Review Grid) YES: Informed     Any chance of pregnancy? Not Applicable   If YES, do NOT schedule and route to RN pool  - Dr. Groves route to Vicky Diallo and PM&R Nurse  [[id number]]      Is patient actively being treated for cancer or immunocompromised? No  If YES, do NOT schedule and route to RN pool/ Dr. Groves's Team    Does the patient have a bleeding or clotting disorder? No   If YES, okay to schedule AND route to RN nurse / Dr. Groves's Team   (For any patients with platelet count <100, RN must forward to provider)    Is patient taking any Blood Thinners OR Antiplatelet medication?  No   If hold needed, " do NOT schedule, route to RN pool/ Dr. Groves's Team  Examples:   Blood Thinners: (Coumadin, Warfarin, Jantoven, Pradaxa, Xarelto, Eliquis, Edoxaban, Enoxaparin, Lovenox, Heparin, Arixtra, Fondaparinux or Fragmin)  Antiplatelet Medications: (Plavix, Brilinta or Effient)     Is patient taking any aspirin products (includes Excedrin and Fiorinal)? No   If more than 325mg/day, OK to schedule; Instruct Pt to decrease to less than 325 mg for 7 days AND route to RN pool/ Dr. Groves's Team   For CERVICAL procedures, hold all aspirin products for 6 days.   Tell Pt that if aspirin product is not held for 6 days, the procedure WILL BE cancelled.     Any allergies to contrast dye, iodine, shellfish, or numbing and steroid medications? No  If YES, schedule and add allergy information to appointment notes AND route to the RN pool/ Dr. Groves's Team  If CATRACHO and Contrast Dye / Iodine Allergy? DO NOT SCHEDULE, route to RN pool/ Dr. Groves's Team  Allergies: Nitrofurantoin and Topiramate     Does patient have an active infection or treated for one within the past week? No  Is patient currently taking any antibiotics or steroid medications?  No   For patients on chronic, preventative, or prophylactic antibiotics, procedures may be scheduled.   For patients on antibiotics for active or recent infection, schedule 4 days after completed.  For patients on steroid medications, schedule 4 days after completed.     Has the patient had a flu shot or any other vaccinations within the past 7 days? No  If yes, explain that for the vaccine to work best they need to:     wait 1 week before and 1 week after getting any Vaccine  wait 1 week before and 2 weeks after getting any Covid Vaccine   If patient has concerns about the timing, send to RN pool/ Dr. Groves's Team    Does patient have an MRI/CT?  Not Applicable Include Date and Check Procedure Scheduling Grid to see if required.  Was the MRI/CT done within the last 3 years?  NA   If no route to  MARY Smyth/ Dr. Groves's Team  If yes, where was the MRI/CT done?    Refer to PACS Transmissions list for approved external locations and route to RN Pool High Priority/ Dr. Roberts Team  If MRI was not done at approved external location do NOT schedule and route to RN pool/ Dr. Roberts Team    If patient has an imaging disc, the injection MAY be scheduled but patient must bring disc to appt or appt will be cancelled.    Is patient able to transfer to a procedure table with minimal or no assistance? Yes   If no, do NOT schedule and route to RN Pool/ Dr. Groves's Team    Procedure Specific Instructions:  If celiac plexus block, informed patient NPO for 6 hours and that it is okay to take medications with sips of water, especially blood pressure medications Not Applicable       If this is for a cervical procedure, informed patient that aspirin needs to be held for 6 days.   Not Applicable    Sedation, If Sedation is ordered for any procedure, patient must be NPO for 6 hours prior to procedure Not Applicable    If IV needed:  Do not schedule procedures requiring IV placement in the first appointment of the day or first appointment after lunch. Do NOT schedule at 0745, 0815 or 1245.   Instructed patient to arrive 30 minutes early for IV start if required. (Check Procedure Scheduling Grid)  Not Applicable    Reminders:  If you are started on any steroids or antibiotics between now and your appointment, you must contact us because the procedure may need to be cancelled.  Yes    As a reminder, receiving steroids can decrease your body's ability to fight infection.   Would you still like to move forward with scheduling the injection?  Yes    IV Sedation is not provided for procedures. If oral anti-anxiety medication is needed, the patient should request this from their referring provider.    Instruct patient to arrive as directed prior to the scheduled appointment time:  If IV needed 30 minutes before appointment time      For patients 85 or older we recommend having an adult stay w/ them for the remainder of the day.     If the patient is Diabetic, remind them to bring their glucometer.      Does the patient have any questions?  NO  Bailee Palacio  [facility]

## 2024-02-15 NOTE — TELEPHONE ENCOUNTER
"Screening Questions for Radiology Injections:    Injection to be done at which interventional clinic site? [facility] [place]    If choosing [facility] for location, please inform patient:  \"[facility] is a Hospital based clinic. Before your visit, you should check with your insurance about how it covers the charges for facility services in a hospital-based clinic.     Procedure ordered by Francisco Javier    Procedure ordered? MBB #2  Transforaminal Cervical CATRACHO - Send to [facility] - No [facility] providers perform this procedure    What insurance would patient like us to bill for this procedure? Ucare & Medicare   IF SCHEDULING IN [place] PAIN OR SPINE PLEASE SCHEDULE AT LEAST 7-10 BUSINESS DAYS OUT SO A PA CAN BE OBTAINED  Worker's comp or MVA (motor vehicle accident) -Any injection DO NOT SCHEDULE and route to Arnulfo Milan.    [employer] insurance - For SI joint injections, DO NOT SCHEDULE and route to Romy Thao.     ALL BCBS, Humana and [employer] CIG[employer] - DO NOT SCHEDULE and route to Romy Thao  MEDICA- facet joint injections, route to [facility]    Is patient scheduled at [facility]?    If YES, route every encounter to [facility] CARE NAVIGATION POOL [[phone]]    Is an  needed? No     Patient has a  home? (Review Grid) YES: Informed     Any chance of pregnancy? Not Applicable   If YES, do NOT schedule and route to RN pool  - Dr. Groves route to Vicky Diallo and PM&R Nurse  [[id number]]      Is patient actively being treated for cancer or immunocompromised? No  If YES, do NOT schedule and route to RN pool/ Dr. Groves's Team    Does the patient have a bleeding or clotting disorder? No   If YES, okay to schedule AND route to RN nurse / Dr. Groves's Team   (For any patients with platelet count <100, RN must forward to provider)    Is patient taking any Blood Thinners OR Antiplatelet medication?  No   If hold needed, do NOT schedule, " route to MARY kennedy/ Dr. Groves's Team  Examples:   Blood Thinners: (Coumadin, Warfarin, Jantoven, Pradaxa, Xarelto, Eliquis, Edoxaban, Enoxaparin, Lovenox, Heparin, Arixtra, Fondaparinux or Fragmin)  Antiplatelet Medications: (Plavix, Brilinta or Effient)     Is patient taking any aspirin products (includes Excedrin and Fiorinal)? No   If more than 325mg/day, OK to schedule; Instruct Pt to decrease to less than 325 mg for 7 days AND route to RN pool/ Dr. Groves's Team   For CERVICAL procedures, hold all aspirin products for 6 days.   Tell Pt that if aspirin product is not held for 6 days, the procedure WILL BE cancelled.     Any allergies to contrast dye, iodine, shellfish, or numbing and steroid medications? No  If YES, schedule and add allergy information to appointment notes AND route to the RN pool/ Dr. Groves's Team  If CATRACHO and Contrast Dye / Iodine Allergy? DO NOT SCHEDULE, route to RN pool/ Dr. Groves's Team  Allergies: Nitrofurantoin and Topiramate     Does patient have an active infection or treated for one within the past week? No  Is patient currently taking any antibiotics or steroid medications?  No   For patients on chronic, preventative, or prophylactic antibiotics, procedures may be scheduled.   For patients on antibiotics for active or recent infection, schedule 4 days after completed.  For patients on steroid medications, schedule 4 days after completed.     Has the patient had a flu shot or any other vaccinations within the past 7 days? No  If yes, explain that for the vaccine to work best they need to:     wait 1 week before and 1 week after getting any Vaccine  wait 1 week before and 2 weeks after getting any Covid Vaccine   If patient has concerns about the timing, send to RN pool/ Dr. Groves's Team    Does patient have an MRI/CT?  Not Applicable Include Date and Check Procedure Scheduling Grid to see if required.  Was the MRI/CT done within the last 3 years?  NA   If no route to RN Pool/   Germain's Team  If yes, where was the MRI/CT done?    Refer to PACS Transmissions list for approved external locations and route to RN Pool High Priority/ Dr. Roberts Team  If MRI was not done at approved external location do NOT schedule and route to  pool/ Dr. Roberts Team    If patient has an imaging disc, the injection MAY be scheduled but patient must bring disc to appt or appt will be cancelled.    Is patient able to transfer to a procedure table with minimal or no assistance? Yes   If no, do NOT schedule and route to RN Pool/ Dr. Groves's Team    Procedure Specific Instructions:  If celiac plexus block, informed patient NPO for 6 hours and that it is okay to take medications with sips of water, especially blood pressure medications Not Applicable       If this is for a cervical procedure, informed patient that aspirin needs to be held for 6 days.   Not Applicable    Sedation, If Sedation is ordered for any procedure, patient must be NPO for 6 hours prior to procedure Not Applicable    If IV needed:  Do not schedule procedures requiring IV placement in the first appointment of the day or first appointment after lunch. Do NOT schedule at 0745, 0815 or 1245.   Instructed patient to arrive 30 minutes early for IV start if required. (Check Procedure Scheduling Grid)  Not Applicable    Reminders:  If you are started on any steroids or antibiotics between now and your appointment, you must contact us because the procedure may need to be cancelled.  Yes    As a reminder, receiving steroids can decrease your body's ability to fight infection.   Would you still like to move forward with scheduling the injection?  Yes    IV Sedation is not provided for procedures. If oral anti-anxiety medication is needed, the patient should request this from their referring provider.    Instruct patient to arrive as directed prior to the scheduled appointment time:  If IV needed 30 minutes before appointment time     For patients 85  or older we recommend having an adult stay w/ them for the remainder of the day.     If the patient is Diabetic, remind them to bring their glucometer.      Does the patient have any questions?  NO  Arnulfo Bjorn  Complex   [facility]  Pain Management

## 2024-02-19 ENCOUNTER — THERAPY VISIT (OUTPATIENT)
Dept: PHYSICAL THERAPY | Facility: CLINIC | Age: 74
End: 2024-02-19
Payer: COMMERCIAL

## 2024-02-19 DIAGNOSIS — G89.29 CHRONIC PAIN OF RIGHT KNEE: Primary | ICD-10-CM

## 2024-02-19 DIAGNOSIS — M25.561 CHRONIC PAIN OF RIGHT KNEE: Primary | ICD-10-CM

## 2024-02-19 PROCEDURE — 97112 NEUROMUSCULAR REEDUCATION: CPT | Mod: 59 | Performed by: PHYSICAL THERAPIST

## 2024-02-19 PROCEDURE — 97530 THERAPEUTIC ACTIVITIES: CPT | Mod: GP | Performed by: PHYSICAL THERAPIST

## 2024-02-19 PROCEDURE — 97110 THERAPEUTIC EXERCISES: CPT | Mod: 59 | Performed by: PHYSICAL THERAPIST

## 2024-02-26 ENCOUNTER — THERAPY VISIT (OUTPATIENT)
Dept: PHYSICAL THERAPY | Facility: CLINIC | Age: 74
End: 2024-02-26
Payer: COMMERCIAL

## 2024-02-26 DIAGNOSIS — G89.29 CHRONIC PAIN OF RIGHT KNEE: Primary | ICD-10-CM

## 2024-02-26 DIAGNOSIS — M25.561 CHRONIC PAIN OF RIGHT KNEE: Primary | ICD-10-CM

## 2024-02-26 PROCEDURE — 97112 NEUROMUSCULAR REEDUCATION: CPT | Mod: GP | Performed by: PHYSICAL THERAPIST

## 2024-02-26 PROCEDURE — 97110 THERAPEUTIC EXERCISES: CPT | Mod: GP | Performed by: PHYSICAL THERAPIST

## 2024-03-05 ENCOUNTER — RADIOLOGY INJECTION OFFICE VISIT (OUTPATIENT)
Dept: PALLIATIVE MEDICINE | Facility: CLINIC | Age: 74
End: 2024-03-05
Payer: COMMERCIAL

## 2024-03-05 VITALS — DIASTOLIC BLOOD PRESSURE: 82 MMHG | SYSTOLIC BLOOD PRESSURE: 142 MMHG | HEART RATE: 108 BPM

## 2024-03-05 DIAGNOSIS — M17.0 PRIMARY OSTEOARTHRITIS OF BOTH KNEES: ICD-10-CM

## 2024-03-05 DIAGNOSIS — M25.561 CHRONIC PAIN OF RIGHT KNEE: ICD-10-CM

## 2024-03-05 DIAGNOSIS — G89.29 CHRONIC PAIN OF RIGHT KNEE: ICD-10-CM

## 2024-03-05 ASSESSMENT — PAIN SCALES - GENERAL: PAINLEVEL: SEVERE PAIN (7)

## 2024-03-05 NOTE — TELEPHONE ENCOUNTER
Pt was scheduled in error for block #2.  Pt upset that this occurred.  Pt to proceed to genicular radiofrequency ablation as block #2 isn't needed.    Dr. Brennan is the 3/22/24 date an option?    Beat[place], RN-BSN  [facility]   [phone]

## 2024-03-05 NOTE — PATIENT INSTRUCTIONS
The injection for today is to be rescheduled.     At this time, 3/22/24 may be an option.  You will be called this week w/ more detail.    [phone].

## 2024-03-05 NOTE — NURSING NOTE
Pre-procedure Intake  If YES to any questions or NO to having a   Please complete laminated checklist and leave on the computer keyboard for Provider, verbally inform provider if able.    For SCS Trial, RFA's or any sedation procedure:  Have you been fasting? No   If yes, for how long?     Are you taking any any blood thinners such as Coumadin, Warfarin, Jantoven, Pradaxa Xarelto, Eliquis, Edoxaban, Enoxaparin, Lovenox, Heparin, Arixtra, Fondaparinux, or Fragmin? OR Antiplatelet medication such as Plavix, Brilinta, or Effient?   No   If yes, when did you take your last dose?     Do you take aspirin?  Yes   If cervical procedure, have you held aspirin for 6 days?   No     Do you have any allergies to contrast dye, iodine, steroid and/or numbing medications?  NO    Are you currently taking antibiotics or have an active infection?  NO    Have you had a fever/elevated temperature within the past week? NO    Are you currently taking oral steroids? NO    Do you have a ? Yes    Are you pregnant or breastfeeding?  NO    Have you received the COVID-19 vaccine? Yes  If yes, was it your 1st, 2nd or only dose needed?   Date of most recent vaccine: 10/03/23    Notify provider and RNs if systolic BP >170, diastolic BP >100, P >100 or O2 sats < 90%

## 2024-03-06 NOTE — TELEPHONE ENCOUNTER
Called pt.       Injection scheduled for:  3/22/24  Injection intake questions reviewed?  YES  Infection/antibiotic information reviewed?  YES  Location confirmed: :Yes [facility]  Is pt arriving early?:Yes [id number]  Instructions sent to pt via [employer].    MARY Spear-BSN  [facility]   [phone]    AUDRA SpaerN  [facility]   [phone]

## 2024-03-18 ENCOUNTER — THERAPY VISIT (OUTPATIENT)
Dept: PHYSICAL THERAPY | Facility: CLINIC | Age: 74
End: 2024-03-18
Payer: COMMERCIAL

## 2024-03-18 DIAGNOSIS — G89.29 CHRONIC PAIN OF RIGHT KNEE: Primary | ICD-10-CM

## 2024-03-18 DIAGNOSIS — M25.561 CHRONIC PAIN OF RIGHT KNEE: Primary | ICD-10-CM

## 2024-03-18 PROCEDURE — 97110 THERAPEUTIC EXERCISES: CPT | Mod: GP | Performed by: PHYSICAL THERAPIST

## 2024-03-18 PROCEDURE — 97112 NEUROMUSCULAR REEDUCATION: CPT | Mod: GP | Performed by: PHYSICAL THERAPIST

## 2024-03-22 ENCOUNTER — RADIOLOGY INJECTION OFFICE VISIT (OUTPATIENT)
Dept: PALLIATIVE MEDICINE | Facility: CLINIC | Age: 74
End: 2024-03-22
Payer: COMMERCIAL

## 2024-03-22 VITALS — DIASTOLIC BLOOD PRESSURE: 78 MMHG | HEART RATE: 92 BPM | SYSTOLIC BLOOD PRESSURE: 154 MMHG | OXYGEN SATURATION: 99 %

## 2024-03-22 DIAGNOSIS — G89.18 POST PROCEDURE DISCOMFORT: Primary | ICD-10-CM

## 2024-03-22 DIAGNOSIS — M17.0 PRIMARY OSTEOARTHRITIS OF BOTH KNEES: ICD-10-CM

## 2024-03-22 DIAGNOSIS — M25.561 CHRONIC PAIN OF RIGHT KNEE: ICD-10-CM

## 2024-03-22 DIAGNOSIS — G89.29 CHRONIC PAIN OF RIGHT KNEE: ICD-10-CM

## 2024-03-22 PROCEDURE — 99153 MOD SED SAME PHYS/QHP EA: CPT | Performed by: PAIN MEDICINE

## 2024-03-22 PROCEDURE — 64624 DSTRJ NULYT AGT GNCLR NRV: CPT | Mod: RT | Performed by: PAIN MEDICINE

## 2024-03-22 PROCEDURE — 99152 MOD SED SAME PHYS/QHP 5/>YRS: CPT | Performed by: PAIN MEDICINE

## 2024-03-22 RX ORDER — FENTANYL CITRATE 50 UG/ML
12.5-5 INJECTION, SOLUTION INTRAMUSCULAR; INTRAVENOUS EVERY 5 MIN PRN
Status: ACTIVE | OUTPATIENT
Start: 2024-03-22 | End: 2024-03-23

## 2024-03-22 RX ORDER — OXYCODONE HYDROCHLORIDE 5 MG/1
5 TABLET ORAL EVERY 12 HOURS PRN
Qty: 6 TABLET | Refills: 0 | Status: SHIPPED | OUTPATIENT
Start: 2024-03-22 | End: 2024-03-25

## 2024-03-22 RX ADMIN — FENTANYL CITRATE 25 MCG: 50 INJECTION, SOLUTION INTRAMUSCULAR; INTRAVENOUS at 10:32

## 2024-03-22 RX ADMIN — FENTANYL CITRATE 25 MCG: 50 INJECTION, SOLUTION INTRAMUSCULAR; INTRAVENOUS at 10:14

## 2024-03-22 ASSESSMENT — PAIN SCALES - GENERAL
PAINLEVEL: MODERATE PAIN (4)
PAINLEVEL: EXTREME PAIN (9)

## 2024-03-22 NOTE — PATIENT INSTRUCTIONS
[facility]   Radiofrequency Ablation (RFA) Discharge Instructions     Today you saw:    Dr. Janes Brennan    You should anticipate procedural pain for up to 2 weeks.   You may receive a prescription for pain medication and you should take this as directed.   It may take up to 8 weeks to receive relief from the RFA   Follow up with your provider in 6 weeks.   If you received sedation before, during or after your procedure, for the next 24 hours you shall NOT:    -Drive    -Operate machinery    -Drink alcohol    -Sign any legal documents   You may resume your normal diet   You may resume your regular medications after the procedure   If you were holding your blood thinning medication, please restart taking it: N/A  Be cautious with walking. Numbness and/or weakness in the lower extremities may occur for up to 6-8 hours due to effect of local anesthetic  Avoid strenuous activity for the first 24 hours   You may resume your regular activities after 24 hours   You may shower, however no swimming, tub baths or hot tubs for 24 hours following your procedure   You may use ice packs 10-15 minutes three to four times a day at the injection site for comfort   Do not use heat to painful areas for 6 to 8 hours. This will give the local anesthetic time to wear off and prevent you from accidentally burning your skin.   Unless you have been directed to avoid the use of anti-inflammatory medications (NSAIDS), you may use medications such as ibuprofen, Aleve or Tylenol for pain control if needed.   If you experience any of the following, call the Pain Clinic during work hours (Monday through Friday 8 am-4:30 pm) at [phone] or the Provider Line after hours at [phone]:   -Fever over 100 degree F    -Swelling, bleeding, redness, drainage, warmth at the injection site    -Progressive weakness or numbness in your leg    -Loss of bowel or bladder function    -Unusual new onset of pain that is not  improving

## 2024-03-22 NOTE — NURSING NOTE
22 gauge Peripheral IV inserted into right anticubital - attempts: 1    Mily Muñoz RN  [facility] [place] [facility] [place]  [phone]

## 2024-03-22 NOTE — NURSING NOTE
MD Time IN: 1000   Sedation start time:  1006  Sedation end time:  1040    Medications given: fentanyl 50 mcg IV; versed 1 mg IV; toradol 0 mg IV  Intravenous fluids were administered, normal saline 200 cc's.  Sedation Level Achieved:  Moderate (conscious) sedation    Mily Muñoz RN  [facility]  [phone]

## 2024-03-22 NOTE — NURSING NOTE
Pre-procedure Intake  If YES to any questions or NO to having a   Please complete laminated checklist and leave on the computer keyboard for Provider, verbally inform provider if able.    For SCS Trial, RFA's or any sedation procedure:  Have you been fasting? Yes  If yes, for how long? More than 6 hours    Are you taking any any blood thinners such as Coumadin, Warfarin, Jantoven, Pradaxa Xarelto, Eliquis, Edoxaban, Enoxaparin, Lovenox, Heparin, Arixtra, Fondaparinux, or Fragmin? OR Antiplatelet medication such as Plavix, Brilinta, or Effient?   No   If yes, when did you take your last dose? NA    Do you take aspirin?  Yes -   ASA  If cervical procedure, have you held aspirin for 6 days?   NA    Do you have any allergies to contrast dye, iodine, steroid and/or numbing medications?  NO    Are you currently taking antibiotics or have an active infection?  NO    Have you had a fever/elevated temperature within the past week? NO    Are you currently taking oral steroids? NO    Do you have a ? Yes    Are you pregnant or breastfeeding?  NO    Have you received the COVID-19 vaccine? Yes  If yes, was it your 1st, 2nd or only dose needed? NA  Date of most recent vaccine: 10/03/23    Notify provider and RNs if systolic BP >170, diastolic BP >100, P >100 or O2 sats < 90%    Mary Bagley MA

## 2024-03-22 NOTE — PROGRESS NOTES
Pre procedure Diagnosis: right knee OA, chronic right knee pain Post procedure Diagnosis: Same  Procedure performed: right Genicular Nerve Radiofrequency Ablation, fluoroscopically guided  Indication: Therapeutic for pain  Anesthesia: minimal sedation       MD Time IN: 1000   Sedation start time:  1006  Sedation end time:  1040     Medications given: fentanyl 50 mcg IV; versed 1 mg IV; toradol 0 mg IV  Intravenous fluids were administered, normal saline 200 cc's.        Complications: none  Operators: Janes Brennan MD     Indications:   Shelia Sanchez is a 73 year old year old female presents today for right genicular nerve radiofrequency ablation.  They have a history of chronic knee pain with only partial response to prior steroid injections, and good temporary response to diagnostic Genicular Nerve blocks.  Other conservative treatments include meds/pt.      Options/alternatives, benefits and risks were discussed with the patient including but not limited to infection, bleeding, nerve injury, radiation exposure, and lack of expected response. Questions were answered to their satisfaction and they agreed to proceed. Voluntary informed consent was obtained and signed.       Vitals were reviewed: Yes  BP (!) 183/96   Pulse 93   SpO2 100%   Allergies were reviewed: Yes   Medications were reviewed: Yes   Pre-procedure pain score: 4/10     Procedure:  After getting informed consent, patient was brought into the procedure suite and was placed in a prone position on the procedure table with a pillow beneath the right knee to place the knee in a flexed position. A Pause for the Cause was performed. Patient was prepped and draped in sterile fashion.       After identifying the right knee, the C-arm was positioned to provide a true AP view. The skin and subcutaneous tissue was anesthetized coaxial with the fluoroscopy beam at this location. one 20 gauge 10 cm RFA needles were advanced under intermittent  fluoroscopy until they were felt to contact periostium, then advanced further along periostium until the needles were midline from anterior to posterior - targeting the superomedial and superolateral genicular nerves and the inferomedial genicular nerve as outlined below,     - Medial genicular nerve located at the medial aspect of the femur at the distal diaphysis (where the apophyseal head begins to widen) and needle tip contacting the mid-position in the anterior/posterior dimension.       - Lateral genicular nerve located at the lateral aspect of the femur at the distal diaphysis, again with needle tip at the mid-position of the anterior/posterior dimension.       - Inferior genicular nerve located at the medial aspect of the proximal diaphysis of the tibia, again with the needle tip at the mid-position of anterior/posterior dimension.       Locations of the needles, at the mid-position in the anterior/posterior dimension were conformed with a True-lateral view.      Each location above was injected with 1ml of 0.25% Bupivicaine for local anesthetic block at the lesion sites prior to lesioning.      Then, the pt RFA probes were inserted into the needles and they were used to create an RFA lesion for 90 seconds at 80 degrees Celsius. The needles were withdrawn approximately 5mm and another RFA lesion was created again for 90 seconds at 80 degrees Celsius.      The RFA needles were then removed and hemostasis achieved.           The needles were removed and hemostasis was confirmed. Band aids were applied where needed       The patient tolerated the procedure well, and was taken to the recovery room.   Images were saved to PACS.       Post-procedure pain score: 4/10 Follow-up includes:   - post RFA f/u in 6- 8 weeks with provider      Janes Brennan MD  [facility] [place]

## 2024-04-02 ENCOUNTER — THERAPY VISIT (OUTPATIENT)
Dept: PHYSICAL THERAPY | Facility: CLINIC | Age: 74
End: 2024-04-02
Payer: COMMERCIAL

## 2024-04-02 DIAGNOSIS — M25.561 CHRONIC PAIN OF RIGHT KNEE: Primary | ICD-10-CM

## 2024-04-02 DIAGNOSIS — G89.29 CHRONIC PAIN OF RIGHT KNEE: Primary | ICD-10-CM

## 2024-04-02 PROCEDURE — 97112 NEUROMUSCULAR REEDUCATION: CPT | Mod: GP | Performed by: PHYSICAL THERAPIST

## 2024-04-02 PROCEDURE — 97110 THERAPEUTIC EXERCISES: CPT | Mod: GP | Performed by: PHYSICAL THERAPIST

## 2024-04-12 ENCOUNTER — THERAPY VISIT (OUTPATIENT)
Dept: PHYSICAL THERAPY | Facility: CLINIC | Age: 74
End: 2024-04-12
Payer: COMMERCIAL

## 2024-04-12 DIAGNOSIS — G89.29 CHRONIC PAIN OF RIGHT KNEE: Primary | ICD-10-CM

## 2024-04-12 DIAGNOSIS — M25.561 CHRONIC PAIN OF RIGHT KNEE: Primary | ICD-10-CM

## 2024-04-12 PROCEDURE — 97110 THERAPEUTIC EXERCISES: CPT | Mod: GP | Performed by: PHYSICAL THERAPIST

## 2024-04-12 PROCEDURE — 97112 NEUROMUSCULAR REEDUCATION: CPT | Mod: GP | Performed by: PHYSICAL THERAPIST

## 2024-04-16 NOTE — PROGRESS NOTES
Video-Visit Details    Type of service:  Video Visit     Originating Location (pt. Location): Home    Distant Location (provider location):  On-site  Platform used for Video Visit: [facility]     Date of visit: 4/18/2024      Assessment:   Shelia Sanchez is a 73 year old female with a past medical history significant for heartburn, arthritis, cervical pain, hx depression/binge eating disorder, LORI, HTN, DM, who presents with complaints of bilateral knee pain (referral for right knee pain).       Bilateral knee pain - Onset of pain around 4 years ago with progressive worsening since then. Bilateral knee xray 7/28/23 demonstrated osteoarthritic/degenerative changes of both knees, though R>L. She had multiple steroid and Synvisc injections, though diminishing benefit over time. Not surgical candidate at this time, advised to work on weight loss/management first. Etiology seems most consistent with DJD, though I suspect genicular neuropathic process contributing to an extent.      Assigned to [place] nursing team.     Visit Diagnoses:  1. Chronic pain of right knee    2. Neuropathic pain    3. Primary osteoarthritis of both knees    4. Chronic midline low back pain with right-sided sciatica    5. Osteoarthritis of right knee, unspecified osteoarthritis type        Plan:  Diagnosis reviewed, treatment option addressed, and risk/benifits discussed.  Self-care instructions given.  I am recommending a multidisciplinary treatment plan to help this patient better manage their pain.      Pain Physical Therapy:  YES   - continue working with Nabor Petty PT. She has found pain PT helpful and appreciates working with Nabor.      Pain Psychologist to address relaxation, behavioral change, coping style, and other factors important to improvement.  NO - not at this time, may consider in future as needed/if indicated.      Diagnostic Studies:  No new orders today.      Medication Management:   Not  interested in medications at this point, will consider in future if indicated/as needed.      Potential procedures:   Right genicular nerve ablation on 3/22/24 with Dr. Brennan. She reports persistent pain for a few weeks after procedure, though notes gradual improvement over the past week. Advised it may take up to 8 weeks for nerves to heal and to appreciate full therapeutic benefit from ablation. Continue monitoring for improvement.      Other Orders/Referrals:   None      Follow up with TOY Lebron CNP in 3-6 months or sooner if needed      Review of Electronic Chart: Today I have also reviewed available medical information in the patient's medical record at [facility] ([facility]) and Care Everywhere (if available), including relevant provider notes, laboratory work, and imaging.     Ligia Mcintyre, CHARLY, TOY, AGNP-C  [facility] Pain Management     -------------------------------------------------    Subjective:    Chief complaint:   Chief Complaint   Patient presents with    Pain       Interval history:  Shelia Sanchez is a 73 year old female last seen on 1/18/24.  They are a patient of mine seen in follow up.     Recommendations/plan at the last visit included:  Pain Physical Therapy:  YES   I am referring for targeting stretching, strengthening, and home exercise plan to support functional goals/ADLs.  If you have not been contacted to schedule within 2 business days, please call [phone] to make an appointment. Recommend scheduling with Nabor Petty PT.      Pain Psychologist to address relaxation, behavioral change, coping style, and other factors important to improvement.  NO - not at this time, may consider in future as needed/if indicated.      Diagnostic Studies:  Bilateral knee xray 7/28/23 - degenerative/osteoarthritic changes noted, R>L.      Medication Management:   Not interested in medications at this point, will consider in future if indicated/as needed.      Potential  procedures:   Recommend right genicular nerve block - orders placed to schedule with physician partner. Not candidate for knee replacement at this point, diminishing benefit with steroid and Synvisc injections.   Pending outcome from nerve block, may consider ablation if covered by insurance.      Other Orders/Referrals:   None      Follow up with TOY Lebron CNP in 2-4 weeks after genicular nerve block    Since her last visit, Shelia Sanchez reports:  -She started engaging in pain PT with Nabor Petty PT. She has appreciated working with him and sees him again tomorrow.   -She reports genicular nerve block went well, then came in for RFA and initially there was an issue with first RFA appt, wrong procedure was scheduled.   -She then came in for RFA on 3/22/24.  -She reports pain has fluctuated considerably since RFA, but overall she thinks it is getting better.   -She notes fairly intense pain last week and disrupted daily activities.     Pain Information:   Pain rating: averages 7/10 on a 0-10 scale.      HPI from initial visit with me on 1/18/24:  Shelia Sanchez is a 73 year old female presents with a chief complaint of right knee pain.      The pain has been present for about 4 years, went in for evaluation  .    The pain is Extreme Pain (9) in severity.    The pain is described as constant aching. Fluctuating intensity, intermittent sharp qualities, particularly with activities.   The pain is alleviated by stopping activity/rest.  Activity modification (lifts cat's litter box up to a higher level to clean it, increased pain if she tries to bend over).   It is exacerbated by walking, standing, very limited in activities. Shower, car rides (getting out of car),   Modalities that have been utilized in the past which were helpful include PT, ibuprofen (mixed benefit), steroid injections (initially helpful, but diminishing benefit over time), Synvisc (helped initially but then diminishing benefit).     Things that were not helpful, but tried ,include voltaren, ibuprofen (occasionally, marginal benefit with more severe pain).    The patient has never tried medical cannabis, pain PT, genicular nerve block, neuropathics/meds.   Shelia Sanchez has been seen at a pain clinic in the past.  She previously saw Dr. Magaly Vicente in the past, last seen 2019.      -She has eating disorder and depression.  -She reports right knee is bone on bone, left knee is getting to that point. She had right knee steroid injection, most recently saw Dr. Fischer.   -She follows with [facility] counselor, also had an evaluation in weight management (recommended by Dr. Fischer). Grey helps with depression management.   -She was an  at [employer] for many years before snf, very active and on her feet a lot.   -She states she joined [employer] yesterday, has been on weight loss programs in the past, has reached goal weight x 3 rounds in the past.   -She states that depression really started to set in after her mother , the day before .   -She had a bad fall last year, states she was coming out of a restaurant, broke her glasses that cut her face with bruising all over.   -Reports sisters recently bought her motorized cart to be able to get out enjoy activities, art fair, concerns, states fair.   -She has two sisters, she is the middle child.         Current Pain Treatments:    Medications:               OTC medications PRN - occasional use, unclear degree of benefit.                    2. Other therapies:               Pain PT - working with Nabor Petty PT              Right genicular nerve RFA on 3/22/24       Current MME: 0    Review of [place] Prescription Monitoring Program (): No concern for abuse or misuse of controlled medications based on this report.     Past pain treatments:  Right genicular nerve block, then RFA    Medications:  Current Outpatient Medications   Medication Sig Dispense  Refill    amLODIPine (NORVASC) 5 MG tablet TAKE 1 TABLET BY MOUTH DAILY 90 tablet 3    aspirin (ASA) 81 MG chewable tablet TAKE 1 TABLET BY MOUTH DAILY 90 tablet 0    atorvastatin (LIPITOR) 20 MG tablet Take 1 tablet (20 mg) by mouth daily 90 tablet 3    B Complex-Biotin-FA (HM VITAMIN B100 COMPLEX) TABS       buPROPion (WELLBUTRIN XL) 150 MG 24 hr tablet take 1 tablet by mouth daily as directed      esomeprazole (NEXIUM) 20 MG DR capsule Take 1 tablet by mouth daily       hydrochlorothiazide (HYDRODIURIL) 25 MG tablet Take 1 tablet (25 mg) by mouth daily 90 tablet 3    venlafaxine (EFFEXOR-XR) 75 MG 24 hr capsule TAKE 1 CAPSULE(75 MG) BY MOUTH DAILY (Patient taking differently: Take 150 mg by mouth daily 75 mg + 75 mg + 75 mg = 225 mg daily) 90 capsule 0    vitamin B complex with vitamin C (STRESS TAB) tablet Take 1 tablet by mouth daily      Vitamin D3 (CHOLECALCIFEROL) 125 MCG (5000 UT) tablet Take 1 tablet by mouth daily      gabapentin (NEURONTIN) 100 MG capsule TAKE 1 CAPSULE BY MOUTH TWICE DAILY (Patient not taking: Reported on 2/8/2024) 180 capsule 1       Medical History: any changes in medical history since they were last seen? Yes - right genicular RFA on 3/22/24      Objective:    Physical Exam:  not currently breastfeeding.  GENERAL: alert and no distress  EYES: Eyes grossly normal to inspection.  No discharge or erythema, or obvious scleral/conjunctival abnormalities.  RESP: No audible wheeze, cough, or visible cyanosis.    SKIN: Visible skin clear. No significant rash, abnormal pigmentation or lesions.  NEURO: Cranial nerves grossly intact.  Mentation and speech appropriate for age.  PSYCH: Appropriate affect, tone, and pace of words      Diagnostic Tests/Imaging/Labs:  None     BILLING TIME DOCUMENTATION:   The total TIME spent on this patient on the date of the encounter/appointment was 14 minutes.      TOTAL TIME includes:   Time spent preparing to see the patient (reviewing records and tests)    Time spent face to face (or over the phone) with the patient   Time spent ordering tests, medications, procedures and referrals   Time spent Referring and communicating with other healthcare professionals   Time spent documenting clinical information in Epic

## 2024-04-17 ASSESSMENT — PAIN SCALES - PAIN ENJOYMENT GENERAL ACTIVITY SCALE (PEG)
INTERFERED_ENJOYMENT_LIFE: 10 - COMPLETELY INTERFERES
INTERFERED_ENJOYMENT_LIFE: 10
PEG_TOTALSCORE: 9
INTERFERED_GENERAL_ACTIVITY: 10
INTERFERED_GENERAL_ACTIVITY: 10 - COMPLETELY INTERFERES
AVG_PAIN_PASTWEEK: 7

## 2024-04-18 ENCOUNTER — VIRTUAL VISIT (OUTPATIENT)
Dept: PALLIATIVE MEDICINE | Facility: CLINIC | Age: 74
End: 2024-04-18
Payer: COMMERCIAL

## 2024-04-18 DIAGNOSIS — G89.29 CHRONIC MIDLINE LOW BACK PAIN WITH RIGHT-SIDED SCIATICA: ICD-10-CM

## 2024-04-18 DIAGNOSIS — M17.0 PRIMARY OSTEOARTHRITIS OF BOTH KNEES: ICD-10-CM

## 2024-04-18 DIAGNOSIS — M79.2 NEUROPATHIC PAIN: ICD-10-CM

## 2024-04-18 DIAGNOSIS — I10 PRIMARY HYPERTENSION: ICD-10-CM

## 2024-04-18 DIAGNOSIS — G89.29 CHRONIC PAIN OF RIGHT KNEE: Primary | ICD-10-CM

## 2024-04-18 DIAGNOSIS — M25.561 CHRONIC PAIN OF RIGHT KNEE: Primary | ICD-10-CM

## 2024-04-18 DIAGNOSIS — M17.11 OSTEOARTHRITIS OF RIGHT KNEE, UNSPECIFIED OSTEOARTHRITIS TYPE: ICD-10-CM

## 2024-04-18 DIAGNOSIS — M54.41 CHRONIC MIDLINE LOW BACK PAIN WITH RIGHT-SIDED SCIATICA: ICD-10-CM

## 2024-04-18 PROCEDURE — 99214 OFFICE O/P EST MOD 30 MIN: CPT | Mod: 95

## 2024-04-18 RX ORDER — AMLODIPINE BESYLATE 5 MG/1
TABLET ORAL
Qty: 90 TABLET | Refills: 3 | Status: SHIPPED | OUTPATIENT
Start: 2024-04-18 | End: 2024-06-13

## 2024-04-18 ASSESSMENT — PAIN SCALES - GENERAL: PAINLEVEL: SEVERE PAIN (6)

## 2024-04-18 NOTE — PATIENT INSTRUCTIONS
Pain Physical Therapy:  YES   - continue working with Nabor Petty PT. She has found pain PT helpful and appreciates working with Nabor.      Pain Psychologist to address relaxation, behavioral change, coping style, and other factors important to improvement.  NO - not at this time, may consider in future as needed/if indicated.      Diagnostic Studies:  No new orders today.      Medication Management:   Not interested in medications at this point, will consider in future if indicated/as needed.      Potential procedures:   Right genicular nerve ablation on 3/22/24 with Dr. Brennan. She reports persistent pain for a few weeks after procedure, though notes gradual improvement over the past week. Advised it may take up to 8 weeks for nerves to heal and to appreciate full therapeutic benefit from ablation. Continue monitoring for improvement.      Other Orders/Referrals:   None      Follow up with TOY Lebron CNP in 3-6 months or sooner if needed    ----------------------------------------------------------------  Clinic Number:  [phone]   Call with any questions about your care and for scheduling assistance.   Calls are returned Monday through Friday between 8 AM and 4:30 PM. We usually get back to you within 2 business days depending on the issue/request.    If we are prescribing your medications:  For opioid medication refills, call the clinic or send a [employer] message 7 days in advance.  Please include:  Name of requested medication  Name of the pharmacy.  For non-opioid medications, call your pharmacy directly to request a refill. Please allow 3-4 days to be processed.   Per [place] State Law:  All controlled substance prescriptions must be filled within 30 days of being written.    For those controlled substances allowing refills, pickup must occur within 30 days of last fill.      We believe regular attendance is key to your success in our program!    Any time you are unable to keep your appointment we ask  that you call us at least 24 hours in advance to cancel.This will allow us to offer the appointment time to another patient.   Multiple missed appointments may lead to dismissal from the clinic.

## 2024-04-18 NOTE — PROGRESS NOTES
Shelia is a 73 year old who is being evaluated via a billable video visit.    How would you like to obtain your AVS? [facility]  If the video visit is dropped, the invitation should be resent by: [facility]jas waiting room. Cell phone: [phone]  Will anyone else be joining your video visit? No  Is Pt currently in [place]? Yes    NOTE:  If Pt is not in [place], Appointment needs to be canceled and rescheduled.

## 2024-04-19 ENCOUNTER — THERAPY VISIT (OUTPATIENT)
Dept: PHYSICAL THERAPY | Facility: CLINIC | Age: 74
End: 2024-04-19
Payer: COMMERCIAL

## 2024-04-19 DIAGNOSIS — G89.29 CHRONIC PAIN OF RIGHT KNEE: Primary | ICD-10-CM

## 2024-04-19 DIAGNOSIS — E78.00 HYPERCHOLESTEREMIA: ICD-10-CM

## 2024-04-19 DIAGNOSIS — M25.561 CHRONIC PAIN OF RIGHT KNEE: Primary | ICD-10-CM

## 2024-04-19 PROCEDURE — 97112 NEUROMUSCULAR REEDUCATION: CPT | Mod: GP | Performed by: PHYSICAL THERAPIST

## 2024-04-19 PROCEDURE — 97110 THERAPEUTIC EXERCISES: CPT | Mod: GP | Performed by: PHYSICAL THERAPIST

## 2024-04-19 RX ORDER — ATORVASTATIN CALCIUM 20 MG/1
20 TABLET, FILM COATED ORAL DAILY
Qty: 90 TABLET | Refills: 3 | Status: SHIPPED | OUTPATIENT
Start: 2024-04-19

## 2024-04-19 NOTE — TELEPHONE ENCOUNTER
Med: atorvastatin    LOV (related): 04/26/2023 (CPX)    Last Lab: 04/26/2023      Due for F/U around: CPX due this month,     Next Appt: 05/09/2024        Cholesterol   Date Value Ref Range Status   04/26/2023 156 100 - 199 mg/dL Final   02/17/2022 155 <200 mg/dL Final   05/10/2021 160 <200 mg/dL Final   02/08/2021 226 (H) <200 mg/dL Final     Comment:     Desirable:       <200 mg/dl     HDL Cholesterol   Date Value Ref Range Status   05/10/2021 70 >49 mg/dL Final   02/08/2021 59 >49 mg/dL Final     HDL   Date Value Ref Range Status   04/26/2023 71 40 mg/dL Final     Direct Measure HDL   Date Value Ref Range Status   02/17/2022 65 >=50 mg/dL Final     LDL Cholesterol Calculated   Date Value Ref Range Status   02/17/2022 74 <=100 mg/dL Final   05/10/2021 78 <100 mg/dL Final     Comment:     Desirable:       <100 mg/dl   02/08/2021 140 (H) <100 mg/dL Final     Comment:     Above desirable:  100-129 mg/dl  Borderline High:  130-159 mg/dL  High:             160-189 mg/dL  Very high:       >189 mg/dl       LDL CALCULATED ([facility])   Date Value Ref Range Status   04/26/2023 73 0 - 130 mg/dL Final     Triglycerides   Date Value Ref Range Status   04/26/2023 56 0 - 149 mg/dL Final   02/17/2022 79 <150 mg/dL Final   05/10/2021 58 <150 mg/dL Final   02/08/2021 136 <150 mg/dL Final     Comment:     Fasting specimen     Cholesterol/HDL Ratio   Date Value Ref Range Status   01/23/2015 2.8 0.0 - 5.0 Final   01/21/2014 3.1 0.0 - 5.0 Final

## 2024-04-20 DIAGNOSIS — I10 PRIMARY HYPERTENSION: ICD-10-CM

## 2024-04-22 RX ORDER — HYDROCHLOROTHIAZIDE 25 MG/1
25 TABLET ORAL DAILY
Qty: 90 TABLET | Refills: 0 | Status: SHIPPED | OUTPATIENT
Start: 2024-04-22 | End: 2024-05-09

## 2024-04-26 ENCOUNTER — THERAPY VISIT (OUTPATIENT)
Dept: PHYSICAL THERAPY | Facility: CLINIC | Age: 74
End: 2024-04-26
Payer: COMMERCIAL

## 2024-04-26 DIAGNOSIS — G89.29 CHRONIC PAIN OF RIGHT KNEE: Primary | ICD-10-CM

## 2024-04-26 DIAGNOSIS — M25.561 CHRONIC PAIN OF RIGHT KNEE: Primary | ICD-10-CM

## 2024-04-26 PROCEDURE — 97530 THERAPEUTIC ACTIVITIES: CPT | Mod: GP | Performed by: PHYSICAL THERAPIST

## 2024-04-26 PROCEDURE — 97110 THERAPEUTIC EXERCISES: CPT | Mod: GP | Performed by: PHYSICAL THERAPIST

## 2024-04-26 PROCEDURE — 97112 NEUROMUSCULAR REEDUCATION: CPT | Mod: GP | Performed by: PHYSICAL THERAPIST

## 2024-04-26 NOTE — PROGRESS NOTES
[facility]                                                                                   OUTPATIENT PHYSICAL THERAPY    PLAN OF TREATMENT FOR OUTPATIENT REHABILITATION   Patient's Last Name, First Name, Shelia Leroy YOB: 1950   Provider's Name   [facility]   Medical Record No.  [id number]     Onset Date: 01/18/24  Start of Care Date: 02/02/24     Medical Diagnosis:  R knee pain, LBP, neuropathic pain      PT Treatment Diagnosis:  R knee pain, LBP, neuropathic pain, weakness, gait abnormality Plan of Treatment  Frequency/Duration: Updated:  2x month x 3 months/ Updated:  3 months    Certification date from 05/02/24 to 07/31/24         See note for plan of treatment details and functional goals     Angel Petty, PT                         I CERTIFY THE NEED FOR THESE SERVICES FURNISHED UNDER        THIS PLAN OF TREATMENT AND WHILE UNDER MY CARE     (Physician attestation of this document indicates review and certification of the therapy plan).              Referring Provider:  Ligia Mcintyre    Initial Assessment  See Epic Evaluation- Start of Care Date: 02/02/24 04/26/24 0500   Appointment Info   Signing clinician's name / credentials Angel Petty PT   Total/Authorized Visits E&T   Visits Used 9   Medical Diagnosis R knee pain, LBP, neuropathic pain   PT Tx Diagnosis R knee pain, LBP, neuropathic pain, weakness, gait abnormality   Quick Adds Certification   Progress Note/Certification   Start of Care Date 02/02/24   Onset of illness/injury or Date of Surgery 01/18/24   Therapy Frequency Updated:  2x month x 3 months   Predicted Duration Updated:  3 months   Certification date from 05/02/24   Certification date to 07/31/24   Progress Note Completed Date 04/26/24   PT Goal 1   Goal Identifier ambulation   Goal Description able to ambulate with approp AD x 15 minutes   Rationale to maximize safety and independence  with performance of ADLs and functional tasks;to maximize safety and independence within the home;to maximize safety and independence within the community;to maximize safety and independence with self cares   Goal Progress intermittent progress, pain and fatigue challenge progress.  Able to ambulate with approp AD x 10 minutes   Target Date 07/31/24   Subjective Report   Subjective Report Feels she has improved her pain management ability and more active adama with her walking consistency/frequency.  Able to walk consistently up to 10 minutes.  Much more aware of her pain boundaries/limits.   Objective Measures   Objective Measures Objective Measure 1;Objective Measure 2;Objective Measure 3   Objective Measure 1   Objective Measure Gait with her 4WW exhibits improved posture, more solid toe/push off B and more confident movement overall.  Sit to stands have improved with more consistent technique.   Objective Measure 2   Objective Measure General movement with lumbar and hip/knee have demonstrated improved quality of movement.  Pt not holding her breath as she did with early visits.   Objective Measure 3   Objective Measure See FACIT scores -> improved overall with fatigue level   Treatment Interventions (PT)   Interventions Therapeutic Procedure/Exercise;Neuromuscular Re-education;Therapeutic Activity   Therapeutic Procedure/Exercise   Therapeutic Procedures: strength, endurance, ROM, flexibility minutes (29622) 30   Therapeutic Procedures Ther Proc 2;Ther Proc 3;Ther Proc 4;Ther Proc 5;Ther Proc 6;Ther Proc 7;Ther Proc 8;Ther Proc 9   Ther Proc 1 estella better in sitting lately -> cat/cow modified for sitting - slump, overcorrect, neutral -> spine mobility facilitation   Ther Proc 2 cues for -> seated pelvic tilt for core activation, set hip in better alignment   Ther Proc 2 - Details engage core, encourage spine, jt mobility -> decrease immobility of body, joints   Ther Proc 3 UBE x 10'/fatigue with increased  "resistance tolerance   Ther Proc 3 - Details Home: seated rows, alternate rows and air bike for aerobic benefit   Ther Proc 4 Home: Stretches for thor ext, KTC and knee extension - modified, in supine   Ther Proc 5 Seated BKFO, BKFI to light fatigue   Ther Proc 5 - Details Performed seated step outs with incr reps tolerated   Ther Proc 6 Standing lateral wt shifting to R and L today   Ther Proc 6 - Details Performed lateral stepping with improved un weighting and re weighting ('stick the landing\")   Ther Proc 7 Reminders of technique for seated wall push ups (use of counter at home of chair in front of wall)   Ther Proc 8 Seated lateral step outs to fatigue (hip flex, then abd out and foot to the ground, then reverse to starting position)   Ther Proc 9 seated modified adductor/groin stretch (pt reports some adductor spasms) -> ed on technique with goal of lengthening, nerve mobility -> pt likes the controlled lengthening   Ther Proc 9 - Details Standing calf stretch (think 'lengthening' and nerve mobility)   Skilled Intervention cues for proper form   Patient Response/Progress good   Therapeutic Activity   Therapeutic Activities: dynamic activities to improve functional performance minutes (33222) 15   Therapeutic Activities Ther Act 2;Ther Act 3   Ther Act 1 ed on body mech with focus on sit to/from stand with cues for placement of hands, feet, trunk. Used hi-lo table for higher height and to focus/emphasize technique   Ther Act 1 - Details ed on power vs endurance muscles   Ther Act 2 ed on functional reaching, ADLs - ed COG, NICK, lever arm   Skilled Intervention cues for proper form   Patient Response/Progress good   Ther Act 3 Ed, work on functional squat and functional above shldr reaching -> ok to widen base and/or sit if reaching to lower shelf item or laundry front    Ther Act 3 - Details ed on variations of feet position, also set scapulae before reach   Neuromuscular Re-education   Neuromuscular " re-ed of mvmt, balance, coord, kinesthetic sense, posture, proprioception minutes (64175) 20   Neuromuscular Re-education Neuro Re-ed 2;Neuro Re-ed 3;Neuro Re-ed 4;Neuro Re-ed 5;Neuro Re-ed 6;Neuro Re-ed 7;Neuro Re-ed 8   Neuro Re-ed 1 in standing, find neutral spine with soft knees   Neuro Re-ed 1 - Details added staggered stance -> also added wt shifting at diagonal from this staggered stance position -> standing heel raises, toe raises -> quality movement focus   Neuro Re-ed 2 PNE, Pain Knowledge, ed on nerve sensors and homeostasis of nerves -> temp, stress, movement, immunity, blood flow   Neuro Re-ed 2 - Details PNE, ed on Calming sensitive nerves -> graded exposure, sore but safe, hurt does not equal harm   Neuro Re-ed 3 'Centering', 'grounding' concept emphasized -> Breath work -> seated with palms up, feel pecs lengthen and open up fo rrib expansion   Neuro Re-ed 4 discussion re pacing, energy conservation with daily routine -> Self care ed -> rec frequent check ins -> create good habits (habit reversal) -> seated decompression with breathing   Neuro Re-ed 4 - Details reminders for ok to say no to certain social activities if she recognizes will be overdoing it and potentially cause pain flare -> pt doing fairly well with this approach   Neuro Re-ed 5 Scapular retraction/depression, rows and ed on functional reaching connection -> emphasis on ok to decr reps and quantity of the movement as needed   Neuro Re-ed 6 Not today - Seated Leeroy Chi UE movements with ed, focus on neutral jt and fluidity of movement -> UE #1-3 with good tolerance   Neuro Re-ed 7 Home: Happiness chemicals and how to hack them -> endorphins, serotonin (meditation, breathing - mood stabilizer), etc   Neuro Re-ed 7 - Details pt appreciated this info and the understanding vs the cortisol, adrenaline that is released with pain stressor   Skilled Intervention pain science, cues for proper form   Patient Response/Progress good    Eval/Assessments   Assessments   (has made progress toward goal, pain does limit progress)   Plan   Updates to plan of care See Recert, PN -> continue pain PT   Comments   Comments repetition of tasks, exercises helpful to reinforce pain PT principles -> improve pain knowledge, understanding   Total Session Time   Timed Code Treatment Minutes 65   Total Treatment Time (sum of timed and untimed services) 65         PLAN  Continue therapy per current plan of care.    Beginning/End Dates of Progress Note Reporting Period:  2/2/24 to 04/26/2024    Referring Provider:  Ligia Mcintyre

## 2024-05-09 ENCOUNTER — OFFICE VISIT (OUTPATIENT)
Dept: FAMILY MEDICINE | Facility: CLINIC | Age: 74
End: 2024-05-09

## 2024-05-09 VITALS
DIASTOLIC BLOOD PRESSURE: 96 MMHG | SYSTOLIC BLOOD PRESSURE: 175 MMHG | OXYGEN SATURATION: 99 % | BODY MASS INDEX: 44.04 KG/M2 | HEIGHT: 67 IN | HEART RATE: 99 BPM | WEIGHT: 280.6 LBS

## 2024-05-09 DIAGNOSIS — R73.03 PREDIABETES: ICD-10-CM

## 2024-05-09 DIAGNOSIS — F32.0 CURRENT MILD EPISODE OF MAJOR DEPRESSIVE DISORDER, UNSPECIFIED WHETHER RECURRENT (H): ICD-10-CM

## 2024-05-09 DIAGNOSIS — I10 PRIMARY HYPERTENSION: Primary | ICD-10-CM

## 2024-05-09 DIAGNOSIS — E66.01 MORBID OBESITY (H): ICD-10-CM

## 2024-05-09 LAB — HBA1C MFR BLD: 6.2 %

## 2024-05-09 PROCEDURE — 36415 COLL VENOUS BLD VENIPUNCTURE: CPT | Performed by: FAMILY MEDICINE

## 2024-05-09 PROCEDURE — 83036 HEMOGLOBIN GLYCOSYLATED A1C: CPT | Performed by: FAMILY MEDICINE

## 2024-05-09 PROCEDURE — 99214 OFFICE O/P EST MOD 30 MIN: CPT | Performed by: FAMILY MEDICINE

## 2024-05-09 RX ORDER — OLMESARTAN MEDOXOMIL AND HYDROCHLOROTHIAZIDE 40/25 40; 25 MG/1; MG/1
1 TABLET ORAL DAILY
Qty: 90 TABLET | Refills: 3 | Status: SHIPPED | OUTPATIENT
Start: 2024-05-09

## 2024-05-09 ASSESSMENT — ANXIETY QUESTIONNAIRES
GAD7 TOTAL SCORE: 11
6. BECOMING EASILY ANNOYED OR IRRITABLE: NEARLY EVERY DAY
IF YOU CHECKED OFF ANY PROBLEMS ON THIS QUESTIONNAIRE, HOW DIFFICULT HAVE THESE PROBLEMS MADE IT FOR YOU TO DO YOUR WORK, TAKE CARE OF THINGS AT HOME, OR GET ALONG WITH OTHER PEOPLE: VERY DIFFICULT
7. FEELING AFRAID AS IF SOMETHING AWFUL MIGHT HAPPEN: SEVERAL DAYS
4. TROUBLE RELAXING: SEVERAL DAYS
5. BEING SO RESTLESS THAT IT IS HARD TO SIT STILL: SEVERAL DAYS
3. WORRYING TOO MUCH ABOUT DIFFERENT THINGS: MORE THAN HALF THE DAYS
GAD7 TOTAL SCORE: 11
GAD7 TOTAL SCORE: 11
2. NOT BEING ABLE TO STOP OR CONTROL WORRYING: MORE THAN HALF THE DAYS
1. FEELING NERVOUS, ANXIOUS, OR ON EDGE: SEVERAL DAYS
8. IF YOU CHECKED OFF ANY PROBLEMS, HOW DIFFICULT HAVE THESE MADE IT FOR YOU TO DO YOUR WORK, TAKE CARE OF THINGS AT HOME, OR GET ALONG WITH OTHER PEOPLE?: VERY DIFFICULT
7. FEELING AFRAID AS IF SOMETHING AWFUL MIGHT HAPPEN: SEVERAL DAYS

## 2024-05-09 ASSESSMENT — PATIENT HEALTH QUESTIONNAIRE - PHQ9
SUM OF ALL RESPONSES TO PHQ QUESTIONS 1-9: 14
10. IF YOU CHECKED OFF ANY PROBLEMS, HOW DIFFICULT HAVE THESE PROBLEMS MADE IT FOR YOU TO DO YOUR WORK, TAKE CARE OF THINGS AT HOME, OR GET ALONG WITH OTHER PEOPLE: VERY DIFFICULT
SUM OF ALL RESPONSES TO PHQ QUESTIONS 1-9: 14

## 2024-05-09 NOTE — PROGRESS NOTES
"SUBJECTIVE:    Shelia Sanchez, is a 73 year old female presenting for the below:     1. Hypertension : consistently elevated readings in clinic. Norvasc 5 mg, hydrochlorothiazide 25 mg. Does not have home blood pressure monitor : open to obtaining one.     OBJECTIVE:  Vitals:    05/09/24 1352   BP: (!) 175/96   Pulse: 99   SpO2: 99%   Weight: 127.3 kg (280 lb 9.6 oz)   Height: 1.702 m (5' 7\")    Body mass index is 43.95 kg/m .  General: no acute distress, cooperative with exam.    BP Readings from Last 6 Encounters:   05/09/24 (!) 175/96   03/22/24 (!) 154/78   03/05/24 (!) 142/82   02/08/24 (!) 152/78   01/18/24 (!) 140/83   12/27/23 (!) 153/72       ASSESSMENT / PLAN:      Primary hypertension  not at target of <140/90.  Add in ARB (in combination tab : h/o ACEi cough) and continue hydrochlorothiazide plus CCB.  Pt will obtain home blood pressure monitor  Follow up blood pressure check 2 weeks.   -     olmesartan-hydrochlorothiazide (BENICAR HCT) 40-25 MG tablet; Take 1 tablet by mouth daily  -     VENOUS COLLECTION    Morbid obesity (H)  Continues to work with [facility]. Physical activity limited by right knee pain (following with ortho and PT)  -     VENOUS COLLECTION    Prediabetes  -     Hemoglobin A1c; Future  -     VENOUS COLLECTION    Current mild episode of major depressive disorder, unspecified whether recurrent (H24)  Effexor 75 mg daily, Wellbutrin 150 mg daily. Follows with psychiatry.       Follow up:  2 week blood pressure recheck with BMP after ARB start.     "

## 2024-05-10 ENCOUNTER — THERAPY VISIT (OUTPATIENT)
Dept: PHYSICAL THERAPY | Facility: CLINIC | Age: 74
End: 2024-05-10
Payer: COMMERCIAL

## 2024-05-10 DIAGNOSIS — G89.29 CHRONIC PAIN OF RIGHT KNEE: Primary | ICD-10-CM

## 2024-05-10 DIAGNOSIS — M25.561 CHRONIC PAIN OF RIGHT KNEE: Primary | ICD-10-CM

## 2024-05-10 PROCEDURE — 97110 THERAPEUTIC EXERCISES: CPT | Mod: GP | Performed by: PHYSICAL THERAPIST

## 2024-05-10 PROCEDURE — 97112 NEUROMUSCULAR REEDUCATION: CPT | Mod: GP | Performed by: PHYSICAL THERAPIST

## 2024-05-24 ASSESSMENT — PAIN SCALES - PAIN ENJOYMENT GENERAL ACTIVITY SCALE (PEG)
INTERFERED_ENJOYMENT_LIFE: 10 - COMPLETELY INTERFERES
AVG_PAIN_PASTWEEK: 8
INTERFERED_GENERAL_ACTIVITY: 10 - COMPLETELY INTERFERES
PEG_TOTALSCORE: 9.33

## 2024-06-07 ENCOUNTER — THERAPY VISIT (OUTPATIENT)
Dept: PHYSICAL THERAPY | Facility: CLINIC | Age: 74
End: 2024-06-07
Payer: COMMERCIAL

## 2024-06-07 DIAGNOSIS — M25.561 CHRONIC PAIN OF RIGHT KNEE: Primary | ICD-10-CM

## 2024-06-07 DIAGNOSIS — G89.29 CHRONIC PAIN OF RIGHT KNEE: Primary | ICD-10-CM

## 2024-06-07 PROCEDURE — 97110 THERAPEUTIC EXERCISES: CPT | Mod: GP | Performed by: PHYSICAL THERAPIST

## 2024-06-07 PROCEDURE — 97530 THERAPEUTIC ACTIVITIES: CPT | Mod: GP | Performed by: PHYSICAL THERAPIST

## 2024-06-07 PROCEDURE — 97112 NEUROMUSCULAR REEDUCATION: CPT | Mod: GP | Performed by: PHYSICAL THERAPIST

## 2024-06-09 ASSESSMENT — PAIN SCALES - PAIN ENJOYMENT GENERAL ACTIVITY SCALE (PEG)
PEG_TOTALSCORE: 9.67
AVG_PAIN_PASTWEEK: 9
INTERFERED_ENJOYMENT_LIFE: 10
INTERFERED_ENJOYMENT_LIFE: 10 - COMPLETELY INTERFERES
INTERFERED_GENERAL_ACTIVITY: 10 - COMPLETELY INTERFERES
INTERFERED_GENERAL_ACTIVITY: 10

## 2024-06-12 NOTE — PROGRESS NOTES
[facility]     Date of visit: 6/13/2024      Assessment:   Shelia Sanchez is a 73 year old female with a past medical history significant for heartburn, arthritis, cervical pain, hx depression/binge eating disorder, LORI, HTN, DM, who presents with complaints of bilateral knee pain (referral for right knee pain).       Bilateral knee pain - Onset of pain around 4 years ago with progressive worsening since then. Bilateral knee xray 7/28/23 demonstrated osteoarthritic/degenerative changes of both knees, though R>L. She had multiple steroid and Synvisc injections, though diminishing benefit over time. Not surgical candidate at this time, advised to work on weight loss/management first. Etiology seems most consistent with DJD, though I suspect genicular neuropathic process contributing to an extent.      Assigned to [facility] nursing team.     Visit Diagnoses:  1. Chronic pain of right knee    2. Neuropathic pain    3. Osteoarthritis of right knee, unspecified osteoarthritis type    4. Morbid obesity (H)    5. Binge eating disorder    6. Hypertension goal BP (blood pressure) < 140/90        Plan:  Diagnosis reviewed, treatment option addressed, and risk/benifits discussed.  Self-care instructions given.  I am recommending a multidisciplinary treatment plan to help this patient better manage their pain.      Pain Physical Therapy:  YES   - continue working with Nabor Petty PT. She has found pain PT helpful and appreciates working with Nabor.      Pain Psychologist to address relaxation, behavioral change, coping style, and other factors important to improvement.  NO - not at this time, may consider in future as needed/if indicated.      Diagnostic Studies:  No new orders today.      Medication Management:   Gabapentin - start 300 mg at bedtime and titrate to goal dose 300 mg three times daily, instructions on prescription. Advised to monitor for benefit, cautioned about potential sedation effects.  Prescription for 300 mg capsules sent into pharmacy today.      Potential procedures:   Right genicular nerve ablation on 3/22/24 with Dr. Brennan. Unfortunately, benefit was marginal, despite excellent relief with nerve block.   Case discussed with Dr. Garcia, recommend follow up with him for interventional evaluation. Will have nursing contact her regarding this recommendation.      Other Orders/Referrals:   Weight management referral placed today. She needs to lose weight before be eligible for knee replacement and pain significantly limits activity levels.   Scheduling phone number is [phone].      Follow up with TOY Lebron CNP in 6-8 weeks or sooner if needed, video visit okay.       Review of Electronic Chart: Today I have also reviewed available medical information in the patient's medical record at [facility] and Care Everywhere (if available), including relevant provider notes, laboratory work, and imaging.     Ligia Mcintyre DNP, TOY, AGNP-C  [facility]     -------------------------------------------------    Subjective:    Chief complaint:   Chief Complaint   Patient presents with    Pain       Interval history:  Shelia Sanchez is a 73 year old female last seen on 4/18/24.  They are a patient of mine seen in follow up.     Recommendations/plan at the last visit included:  Pain Physical Therapy:  YES   - continue working with Nabor ePtty PT. She has found pain PT helpful and appreciates working with Nabor.      Pain Psychologist to address relaxation, behavioral change, coping style, and other factors important to improvement.  NO - not at this time, may consider in future as needed/if indicated.      Diagnostic Studies:  No new orders today.      Medication Management:   Not interested in medications at this point, will consider in future if indicated/as needed.      Potential procedures:   Right genicular nerve ablation on 3/22/24 with Dr. Brennan. She  reports persistent pain for a few weeks after procedure, though notes gradual improvement over the past week. Advised it may take up to 8 weeks for nerves to heal and to appreciate full therapeutic benefit from ablation. Continue monitoring for improvement.      Other Orders/Referrals:   None      Follow up with TOY Lebron CNP in 3-6 months or sooner if needed    Since her last visit, Shelia Sanchez reports:  -She reports no significant improvement in right knee pain, s/p genicular RFA on 3/22/24.   -She reports slight improvement in pain during daytime but just for 3-4 weeks right after procedure.   -She had a follow up with me last on 4/18, no change since then.   -She now needs a wheelchair for distances, able to walk with rolling walker for about 10 minutes then has to sit down and rest.   -She has engaged in pain PT, helpful and appreciates working with Nabor.     Pain Information:   Pain rating: averages 9/10 on a 0-10 scale.      Interval history from last visit on 4/18/24:  -She started engaging in pain PT with Nabor Petty PT. She has appreciated working with him and sees him again tomorrow.   -She reports genicular nerve block went well, then came in for RFA and initially there was an issue with first RFA appt, wrong procedure was scheduled.   -She then came in for RFA on 3/22/24.  -She reports pain has fluctuated considerably since RFA, but overall she thinks it is getting better.   -She notes fairly intense pain last week and disrupted daily activities.   Pain Information:              Pain rating: averages 7/10 on a 0-10 scale.        HPI from initial visit with me on 1/18/24:  Shelia Sanchez is a 73 year old female presents with a chief complaint of right knee pain.      The pain has been present for about 4 years, went in for evaluation  .    The pain is Extreme Pain (9) in severity.    The pain is described as constant aching. Fluctuating intensity, intermittent sharp qualities,  particularly with activities.   The pain is alleviated by stopping activity/rest.  Activity modification (lifts cat's litter box up to a higher level to clean it, increased pain if she tries to bend over).   It is exacerbated by walking, standing, very limited in activities. Shower, car rides (getting out of car),   Modalities that have been utilized in the past which were helpful include PT, ibuprofen (mixed benefit), steroid injections (initially helpful, but diminishing benefit over time), Synvisc (helped initially but then diminishing benefit).    Things that were not helpful, but tried ,include voltaren, ibuprofen (occasionally, marginal benefit with more severe pain).    The patient has never tried medical cannabis, pain PT, genicular nerve block, neuropathics/meds.   Shelia Sanchez has been seen at a pain clinic in the past.  She previously saw Dr. Magaly Vicente in the past, last seen 2019.      -She has eating disorder and depression.  -She reports right knee is bone on bone, left knee is getting to that point. She had right knee steroid injection, most recently saw Dr. Fischer.   -She follows with [facility] counselor, also had an evaluation in weight management (recommended by Dr. Fischer). Grey helps with depression management.   -She was an  at [employer] for many years before detention, very active and on her feet a lot.   -She states she joined  yesterday, has been on weight loss programs in the past, has reached goal weight x 3 rounds in the past.   -She states that depression really started to set in after her mother , the day before .   -She had a bad fall last year, states she was coming out of a restaurant, broke her glasses that cut her face with bruising all over.   -Reports sisters recently bought her motorized cart to be able to get out enjoy activities, art fair, concerns, states fair.   -She has two sisters, she is the middle child.            Current  Pain Treatments:     Medications:               Gabapentin 300 mg TID                   2. Other therapies:               Pain PT - working with Nabor Petty PT              Weight management referral    Interventional pain eval with Dr. Garcia         Current MME: 0     Review of [place] Prescription Monitoring Program (): No concern for abuse or misuse of controlled medications based on this report.      Past pain treatments:  Right genicular nerve block, then RFA      Medications:  Current Outpatient Medications   Medication Sig Dispense Refill    aspirin (ASA) 81 MG chewable tablet TAKE 1 TABLET BY MOUTH DAILY 90 tablet 0    atorvastatin (LIPITOR) 20 MG tablet Take 1 tablet (20 mg) by mouth daily 90 tablet 3    B Complex-Biotin-FA (HM VITAMIN B100 COMPLEX) TABS       buPROPion (WELLBUTRIN XL) 150 MG 24 hr tablet take 1 tablet by mouth daily as directed      esomeprazole (NEXIUM) 20 MG DR capsule Take 1 tablet by mouth daily       gabapentin (NEURONTIN) 300 MG capsule Start 300 mg at bedtime x 3 days, then 300 mg BID x 3 days, then 300 mg TID. 90 capsule 1    olmesartan-hydrochlorothiazide (BENICAR HCT) 40-25 MG tablet Take 1 tablet by mouth daily 90 tablet 3    venlafaxine (EFFEXOR-XR) 75 MG 24 hr capsule TAKE 1 CAPSULE(75 MG) BY MOUTH DAILY (Patient taking differently: Take 150 mg by mouth daily 75 mg + 75 mg + 75 mg = 225 mg daily) 90 capsule 0    vitamin B complex with vitamin C (STRESS TAB) tablet Take 1 tablet by mouth daily      Vitamin D3 (CHOLECALCIFEROL) 125 MCG (5000 UT) tablet Take 1 tablet by mouth daily         Medical History: any changes in medical history since they were last seen? No      Objective:    Physical Exam:  Blood pressure 134/75, pulse 85, not currently breastfeeding.  Constitutional: Well developed, well nourished, appears stated age.  Gait: Abnormal gait  HEENT: Head atraumatic, normocephalic. Eyes without conjunctival injection or jaundice. Oropharynx clear. Neck supple. No  obvious neck masses.  Skin: No rash, lesions, or petechiae of exposed skin.   Psychiatric/mental status: Alert, without lethargy or stupor. Speech fluent. Appropriate affect. Mood normal. Able to follow commands without difficulty.     Diagnostic Tests/Imaging/Labs:  BMP on 12/27/23 - GFR 64    BILLING TIME DOCUMENTATION:   The total TIME spent on this patient on the date of the encounter/appointment was 26 minutes.      TOTAL TIME includes:   Time spent preparing to see the patient (reviewing records and tests)   Time spent face to face (or over the phone) with the patient   Time spent ordering tests, medications, procedures and referrals   Time spent Referring and communicating with other healthcare professionals   Time spent documenting clinical information in Epic

## 2024-06-13 ENCOUNTER — OFFICE VISIT (OUTPATIENT)
Dept: PALLIATIVE MEDICINE | Facility: CLINIC | Age: 74
End: 2024-06-13
Payer: COMMERCIAL

## 2024-06-13 VITALS — SYSTOLIC BLOOD PRESSURE: 134 MMHG | DIASTOLIC BLOOD PRESSURE: 75 MMHG | HEART RATE: 85 BPM

## 2024-06-13 DIAGNOSIS — E66.01 MORBID OBESITY (H): ICD-10-CM

## 2024-06-13 DIAGNOSIS — I10 HYPERTENSION GOAL BP (BLOOD PRESSURE) < 140/90: Chronic | ICD-10-CM

## 2024-06-13 DIAGNOSIS — M79.2 NEUROPATHIC PAIN: ICD-10-CM

## 2024-06-13 DIAGNOSIS — G89.29 CHRONIC PAIN OF RIGHT KNEE: Primary | ICD-10-CM

## 2024-06-13 DIAGNOSIS — F50.819 BINGE EATING DISORDER: ICD-10-CM

## 2024-06-13 DIAGNOSIS — M17.11 OSTEOARTHRITIS OF RIGHT KNEE, UNSPECIFIED OSTEOARTHRITIS TYPE: ICD-10-CM

## 2024-06-13 DIAGNOSIS — M25.561 CHRONIC PAIN OF RIGHT KNEE: Primary | ICD-10-CM

## 2024-06-13 PROCEDURE — 99214 OFFICE O/P EST MOD 30 MIN: CPT

## 2024-06-13 RX ORDER — GABAPENTIN 300 MG/1
CAPSULE ORAL
Qty: 90 CAPSULE | Refills: 1 | Status: SHIPPED | OUTPATIENT
Start: 2024-06-13 | End: 2024-07-16

## 2024-06-13 ASSESSMENT — PAIN SCALES - GENERAL: PAINLEVEL: EXTREME PAIN (9)

## 2024-06-13 NOTE — Clinical Note
Message Shanthi Collado about WM at [facility] Procedure:  EGD  Dx:   Gastric mass   Ordering Provider:  Dr. Craig   RT? (order if BMI over 40 and/or if patient has sleep apnea):  Yes, BMI over 40  Is the patient on blood thinners? (warfarin, coumadin, plavix, or xarelto; if yes, patient will need to check with prescribing doctor to determine if they can hold prior to procedure):  No        Will patient have ?: Yes  Prep instructions sent to patient via: Mail      Patient's chart was reviewed. Per patient request: EGD reschedule to 1/14/19 at 0815. Arrive by 0730. Verbalized prep instructions with patient. Patient verbalized understanding and will have a  on the day of procedure. New patient instructions were printed and mailed out to patient's address. Surgery scheduling were notified.                  Needs Scheduling       Authorized by Antonieta Craig MD   Diagnosis: Gastric mass [K31.9]      Comments      EGD--possible gastric fundal mass on CT abdomen 10/2018

## 2024-06-13 NOTE — PROGRESS NOTES
Patient presents to the clinic today for a follow up with TOY Lebron CNP  regarding Pain Management.           6/13/2024     1:22 PM   PEG Score   PEG Total Score 9            Vicky August MA  [facility] [place]

## 2024-06-13 NOTE — PATIENT INSTRUCTIONS
Pain Physical Therapy:  YES   - continue working with Nabor Petty PT. She has found pain PT helpful and appreciates working with Nabor.      Pain Psychologist to address relaxation, behavioral change, coping style, and other factors important to improvement.  NO - not at this time, may consider in future as needed/if indicated.      Diagnostic Studies:  No new orders today.      Medication Management:   Gabapentin - start 300 mg at bedtime and titrate to goal dose 300 mg three times daily, instructions on prescription. Advised to monitor for benefit, cautioned about potential sedation effects. Prescription for 300 mg capsules sent into pharmacy today.      Potential procedures:   Right genicular nerve ablation on 3/22/24 with Dr. Brennan. Unfortunately, benefit was marginal, despite excellent relief with nerve block.   Case discussed with Dr. Garcia, recommend follow up with him for interventional evaluation. Will have nursing contact her regarding this recommendation.      Other Orders/Referrals:   Weight management referral placed today. She needs to lose weight before be eligible for knee replacement and pain significantly limits activity levels.   Scheduling phone number is [phone].      Follow up with TOY Lebron CNP in 6-8 weeks or sooner if needed, video visit okay.     ----------------------------------------------------------------  Clinic Number:  [phone]   Call with any questions about your care and for scheduling assistance.   Calls are returned Monday through Friday between 8 AM and 4:30 PM. We usually get back to you within 2 business days depending on the issue/request.    If we are prescribing your medications:  For opioid medication refills, call the clinic or send a [employer] message 7 days in advance.  Please include:  Name of requested medication  Name of the pharmacy.  For non-opioid medications, call your pharmacy directly to request a refill. Please allow 3-4 days to be  processed.   Per [place] State Law:  All controlled substance prescriptions must be filled within 30 days of being written.    For those controlled substances allowing refills, pickup must occur within 30 days of last fill.      We believe regular attendance is key to your success in our program!    Any time you are unable to keep your appointment we ask that you call us at least 24 hours in advance to cancel.This will allow us to offer the appointment time to another patient.   Multiple missed appointments may lead to dismissal from the clinic.

## 2024-06-20 ENCOUNTER — TELEPHONE (OUTPATIENT)
Dept: PALLIATIVE MEDICINE | Facility: CLINIC | Age: 74
End: 2024-06-20
Payer: COMMERCIAL

## 2024-06-20 DIAGNOSIS — M79.2 NEUROPATHIC PAIN: ICD-10-CM

## 2024-06-20 DIAGNOSIS — M25.561 CHRONIC PAIN OF RIGHT KNEE: Primary | ICD-10-CM

## 2024-06-20 DIAGNOSIS — G89.29 CHRONIC PAIN OF RIGHT KNEE: Primary | ICD-10-CM

## 2024-06-21 NOTE — TELEPHONE ENCOUNTER
Spoke with patient who would like to go forward with referral.     Order prepped for provider.     Mily Muñoz RN

## 2024-06-21 NOTE — TELEPHONE ENCOUNTER
Please update Shelia that I discussed her case with Dr. Garcia and recommend a referral to see him for interventional evaluation of persistent knee pain s/p genicular RFA. If she is agreeable, I will place a follow up order for her to schedule with him at [place].     Ligia Mcintyre DNP, APRN, AGNP-C  [facility]

## 2024-06-27 ENCOUNTER — MYC MEDICAL ADVICE (OUTPATIENT)
Dept: PALLIATIVE MEDICINE | Facility: CLINIC | Age: 74
End: 2024-06-27
Payer: COMMERCIAL

## 2024-06-27 NOTE — TELEPHONE ENCOUNTER
Patient cancelling weight management appointments as insurance will not cover.    Routing to provider as FYI.     Mily Muñoz RN

## 2024-06-28 NOTE — TELEPHONE ENCOUNTER
Interventional evaluation with Dr. Garcia for failed right genicular RFA. Referral reviewed/signed.     Ligia Mcintyre, CHARLY, APRN, AGNP-C  [facility]

## 2024-06-29 NOTE — TELEPHONE ENCOUNTER
Information noted. It is disappointing to hear that insurance coverage for weight manage is very limited. If she decides to pursue again in the future, I can place another referral if needed.     Ligia Mcintyre, DNP, APRN, AGNP-C  [facility]

## 2024-06-30 ENCOUNTER — HEALTH MAINTENANCE LETTER (OUTPATIENT)
Age: 74
End: 2024-06-30

## 2024-07-05 ENCOUNTER — MYC MEDICAL ADVICE (OUTPATIENT)
Dept: PALLIATIVE MEDICINE | Facility: CLINIC | Age: 74
End: 2024-07-05
Payer: COMMERCIAL

## 2024-07-12 ASSESSMENT — PAIN SCALES - PAIN ENJOYMENT GENERAL ACTIVITY SCALE (PEG)
INTERFERED_GENERAL_ACTIVITY: 10 - COMPLETELY INTERFERES
AVG_PAIN_PASTWEEK: 9
PEG_TOTALSCORE: 9.67
INTERFERED_ENJOYMENT_LIFE: 10
PEG_TOTALSCORE: 9.67
INTERFERED_GENERAL_ACTIVITY: 10
INTERFERED_ENJOYMENT_LIFE: 10 - COMPLETELY INTERFERES
AVG_PAIN_PASTWEEK: 9

## 2024-07-12 ASSESSMENT — ANXIETY QUESTIONNAIRES
7. FEELING AFRAID AS IF SOMETHING AWFUL MIGHT HAPPEN: NOT AT ALL
GAD7 TOTAL SCORE: 9
5. BEING SO RESTLESS THAT IT IS HARD TO SIT STILL: NOT AT ALL
IF YOU CHECKED OFF ANY PROBLEMS ON THIS QUESTIONNAIRE, HOW DIFFICULT HAVE THESE PROBLEMS MADE IT FOR YOU TO DO YOUR WORK, TAKE CARE OF THINGS AT HOME, OR GET ALONG WITH OTHER PEOPLE: SOMEWHAT DIFFICULT
3. WORRYING TOO MUCH ABOUT DIFFERENT THINGS: MORE THAN HALF THE DAYS
7. FEELING AFRAID AS IF SOMETHING AWFUL MIGHT HAPPEN: NOT AT ALL
GAD7 TOTAL SCORE: 9
2. NOT BEING ABLE TO STOP OR CONTROL WORRYING: SEVERAL DAYS
6. BECOMING EASILY ANNOYED OR IRRITABLE: NEARLY EVERY DAY
8. IF YOU CHECKED OFF ANY PROBLEMS, HOW DIFFICULT HAVE THESE MADE IT FOR YOU TO DO YOUR WORK, TAKE CARE OF THINGS AT HOME, OR GET ALONG WITH OTHER PEOPLE?: SOMEWHAT DIFFICULT
4. TROUBLE RELAXING: SEVERAL DAYS
1. FEELING NERVOUS, ANXIOUS, OR ON EDGE: MORE THAN HALF THE DAYS

## 2024-07-16 ENCOUNTER — OFFICE VISIT (OUTPATIENT)
Dept: PALLIATIVE MEDICINE | Facility: OTHER | Age: 74
End: 2024-07-16
Attending: STUDENT IN AN ORGANIZED HEALTH CARE EDUCATION/TRAINING PROGRAM
Payer: COMMERCIAL

## 2024-07-16 VITALS — HEART RATE: 97 BPM | OXYGEN SATURATION: 98 % | SYSTOLIC BLOOD PRESSURE: 134 MMHG | DIASTOLIC BLOOD PRESSURE: 63 MMHG

## 2024-07-16 DIAGNOSIS — G89.29 CHRONIC PAIN OF RIGHT KNEE: ICD-10-CM

## 2024-07-16 DIAGNOSIS — M79.2 NEUROPATHIC PAIN: ICD-10-CM

## 2024-07-16 DIAGNOSIS — M25.561 CHRONIC PAIN OF RIGHT KNEE: ICD-10-CM

## 2024-07-16 PROCEDURE — G0463 HOSPITAL OUTPT CLINIC VISIT: HCPCS | Performed by: STUDENT IN AN ORGANIZED HEALTH CARE EDUCATION/TRAINING PROGRAM

## 2024-07-16 PROCEDURE — 99204 OFFICE O/P NEW MOD 45 MIN: CPT | Mod: GC | Performed by: STUDENT IN AN ORGANIZED HEALTH CARE EDUCATION/TRAINING PROGRAM

## 2024-07-16 RX ORDER — IBUPROFEN 200 MG
200 TABLET ORAL EVERY 6 HOURS PRN
COMMUNITY

## 2024-07-16 RX ORDER — ACETAMINOPHEN 325 MG/1
325-650 TABLET ORAL EVERY 4 HOURS PRN
COMMUNITY

## 2024-07-16 ASSESSMENT — PAIN SCALES - GENERAL: PAINLEVEL: EXTREME PAIN (9)

## 2024-07-16 NOTE — PROGRESS NOTES
[facility] Interventional Evaluation    Date of visit: 7/16/2024    Reason for consultation:    Shelia Sanchez is a 73 year old female who is seen in consultation today for evaluation of an interventional strategy for pain management.    PCP is Hailee Harris.    Please see the [facility] health questionnaire which the patient completed and reviewed with me in detail.    Chief Complaint:    Chief Complaint   Patient presents with    Pain       Pain history:  Shelia Sanchez is a 73 year old female presenting with a chief pain complaint of right knee pain        Location: Bilateral knee pain 6 years ago (right>left)  Onset: gradual onset   Duration: present all the time   Radiation: stays in the same region   Character: Dull achy, sharp pain   Severity: 9/10   Aggravating factors: walking, getting out of chair, going down incline  Relieving factors: Ice, rest   Numbness: No   Weakness: No       Patient denies red flag symptoms of corresponding bowel/bladder symptoms, fever/chills, saddle anesthesia, profound motor loss, weight loss, or sudden unremitting increase in pain.    Current pain medications include:  Advil  Tylenol    Previous medication treatments included:  Gabapentin, she had cognitive changes    Other treatments have included:  Shelia Sanchez has been seen at a pain clinic in the past.    PT: Yes, does not help with pain  Injections: Genicular RFA, without any pain relief   Surgery: No   Alternative: No     Past Medical History:  Past Medical History:   Diagnosis Date    Arthritis 01/28/2014    Bursitis in left hip    Asteatotic eczema     Cholelithiasis with obstruction 05/10/2010    Depressive disorder     Diabetes (H) Summer 2019    Pre-diabetes    Gallstone pancreatitis 05/11/2010    History of blood transfusion 40 years ago    Hypertension     Right knee pain 12/27/2012     Patient Active Problem List    Diagnosis  Date Noted    Right knee pain 02/02/2024     Priority: Medium    Current mild episode of major depressive disorder, unspecified whether recurrent (H24) 04/26/2023     Priority: Medium    Arthritis 03/01/2022     Priority: Medium    Binge eating disorder 08/08/2019     Priority: Medium    Prediabetes 08/08/2019     Priority: Medium    LORI (obstructive sleep apnea)- severe () 08/09/2018     Priority: Medium     8/8/2018 [employer] [facility] Sleep Study (275.0 lbs) - .3, .7, Supine .3, REM AHI n/a, Low O2% 81.3%, Time Spent ?88% 2.6, Time Spent ?89% 5.2. Treatment was titrated to a pressure of CPAP 8 with an AHI 28.9. Time spent in REM at this pressure was 0 minutes.      Heartburn 11/16/2016     Priority: Medium    Menopausal syndrome (hot flashes) 03/30/2016     Priority: Medium    Cervical pain 11/19/2015     Priority: Medium    Urinary incontinence 02/02/2015     Priority: Medium    Hypertension goal BP (blood pressure) < 140/90 07/02/2011     Priority: Medium       Past Surgical History:  Past Surgical History:   Procedure Laterality Date    CHOLECYSTECTOMY, LAPOROSCOPIC      Cholecystectomy, Laparoscopic    HEAD & NECK SURGERY      Treatment for arthritis in neck by deadening nerves    PHACOEMULSIFICATION CLEAR CORNEA WITH STANDARD INTRAOCULAR LENS IMPLANT Right 11/22/2022    Procedure: RIGHT PHACOEMULSIFICATION, CATARACT, WITH INTRAOCULAR LENS IMPLANT;  Surgeon: Trae Wade MD;  Location: [facility] OR    PHACOEMULSIFICATION CLEAR CORNEA WITH STANDARD INTRAOCULAR LENS IMPLANT Left 12/8/2022    Procedure: LEFT PHACOEMULSIFICATION, CATARACT, WITH INTRAOCULAR LENS IMPLANT;  Surgeon: Trae Wade MD;  Location: [facility] OR    ZZC VAG HYST,RMV TUBE/OVARY  2004    ZZHC ERCP W STENT PLACEMENT BILE/PANCREATIC DUCT       Medications:  Current Outpatient Medications   Medication Sig Dispense Refill    acetaminophen (TYLENOL) 325 MG tablet Take 325-650 mg by mouth every 4 hours as  needed for mild pain      aspirin (ASA) 81 MG chewable tablet TAKE 1 TABLET BY MOUTH DAILY 90 tablet 0    atorvastatin (LIPITOR) 20 MG tablet Take 1 tablet (20 mg) by mouth daily 90 tablet 3    B Complex-Biotin-FA (HM VITAMIN B100 COMPLEX) TABS       buPROPion (WELLBUTRIN XL) 150 MG 24 hr tablet take 1 tablet by mouth daily as directed      esomeprazole (NEXIUM) 20 MG DR capsule Take 1 tablet by mouth daily       ibuprofen (ADVIL/MOTRIN) 200 MG tablet Take 200 mg by mouth every 6 hours as needed for pain      olmesartan-hydrochlorothiazide (BENICAR HCT) 40-25 MG tablet Take 1 tablet by mouth daily 90 tablet 3    venlafaxine (EFFEXOR-XR) 75 MG 24 hr capsule TAKE 1 CAPSULE(75 MG) BY MOUTH DAILY (Patient taking differently: Take 150 mg by mouth daily 75 mg + 75 mg + 75 mg = 225 mg daily) 90 capsule 0    vitamin B complex with vitamin C (STRESS TAB) tablet Take 1 tablet by mouth daily      Vitamin D3 (CHOLECALCIFEROL) 125 MCG (5000 UT) tablet Take 1 tablet by mouth daily       Allergies:     Allergies   Allergen Reactions    Nitrofurantoin      Stomach pain    Topiramate Confusion    Lisinopril Cough         Family history:  Family History   Problem Relation Age of Onset    Arthritis Mother     Congenital Anomalies Mother     Eye Disorder Mother     Glaucoma Mother     Hypertension Mother     Depression Mother     Obesity Mother     Hyperlipidemia Mother     Osteoporosis Mother     Congenital Anomalies Father     Alzheimer Disease Father     Arthritis Father     Diabetes Father     Hypertension Father     Cerebrovascular Disease Father         Father    Prostate Cancer Father     Obesity Father     Coronary Artery Disease Father     Hyperlipidemia Father     Heart Disease Maternal Grandmother     Heart Disease Maternal Grandfather     Hypertension Paternal Grandmother         Mother s side    Heart Disease Paternal Grandfather     Depression Sister     Hypertension Sister         Sister    Anxiety Disorder Sister      Macular Degeneration Maternal Uncle        Physical Exam:  Vitals:    07/16/24 1355   BP: 134/63   Pulse: 97   SpO2: 98%     Exam:  Constitutional: Well developed, NAD  Head: normocephalic. Atraumatic.   Eyes: no redness or jaundice noted   CV: warm, well perfused extremities   Skin: no suspicious lesions or rashes  Psychiatric: mentation appears normal and affect     Bilateral knee:  Swelling around the knee  Tenderness at the medial joint line of both the knees. No tenderness in the popliteal fossa  Anterior drawer, posterior drawer bilaterally negative      Diagnostic tests:  9/2/2022 3 views left knee radiographs      History: Bilateral WB AP/LAT/SUN; Knee pain     Comparison: Right knee 5/5/2021     Findings:     Standing AP view of lateral knees, lateral and patellofemoral views of  the left knee were obtained.      Left:     No acute osseous abnormality. Small joint effusion.     Moderate medial compartment joint space loss.     No patellar tilt or lateral subluxation.  Soft tissue is unremarkable.     Right:     Moderate to severe medial compartment joint space loss.     Mild subcutaneous soft tissue stranding lateral calf.                                                                      Impression:  1. No acute osseous abnormality.  2. Medial compartment predominant osteoarthrosis of bilateral knees,  greater on the right.  Personally reviewed imaging: yes      Assessment:  Bilateral knee osteoarthritis  Chronic pain of right knee  Morbid obesity  Binge eating disorder      Shelia Sanchez is a 73 year old female who presents with pain in the bilateral knee and from last 6 years gradually getting worse right side worse than the left.  This pain is localized in the medial joint line and nonradiating.  Patient has tried multiple steroid injections which were working for her initially but later on stopped working.  She has also tried hyaluronic acid without any pain relief.  Patient recently had a right  knee genicular block with good response but did not have lasting pain relief from traditional RF of the right knee.  She has also tried physical therapy but is not able to do exercise or walking because of the knee pain. She is working on weight management for possible surgical treatment. We discussed attempting cooled radiofrequency ablation, as this will provide a larger lesion volume compared to traditional RF and is more likely to capture the appropriate genicular articular branches. Patient agreed to the plan.      Plan:  Diagnosis reviewed, treatment option addressed, and risk/benefits discussed.     Procedures: Right knee genicular nerves cold (coolief) radiofrequency ablation ordered  Physical Therapy: Continue physical therapy   Diagnostic Studies: None   Medication Management: No medication changes   Follow up: As needed     Patient was seen and discussed with my staff physician Dr. Garcia, who agrees with my assessment and plan.    Joaquim GIL  Pain Fellow  [facility]      Physician Attestation   I, Jean Pierre Garcia MD, saw and evaluated this patient and agree with the findings and plan of care as documented in the note.  Any changes have been made above.    Jean Pierre Garcia MD  Interventional Pain Medicine  [facility]

## 2024-07-16 NOTE — PATIENT INSTRUCTIONS
Procedures: Right knee genicular nerves, coolief ablation.  Physical Therapy: Continue physical therapy   Diagnostic Studies: None   Medication Management: No medication changes   Follow up: As needed

## 2024-07-16 NOTE — PROGRESS NOTES
"Patient presents to the clinic today for a visit with Jean Pierre Garcia MD regarding Pain Management.          6/13/2024     1:22 PM 7/16/2024     1:55 PM   PEG Score   PEG Total Score 9 9.67       UDS/CSA-     Medications- Stopped taking Gabapentin because it was \"making me goofy.\" She is still struggling with cognitive issues since she started tapering. Ligia Mcintyre helped her taper    Notes Pulled a muscle in her left hip. She couldn't get into her car.    She is using the ibuprofen and acetaminophen together as an every other regimen.     Jelly Rendon  [facility] Clinical Assistant  "

## 2024-08-12 ENCOUNTER — THERAPY VISIT (OUTPATIENT)
Dept: PHYSICAL THERAPY | Facility: CLINIC | Age: 74
End: 2024-08-12
Payer: COMMERCIAL

## 2024-08-12 DIAGNOSIS — G89.29 CHRONIC PAIN OF RIGHT KNEE: Primary | ICD-10-CM

## 2024-08-12 DIAGNOSIS — M25.561 CHRONIC PAIN OF RIGHT KNEE: Primary | ICD-10-CM

## 2024-08-12 PROCEDURE — 97110 THERAPEUTIC EXERCISES: CPT | Mod: GP | Performed by: PHYSICAL THERAPIST

## 2024-08-12 PROCEDURE — 97112 NEUROMUSCULAR REEDUCATION: CPT | Mod: GP | Performed by: PHYSICAL THERAPIST

## 2024-08-12 NOTE — PROGRESS NOTES
[facility]                                                                                   OUTPATIENT PHYSICAL THERAPY    PLAN OF TREATMENT FOR OUTPATIENT REHABILITATION   Patient's Last Name, First Name, Shelia Leroy YOB: 1950   Provider's Name   [facility]   Medical Record No.  [id number]     Onset Date: 01/18/24  Start of Care Date: 02/02/24     Medical Diagnosis:  R knee pain, LBP, neuropathic pain      PT Treatment Diagnosis:  R knee pain, LBP, neuropathic pain, weakness, gait abnormality Plan of Treatment  Frequency/Duration: Updated:  2x month x 3 months/ Updated:  3 months    Certification date from 08/01/24 to 10/30/24         See note for plan of treatment details and functional goals     Angel Petty, PT                         I CERTIFY THE NEED FOR THESE SERVICES FURNISHED UNDER        THIS PLAN OF TREATMENT AND WHILE UNDER MY CARE     (Physician attestation of this document indicates review and certification of the therapy plan).              Referring Provider:  Ligia Mcintyre    Initial Assessment  See Epic Evaluation- Start of Care Date: 02/02/24 08/12/24 0500   Appointment Info   Signing clinician's name / credentials Angel Petty PT   Total/Authorized Visits E&T   Visits Used 12   Medical Diagnosis R knee pain, LBP, neuropathic pain   PT Tx Diagnosis R knee pain, LBP, neuropathic pain, weakness, gait abnormality   Quick Adds Certification   Progress Note/Certification   Start of Care Date 02/02/24   Onset of illness/injury or Date of Surgery 01/18/24   Therapy Frequency Updated:  2x month x 3 months   Predicted Duration Updated:  3 months   Certification date from 08/01/24   Certification date to 10/30/24   Progress Note Completed Date 08/12/24   PT Goal 1   Goal Identifier ambulation   Goal Description Goal modified: able to ambulate with approp AD x 12 minutes   Rationale to maximize safety and  independence with performance of ADLs and functional tasks;to maximize safety and independence within the home;to maximize safety and independence within the community;to maximize safety and independence with self cares   Goal Progress Partially met:  intermittent progress, pain and fatigue challenge progress.  Able to ambulate with approp AD x 8-10 minutes   Target Date 10/30/24   Subjective Report   Subjective Report Has been tending to a family member with health/medical issues so has not been in to pain PT for awhile.  Now, planning to focus more on her health and PT.   Objective Measures   Objective Measures Objective Measure 1;Objective Measure 2;Objective Measure 3   Objective Measure 1   Objective Measure AROM:  Lumbar flex 75%, ext 5%.   Details L hip flexion too painful today.   Objective Measure 2   Objective Measure Gait with 4WW exhibits decr hip ext on L, decr toe off on L.   Objective Measure 3   Objective Measure See FACIT   Treatment Interventions (PT)   Interventions Therapeutic Procedure/Exercise;Neuromuscular Re-education   Therapeutic Procedure/Exercise   Therapeutic Procedures: strength, endurance, ROM, flexibility minutes (76818) 30   Therapeutic Procedures Ther Proc 2;Ther Proc 3;Ther Proc 4;Ther Proc 5;Ther Proc 6;Ther Proc 7;Ther Proc 8;Ther Proc 9   Ther Proc 1 seated spine mobility -> cat/cow modified for sitting - slump, overcorrect, neutral -> spine mobility facilitation   Ther Proc 2 Seated pelvic tilts x 5   Ther Proc 3 UBE x 10'/fatigue ->  to increase overall functional activity tolerance and LE strength   Ther Proc 3 - Details Garibay walk with 4WW and cues for heel strike and foot clearance   Ther Proc 4 Home: Stretches for thor ext, KTC and knee extension - modified, in supine   Ther Proc 5 Seated BKFO, to light fatigue -> improved control and estella for incr reps   Ther Proc 5 - Details Hold on BKFI due to L hip pain   Ther Proc 6 Standing lateral wt shifting to R and L   Ther Proc 6  - Details Lat stepping too painful for L hip   Ther Proc 7 Home: Reminders of technique for seated wall push ups (use of counter at home of chair in front of wall)   Ther Proc 8 Fewer cues needed to perform seated lateral step outs to fatigue (hip flex, then abd out and foot to the ground, then reverse to starting position) -> estella incr reps and demonstrated imprvoed quality of movement   Ther Proc 9 Home: seated modified adductor/groin stretch (pt reports some adductor spasms) -> ed on technique with goal of lengthening, nerve mobility -> pt likes the controlled lengthening   Ther Proc 9 - Details Standing calf stretch (think 'lengthening' and nerve mobility)   Skilled Intervention cues for proper form   Patient Response/Progress good   Therapeutic Activity   Therapeutic Activities Ther Act 2;Ther Act 3   Ther Act 1 For therapeutic strengthening, performed multiple sit to/from stands at traditional height chair and also utilized hi-lo table for mult sit to/from stands -> to fatigue.  Ed on ok to modify for R knee pain ie have R foot slightly further ahead of L foot   Ther Act 1 - Details continued emphasis, ed on power vs endurance muscles   Ther Act 2 ed on functional reaching, ADLs - ed COG, NICK, lever arm   Ther Act 3 Ed, work on functional squat and functional above shldr reaching -> ok to widen base and/or sit if reaching to lower shelf item or laundry front    Ther Act 3 - Details ed on variations of feet position, also set scapulae before reach   Skilled Intervention cues for proper form   Patient Response/Progress good   Neuromuscular Re-education   Neuromuscular re-ed of mvmt, balance, coord, kinesthetic sense, posture, proprioception minutes (34055) 25   Neuromuscular Re-education Neuro Re-ed 2;Neuro Re-ed 3;Neuro Re-ed 4;Neuro Re-ed 5;Neuro Re-ed 6;Neuro Re-ed 7;Neuro Re-ed 8   Neuro Re-ed 1 in standing, find neutral spine with soft knees   Neuro Re-ed 1 - Details staggered stance -> also added wt  shifting at diagonal from this staggered stance position -> standing heel raises, toe raises -> quality movement focus   Neuro Re-ed 2 PNE, Pain Knowledge, Q1-4 with ed, discussion   Neuro Re-ed 3 Centering/grounding efforts -> 'Centering', 'grounding' concept emphasized -> Breath work -> seated with palms up, feel pecs lengthen and open up fo rrib expansion   Neuro Re-ed 4 Self care ed -> pain PT principle   Neuro Re-ed 4 - Details reminders for ok to say no to certain social activities if she recognizes will be overdoing it and potentially cause pain flare -> pt doing fairly well with this approach   Neuro Re-ed 5 Scapular retraction/depression, rows and ed on functional reaching connection -> also feel pecs lengthening   Neuro Re-ed 6 Performed seated Leeroy Chi UE movements with ed, focus on neutral jt and fluidity of movement -> UE #1-3 with good tolerance   Neuro Re-ed 6 - Details lateral stepping with UE #3 -> ed on reps of 3 -> also ed on 'rooting' principle of Leeroy Chi -> focus on stability   Neuro Re-ed 7 Home: Happiness chemicals and how to hack them -> endorphins, serotonin (meditation, breathing - mood stabilizer), etc   Neuro Re-ed 7 - Details pt appreciated this info and the understanding vs the cortisol, adrenaline that is released with pain stressor   Neuro Re-ed 8 Sit and reach balance tasks -> recognize the off balance and train to re-balance, recover, find COG   Skilled Intervention pain science, cues for proper form   Patient Response/Progress good   Eval/Assessments   Assessments   (has made progress toward goal, pain does limit progress)   Plan   Updates to plan of care See Recert/PN -> continue pain PT   Comments   Comments repetition of tasks, exercises helpful to reinforce pain PT principles -> improve pain knowledge, understanding   Total Session Time   Timed Code Treatment Minutes 55   Total Treatment Time (sum of timed and untimed services) 55         PLAN  Continue therapy per current plan  of care.    Beginning/End Dates of Progress Note Reporting Period:  5/2/24 to 08/12/2024    Referring Provider:  Ligia Mcintyre

## 2024-08-28 SDOH — HEALTH STABILITY: PHYSICAL HEALTH: ON AVERAGE, HOW MANY DAYS PER WEEK DO YOU ENGAGE IN MODERATE TO STRENUOUS EXERCISE (LIKE A BRISK WALK)?: 0 DAYS

## 2024-08-28 SDOH — HEALTH STABILITY: PHYSICAL HEALTH: ON AVERAGE, HOW MANY MINUTES DO YOU ENGAGE IN EXERCISE AT THIS LEVEL?: 0 MIN

## 2024-08-28 ASSESSMENT — SOCIAL DETERMINANTS OF HEALTH (SDOH): HOW OFTEN DO YOU GET TOGETHER WITH FRIENDS OR RELATIVES?: TWICE A WEEK

## 2024-09-03 ENCOUNTER — THERAPY VISIT (OUTPATIENT)
Dept: PHYSICAL THERAPY | Facility: CLINIC | Age: 74
End: 2024-09-03
Payer: COMMERCIAL

## 2024-09-03 DIAGNOSIS — M25.561 CHRONIC PAIN OF RIGHT KNEE: Primary | ICD-10-CM

## 2024-09-03 DIAGNOSIS — G89.29 CHRONIC PAIN OF RIGHT KNEE: Primary | ICD-10-CM

## 2024-09-03 PROCEDURE — 97112 NEUROMUSCULAR REEDUCATION: CPT | Mod: GP | Performed by: PHYSICAL THERAPIST

## 2024-09-03 PROCEDURE — 97110 THERAPEUTIC EXERCISES: CPT | Mod: GP | Performed by: PHYSICAL THERAPIST

## 2024-09-09 ENCOUNTER — MYC MEDICAL ADVICE (OUTPATIENT)
Dept: PALLIATIVE MEDICINE | Facility: CLINIC | Age: 74
End: 2024-09-09
Payer: COMMERCIAL

## 2024-09-09 DIAGNOSIS — M25.561 CHRONIC PAIN OF RIGHT KNEE: Primary | ICD-10-CM

## 2024-09-09 DIAGNOSIS — G89.29 CHRONIC PAIN OF RIGHT KNEE: Primary | ICD-10-CM

## 2024-09-11 ENCOUNTER — OFFICE VISIT (OUTPATIENT)
Dept: FAMILY MEDICINE | Facility: CLINIC | Age: 74
End: 2024-09-11

## 2024-09-11 VITALS
HEART RATE: 95 BPM | BODY MASS INDEX: 41.5 KG/M2 | DIASTOLIC BLOOD PRESSURE: 57 MMHG | SYSTOLIC BLOOD PRESSURE: 131 MMHG | OXYGEN SATURATION: 100 % | WEIGHT: 265 LBS

## 2024-09-11 DIAGNOSIS — Z02.89 ENCOUNTER FOR COMPLETION OF FORM WITH PATIENT: ICD-10-CM

## 2024-09-11 DIAGNOSIS — M79.3 LIPODERMATOSCLEROSIS OF BOTH LOWER EXTREMITIES: ICD-10-CM

## 2024-09-11 DIAGNOSIS — I10 HYPERTENSION GOAL BP (BLOOD PRESSURE) < 140/90: Primary | Chronic | ICD-10-CM

## 2024-09-11 DIAGNOSIS — I95.1 POSTURAL HYPOTENSION: ICD-10-CM

## 2024-09-11 LAB
ANION GAP SERPL CALCULATED.3IONS-SCNC: 11 MMOL/L (ref 7–15)
BUN SERPL-MCNC: 42 MG/DL (ref 8–23)
CALCIUM SERPL-MCNC: 10.1 MG/DL (ref 8.8–10.4)
CHLORIDE SERPL-SCNC: 108 MMOL/L (ref 98–107)
CREAT SERPL-MCNC: 1.25 MG/DL (ref 0.51–0.95)
EGFRCR SERPLBLD CKD-EPI 2021: 44 ML/MIN/1.73M2
FASTING STATUS PATIENT QL REPORTED: NO
GLUCOSE SERPL-MCNC: 108 MG/DL (ref 70–99)
HCO3 SERPL-SCNC: 21 MMOL/L (ref 22–29)
POTASSIUM SERPL-SCNC: 3.6 MMOL/L (ref 3.4–5.3)
SODIUM SERPL-SCNC: 140 MMOL/L (ref 135–145)

## 2024-09-11 PROCEDURE — 82374 ASSAY BLOOD CARBON DIOXIDE: CPT | Performed by: FAMILY MEDICINE

## 2024-09-11 PROCEDURE — G2211 COMPLEX E/M VISIT ADD ON: HCPCS | Performed by: FAMILY MEDICINE

## 2024-09-11 PROCEDURE — 80048 BASIC METABOLIC PNL TOTAL CA: CPT | Mod: 90 | Performed by: FAMILY MEDICINE

## 2024-09-11 PROCEDURE — 36415 COLL VENOUS BLD VENIPUNCTURE: CPT | Performed by: FAMILY MEDICINE

## 2024-09-11 PROCEDURE — 99214 OFFICE O/P EST MOD 30 MIN: CPT | Performed by: FAMILY MEDICINE

## 2024-09-11 PROCEDURE — 3075F SYST BP GE 130 - 139MM HG: CPT | Performed by: FAMILY MEDICINE

## 2024-09-11 NOTE — PATIENT INSTRUCTIONS
- stop taking amlodipine 5 mg : I think your dizziness is related to too tightly controlled blood pressure     - [facility]    phone [phone].    EMAIL  [email or web address]    HOURS  Monday-Friday: 8:00AM - 4:00PM    Saturday-Sunday: Closed    Dr. Ramona Velasquez

## 2024-09-11 NOTE — PROGRESS NOTES
SUBJECTIVE:    Shelia Sanchez, is a 74 year old female presenting for the below:     -hypertension : olmesartan-hydrochlorothiazide (BENICAR HCT) 40-25 MG tablet, norvasc 5 mg. Has invested in home blood pressure monitor : has not yet started using. Sister was hospitalize over summer : destructed from own health during that time.     -3 week h/o dizziness on postural changes (bending over, sitting to standing). No associated chest pain, palpitations, shortness of breath. Does not occur when rolling over in bed.     -requesting renewal of disability parking permit : osteoarthritis hip and knee : walks with wheeled walker and elliott.    -sloughing skin to left medial ankle area. Has started to wear compression stockings and dress to to weeping in this area.      OBJECTIVE:  Vitals:    09/11/24 1038   BP: 131/57   Pulse: 95   SpO2: 100%   Weight: 120.2 kg (265 lb)    Body mass index is 41.5 kg/m .  General: no acute distress, cooperative with exam.  Extremities: both lower extremities with compression stockings in situ. On removal, slightly macerated skin to medial left ankle. Purplish discoloration to skin with tapering or diameter and tightness to ankle area. No heat or mary kate erythema.   Neuro: grossly intact   Psych: mental status normal, mood and affect appropriate.    BP Readings from Last 6 Encounters:   09/11/24 131/57   07/16/24 134/63   06/13/24 134/75   05/09/24 (!) 175/96   03/22/24 (!) 154/78   03/05/24 (!) 142/82       ASSESSMENT / PLAN:      Hypertension goal BP (blood pressure) < 140/90  Postural hypotension  Suspect dizziness is related to this.   Stop CCB (norvasc 5 mg daily)  Continue  olmesartan-hydrochlorothiazide (BENICAR HCT) 40-25 MG tablet,  -follow up blood pressure check at upcoming annual physical  -     Basic metabolic panel; Future  -     VENOUS COLLECTION  -     MOST RECENT SYSTOLIC BLOOD PRESS -139MM HG    Encounter for completion of form with patient  Disability parking permit of  osteoarthritis hip and knee completed and issued back to patient for processing.     Lipodermatosclerosis of both lower extremities  Strongly suspect venous insufficiency with appearance of lower extremities and ensuing possible venous ulcer right leg.    No evidence of skin loss / ulceration or secondary infection at this time  Referral to vascular medicine.   -     Vascular Medicine Referral; Future      The longitudinal plan of care for the diagnosis(es)/condition(s) as documented were addressed during this visit. Due to the added complexity in care, I will continue to support Shelia in the subsequent management and with ongoing continuity of care.      Follow up:    Nov 06, 2024 10:30 AM  PHYSICAL with Hailee Harris MD  [facility] [phone]

## 2024-09-12 ENCOUNTER — THERAPY VISIT (OUTPATIENT)
Dept: PHYSICAL THERAPY | Facility: CLINIC | Age: 74
End: 2024-09-12
Payer: COMMERCIAL

## 2024-09-12 DIAGNOSIS — M25.561 CHRONIC PAIN OF RIGHT KNEE: Primary | ICD-10-CM

## 2024-09-12 DIAGNOSIS — G89.29 CHRONIC PAIN OF RIGHT KNEE: Primary | ICD-10-CM

## 2024-09-12 PROCEDURE — 97110 THERAPEUTIC EXERCISES: CPT | Mod: GP | Performed by: PHYSICAL THERAPIST

## 2024-09-12 PROCEDURE — 97112 NEUROMUSCULAR REEDUCATION: CPT | Mod: GP | Performed by: PHYSICAL THERAPIST

## 2024-09-16 NOTE — TELEPHONE ENCOUNTER
Screening Questions for RFA Procedure      Procedure ordered? right knee genicular coolief RFA     What insurance are we billing for this procedure?  [employer]  IF SCHEDULING AT [facility] PLEASE SCHEDULE AT LEAST 7-10 BUSINESS DAYS OUT SO A PA CAN BE OBTAINED    Is patient scheduled at [facility]? no  If YES, route every encounter to [facility] CARE NAVIGATION POOL [[phone]]  Has patient had this injection before? No  Any chance of pregnancy? NO   If YES, do NOT schedule and route to RN pool     Is  Needed?: No  Will patient have a ?  Yes   If pt is given sedation meds, no driving for 24 hours.  Is pt taking a cab or transportation service? NO      If so will need to be accompanied by an adult too (friend/family member) in order for IV sedation to be given.    Per [place] Policy:  Outpatients are to have responsible adult or family member to accompany them at discharge and drive them home. A service providing medically trained drivers or attendants would be acceptable. Public transportation would not be acceptable unless the patient is accompanied by a responsible adult or family member.  Is patient taking any blood thinners (i.e. plavix, coumadin, jantoven, warfarin, heparin, pradaxa or dabigatran, etc)? No   If YES, do NOT schedule, and route to RN pool     Is patient taking any GLP-1 Antagonist (hold needed for sedation patients only) No   (semaglutide (Ozempic, Wegovy), dulaglutide (Trulicity), exenatide ER (Bydureon), tirzepatide (Mounjaro), Liraglutide (Saxenda, Victoza), semaglutide (Rybelsus),Terzepatide (Zepbound)  If YES, okay to schedule AND route to RN nurse / Dr. Groves's Team    Does the patient have a bleeding or clotting disorder? No   If YES, it it OKAY to schedule AND route to RN pool  **For any patients with platelet count <100, must be forwarded to provider**    Is patient diabetic? No If YES, have them bring their glucometer.    Does patient have an  active infection or treated for one within the past week? No   If YES, do NOT schedule and route to RN nurse pool     Is patient currently taking any antibiotics?  No  For patients on chronic, preventative, or prophylactic antibiotics, procedures may be scheduled.   For patients on antibiotics for active or recent infection:antibiotic course must have been completed for 4 days    Is patient currently taking any steroid medications? (i.e. Prednisone, Medrol)  No   For patients on steroid medications, course must have been completed for 4 days    Is patient actively being treated for cancer or immunocompromised, including the spleen having been removed? No  If YES, do NOT schedule and route to RN pool     Any history of complications with sedation medications?  NO   If YES, OK to schedule AND route to RN pool     Any history of sleep apnea?  yes   If YES, OK to schedule AND route to RN pool     Any cardiac history?  NO   If YES, OK to schedule AND route to RN pool     Do you have an implanted pacemaker, ICD (implanted cardiac device) or AICD (automatic implanted cardiac device)?  NO  If YES, do NOT schedule AND route to RN pool (for all providers including Dr Thomas)   Obtain name of device :     Obtain name of cardiologist:       Do you have an implanted stimulator?  NO  If YES, OK to schedule AND route to nursing.   Instruct patient to bring in the remote to the appointment and it will need to be turned off.  reviewed      Does patient have an allergy to contrast dye, iodine or shellfish?  No   If YES, OK to schedule. Route to RN pool AND add allergy information to appointment notes    Are you able to get on and off an exam table with minimal or no assistance? Yes   If NO, do NOT schedule and route to RN pool    Are you able to roll over and lay on your stomach with minimal or no assistance? Yes   If NO, do NOT schedule and route to RN pool    Reminders:  If you are started on any steroids or antibiotics  between now and your appointment, you must contact us because it may affect our ability to perform your procedure.  Yes  Informed patient that s/he needs to fast for 6 hours before procedure?  YES  Informed patient that it is OK to take normal medications with sips of water, especially blood pressure medications, before the procedure and must hold blood thinners as instructed.  Yes  Informed patient to arrive 30 minutes before procedure time to have an IV inserted.  reviewed   Do NOT schedule at 0745, 0815 or 1245.  reviewed   All radiofrequency ablations are in a 60 minute time slot.If cervical RFA, please schedule each side separate. If okay to do bilateral cervical RFA, schedule for 90 minutes.  reviewed

## 2024-09-19 ENCOUNTER — THERAPY VISIT (OUTPATIENT)
Dept: PHYSICAL THERAPY | Facility: CLINIC | Age: 74
End: 2024-09-19
Payer: COMMERCIAL

## 2024-09-19 ENCOUNTER — MYC MEDICAL ADVICE (OUTPATIENT)
Dept: FAMILY MEDICINE | Facility: CLINIC | Age: 74
End: 2024-09-19

## 2024-09-19 DIAGNOSIS — M25.561 CHRONIC PAIN OF RIGHT KNEE: Primary | ICD-10-CM

## 2024-09-19 DIAGNOSIS — G89.29 CHRONIC PAIN OF RIGHT KNEE: Primary | ICD-10-CM

## 2024-09-19 PROCEDURE — 97110 THERAPEUTIC EXERCISES: CPT | Mod: GP | Performed by: PHYSICAL THERAPIST

## 2024-09-19 PROCEDURE — 97112 NEUROMUSCULAR REEDUCATION: CPT | Mod: GP | Performed by: PHYSICAL THERAPIST

## 2024-09-19 NOTE — TELEPHONE ENCOUNTER
Called patient to discuss. Reassured. CKD stage 3a. Taking ARB. Discussed mild nature of this and will monitor.

## 2024-09-26 ENCOUNTER — THERAPY VISIT (OUTPATIENT)
Dept: PHYSICAL THERAPY | Facility: CLINIC | Age: 74
End: 2024-09-26
Payer: COMMERCIAL

## 2024-09-26 DIAGNOSIS — M25.561 CHRONIC PAIN OF RIGHT KNEE: Primary | ICD-10-CM

## 2024-09-26 DIAGNOSIS — G89.29 CHRONIC PAIN OF RIGHT KNEE: Primary | ICD-10-CM

## 2024-09-26 PROCEDURE — 97112 NEUROMUSCULAR REEDUCATION: CPT | Mod: GP | Performed by: PHYSICAL THERAPIST

## 2024-09-26 PROCEDURE — 97110 THERAPEUTIC EXERCISES: CPT | Mod: GP | Performed by: PHYSICAL THERAPIST

## 2024-10-14 ENCOUNTER — THERAPY VISIT (OUTPATIENT)
Dept: PHYSICAL THERAPY | Facility: CLINIC | Age: 74
End: 2024-10-14
Payer: COMMERCIAL

## 2024-10-14 DIAGNOSIS — G89.29 CHRONIC PAIN OF RIGHT KNEE: Primary | ICD-10-CM

## 2024-10-14 DIAGNOSIS — M25.561 CHRONIC PAIN OF RIGHT KNEE: Primary | ICD-10-CM

## 2024-10-14 PROCEDURE — 97110 THERAPEUTIC EXERCISES: CPT | Mod: GP | Performed by: PHYSICAL THERAPIST

## 2024-10-14 PROCEDURE — 97112 NEUROMUSCULAR REEDUCATION: CPT | Mod: GP | Performed by: PHYSICAL THERAPIST

## 2024-10-21 ENCOUNTER — THERAPY VISIT (OUTPATIENT)
Dept: PHYSICAL THERAPY | Facility: CLINIC | Age: 74
End: 2024-10-21
Payer: COMMERCIAL

## 2024-10-21 DIAGNOSIS — M25.561 CHRONIC PAIN OF RIGHT KNEE: Primary | ICD-10-CM

## 2024-10-21 DIAGNOSIS — G89.29 CHRONIC PAIN OF RIGHT KNEE: Primary | ICD-10-CM

## 2024-10-21 PROCEDURE — 97112 NEUROMUSCULAR REEDUCATION: CPT | Mod: GP | Performed by: PHYSICAL THERAPIST

## 2024-10-21 PROCEDURE — 97110 THERAPEUTIC EXERCISES: CPT | Mod: GP | Performed by: PHYSICAL THERAPIST

## 2024-10-23 ENCOUNTER — RADIOLOGY INJECTION OFFICE VISIT (OUTPATIENT)
Dept: PALLIATIVE MEDICINE | Facility: OTHER | Age: 74
End: 2024-10-23
Attending: STUDENT IN AN ORGANIZED HEALTH CARE EDUCATION/TRAINING PROGRAM
Payer: COMMERCIAL

## 2024-10-23 VITALS — OXYGEN SATURATION: 99 % | DIASTOLIC BLOOD PRESSURE: 72 MMHG | SYSTOLIC BLOOD PRESSURE: 173 MMHG | HEART RATE: 90 BPM

## 2024-10-23 DIAGNOSIS — M25.561 CHRONIC PAIN OF RIGHT KNEE: Primary | ICD-10-CM

## 2024-10-23 DIAGNOSIS — G89.29 CHRONIC PAIN OF RIGHT KNEE: Primary | ICD-10-CM

## 2024-10-23 PROCEDURE — 64624 DSTRJ NULYT AGT GNCLR NRV: CPT | Mod: RT | Performed by: STUDENT IN AN ORGANIZED HEALTH CARE EDUCATION/TRAINING PROGRAM

## 2024-10-23 PROCEDURE — 250N000011 HC RX IP 250 OP 636: Performed by: STUDENT IN AN ORGANIZED HEALTH CARE EDUCATION/TRAINING PROGRAM

## 2024-10-23 RX ADMIN — LIDOCAINE HYDROCHLORIDE 5 ML: 20 INJECTION, SOLUTION INTRAVENOUS at 12:03

## 2024-10-23 ASSESSMENT — PAIN SCALES - GENERAL: PAINLEVEL_OUTOF10: MILD PAIN (3)

## 2024-10-23 NOTE — PROGRESS NOTES
Pre procedure Diagnosis: chronic right knee arthropathy  Post procedure Diagnosis: Same  Procedure performed: right genicular nerve radiofrequency ablation  Anesthesia: none  Complications: none immediately  Operators: Jean Pierre Garcia MD, Tomer Valencia DO    Indications:   Shelia Sanchez is a 74 year old female was sent by myself for right knee genicular nerve radiofrequency ablation    Options/alternatives, benefits and risks were discussed with the patient including bleeding, infection, tissue trauma, exposure to radiation, reaction to medications including seizure, nerve injury, weakness, and numbness.  Questions were answered to her  satisfaction and she agrees to proceed. Voluntary informed consent was obtained and signed.     Vitals were reviewed: Yes  Allergies were reviewed:  Yes   Medications were reviewed:  Yes   Pre-procedure pain score: see flowsheet/10    Procedure:  After getting informed consent, patient was brought into the procedure suite and was placed in a supine position on the procedure table.   A procedural pause was performed.  Patient was prepped and draped in sterile fashion.     After identifying the right knee joint, the overlying skin was superficially anesthetized with 1ml lidocaine 1%. A 75mm, 17G radiofrequency cannula with a 10mm active tip was advanced under intermittent fluoroscopy at the sites of the right three genicular nerves. The genicular (superior medial, superior lateral, and inferior medial) articular nerves were targeted. Placement was confirmed with multiple fluoroscopic views.     Each position was tested for motor stimulation, and was repositioned if necessary so that stimulation was negative for stimuli outside the immediate area of the desired lesion. Motor stimulation was completed at 2Hz, up to 1.5V.   Lidocaine 2% 1.0 ml was injected, and a 150 second, 60 degree Centigrade lesion was generated.    Hemostasis was achieved, the area was cleaned, and bandaids  were placed when appropriate.  The patient tolerated the procedure well, and was taken to the recovery room.    Images were saved to PACS.    Post-procedure pain score: 3/10  Follow-up includes:   -f/u with referring provider    Jean Pierre Garcia MD  Interventional Pain Medicine  [facility]

## 2024-10-23 NOTE — PATIENT INSTRUCTIONS
[facility] [place]  Radiofrequency Ablation (RFA) Discharge Instructions     Today you saw:   Dr. Garcia     Lidocaine Omnipaque  You should anticipate procedural pain for up to 2 weeks.     It may take up to 8 weeks to receive relief from the RFA     You may resume your normal diet     You may resume your regular medications after the procedure     If you were holding your blood thinning medication, please restart taking it: N/A    Be cautious with walking. Numbness and/or weakness in the lower extremities may occur for up to 6-8 hours due to effect of local anesthetic    Avoid strenuous activity for the first 24 hours     You may resume your regular activities after 24 hours     You may shower, however no swimming, tub baths or hot tubs for 24 hours following your procedure     You may use ice packs 10-15 minutes three to four times a day at the injection site for comfort     Do not use heat to painful areas for 6 to 8 hours. This will give the local anesthetic time to wear off and prevent you from accidentally burning your skin.     Unless you have been directed to avoid the use of anti-inflammatory medications (NSAIDS), you may use medications such as ibuprofen, Aleve or Tylenol for pain control if needed.     If you experience any of the following, call the Pain Clinic at [phone]:   -Fever over 100 degree F    -Swelling, bleeding, redness, drainage, warmth at the injection site    -Progressive weakness or numbness in your legs or arms    -Loss of bowel or bladder function    -Unusual headache that is not relieved by Tylenol    -Unusual new onset of pain that is not improving

## 2024-10-23 NOTE — PROGRESS NOTES
Pre-procedure Intake    If YES to any questions or NO to having a     Notify nurses and provider    Are you taking any prescribed blood thinners such as Coumadin, Warfarin, Jantoven, Pradaxa Xarelto, Eliquis, Edoxaban, Enoxaparin, Lovenox, Heparin, Arixtra, Fondaparinux, or Fragmin? OR Antiplatelet medication such as Plavix, Brilinta, or Effient?   No   If yes, when did you take your last dose?     Do you take aspirin or use aspirin products?  No  If cervical procedure or interlaminar, have you held aspirin for 6 days?   Yes    Do you have any allergies to contrast dye, iodine, steroid and/or numbing medications?  NO    Are you currently taking antibiotics or have an active infection?  NO    Have you had a fever/elevated temperature within the past week? NO    Have you had a vaccination of any kind in the last seven days or a Covid vaccine in the last two weeks NO    Do you have a ? Yes    Are you pregnant or breastfeeding?  NO      Notify provider and RNs if systolic BP >170, diastolic BP >100, P >100 or O2 sats < 90%

## 2024-11-04 ENCOUNTER — THERAPY VISIT (OUTPATIENT)
Dept: PHYSICAL THERAPY | Facility: CLINIC | Age: 74
End: 2024-11-04
Payer: COMMERCIAL

## 2024-11-04 DIAGNOSIS — G89.29 CHRONIC PAIN OF RIGHT KNEE: Primary | ICD-10-CM

## 2024-11-04 DIAGNOSIS — M25.561 CHRONIC PAIN OF RIGHT KNEE: Primary | ICD-10-CM

## 2024-11-04 PROCEDURE — 97110 THERAPEUTIC EXERCISES: CPT | Mod: GP | Performed by: PHYSICAL THERAPIST

## 2024-11-04 PROCEDURE — 97112 NEUROMUSCULAR REEDUCATION: CPT | Mod: GP | Performed by: PHYSICAL THERAPIST

## 2024-11-04 NOTE — PROGRESS NOTES
[facility]                                                                                   OUTPATIENT PHYSICAL THERAPY    PLAN OF TREATMENT FOR OUTPATIENT REHABILITATION   Patient's Last Name, First Name, Shelia Leroy YOB: 1950   Provider's Name   [facility]   Medical Record No.  [id number]     Onset Date: 01/18/24  Start of Care Date: 02/02/24     Medical Diagnosis:  R knee pain, LBP, neuropathic pain      PT Treatment Diagnosis:  R knee pain, LBP, neuropathic pain, weakness, gait abnormality Plan of Treatment  Frequency/Duration: Updated:  1x visit this recert period/ Updated:  1 week    Certification date from 10/31/24 to 11/04/24         See note for plan of treatment details and functional goals     Angel Petty, PT                         I CERTIFY THE NEED FOR THESE SERVICES FURNISHED UNDER        THIS PLAN OF TREATMENT AND WHILE UNDER MY CARE     (Physician attestation of this document indicates review and certification of the therapy plan).              Referring Provider:  Ligia Mcintyre    Initial Assessment  See Epic Evaluation- Start of Care Date: 02/02/24 11/04/24 0500   Appointment Info   Signing clinician's name / credentials Angel Petty PT   Total/Authorized Visits E&T   Visits Used 18   Medical Diagnosis R knee pain, LBP, neuropathic pain   PT Tx Diagnosis R knee pain, LBP, neuropathic pain, weakness, gait abnormality   Quick Adds Certification   Progress Note/Certification   Start of Care Date 02/02/24   Onset of illness/injury or Date of Surgery 01/18/24   Therapy Frequency Updated:  1x visit this recert period   Predicted Duration Updated:  1 week   Certification date from 10/31/24   Certification date to 11/04/24   Progress Note Completed Date 11/04/24   PT Goal 1   Goal Identifier ambulation   Goal Description Goal modified: able to ambulate with approp AD x 12 minutes, pain 8/10   Rationale  to maximize safety and independence with performance of ADLs and functional tasks;to maximize safety and independence within the home;to maximize safety and independence within the community;to maximize safety and independence with self cares   Goal Progress Able to ambulate with 4WW x 10-12 minutes, pain 8/10   Target Date 10/30/24   Date Met 11/04/24   Subjective Report   Subjective Report Feels ready for DC to HEP.  Feels confident and comfortable continuing her HEP on her own.  Car transfers and walking going better and more aware of her pain boundaries to help with avoiding big pain flares.  Very appreciative of her Pain PT experience and all that she has learned about pain and her pain specifically. Also less overall fatigue noted so has more energy to be generally active.   Objective Measures   Objective Measures Objective Measure 1;Objective Measure 2;Objective Measure 3   Objective Measure 1   Objective Measure Improved R knee quad control adama with WB tasks.   Details Gait with her 4WW exhibits good equal step length, toe off B, good upright posture and indep with staying 'in the cage' of her walker.   Objective Measure 2   Objective Measure AROM:  R knee 0-0-105, L knee 0-0-110   Objective Measure 3   Objective Measure See FACIT - much improved overall score   Treatment Interventions (PT)   Interventions Therapeutic Procedure/Exercise;Neuromuscular Re-education   Therapeutic Procedure/Exercise   Therapeutic Procedures: strength, endurance, ROM, flexibility minutes (61849) 30   Therapeutic Procedures Ther Proc 2;Ther Proc 3;Ther Proc 4;Ther Proc 5;Ther Proc 6;Ther Proc 7;Ther Proc 9;Ther Proc 8;Ther Proc 10   Ther Proc 1 seated cat/cow/netural -> encourage spine mobility, core muscle activation   Ther Proc 2 Performed standing small step into hip extension (both with toe tap and with full step back) to light fatigue   Ther Proc 3 UBE x 10'/fatigue   Ther Proc 3 - Details Gait, waller walk with her 4WW and  focus on staying in the cage of her walker to assist with more upright posture and less fwd trunk lean which can also stress the knee/hip more   Ther Proc 4 Performed seated thor ext and HS stretches -> good technique   Ther Proc 5 Performed seated BKFO and BKFI.  Seated hip adduction isometric and performed seated hip abduction isometric - good technique and indep   Ther Proc 5 - Details Not today - Performed standing gentle hip flexor stretch adama for L hip flexor   Ther Proc 6 Standing lateral wt shifting to R and L   Ther Proc 6 - Details Lat stepping with light UE assist and diagonal stepping with light UE assist - good technique, tolerance   Ther Proc 7 Not today - Seated shldr flex with scap set -> feel the core engage (ok to modify with elbow bends if needed)   Ther Proc 8 Seated knee extensions R and L to light fatigue   Ther Proc 8 - Details Slow and fast DF/PF -> Seated ankle pumps with focus on the DF and ed on relation to function adama with walking and foot clearance -> added alphabet for big toe, multidirectional control   Ther Proc 9 Home: seated modified adductor/groin stretch (pt reports some adductor spasms) -> ed on technique with goal of lengthening, nerve mobility -> pt likes the controlled lengthening   Ther Proc 9 - Details Cues for keeping heel on ground for technique -> Standing calf stretch (think 'lengthening' and nerve mobility) -> fewer cues needed for correct technique   Skilled Intervention cues for proper form   Patient Response/Progress good   Therapeutic Activity   Therapeutic Activities Ther Act 2;Ther Act 3   Ther Act 1 For therapeutic strengthening, performed multiple sit to/from stands at traditional height chair and also utilized hi-lo table for mult sit to/from stands -> to fatigue.  Ed on ok to modify for R knee pain ie have R foot slightly further ahead of L foot   Ther Act 1 - Details continued emphasis, ed on power vs endurance muscles   Ther Act 2 ed on functional reaching,  ADLs - ed COG, NICK, lever arm   Ther Act 3 Ed, work on functional squat and functional above shldr reaching -> ok to widen base and/or sit if reaching to lower shelf item or laundry front    Ther Act 3 - Details ed on variations of feet position, also set scapulae before reach   Skilled Intervention cues for proper form   Patient Response/Progress good   Neuromuscular Re-education   Neuromuscular re-ed of mvmt, balance, coord, kinesthetic sense, posture, proprioception minutes (43762) 25   Neuromuscular Re-education Neuro Re-ed 2;Neuro Re-ed 3;Neuro Re-ed 4;Neuro Re-ed 5;Neuro Re-ed 6;Neuro Re-ed 7;Neuro Re-ed 8;Neuro Re-ed 9   Neuro Re-ed 1 in standing, find neutral spine with soft knees   Neuro Re-ed 1 - Details staggered stance -> also wt shifting at diagonal from this staggered stance position -> standing heel raises, toe raises -> quality movement focus -> indep with staggered stance positioning   Neuro Re-ed 2 PNE, Pain Knowledge, general review of principles of PNE and pain PT -> HEP   Neuro Re-ed 2 - Details Further ed ->  visualization with static and dynamic movements for R knee.  Dynamic visualization slightly decreased R knee pain.   Neuro Re-ed 3 Standing mid range heel raises, toe raises to fatigue   Neuro Re-ed 4 Self care emphasis with seated decompression and deep breaths   Neuro Re-ed 4 - Details ongoing:  reminders for ok to say no to certain social activities if she recognizes will be overdoing it and potentially cause pain flare -> pt reports continued to do very well with this approach   Neuro Re-ed 5 Shoulder blade squeezes with cues for avoiding shldr shrug movement.  Feel pecs lengthen and makes diaphragm breathing more available and efficient   Neuro Re-ed 6 Seated Leeroy Chi UE movements with ed, focus on neutral jt and fluidity of movement -> UE #1-3   Neuro Re-ed 6 - Details Performed lateral stepping with UE #3 -> ed on reps of 3 -> also ed on 'rooting' principle of Leeroy Chi -> focus  on stability   Neuro Re-ed 7 Verbal review, pt has good recall and understanding -> Happiness chemicals and how to hack them -> endorphins, serotonin (meditation, breathing - mood stabilizer), etc   Neuro Re-ed 7 - Details pt appreciated this info and the understanding vs the cortisol, adrenaline that is released with pain stressor -> emphasized connection of movement adama aerobic ex/movement and the increased O2, endorphins, blood flow, etc   Neuro Re-ed 8 Sit and reach balance tasks -> recognize the off balance and train to re-balance, recover, find COG -->> performed intentional trunk SB and rotation for core engagement and spine mobility   Neuro Re-ed 9 not today - Step up and overs (small towel) -> cues for staying balanced on single leg.  Cues for hip flex, knee ext, ankle DF -> for foot clearance   Skilled Intervention pain science, cues for proper form   Patient Response/Progress good   Eval/Assessments   Assessments   (met goal)   Plan   Updates to plan of care DC from pain PT.  Pt to continue independently with HEP. (See Recert/PN/DC)   Comments   Comments See Recert/PN/DC -> had one visit into the new recert period   Total Session Time   Timed Code Treatment Minutes 55   Total Treatment Time (sum of timed and untimed services) 55         DISCHARGE  Reason for Discharge: Patient has met all goals.    Discharge Plan: Patient to continue home program.    Referring Provider:  Ligia Mcintyre

## 2024-11-05 SDOH — HEALTH STABILITY: PHYSICAL HEALTH: ON AVERAGE, HOW MANY DAYS PER WEEK DO YOU ENGAGE IN MODERATE TO STRENUOUS EXERCISE (LIKE A BRISK WALK)?: 0 DAYS

## 2024-11-05 SDOH — HEALTH STABILITY: PHYSICAL HEALTH: ON AVERAGE, HOW MANY MINUTES DO YOU ENGAGE IN EXERCISE AT THIS LEVEL?: 0 MIN

## 2024-11-05 ASSESSMENT — SOCIAL DETERMINANTS OF HEALTH (SDOH): HOW OFTEN DO YOU GET TOGETHER WITH FRIENDS OR RELATIVES?: TWICE A WEEK

## 2024-11-05 NOTE — PATIENT INSTRUCTIONS
Patient Education   Preventive Care Advice   This is general advice given by our system to help you stay healthy. However, your care team may have specific advice just for you. Please talk to your care team about your preventive care needs.  Nutrition  Eat 5 or more servings of fruits and vegetables each day.  Try wheat bread, brown rice and whole grain pasta (instead of white bread, rice, and pasta).  Get enough calcium and vitamin D. Check the label on foods and aim for 100% of the RDA (recommended daily allowance).  Lifestyle  Exercise at least 150 minutes each week  (30 minutes a day, 5 days a week).  Do muscle strengthening activities 2 days a week. These help control your weight and prevent disease.  No smoking.  Wear sunscreen to prevent skin cancer.  Have a dental exam and cleaning every 6 months.  Yearly exams  See your health care team every year to talk about:  Any changes in your health.  Any medicines your care team has prescribed.  Preventive care, family planning, and ways to prevent chronic diseases.  Shots (vaccines)   HPV shots (up to age 26), if you've never had them before.  Hepatitis B shots (up to age 59), if you've never had them before.  COVID-19 shot: Get this shot when it's due.  Flu shot: Get a flu shot every year.  Tetanus shot: Get a tetanus shot every 10 years.  Pneumococcal, hepatitis A, and RSV shots: Ask your care team if you need these based on your risk.  Shingles shot (for age 50 and up)  General health tests  Diabetes screening:  Starting at age 35, Get screened for diabetes at least every 3 years.  If you are younger than age 35, ask your care team if you should be screened for diabetes.  Cholesterol test: At age 39, start having a cholesterol test every 5 years, or more often if advised.  Bone density scan (DEXA): At age 50, ask your care team if you should have this scan for osteoporosis (brittle bones).  Hepatitis C: Get tested at least once in your life.  STIs (sexually  transmitted infections)  Before age 24: Ask your care team if you should be screened for STIs.  After age 24: Get screened for STIs if you're at risk. You are at risk for STIs (including HIV) if:  You are sexually active with more than one person.  You don't use condoms every time.  You or a partner was diagnosed with a sexually transmitted infection.  If you are at risk for HIV, ask about PrEP medicine to prevent HIV.  Get tested for HIV at least once in your life, whether you are at risk for HIV or not.  Cancer screening tests  Cervical cancer screening: If you have a cervix, begin getting regular cervical cancer screening tests starting at age 21.  Breast cancer scan (mammogram): If you've ever had breasts, begin having regular mammograms starting at age 40. This is a scan to check for breast cancer.  Colon cancer screening: It is important to start screening for colon cancer at age 45.  Have a colonoscopy test every 10 years (or more often if you're at risk) Or, ask your provider about stool tests like a FIT test every year or Cologuard test every 3 years.  To learn more about your testing options, visit:   .  For help making a decision, visit:   https://bit.ly/ad04535.  Prostate cancer screening test: If you have a prostate, ask your care team if a prostate cancer screening test (PSA) at age 55 is right for you.  Lung cancer screening: If you are a current or former smoker ages 50 to 80, ask your care team if ongoing lung cancer screenings are right for you.  For informational purposes only. Not to replace the advice of your health care provider. Copyright   2023 [facility]. All rights reserved. Clinically reviewed by the [facility] Transitions Program. Kozio 120163 - REV 01/24.    DEBROX over the counter on both sides to soften and remove ear wax.

## 2024-11-06 ENCOUNTER — OFFICE VISIT (OUTPATIENT)
Dept: FAMILY MEDICINE | Facility: CLINIC | Age: 74
End: 2024-11-06

## 2024-11-06 VITALS
DIASTOLIC BLOOD PRESSURE: 78 MMHG | OXYGEN SATURATION: 100 % | BODY MASS INDEX: 41.28 KG/M2 | WEIGHT: 263 LBS | HEART RATE: 94 BPM | SYSTOLIC BLOOD PRESSURE: 140 MMHG | HEIGHT: 67 IN

## 2024-11-06 DIAGNOSIS — E78.00 HYPERCHOLESTEREMIA: ICD-10-CM

## 2024-11-06 DIAGNOSIS — N39.46 MIXED STRESS AND URGE URINARY INCONTINENCE: ICD-10-CM

## 2024-11-06 DIAGNOSIS — H61.23 BILATERAL IMPACTED CERUMEN: ICD-10-CM

## 2024-11-06 DIAGNOSIS — I10 PRIMARY HYPERTENSION: ICD-10-CM

## 2024-11-06 DIAGNOSIS — Z00.00 ENCOUNTER FOR MEDICARE ANNUAL WELLNESS EXAM: Primary | ICD-10-CM

## 2024-11-06 DIAGNOSIS — R94.120 FAILED HEARING SCREENING: ICD-10-CM

## 2024-11-06 DIAGNOSIS — L98.9 SKIN LESION: ICD-10-CM

## 2024-11-06 DIAGNOSIS — G47.33 OSA (OBSTRUCTIVE SLEEP APNEA): Chronic | ICD-10-CM

## 2024-11-06 DIAGNOSIS — R73.03 PREDIABETES: ICD-10-CM

## 2024-11-06 DIAGNOSIS — F32.0 CURRENT MILD EPISODE OF MAJOR DEPRESSIVE DISORDER, UNSPECIFIED WHETHER RECURRENT (H): ICD-10-CM

## 2024-11-06 DIAGNOSIS — F50.819 BINGE EATING DISORDER, UNSPECIFIED SEVERITY: ICD-10-CM

## 2024-11-06 LAB
ANION GAP SERPL CALCULATED.3IONS-SCNC: 17 MMOL/L (ref 7–15)
BUN SERPL-MCNC: 25.9 MG/DL (ref 8–23)
CALCIUM SERPL-MCNC: 10 MG/DL (ref 8.8–10.4)
CHLORIDE SERPL-SCNC: 104 MMOL/L (ref 98–107)
CHOLESTEROL: 154 MG/DL (ref 100–199)
CREAT SERPL-MCNC: 0.93 MG/DL (ref 0.51–0.95)
EGFRCR SERPLBLD CKD-EPI 2021: 63 ML/MIN/1.73M2
EST. AVERAGE GLUCOSE BLD GHB EST-MCNC: 128 MG/DL
FASTING STATUS PATIENT QL REPORTED: YES
FASTING?: YES
GLUCOSE SERPL-MCNC: 111 MG/DL (ref 70–99)
HBA1C MFR BLD: 6.1 %
HCO3 SERPL-SCNC: 18 MMOL/L (ref 22–29)
HDL (RMG): 68 MG/DL (ref 40–?)
LDL CALCULATED (RMG): 71 MG/DL (ref 0–130)
POTASSIUM SERPL-SCNC: 3.8 MMOL/L (ref 3.4–5.3)
SODIUM SERPL-SCNC: 139 MMOL/L (ref 135–145)
TRIGLYCERIDES (RMG): 76 MG/DL (ref 0–149)

## 2024-11-06 PROCEDURE — 80048 BASIC METABOLIC PNL TOTAL CA: CPT | Performed by: FAMILY MEDICINE

## 2024-11-06 PROCEDURE — 88305 TISSUE EXAM BY PATHOLOGIST: CPT

## 2024-11-06 PROCEDURE — 83036 HEMOGLOBIN GLYCOSYLATED A1C: CPT | Performed by: FAMILY MEDICINE

## 2024-11-06 PROCEDURE — 88305 TISSUE EXAM BY PATHOLOGIST: CPT | Mod: TC | Performed by: FAMILY MEDICINE

## 2024-11-06 PROCEDURE — 36415 COLL VENOUS BLD VENIPUNCTURE: CPT | Performed by: FAMILY MEDICINE

## 2024-11-06 RX ORDER — OLMESARTAN MEDOXOMIL AND HYDROCHLOROTHIAZIDE 40/25 40; 25 MG/1; MG/1
1 TABLET ORAL DAILY
Qty: 90 TABLET | Refills: 3 | Status: SHIPPED | OUTPATIENT
Start: 2024-11-06

## 2024-11-06 RX ORDER — ATORVASTATIN CALCIUM 20 MG/1
20 TABLET, FILM COATED ORAL DAILY
Qty: 90 TABLET | Refills: 3 | Status: SHIPPED | OUTPATIENT
Start: 2024-11-06

## 2024-11-06 NOTE — PROGRESS NOTES
11/5/2024   General Health   How would you rate your overall physical health? (!) POOR   Feel stress (tense, anxious, or unable to sleep) Very much      (!) STRESS CONCERN      11/5/2024   Nutrition   Diet: Low salt            11/5/2024   Exercise   Days per week of moderate/strenous exercise 0 days   Average minutes spent exercising at this level 0 min      (!) EXERCISE CONCERN      11/5/2024   Social Factors   Frequency of gathering with friends or relatives Twice a week   Worry food won't last until get money to buy more No   Food not last or not have enough money for food? No   Do you have housing? (Housing is defined as stable permanent housing and does not include staying ouside in a car, in a tent, in an abandoned building, in an overnight shelter, or couch-surfing.) Yes   Are you worried about losing your housing? No   Lack of transportation? No   Unable to get utilities (heat,electricity)? No            11/5/2024   Fall Risk   Fallen 2 or more times in the past year? No    Trouble with walking or balance? Yes        Patient-reported         11/5/2024   Activities of Daily Living- Home Safety   Needs help with the following daily activites Shopping    Housework   Safety concerns in the home None of the above       Multiple values from one day are sorted in reverse-chronological order         11/5/2024   Dental   Dentist two times every year? Yes            11/5/2024   Hearing Screening   Hearing concerns? (!) I NEED TO ASK PEOPLE TO SPEAK UP OR REPEAT THEMSELVES.    HEARING SCREEN:      LEFT EAR:    500hz: Fail  1000hz: Fail  2000hz: Fail  4000hz: Fail    RIGHT EAR:    500hz: Pass  1000hz: Pass  2000hz: Fail  4000hz: Fail        11/5/2024   Driving Risk Screening   Patient/family members have concerns about driving No            11/5/2024   General Alertness/Fatigue Screening   Have you been more tired than usual lately? (!) YES            11/5/2024   Urinary Incontinence Screening   Bothered by  leaking urine in past 6 months Yes            8/28/2024   TB Screening   Were you born outside of the [place]? No        PHQ-2 Score:         11/6/2024    10:32 AM 4/23/2023     3:06 PM   PHQ-2 ( 1999 [employer])   Q1: Little interest or pleasure in doing things 1 1    Q2: Feeling down, depressed or hopeless 1 1    PHQ-2 Score 2 2   Q1: Little interest or pleasure in doing things  Several days   Q2: Feeling down, depressed or hopeless  Several days   PHQ-2 Score  2       Patient-reported              11/5/2024   Substance Use   Alcohol more than 3/day or more than 7/wk No   Do you have a current opioid prescription? No   How severe/bad is pain from 1 to 10? 7/10   Do you use any other substances recreationally? No        Social History     Tobacco Use    Smoking status: Never    Smokeless tobacco: Never   Vaping Use    Vaping status: Never Used   Substance Use Topics    Alcohol use: Yes     Comment: Weekends mostly.    Drug use: No           5/4/2018    12:57 PM 11/6/2024    10:34 AM   MINI COG   Clock Draw Score 2 Normal 2 Normal   3 Item Recall 3 objects recalled 3 objects recalled   Mini Cog Total Score 5 5        Shelia was seen today for physical, derm problem and dizziness.    Diagnoses and all orders for this visit:    Encounter for Medicare annual wellness exam        Encounter for Medicare annual wellness exam    Failed hearing screening  Bilateral impacted cerumen  Guided to use Debrox to start.

## 2024-11-06 NOTE — PROGRESS NOTES
"SUBJECTIVE:    This 74 year old female presents with the following concerns:     -\"Growth\" to left posterior thigh. Growing in size. No pain, itching, bleeding    Health Maintenance:  Health maintenance alerts were reviewed and updated as appropriate.  Osteoporosis screening: UTD  Colorectal cancer screening: historically declines. Has been offered cologuard in the past. Declines this also at present.     Lab Results   Component Value Date    A1C 6.2 05/09/2024    A1C 6.2 12/27/2023    A1C 6.2 04/26/2023    A1C 6.3 08/15/2022    A1C 6.2 02/17/2022    A1C 6.0 05/10/2021    A1C 6.3 02/08/2021    A1C 5.7 11/18/2019    A1C 6.1 08/07/2019       OBJECTIVE:  Vitals:    11/06/24 1036 11/06/24 1038 11/06/24 1100   BP: (!) 151/79 (!) 154/68 (!) 140/78   Pulse: 94     SpO2: 100%     Weight: 119.3 kg (263 lb)     Height: 1.702 m (5' 7\")      Body mass index is 41.19 kg/m .  General: no acute distress, cooperative with exam.  HEENT:  PERRLA. Bilateral impacted cerumen.  Neck:  Supple, no lymphadenopathy or thyromegaly   Lungs: clear to auscultation bilaterally, normal respiratory effort.  Heart:  RRR without murmurs, rubs or gallops.  Normal S1 and S2.  Abdomen: normal bowel sounds, nontender, no palpable organomegaly.  Skin:  pea sized pedunculated fleshy skin colored lesion to posterior left thigh.   Extremities: warm, perfused, no swelling or edema.  Neuro:  CN II-XII intact, motor & sensory function all intact.    Psych: mental status normal, mood and affect appropriate.\    Shave Excision Procedure Note    The procedure, risks and complications, which include but are not limited to bleeding, infection were discussed. Verbal consent was obtained for the procedure.     PROCEDURE:     Lesion was cleansed with alcohol wipe.  0.5 mL of  2% lidocaine with epinephrine used for subcutaneous anesthesia. Lesion was removed using Dermablade.The patient tolerated the procedure well.      Lesion was sent for pathology.          " 4/26/2023     2:50 PM 12/27/2023     4:52 PM 5/9/2024     8:47 AM   PHQ   PHQ-9 Total Score 15 15 14   Q9: Thoughts of better off dead/self-harm past 2 weeks Not at all Not at all Not at all        Patient-reported       ASSESSMENT / PLAN:      Primary hypertension  Takes 81 mg ASA also. Borderline blood pressure today. Prior postural hypotension with increased hypertensive control. Monitor for now.   -     VENOUS COLLECTION  -     Basic metabolic panel; Future  -     olmesartan-hydrochlorothiazide (BENICAR HCT) 40-25 MG tablet; Take 1 tablet by mouth daily.    Prediabetes  Lab Results   Component Value Date    A1C 6.2 05/09/2024     -     Hemoglobin A1c; Future    Hypercholesteremia  lipitor 20 mg daily  -     Lipid Profile ([facility])  -     atorvastatin (LIPITOR) 20 MG tablet; Take 1 tablet (20 mg) by mouth daily.    Skin lesion  Likely skin tag. Sent for pathology due to size.   Follow up: The patient tolerated the procedure well without complications.  Standard post-procedure care is explained and return precautions are given.    -     Surgical Pathology Exam; Future  -     SD TANGENTIAL BIOPSY OF SKIN, FIRST/SINGLE LESION    LORI (obstructive sleep apnea)- severe ()  CPAP machine was recalled. Was without CPAP for 6 months.      Binge eating disorder, unspecified severity  Working with [facility]. Referred there by prior PCP. Prior trial of Topamax : intolerable brain fog. Endorses strong emotinal eating cutes.    Mixed stress and urge urinary incontinence  s/p hysterectomy. Wears urinary incontinence pads both day and night. Has to change pad at night.      Current mild episode of major depressive disorder, unspecified whether recurrent (H)    Effexor 75 mg daily, Wellbutrin 150 mg daily. Follows with psychiatry.      9. Urinary incontience:

## 2024-11-12 ENCOUNTER — TRANSCRIBE ORDERS (OUTPATIENT)
Dept: OTHER | Age: 74
End: 2024-11-12

## 2024-11-12 ENCOUNTER — TRANSFERRED RECORDS (OUTPATIENT)
Dept: FAMILY MEDICINE | Facility: CLINIC | Age: 74
End: 2024-11-12

## 2024-11-12 DIAGNOSIS — I89.0 LYMPHEDEMA, NOT ELSEWHERE CLASSIFIED: Primary | ICD-10-CM

## 2024-11-12 LAB
PATH REPORT.COMMENTS IMP SPEC: NORMAL
PATH REPORT.COMMENTS IMP SPEC: NORMAL
PATH REPORT.FINAL DX SPEC: NORMAL
PATH REPORT.GROSS SPEC: NORMAL
PATH REPORT.MICROSCOPIC SPEC OTHER STN: NORMAL
PATH REPORT.RELEVANT HX SPEC: NORMAL
PHOTO IMAGE: NORMAL

## 2025-05-02 ASSESSMENT — SLEEP AND FATIGUE QUESTIONNAIRES
HOW LIKELY ARE YOU TO NOD OFF OR FALL ASLEEP IN A CAR, WHILE STOPPED FOR A FEW MINUTES IN TRAFFIC: WOULD NEVER DOZE
HOW LIKELY ARE YOU TO NOD OFF OR FALL ASLEEP WHILE SITTING INACTIVE IN A PUBLIC PLACE: WOULD NEVER DOZE
HOW LIKELY ARE YOU TO NOD OFF OR FALL ASLEEP WHILE WATCHING TV: SLIGHT CHANCE OF DOZING
HOW LIKELY ARE YOU TO NOD OFF OR FALL ASLEEP WHILE LYING DOWN TO REST IN THE AFTERNOON WHEN CIRCUMSTANCES PERMIT: HIGH CHANCE OF DOZING
HOW LIKELY ARE YOU TO NOD OFF OR FALL ASLEEP WHILE SITTING QUIETLY AFTER LUNCH WITHOUT ALCOHOL: HIGH CHANCE OF DOZING
HOW LIKELY ARE YOU TO NOD OFF OR FALL ASLEEP WHILE SITTING AND TALKING TO SOMEONE: WOULD NEVER DOZE
HOW LIKELY ARE YOU TO NOD OFF OR FALL ASLEEP WHILE SITTING AND READING: SLIGHT CHANCE OF DOZING
HOW LIKELY ARE YOU TO NOD OFF OR FALL ASLEEP WHEN YOU ARE A PASSENGER IN A CAR FOR AN HOUR WITHOUT A BREAK: SLIGHT CHANCE OF DOZING

## 2025-05-07 ENCOUNTER — OFFICE VISIT (OUTPATIENT)
Dept: SLEEP MEDICINE | Facility: CLINIC | Age: 75
End: 2025-05-07
Payer: COMMERCIAL

## 2025-05-07 VITALS
HEIGHT: 67 IN | BODY MASS INDEX: 42.09 KG/M2 | RESPIRATION RATE: 20 BRPM | HEART RATE: 96 BPM | DIASTOLIC BLOOD PRESSURE: 86 MMHG | SYSTOLIC BLOOD PRESSURE: 150 MMHG | WEIGHT: 268.2 LBS | OXYGEN SATURATION: 99 %

## 2025-05-07 DIAGNOSIS — G47.33 OSA (OBSTRUCTIVE SLEEP APNEA): Primary | Chronic | ICD-10-CM

## 2025-05-07 PROCEDURE — 3077F SYST BP >= 140 MM HG: CPT | Performed by: PHYSICIAN ASSISTANT

## 2025-05-07 PROCEDURE — 1125F AMNT PAIN NOTED PAIN PRSNT: CPT | Performed by: PHYSICIAN ASSISTANT

## 2025-05-07 PROCEDURE — 3079F DIAST BP 80-89 MM HG: CPT | Performed by: PHYSICIAN ASSISTANT

## 2025-05-07 PROCEDURE — 99204 OFFICE O/P NEW MOD 45 MIN: CPT | Performed by: PHYSICIAN ASSISTANT

## 2025-05-07 NOTE — PROGRESS NOTES
Outpatient Sleep Medicine Consultation:      Name: Shelia Sanchez MRN# [id number]   Age: 74 year old YOB: 1950     Date of Consultation: May 7, 2025  Consultation is requested by: No referring provider defined for this encounter. No ref. provider found  Primary care provider: Hailee Harris       Reason for Sleep Consult:       Patient s Reason for visit  Shelia Sanchez main reason for visit: (Patient-Rptd) Have not seen doctor since recall of my cpap and covid.  Patient states problem(s) started: (Patient-Rptd) Recall of cpap and Covid  Shelia Sanchez's goals for this visit: (Patient-Rptd) Get back on track with cpap and get bavk with good sleep habits. Renew prescriptions for supplies.           Assessment and Plan:     Summary Sleep Diagnoses & Recommendations:   Severe obstructive sleep apnea.  Tolerating PAP well, but sup-optimal compliance. Daytime symptoms are improved with use of CPAP.   Patient counseled to use CPAP with all sleep.  Sleep apnea reviewed. Also reviewed potential consequences of untreated severe sleep apnea   Comprehensive DME order placed.    Split, if a new study is required    Shelia to follow up with Primary Care provider regarding elevated blood pressure.     Comorbid Diagnoses:  HTN  Depression  Morbid obesity      Summary Recommendations:  Orders Placed This Encounter   Procedures    Comprehensive DME       Summary Counseling:    Obstructive Sleep Apnea Reviewed  Complications of Untreated Sleep Apnea Reviewed      Medical Decision-making:   Educational materials provided in instructions    Total time spent reviewing medical records, history and physical examination, review of previous testing and interpretation as well as documentation on this date:45 minutes    CC: No ref. provider found          History of Present Illness:     Chief Complaint   Patient presents with    CPAP Follow Up     When she was waiting for a new CPAP from the recall, she began  "sleeping in a recliner. She really likes the recliner, but knows that she should be in her bed with the CPAP. Wants to get back to CPAP for her health.       Shelia Sanchez presents to re-establish care for her severe sleep apnea, managed with CPAP.     In review:  Shelia Sanchez initially presented with high probability of obstructive sleep apnea with modest possibility of coexisting hypoventilation based on BMI of 43, loud snoring, witnessed apnea, non-refreshing sleep, fatigue/excessive daytime sleepiness(ESS 9), crowded oropharynx, morning headaches, neck circumference of 16\" and co-morbid HTN. STOP-BANG score is 7/8.    8/8/2018 [place] Split Sleep Study (275.0 lbs) - .3, .7, Supine .3, REM AHI n/a, Low O2% 81.3%, Time Spent =88% 2.6, Time Spent =89% 5.2. Treatment was titrated to a pressure of CPAP 8 with an AHI 28.9. Time spent in REM at this pressure was 0 minutes.    Do you use a CPAP Machine at home:  yes  Overall, on a scale of 0-10 how would you rate your CPAP (0 poor, 10 great):  -    What type of mask do you use:  FFM  Is your mask comfortable:    If not, why:      Is your mask leaking:  no  If yes, where do you feel it:    How many night per week does the mask leak (0-7):      Do you notice snoring with mask on:  no  Do you notice gasping arousals with mask on:  no  Are you having significant oral or nasal dryness:  no  Is the pressure setting comfortable:  ye  If not, why:        Respironics  Auto-PAP 8 - 18 cmH2O 30 day usage data:    <1% of days with > 4 hours of use. 89/90 days with no use.   Average use 6 hours and 25 minutes per day on the night used  Average large leak 0 LPM.     CPAP 90% pressure 18 cm.   AHI 3.6 events per hour.      SLEEP-WAKE SCHEDULE:     Work/School Days: Patient goes to school/work: (Patient-Rptd) No   Usually gets into bed at (Patient-Rptd) Midnight or 1AM  Takes patient about (Patient-Rptd) Usually 1hour to fall asleep  Has trouble falling " asleep (Patient-Rptd) 3  to 4 nights nights per week  Wakes up in the middle of the night (Patient-Rptd) Once at 2:30 am or 3 am times.  Wakes up due to (Patient-Rptd) Pain;External stimuli (bed partner, pets, noise, etc)  She has trouble falling back asleep (Patient-Rptd) 5 times a week.   It usually takes (Patient-Rptd) Most times i just stay awake to get back to sleep  Patient is usually up at (Patient-Rptd) 7AM  Uses alarm: (Patient-Rptd) No    Weekends/Non-work Days/All Other Days:  Usually gets into bed at (Patient-Rptd) 10PM   Takes patient about (Patient-Rptd) 1 hour + to fall asleep  Patient is usually up at (Patient-Rptd) 7: 00 to 7:30  Uses alarm: (Patient-Rptd) No    Sleep Need  Patient gets  (Patient-Rptd) As much as 7 and as little as 4 sleep on average   Patient thinks she needs about (Patient-Rptd) 8 hours sleep    Shelia Sanchez prefers to sleep in this position(s): (Patient-Rptd) Recliner   Patient states they do the following activities in bed: (Patient-Rptd) Read;Eat;Watch TV;Use phone, computer, or tablet    Naps  Patient takes a purposeful nap (Patient-Rptd) Most days times a week and naps are usually (Patient-Rptd) 1 and 1/2 hours in duration  She feels better after a nap: (Patient-Rptd) No  She dozes off unintentionally (Patient-Rptd) 0 days per week  Patient has had a driving accident or near-miss due to sleepiness/drowsiness: (Patient-Rptd) No      SLEEP DISRUPTIONS:    Breathing/Snoring  Patient snores:(Patient-Rptd) Yes  Other people complain about her snoring: (Patient-Rptd) Yes  Patient has been told she stops breathing in her sleep:(Patient-Rptd) Yes  She has issues with the following: (Patient-Rptd) Morning headaches;Morning mouth dryness;Stuffy nose when you wake up;Heartburn or reflux at night    Movement:  Patient gets pain, discomfort, with an urge to move:  (Patient-Rptd) Yes  It happens when she is resting:  (Patient-Rptd) Yes  It happens more at night:  (Patient-Rptd)  No  Patient has been told she kicks her legs at night:  (Patient-Rptd) No     Behaviors in Sleep:  Shelia Sanchez has experienced the following behaviors while sleeping:    She has experienced sudden muscle weakness during the day: (Patient-Rptd) Yes      Is there anything else you would like your sleep provider to know: (Patient-Rptd) I am obese and am prediabetic. I have been diagnosed with depression and have a binge eating disorder. 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 I need knee replacements and walking is very painful. I      CAFFEINE AND OTHER SUBSTANCES:    Patient consumes caffeinated beverages per day:  (Patient-Rptd) 1 8oz cup of coffee at breakfast  Last caffeine use is usually: (Patient-Rptd) 8:30 AM  List of any prescribed or over the counter stimulants that patient takes: (Patient-Rptd) None other than Bupropion and Venlafaxine. Don t know if they have stimulints in them.  List of any prescribed or over the counter sleep medication patient takes: (Patient-Rptd) None  List of previous sleep medications that patient has tried: (Patient-Rptd) Melatonin  Patient drinks alcohol to help them sleep: (Patient-Rptd) No  Patient drinks alcohol near bedtime: (Patient-Rptd) Yes    Family History:  Patient has a family member been diagnosed with a sleep disorder: (Patient-Rptd) No            SCALES:    EPWORTH SLEEPINESS SCALE         5/7/2025     1:00 PM    [place] Sleepiness Scale ( HORACIO Goddard  [id number]<br>ESS - [place]/English - Final version - 21 Nov 07 - [facility] [place].)   [place] Score (Sleep) 9         INSOMNIA SEVERITY INDEX (NATALIA)          5/7/2025     1:00 PM   Insomnia Severity Index (NATALIA)   Difficulty falling asleep 3   Difficulty staying asleep 3   Problems waking up too early 3   How SATISFIED/DISSATISFIED are you with your CURRENT sleep pattern? 3   How NOTICEABLE to others do you think your sleep problem is in terms of impairing the quality of  "your life? 3   How WORRIED/DISTRESSED are you about your current sleep problem? 3   To what extent do you consider your sleep problem to INTERFERE with your daily functioning (e.g. daytime fatigue, mood, ability to function at work/daily chores, concentration, memory, mood, etc.) CURRENTLY? 3   NATALIA Total Score 21       Guidelines for Scoring/Interpretation:  Total score categories:  0-7 = No clinically significant insomnia   8-14 = Subthreshold insomnia   15-21 = Clinical insomnia (moderate severity)  22-28 = Clinical insomnia (severe)  Used via courtesy of www.LearnUponealth.va.gov with permission from Gurpreet Valdes PhD., [facility]      STOP BANG         5/7/2025     1:50 PM   STOP BANG Questionnaire (  2008, the American Society of Anesthesiologists, Inc. [employer]   [facility]: 150/86         GAD7        7/12/2024     8:29 PM   OSCAR-7    1. Feeling nervous, anxious, or on edge 2   2. Not being able to stop or control worrying 1   3. Worrying too much about different things 2   4. Trouble relaxing 1   5. Being so restless that it is hard to sit still 0   6. Becoming easily annoyed or irritable 3   7. Feeling afraid, as if something awful might happen 0   OSCAR-7 Total Score 9   If you checked any problems, how difficult have they made it for you to do your work, take care of things at home, or get along with other people? Somewhat difficult         CAGE-AID         No data to display                CAGE-AID reprinted with permission from the [place] Medical Journal, SHITAL Reeves. and ESTRELLA Hagan, \"Conjoint screening questionnaires for alcohol and drug abuse\" [place] Medical Journal 94: 135-140, 1995.      PATIENT HEALTH QUESTIONNAIRE-9 (PHQ - 9)        5/9/2024     8:47 AM   PHQ-9 ([employer])   1.  Little interest or pleasure in doing things 2   2.  Feeling down, depressed, or hopeless 2   3.  Trouble falling or staying asleep, or sleeping too much 2   4.  Feeling tired or having little " energy 2   5.  Poor appetite or overeating 2   6.  Feeling bad about yourself - or that you are a failure or have let yourself or your family down 2   7.  Trouble concentrating on things, such as reading the newspaper or watching television 2   8.  Moving or speaking so slowly that other people could have noticed. Or the opposite - being so fidgety or restless that you have been moving around a lot more than usual 0   9.  Thoughts that you would be better off dead, or of hurting yourself in some way 0   PHQ-9 Total Score 14   6.  Feeling bad about yourself 2   7.  Trouble concentrating 2   8.  Moving slowly or restless 0   9.  Suicidal or self-harm thoughts 0   1.  Little interest or pleasure in doing things More than half the days   2.  Feeling down, depressed, or hopeless More than half the days   3.  Trouble falling or staying asleep, or sleeping too much More than half the days   4.  Feeling tired or having little energy More than half the days   5.  Poor appetite or overeating More than half the days   6.  Feeling bad about yourself More than half the days   7.  Trouble concentrating More than half the days   8.  Moving slowly or restless Not at all   9.  Suicidal or self-harm thoughts Not at all   PHQ-9 via [employer] TOTAL SCORE-----> 14 (Moderate depression)   Difficulty at work, home, or with people Very difficult       Developed by Christy Mayo, Mireya Robins, Yaya Power and colleagues, with an educational liliane from [employer] No permission required to reproduce, translate, display or distribute.        Allergies:    Allergies   Allergen Reactions    Nitrofurantoin      Stomach pain    Topiramate Confusion    Lisinopril Cough       Medications:    Current Outpatient Medications   Medication Sig Dispense Refill    acetaminophen (TYLENOL) 325 MG tablet Take 325-650 mg by mouth every 4 hours as needed for mild pain      aspirin (ASA) 81 MG chewable tablet TAKE 1 TABLET BY MOUTH DAILY 90  tablet 0    atorvastatin (LIPITOR) 20 MG tablet Take 1 tablet (20 mg) by mouth daily. 90 tablet 3    B Complex-Biotin-FA (HM VITAMIN B100 COMPLEX) TABS       buPROPion (WELLBUTRIN XL) 150 MG 24 hr tablet take 1 tablet by mouth daily as directed      esomeprazole (NEXIUM) 20 MG DR capsule Take 1 tablet by mouth daily       ibuprofen (ADVIL/MOTRIN) 200 MG tablet Take 200 mg by mouth every 6 hours as needed for pain      olmesartan-hydrochlorothiazide (BENICAR HCT) 40-25 MG tablet Take 1 tablet by mouth daily. 90 tablet 3    venlafaxine (EFFEXOR-XR) 75 MG 24 hr capsule TAKE 1 CAPSULE(75 MG) BY MOUTH DAILY 90 capsule 0    Vitamin D3 (CHOLECALCIFEROL) 125 MCG (5000 UT) tablet Take 1 tablet by mouth daily         Problem List:  Patient Active Problem List    Diagnosis Date Noted    Right knee pain 02/02/2024     Priority: Medium    Current mild episode of major depressive disorder, unspecified whether recurrent 04/26/2023     Priority: Medium    Arthritis 03/01/2022     Priority: Medium    Binge eating disorder 08/08/2019     Priority: Medium    Prediabetes 08/08/2019     Priority: Medium    LORI (obstructive sleep apnea)- severe () 08/09/2018     Priority: Medium     8/8/2018 [place] Split Sleep Study (275.0 lbs) - .3, .7, Supine .3, REM AHI n/a, Low O2% 81.3%, Time Spent <=88% 2.6, Time Spent <=89% 5.2. Treatment was titrated to a pressure of CPAP 8 with an AHI 28.9. Time spent in REM at this pressure was 0 minutes.      Heartburn 11/16/2016     Priority: Medium    Menopausal syndrome (hot flashes) 03/30/2016     Priority: Medium    Cervical pain 11/19/2015     Priority: Medium    Urinary incontinence 02/02/2015     Priority: Medium    Hypertension goal BP (blood pressure) < 140/90 07/02/2011     Priority: Medium        Past Medical/Surgical History:  Past Medical History:   Diagnosis Date    Arthritis 01/28/2014    Bursitis in left hip    Asteatotic eczema     Cholelithiasis with obstruction  05/10/2010    Depressive disorder     Diabetes (H) Summer 2019    Pre-diabetes    Gallstone pancreatitis 05/11/2010    History of blood transfusion 40 years ago    Hypertension     Right knee pain 12/27/2012     Past Surgical History:   Procedure Laterality Date    CHOLECYSTECTOMY, LAPOROSCOPIC      Cholecystectomy, Laparoscopic    HEAD & NECK SURGERY      Treatment for arthritis in neck by deadening nerves    PHACOEMULSIFICATION CLEAR CORNEA WITH STANDARD INTRAOCULAR LENS IMPLANT Right 11/22/2022    Procedure: RIGHT PHACOEMULSIFICATION, CATARACT, WITH INTRAOCULAR LENS IMPLANT;  Surgeon: Trae Wade MD;  Location: [facility] OR    PHACOEMULSIFICATION CLEAR CORNEA WITH STANDARD INTRAOCULAR LENS IMPLANT Left 12/8/2022    Procedure: LEFT PHACOEMULSIFICATION, CATARACT, WITH INTRAOCULAR LENS IMPLANT;  Surgeon: Trae Wade MD;  Location: [facility] OR    ZZC VAG HYST,RMV TUBE/OVARY  2004    ZZHC ERCP W STENT PLACEMENT BILE/PANCREATIC DUCT         Social History:  Social History     Socioeconomic History    Marital status: Single     Spouse name: Not on file    Number of children: Not on file    Years of education: Not on file    Highest education level: Not on file   Occupational History    Not on file   Tobacco Use    Smoking status: Never     Passive exposure: Never    Smokeless tobacco: Never   Vaping Use    Vaping status: Never Used   Substance and Sexual Activity    Alcohol use: Yes     Comment: Weekends mostly.    Drug use: No    Sexual activity: Not Currently     Partners: Male     Birth control/protection: Abstinence   Other Topics Concern     Service No    Blood Transfusions Yes     Comment: 1974    Caffeine Concern No    Occupational Exposure No    Hobby Hazards No    Sleep Concern No    Stress Concern No    Weight Concern Yes     Comment: OVERWEIGHT    Special Diet No    Back Care No    Exercise No    Bike Helmet Not Asked    Seat Belt Yes    Self-Exams Yes    Parent/sibling w/ CABG, MI  or angioplasty before 65F 55M? No   Social History Narrative     for 6 years in [employer]. Did not enjoy. Since worked for  for [employer] for 30 years. 2 older sisters and 1 younger sister. Not  with no children,. Has a cat (Mamie : 14). Enjoys reading, puzzles.     Social Drivers of Health     Financial Resource Strain: Low Risk  (11/5/2024)    Financial Resource Strain     Within the past 12 months, have you or your family members you live with been unable to get utilities (heat, electricity) when it was really needed?: No   Food Insecurity: Low Risk  (11/5/2024)    Food Insecurity     Within the past 12 months, did you worry that your food would run out before you got money to buy more?: No     Within the past 12 months, did the food you bought just not last and you didn t have money to get more?: No   Transportation Needs: Low Risk  (11/5/2024)    Transportation Needs     Within the past 12 months, has lack of transportation kept you from medical appointments, getting your medicines, non-medical meetings or appointments, work, or from getting things that you need?: No   Recent Concern: Transportation Needs - High Risk (9/8/2024)    Transportation Needs     Within the past 12 months, has lack of transportation kept you from medical appointments, getting your medicines, non-medical meetings or appointments, work, or from getting things that you need?: Yes   Physical Activity: Inactive (11/5/2024)    Exercise Vital Sign     Days of Exercise per Week: 0 days     Minutes of Exercise per Session: 0 min   Stress: Stress Concern Present (11/5/2024)    [place] [employer] - Occupational Stress Questionnaire     Feeling of Stress : Very much   Social Connections: Unknown (11/5/2024)    Social Connection and Isolation Panel [NHANES]     Frequency of Communication with Friends and Family: Not on file     Frequency of Social Gatherings with Friends and Family: Twice  a week     Attends Denominational Services: Not on file     Active Member of Clubs or Organizations: Not on file     Attends Club or Organization Meetings: Not on file     Marital Status: Not on file   Interpersonal Safety: Not on file   Housing Stability: Low Risk  (11/5/2024)    Housing Stability     Do you have housing? : Yes     Are you worried about losing your housing?: No       Family History:  Family History   Problem Relation Age of Onset    Arthritis Mother     Congenital Anomalies Mother     Eye Disorder Mother     Glaucoma Mother     Hypertension Mother     Depression Mother     Obesity Mother     Hyperlipidemia Mother     Osteoporosis Mother     Congenital Anomalies Father     Alzheimer Disease Father     Arthritis Father     Diabetes Father     Hypertension Father     Cerebrovascular Disease Father         Father    Prostate Cancer Father     Obesity Father     Coronary Artery Disease Father     Hyperlipidemia Father     Heart Disease Maternal Grandmother     Heart Disease Maternal Grandfather     Hypertension Paternal Grandmother         Mother s side    Heart Disease Paternal Grandfather     Depression Sister     Hypertension Sister         Sister    Anxiety Disorder Sister     Macular Degeneration Maternal Uncle        Review of Systems:  A complete review of systems reviewed by me is negative with the exeption of what has been mentioned in the history of present illness.  In the last TWO WEEKS have you experienced any of the following symptoms?  Fevers: (Patient-Rptd) No  Night Sweats: (Patient-Rptd) No  Weight Gain: (Patient-Rptd) Yes  Pain at Night: (Patient-Rptd) Yes  Double Vision: (Patient-Rptd) No  Changes in Vision: (Patient-Rptd) No  Difficulty Breathing through Nose: (Patient-Rptd) No  Sore Throat in Morning: (Patient-Rptd) No  Dry Mouth in the Morning: (Patient-Rptd) Yes  Shortness of Breath Lying Flat: (Patient-Rptd) No  Shortness of Breath With Activity: (Patient-Rptd) Yes  Awakening with  "Shortness of Breath: (Patient-Rptd) No  Increased Cough: (Patient-Rptd) Yes  Heart Racing at Night: (Patient-Rptd) No  Swelling in Feet or Legs: (Patient-Rptd) Yes  Diarrhea at Night: (Patient-Rptd) No  Heartburn at Night: (Patient-Rptd) No  Urinating More than Once at Night: (Patient-Rptd) Yes  Losing Control of Urine at Night: (Patient-Rptd) Yes  Joint Pains at Night: (Patient-Rptd) Yes  Headaches in Morning: (Patient-Rptd) Yes  Weakness in Arms or Legs: (Patient-Rptd) No  Depressed Mood: (Patient-Rptd) Yes  Anxiety: (Patient-Rptd) Yes     Physical Examination:  Vitals: BP (!) 150/86   Pulse 96   Resp 20   Ht 1.702 m (5' 7\")   Wt 121.7 kg (268 lb 3.2 oz)   SpO2 99%   BMI 42.01 kg/m    BMI= Body mass index is 42.01 kg/m .    Neck Cir (cm): 39 cm               Data: All pertinent previous laboratory data reviewed     Recent Labs   Lab Test 11/06/24  1109 09/11/24  1138    140   POTASSIUM 3.8 3.6   CHLORIDE 104 108*   CO2 18* 21*   ANIONGAP 17* 11   * 108*   BUN 25.9* 42.0*   CR 0.93 1.25*   MYLES 10.0 10.1       Recent Labs   Lab Test 08/15/22  1047   WBC 10.6   RBC 4.56   HGB 12.9   HCT 39.5   MCV 87   MCH 28.3   MCHC 32.7   RDW 14.0          Recent Labs   Lab Test 04/26/23  1555   PROTTOTAL 6.4   ALBUMIN 4.3   BILITOTAL 0.4   ALKPHOS 93   AST 17   ALT 20       TSH (mU/L)   Date Value   02/17/2022 1.62   08/05/2019 3.45   01/11/2019 1.18           BEBETO Grubbs 5/7/2025           "

## 2025-05-07 NOTE — NURSING NOTE
"Chief Complaint   Patient presents with    CPAP Follow Up     When she was waiting for a new CPAP from the recall, she began sleeping in a recliner. She really likes the recliner, but knows that she should be in her bed with the CPAP. Wants to get back to CPAP for her health.       Initial BP (!) 147/82   Pulse 96   Resp 20   Ht 1.702 m (5' 7\")   Wt 121.7 kg (268 lb 3.2 oz)   SpO2 99%   BMI 42.01 kg/m   Estimated body mass index is 42.01 kg/m  as calculated from the following:    Height as of this encounter: 1.702 m (5' 7\").    Weight as of this encounter: 121.7 kg (268 lb 3.2 oz).    Medication Reconciliation: complete  ESS: 9  Neck circumference: 15.50 inches / 39 centimeters.  DME: MARJ Carmichael CMA      "

## 2025-05-07 NOTE — PATIENT INSTRUCTIONS
Your Body mass index is 42.01 kg/m .  Weight management is a personal decision.  If you are interested in exploring weight loss strategies, the following discussion covers the approaches that may be successful. Body mass index (BMI) is one way to tell whether you are at a healthy weight, overweight, or obese. It measures your weight in relation to your height.  A BMI of 18.5 to 24.9 is in the healthy range. A person with a BMI of 25 to 29.9 is considered overweight, and someone with a BMI of 30 or greater is considered obese. More than two-thirds of [place] adults are considered overweight or obese.  Being overweight or obese increases the risk for further weight gain. Excess weight may lead to heart disease and diabetes.  Creating and following plans for healthy eating and physical activity may help you improve your health.  Weight control is part of healthy lifestyle and includes exercise, emotional health, and healthy eating habits. Careful eating habits lifelong are the mainstay of weight control. Though there are significant health benefits from weight loss, long-term weight loss with diet alone may be very difficult to achieve- studies show long-term success with dietary management in less than 10% of people. Attaining a healthy weight may be especially difficult to achieve in those with severe obesity. In some cases, medications, devices and surgical management might be considered.  What can you do?  If you are overweight or obese and are interested in methods for weight loss, you should discuss this with your provider.   Consider reducing daily calorie intake by 500 calories.   Keep a food journal.   Avoiding skipping meals, consider cutting portions instead.    Diet combined with exercise helps maintain muscle while optimizing fat loss. Strength training is particularly important for building and maintaining muscle mass. Exercise helps reduce stress, increase energy, and improves fitness. Increasing  exercise without diet control, however, may not burn enough calories to loose weight.     Start walking three days a week 10-20 minutes at a time  Work towards walking thirty minutes five days a week   Eventually, increase the speed of your walking for 1-2 minutes at time    In addition, we recommend that you review healthy lifestyles and methods for weight loss available through the [employer] patient information sites:  http://win.niddk.nih.gov/publications/index.htm    And look into health and wellness programs that may be available through your health insurance provider, employer, local community center, or [employer].

## (undated) DEVICE — GLOVE PROTEXIS W/NEU-THERA 7.5  2D73TE75

## (undated) DEVICE — EYE PACK CUSTOM CATARACT AS12127-01

## (undated) RX ORDER — TETRACAINE HYDROCHLORIDE 5 MG/ML
SOLUTION OPHTHALMIC
Status: DISPENSED
Start: 2022-12-08

## (undated) RX ORDER — TRIAMCINOLONE ACETONIDE 40 MG/ML
INJECTION, SUSPENSION INTRA-ARTICULAR; INTRAMUSCULAR
Status: DISPENSED
Start: 2023-02-03

## (undated) RX ORDER — TRIAMCINOLONE ACETONIDE 40 MG/ML
INJECTION, SUSPENSION INTRA-ARTICULAR; INTRAMUSCULAR
Status: DISPENSED
Start: 2023-12-01

## (undated) RX ORDER — TRIAMCINOLONE ACETONIDE 40 MG/ML
INJECTION, SUSPENSION INTRA-ARTICULAR; INTRAMUSCULAR
Status: DISPENSED
Start: 2023-07-28

## (undated) RX ORDER — MOXIFLOXACIN IN NACL,ISO-OS/PF 0.3MG/0.3
SYRINGE (ML) INTRAOCULAR
Status: DISPENSED
Start: 2022-12-08

## (undated) RX ORDER — TRIAMCINOLONE ACETONIDE 40 MG/ML
INJECTION, SUSPENSION INTRA-ARTICULAR; INTRAMUSCULAR
Status: DISPENSED
Start: 2019-11-25

## (undated) RX ORDER — LIDOCAINE HYDROCHLORIDE 10 MG/ML
INJECTION, SOLUTION EPIDURAL; INFILTRATION; INTRACAUDAL; PERINEURAL
Status: DISPENSED
Start: 2023-02-03

## (undated) RX ORDER — TRIAMCINOLONE ACETONIDE 40 MG/ML
INJECTION, SUSPENSION INTRA-ARTICULAR; INTRAMUSCULAR
Status: DISPENSED
Start: 2021-04-08

## (undated) RX ORDER — ACETAMINOPHEN 325 MG/1
TABLET ORAL
Status: DISPENSED
Start: 2022-12-08

## (undated) RX ORDER — LIDOCAINE HYDROCHLORIDE 10 MG/ML
INJECTION, SOLUTION EPIDURAL; INFILTRATION; INTRACAUDAL; PERINEURAL
Status: DISPENSED
Start: 2022-09-02

## (undated) RX ORDER — TRIAMCINOLONE ACETONIDE 40 MG/ML
INJECTION, SUSPENSION INTRA-ARTICULAR; INTRAMUSCULAR
Status: DISPENSED
Start: 2022-09-02

## (undated) RX ORDER — KETOROLAC TROMETHAMINE 5 MG/ML
SOLUTION OPHTHALMIC
Status: DISPENSED
Start: 2022-12-08

## (undated) RX ORDER — LIDOCAINE HYDROCHLORIDE 10 MG/ML
INJECTION, SOLUTION EPIDURAL; INFILTRATION; INTRACAUDAL; PERINEURAL
Status: DISPENSED
Start: 2021-10-22

## (undated) RX ORDER — LIDOCAINE HYDROCHLORIDE 10 MG/ML
INJECTION, SOLUTION EPIDURAL; INFILTRATION; INTRACAUDAL; PERINEURAL
Status: DISPENSED
Start: 2019-11-25

## (undated) RX ORDER — LIDOCAINE HYDROCHLORIDE 10 MG/ML
INJECTION, SOLUTION EPIDURAL; INFILTRATION; INTRACAUDAL; PERINEURAL
Status: DISPENSED
Start: 2021-04-08

## (undated) RX ORDER — BUPIVACAINE HYDROCHLORIDE 2.5 MG/ML
INJECTION, SOLUTION EPIDURAL; INFILTRATION; INTRACAUDAL
Status: DISPENSED
Start: 2021-04-08

## (undated) RX ORDER — BALANCED SALT SOLUTION 6.4; .75; .48; .3; 3.9; 1.7 MG/ML; MG/ML; MG/ML; MG/ML; MG/ML; MG/ML
SOLUTION OPHTHALMIC
Status: DISPENSED
Start: 2022-12-08

## (undated) RX ORDER — LIDOCAINE HYDROCHLORIDE 10 MG/ML
INJECTION, SOLUTION EPIDURAL; INFILTRATION; INTRACAUDAL; PERINEURAL
Status: DISPENSED
Start: 2023-12-01

## (undated) RX ORDER — EPINEPHRINE 1 MG/ML
INJECTION, SOLUTION, CONCENTRATE INTRAVENOUS
Status: DISPENSED
Start: 2022-12-08

## (undated) RX ORDER — ACETAMINOPHEN 325 MG/1
TABLET ORAL
Status: DISPENSED
Start: 2022-11-22

## (undated) RX ORDER — FENTANYL CITRATE 50 UG/ML
INJECTION, SOLUTION INTRAMUSCULAR; INTRAVENOUS
Status: DISPENSED
Start: 2022-12-08

## (undated) RX ORDER — TRIAMCINOLONE ACETONIDE 40 MG/ML
INJECTION, SUSPENSION INTRA-ARTICULAR; INTRAMUSCULAR
Status: DISPENSED
Start: 2021-10-22

## (undated) RX ORDER — FENTANYL CITRATE 50 UG/ML
INJECTION, SOLUTION INTRAMUSCULAR; INTRAVENOUS
Status: DISPENSED
Start: 2022-11-22

## (undated) RX ORDER — LIDOCAINE HYDROCHLORIDE 10 MG/ML
INJECTION, SOLUTION EPIDURAL; INFILTRATION; INTRACAUDAL; PERINEURAL
Status: DISPENSED
Start: 2023-07-28